# Patient Record
Sex: MALE | Race: WHITE | NOT HISPANIC OR LATINO | Employment: OTHER | URBAN - METROPOLITAN AREA
[De-identification: names, ages, dates, MRNs, and addresses within clinical notes are randomized per-mention and may not be internally consistent; named-entity substitution may affect disease eponyms.]

---

## 2017-01-10 ENCOUNTER — INFUSION (OUTPATIENT)
Dept: RHEUMATOLOGY | Facility: HOSPITAL | Age: 72
End: 2017-01-10
Attending: PHYSICIAN ASSISTANT
Payer: MEDICARE

## 2017-01-10 ENCOUNTER — LAB VISIT (OUTPATIENT)
Dept: LAB | Facility: HOSPITAL | Age: 72
End: 2017-01-10
Attending: PHYSICIAN ASSISTANT
Payer: MEDICARE

## 2017-01-10 ENCOUNTER — OFFICE VISIT (OUTPATIENT)
Dept: RHEUMATOLOGY | Facility: CLINIC | Age: 72
End: 2017-01-10
Payer: MEDICARE

## 2017-01-10 VITALS
BODY MASS INDEX: 32.21 KG/M2 | WEIGHT: 205.25 LBS | SYSTOLIC BLOOD PRESSURE: 117 MMHG | DIASTOLIC BLOOD PRESSURE: 64 MMHG | HEART RATE: 60 BPM | HEIGHT: 67 IN

## 2017-01-10 VITALS
DIASTOLIC BLOOD PRESSURE: 64 MMHG | SYSTOLIC BLOOD PRESSURE: 119 MMHG | HEART RATE: 49 BPM | WEIGHT: 205.25 LBS | BODY MASS INDEX: 32.15 KG/M2

## 2017-01-10 DIAGNOSIS — M06.9 RA (RHEUMATOID ARTHRITIS): Primary | ICD-10-CM

## 2017-01-10 DIAGNOSIS — M05.79 RHEUMATOID ARTHRITIS INVOLVING MULTIPLE SITES WITH POSITIVE RHEUMATOID FACTOR: Chronic | ICD-10-CM

## 2017-01-10 DIAGNOSIS — M05.79 RHEUMATOID ARTHRITIS INVOLVING MULTIPLE SITES WITH POSITIVE RHEUMATOID FACTOR: Primary | Chronic | ICD-10-CM

## 2017-01-10 DIAGNOSIS — M06.9 RA (RHEUMATOID ARTHRITIS): ICD-10-CM

## 2017-01-10 DIAGNOSIS — D84.9 IMMUNOCOMPROMISED PATIENT: Chronic | ICD-10-CM

## 2017-01-10 DIAGNOSIS — I73.9 PERIPHERAL VASCULAR DISEASE WITH CLAUDICATION: ICD-10-CM

## 2017-01-10 DIAGNOSIS — I25.118 CORONARY ARTERY DISEASE OF NATIVE ARTERY OF NATIVE HEART WITH STABLE ANGINA PECTORIS: ICD-10-CM

## 2017-01-10 DIAGNOSIS — Z51.81 MEDICATION MONITORING ENCOUNTER: Chronic | ICD-10-CM

## 2017-01-10 DIAGNOSIS — M05.79 RHEUMATOID ARTHRITIS INVOLVING MULTIPLE SITES WITH POSITIVE RHEUMATOID FACTOR: Primary | ICD-10-CM

## 2017-01-10 LAB
ALBUMIN SERPL BCP-MCNC: 3.3 G/DL
ALP SERPL-CCNC: 60 U/L
ALT SERPL W/O P-5'-P-CCNC: 16 U/L
ANION GAP SERPL CALC-SCNC: 9 MMOL/L
AST SERPL-CCNC: 28 U/L
BASOPHILS # BLD AUTO: 0.01 K/UL
BASOPHILS NFR BLD: 0.2 %
BILIRUB SERPL-MCNC: 0.6 MG/DL
BUN SERPL-MCNC: 11 MG/DL
CALCIUM SERPL-MCNC: 9.6 MG/DL
CHLORIDE SERPL-SCNC: 103 MMOL/L
CO2 SERPL-SCNC: 28 MMOL/L
CREAT SERPL-MCNC: 1 MG/DL
DIFFERENTIAL METHOD: ABNORMAL
EOSINOPHIL # BLD AUTO: 0.3 K/UL
EOSINOPHIL NFR BLD: 4.2 %
ERYTHROCYTE [DISTWIDTH] IN BLOOD BY AUTOMATED COUNT: 14.3 %
ERYTHROCYTE [SEDIMENTATION RATE] IN BLOOD BY WESTERGREN METHOD: 46 MM/HR
EST. GFR  (AFRICAN AMERICAN): >60 ML/MIN/1.73 M^2
EST. GFR  (NON AFRICAN AMERICAN): >60 ML/MIN/1.73 M^2
GLUCOSE SERPL-MCNC: 115 MG/DL
HCT VFR BLD AUTO: 42.8 %
HGB BLD-MCNC: 14.9 G/DL
LYMPHOCYTES # BLD AUTO: 2.3 K/UL
LYMPHOCYTES NFR BLD: 37.5 %
MCH RBC QN AUTO: 34.7 PG
MCHC RBC AUTO-ENTMCNC: 34.8 %
MCV RBC AUTO: 100 FL
MONOCYTES # BLD AUTO: 0.3 K/UL
MONOCYTES NFR BLD: 4.2 %
NEUTROPHILS # BLD AUTO: 3.4 K/UL
NEUTROPHILS NFR BLD: 53.9 %
PLATELET # BLD AUTO: 237 K/UL
PMV BLD AUTO: 8.7 FL
POTASSIUM SERPL-SCNC: 4.2 MMOL/L
PROT SERPL-MCNC: 7.1 G/DL
RBC # BLD AUTO: 4.3 M/UL
SODIUM SERPL-SCNC: 140 MMOL/L
WBC # BLD AUTO: 6.21 K/UL

## 2017-01-10 PROCEDURE — 25000003 PHARM REV CODE 250: Mod: PO | Performed by: PHYSICIAN ASSISTANT

## 2017-01-10 PROCEDURE — 96413 CHEMO IV INFUSION 1 HR: CPT | Mod: PO

## 2017-01-10 PROCEDURE — 99999 PR PBB SHADOW E&M-EST. PATIENT-LVL III: CPT | Mod: PBBFAC,,, | Performed by: INTERNAL MEDICINE

## 2017-01-10 PROCEDURE — 80053 COMPREHEN METABOLIC PANEL: CPT | Mod: PO

## 2017-01-10 PROCEDURE — 80299 QUANTITATIVE ASSAY DRUG: CPT

## 2017-01-10 PROCEDURE — 85025 COMPLETE CBC W/AUTO DIFF WBC: CPT | Mod: PO

## 2017-01-10 PROCEDURE — 85651 RBC SED RATE NONAUTOMATED: CPT | Mod: PO

## 2017-01-10 PROCEDURE — 36415 COLL VENOUS BLD VENIPUNCTURE: CPT | Mod: PO

## 2017-01-10 PROCEDURE — 86140 C-REACTIVE PROTEIN: CPT

## 2017-01-10 PROCEDURE — 99214 OFFICE O/P EST MOD 30 MIN: CPT | Mod: S$PBB,,, | Performed by: INTERNAL MEDICINE

## 2017-01-10 PROCEDURE — 96375 TX/PRO/DX INJ NEW DRUG ADDON: CPT | Mod: PO

## 2017-01-10 PROCEDURE — 63600175 PHARM REV CODE 636 W HCPCS: Mod: PO | Performed by: PHYSICIAN ASSISTANT

## 2017-01-10 RX ORDER — OMEGA-3-ACID ETHYL ESTERS 1 G/1
CAPSULE, LIQUID FILLED ORAL
Refills: 5 | COMMUNITY
Start: 2017-01-03 | End: 2019-07-03 | Stop reason: SDUPTHER

## 2017-01-10 RX ORDER — SODIUM CHLORIDE 0.9 % (FLUSH) 0.9 %
10 SYRINGE (ML) INJECTION
Status: CANCELLED | OUTPATIENT
Start: 2017-03-06

## 2017-01-10 RX ORDER — DIPHENHYDRAMINE HYDROCHLORIDE 50 MG/ML
12.5 INJECTION INTRAMUSCULAR; INTRAVENOUS ONCE
Status: COMPLETED | OUTPATIENT
Start: 2017-01-10 | End: 2017-01-10

## 2017-01-10 RX ORDER — EZETIMIBE 10 MG
TABLET ORAL
Refills: 5 | COMMUNITY
Start: 2016-12-27 | End: 2018-05-30

## 2017-01-10 RX ORDER — METHOTREXATE 2.5 MG/1
TABLET ORAL
Qty: 40 TABLET | Refills: 5 | Status: SHIPPED | OUTPATIENT
Start: 2017-01-10 | End: 2017-08-28 | Stop reason: SDUPTHER

## 2017-01-10 RX ORDER — DIPHENHYDRAMINE HYDROCHLORIDE 50 MG/ML
12.5 INJECTION INTRAMUSCULAR; INTRAVENOUS ONCE
Status: CANCELLED
Start: 2017-03-06 | End: 2017-03-06

## 2017-01-10 RX ORDER — METHYLPREDNISOLONE SOD SUCC 125 MG
50 VIAL (EA) INJECTION ONCE
Status: CANCELLED
Start: 2017-03-06 | End: 2017-03-06

## 2017-01-10 RX ORDER — SODIUM CHLORIDE 0.9 % (FLUSH) 0.9 %
10 SYRINGE (ML) INJECTION
Status: DISCONTINUED | OUTPATIENT
Start: 2017-01-10 | End: 2017-01-10 | Stop reason: HOSPADM

## 2017-01-10 RX ORDER — METHYLPREDNISOLONE SOD SUCC 125 MG
50 VIAL (EA) INJECTION ONCE
Status: COMPLETED | OUTPATIENT
Start: 2017-01-10 | End: 2017-01-10

## 2017-01-10 RX ADMIN — SODIUM CHLORIDE, PRESERVATIVE FREE 10 ML: 5 INJECTION INTRAVENOUS at 11:01

## 2017-01-10 RX ADMIN — DIPHENHYDRAMINE HYDROCHLORIDE 12.5 MG: 50 INJECTION INTRAMUSCULAR; INTRAVENOUS at 09:01

## 2017-01-10 RX ADMIN — INFLIXIMAB 800 MG: 100 INJECTION, POWDER, LYOPHILIZED, FOR SOLUTION INTRAVENOUS at 09:01

## 2017-01-10 RX ADMIN — METHYLPREDNISOLONE SODIUM SUCCINATE 50 MG: 125 INJECTION, POWDER, FOR SOLUTION INTRAMUSCULAR; INTRAVENOUS at 09:01

## 2017-01-10 ASSESSMENT — ROUTINE ASSESSMENT OF PATIENT INDEX DATA (RAPID3): MDHAQ FUNCTION SCORE: .6

## 2017-01-10 NOTE — PATIENT INSTRUCTIONS
Raise Mtx to 6 week for 2 weeks, the 8 week in split dose    No change in vits    Will check circulation in legs

## 2017-01-10 NOTE — PROGRESS NOTES
Infusion# >10  S/S infection noted or voiced? no  Recent labs? Today; infliximab antibodies drawn     Premeds? Benadryl 12.5 mg IVP over 2 min, and Solumedrol 50 mg IVP over 2 min    Remicade 800 mg administered IV at a 90 minute rate per orders; see MAR and vitals for more  details. Tolerated well without adverse events, discharged and ambulatory out of clinic.

## 2017-01-10 NOTE — MR AVS SNAPSHOT
[facility]  [address] [place] [address]  Phone: [phone]  Fax: [phone]                  Jack Back   1/10/2017 9:00 AM   Infusion    Description:  Male : 1945   Provider:  RHEUMATOLOGY, INFUSION   Department:  [facility]           Diagnoses this Visit        Comments    Rheumatoid arthritis involving multiple sites with positive rheumatoid factor    -  Primary     RA (rheumatoid arthritis)                To Do List           Future Appointments        Provider Department Dept Phone    3/7/2017 7:55 AM LABORATORY, [place] [facility] [phone]    3/7/2017 8:30 AM Cleo Espinoza PA-C [facility] Rheumatology [phone]    3/7/2017 9:30 AM RHEUMATOLOGY, INFUSION [facility] [phone]      Goals (5 Years of Data)     None      [facility]     [facility] -  Assistance  Registered nurses in the [facility] provide clinical advisement, health education, appointment booking, and other advisory services.  Call for this free service at [phone].             Medications           Message regarding Medications     Verify the changes and/or additions to your medication regime listed below are the same as discussed with your clinician today.  If any of these changes or additions are incorrect, please notify your healthcare provider.        These medications were administered today        Dose Freq    infliximab (REMICADE) 800 mg in sodium chloride 0.9% 250 mL IVPB 800 mg Once    Sig: Inject 800 mg into the vein once.    Class: Normal    Route: Intravenous    sodium chloride 0.9% flush 10 mL 10 mL As needed (PRN)    Sig: Inject 10 mLs into the vein as needed for Line Care.    Class: Normal    Route: Intravenous    diphenhydrAMINE injection 12.5 mg 12.5 mg Once    Sig: Inject 0.25 mLs (12.5 mg total) into the vein once.    Class: Normal    Route: Intravenous     methylPREDNISolone sodium succinate injection 50 mg 50 mg Once    Sig: Inject 50 mg into the vein once.    Class: Normal    Route: Intravenous           Verify that the below list of medications is an accurate representation of the medications you are currently taking.  If none reported, the list may be blank. If incorrect, please contact your healthcare provider. Carry this list with you in case of emergency.           Current Medications     arginine 500 mg tablet Take 500 mg by mouth once daily.    aspirin (ECOTRIN) 81 MG EC tablet Take 81 mg by mouth once daily.    atorvastatin (LIPITOR) 20 MG tablet     B-complex with vitamin C (Z-BEC OR EQUIV) tablet Take 1 tablet by mouth once daily.    co-enzyme Q-10 30 mg capsule Take 30 mg by mouth once daily.    EFFIENT 10 mg Tab     folic acid (FOLVITE) 800 MCG Tab Take 800 mcg by mouth once daily.    INFLIXIMAB (REMICADE IV) Inject into the vein.    isosorbide mononitrate (IMDUR) 30 MG 24 hr tablet 90 mg 2 (two) times daily.    lecithin 1,200 mg Cap Take by mouth.    methotrexate 2.5 MG Tab 4 tabs am, 4 tabs pm once a week    metoprolol tartrate (LOPRESSOR) 25 MG tablet 25 mg once daily.    milk thistle 175 mg tablet Take 175 mg by mouth once daily.    multivitamin with minerals tablet Take 1 tablet by mouth once daily.    NITROSTAT 0.4 mg SL tablet 0.4 mg.    omega-3 acid ethyl esters (LOVAZA) 1 gram capsule     POTASSIUM/MAGNESIUM (MAGNESIUM-POTASSIUM ORAL) Take by mouth.    prasterone, dhea, (DHEA) 50 mg Cap Take by mouth.    saw palmetto fruit 450 mg Cap Take by mouth.    SYNTHROID 75 mcg tablet     testosterone cypionate (DEPOTESTOTERONE CYPIONATE) 200 mg/mL injection     vitamin D 1000 units Tab Take 185 mg by mouth 2 (two) times daily.    ZETIA 10 mg tablet     zinc 50 mg Tab Take by mouth.           Clinical Reference Information           Vital Signs - Last Recorded  Most recent update: 1/10/2017 12:50 PM by Janina Tom RN    BP Pulse Wt BMI        119/64 (!) 49 93.1 kg (205 lb 4 oz) 32.15 kg/m2       Allergies as of 1/10/2017     Penicillins    Tetanus Vaccines And Toxoid    Vancomycin Analogues      Immunizations Administered on Date of Encounter - 1/10/2017     None      Orders Placed During Today's Visit      Normal Orders This Visit    Infliximab and Anti-Infliximab Ab       Administrations This Visit     diphenhydrAMINE injection 12.5 mg     Admin Date Action Dose Route Administered By             01/10/2017 Given 12.5 mg Intravenous Janina Tom RN                    infliximab (REMICADE) 800 mg in sodium chloride 0.9% 250 mL IVPB     Admin Date Action Dose Rate Route Administered By          01/10/2017 New Bag 800 mg 166.7 mL/hr Intravenous Janina Tom RN                   methylPREDNISolone sodium succinate injection 50 mg     Admin Date Action Dose Route Administered By             01/10/2017 Given 50 mg Intravenous Janina Tom RN                    sodium chloride 0.9% flush 10 mL     Admin Date Action Dose Route Administered By             01/10/2017 Given 10 mL Intravenous Janina Tom RN

## 2017-01-10 NOTE — PLAN OF CARE
Problem: Patient Care Overview  Goal: Plan of Care Review  Outcome: Ongoing (interventions implemented as appropriate)  I'm feeling a little worse than I have been before.

## 2017-01-11 LAB — CRP SERPL-MCNC: 5.9 MG/L

## 2017-01-13 LAB
INFLIXIMAB DRUG LEVEL: 6 MCG/ML
INFLIXIMAB INTERPRETATION: NORMAL

## 2017-03-07 ENCOUNTER — INFUSION (OUTPATIENT)
Dept: RHEUMATOLOGY | Facility: HOSPITAL | Age: 72
End: 2017-03-07
Attending: INTERNAL MEDICINE
Payer: MEDICARE

## 2017-03-07 ENCOUNTER — OFFICE VISIT (OUTPATIENT)
Dept: RHEUMATOLOGY | Facility: CLINIC | Age: 72
End: 2017-03-07
Payer: MEDICARE

## 2017-03-07 VITALS
DIASTOLIC BLOOD PRESSURE: 72 MMHG | BODY MASS INDEX: 32.21 KG/M2 | HEIGHT: 67 IN | WEIGHT: 205.25 LBS | SYSTOLIC BLOOD PRESSURE: 137 MMHG | HEART RATE: 54 BPM

## 2017-03-07 VITALS
BODY MASS INDEX: 32.15 KG/M2 | DIASTOLIC BLOOD PRESSURE: 60 MMHG | WEIGHT: 205.25 LBS | SYSTOLIC BLOOD PRESSURE: 107 MMHG | HEART RATE: 43 BPM

## 2017-03-07 DIAGNOSIS — R70.0 ELEVATED SED RATE: ICD-10-CM

## 2017-03-07 DIAGNOSIS — M05.79 RHEUMATOID ARTHRITIS INVOLVING MULTIPLE SITES WITH POSITIVE RHEUMATOID FACTOR: Primary | ICD-10-CM

## 2017-03-07 DIAGNOSIS — Z51.81 MEDICATION MONITORING ENCOUNTER: Chronic | ICD-10-CM

## 2017-03-07 DIAGNOSIS — M05.79 RHEUMATOID ARTHRITIS INVOLVING MULTIPLE SITES WITH POSITIVE RHEUMATOID FACTOR: Primary | Chronic | ICD-10-CM

## 2017-03-07 DIAGNOSIS — D84.9 IMMUNOCOMPROMISED PATIENT: Chronic | ICD-10-CM

## 2017-03-07 PROCEDURE — 25000003 PHARM REV CODE 250: Mod: PO | Performed by: PHYSICIAN ASSISTANT

## 2017-03-07 PROCEDURE — 63600175 PHARM REV CODE 636 W HCPCS: Mod: PO | Performed by: PHYSICIAN ASSISTANT

## 2017-03-07 PROCEDURE — 99214 OFFICE O/P EST MOD 30 MIN: CPT | Mod: S$PBB,,, | Performed by: PHYSICIAN ASSISTANT

## 2017-03-07 PROCEDURE — 99999 PR PBB SHADOW E&M-EST. PATIENT-LVL IV: CPT | Mod: PBBFAC,,, | Performed by: PHYSICIAN ASSISTANT

## 2017-03-07 PROCEDURE — 96413 CHEMO IV INFUSION 1 HR: CPT | Mod: PO

## 2017-03-07 RX ORDER — METHYLPREDNISOLONE SOD SUCC 125 MG
50 VIAL (EA) INJECTION ONCE
Status: CANCELLED
Start: 2017-05-01 | End: 2017-05-01

## 2017-03-07 RX ORDER — SODIUM CHLORIDE 0.9 % (FLUSH) 0.9 %
10 SYRINGE (ML) INJECTION
Status: CANCELLED | OUTPATIENT
Start: 2017-05-01

## 2017-03-07 RX ORDER — DIPHENHYDRAMINE HYDROCHLORIDE 50 MG/ML
12.5 INJECTION INTRAMUSCULAR; INTRAVENOUS ONCE
Status: CANCELLED
Start: 2017-05-01 | End: 2017-05-01

## 2017-03-07 RX ORDER — SODIUM CHLORIDE 0.9 % (FLUSH) 0.9 %
10 SYRINGE (ML) INJECTION
Status: DISCONTINUED | OUTPATIENT
Start: 2017-03-07 | End: 2017-03-07 | Stop reason: HOSPADM

## 2017-03-07 RX ORDER — LEVOTHYROXINE SODIUM 88 UG/1
88 TABLET ORAL DAILY
Refills: 5 | COMMUNITY
Start: 2017-02-21

## 2017-03-07 RX ADMIN — Medication 10 ML: at 09:03

## 2017-03-07 RX ADMIN — INFLIXIMAB 800 MG: 100 INJECTION, POWDER, LYOPHILIZED, FOR SOLUTION INTRAVENOUS at 09:03

## 2017-03-07 ASSESSMENT — CLINICAL DISEASE ACTIVITY INDEX (CDAI)
SWOLLEN_JOINTS_COUNT: 0
TOTAL_SCORE: 2
TENDER_JOINTS_COUNT: 0
PHYSICIAN_ASSESSMENT: 1
PATIENT_ASSESSMENT: 1

## 2017-03-07 ASSESSMENT — ROUTINE ASSESSMENT OF PATIENT INDEX DATA (RAPID3): MDHAQ FUNCTION SCORE: .5

## 2017-03-07 NOTE — PROGRESS NOTES
Infusion# >10  S/S infection noted or voiced? None noted/denied  Recent labs? yes    Premeds? None    Remicade 800 mg administered IV at a 90 minute rate per orders; see MAR and vitals for more  details. Tolerated well without adverse events, discharged and ambulatory out of clinic.

## 2017-03-07 NOTE — PROGRESS NOTES
Subjective:       Patient ID: Jack Back is a 72 y.o. male.    Chief Complaint: Rheumatoid Arthritis      HPI Comments: Jack is back today for rheumatology follow-up.  He has seropositive erosive rheumatoid arthritis. Last year was off schedule due to MI.  Had stent place. 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 He wanted to stay off his remicade and see how he did. After missing 2 months. Remicade resumed. initally not back to remission so we opted to increase his mtx to 20 mg once weekly. His joints are doing better. Still some aching and stiffness. No swelling or acute exacerbations. Pain 0/10 today. His esr has remained elevated. He is concerned from a cardiac standpoint with the chronic inflammation. He is on fish oil, liptor 20 mg, zetia, asa 81 mg and effient. Also on metoprolol. His bp is good. managed by cardiology. Previous MI with CABG X 3 in 2012. He is on lipitor, metoprolol and brilinta. They does have some mild heart damage. He Will be watched closely by cards. echo done a few weeks ago showed prior damage noted was resolved. His heart function was back to normal.  He has not developed heart failure. No lower ext edema, mild sob, no chest pain. No pnd    Dose of Remicade 800 mg (7.9 mg/kg) IV over 90 min.  On folic acid otc.       He denies any fevers, no cough,  congestion.  He did have a bone density done in June 2014 which was normal. Vaccines all up to date except TdaP but pt allergic to Tetanus.           Review of Systems   Constitutional: Negative.  Negative for chills, fatigue and fever.   HENT: Negative.  Negative for congestion, mouth sores and trouble swallowing.    Eyes: Negative.  Negative for photophobia, redness and visual disturbance.   Respiratory: Negative.  Negative for cough, shortness of breath and wheezing.    Cardiovascular: Negative.  Negative for chest pain and leg swelling.   Gastrointestinal: Negative.  Negative for abdominal distention, abdominal pain, diarrhea, nausea and vomiting.  "  Genitourinary: Negative.  Negative for dysuria, flank pain, frequency and urgency.   Musculoskeletal: Negative for arthralgias, back pain, gait problem, joint swelling and myalgias.        Mild aching and stiffness   Skin: Negative.  Negative for rash.   Neurological: Negative.  Negative for dizziness, weakness and numbness.           Objective:     /72  Pulse (!) 54  Ht 5' 7" (1.702 m)  Wt 93.1 kg (205 lb 4 oz)  BMI 32.15 kg/m2        Physical Exam   Constitutional: He is oriented to person, place, and time and well-developed, well-nourished, and in no distress. No distress.   HENT:   Head: Normocephalic and atraumatic.   Right Ear: External ear normal.   Left Ear: External ear normal.   Mouth/Throat: No oropharyngeal exudate.   Eyes: Conjunctivae and EOM are normal. Pupils are equal, round, and reactive to light. No scleral icterus.   Neck: Neck supple. No thyromegaly present.   Cardiovascular: Normal rate, regular rhythm and normal heart sounds.    No murmur heard.  Pulmonary/Chest: Effort normal and breath sounds normal. No respiratory distress.   Abdominal: Soft. Bowel sounds are normal.       Right Side Rheumatological Exam     Examination finds the shoulder, 1st PIP, 1st MCP, 2nd PIP, 2nd MCP, 3rd PIP, 3rd MCP, 4th PIP, 4th MCP, 5th PIP and 5th MCP normal.    Joint Exam Comments   Elbow: limited extension   Wrist: Mild reduced flexion of wrist, no synovitis    Left Side Rheumatological Exam     Examination finds the shoulder, elbow, knee, 1st PIP, 1st MCP, 2nd PIP, 2nd MCP, 3rd PIP, 3rd MCP, 4th PIP, 4th MCP, 5th PIP and 5th MCP normal.    Joint Exam Comments   Wrist: Limited motion left wrists, no synovitis      Lymphadenopathy:     He has no cervical adenopathy.   Neurological: He is alert and oriented to person, place, and time. No cranial nerve deficit. He exhibits normal muscle tone. Gait normal. Coordination normal.   Skin: Skin is warm and dry.     Musculoskeletal: Normal range of motion. " He exhibits deformity. He exhibits no edema or tenderness.   Ulnar drift 25 degrees right hand and 15 degrees left hand, right elbow fixed flexion stops about  5 degrees from baseline,  Limited motion both wrist extension  No synovitis on exam today               Results for orders placed or performed in visit on 03/07/17   CBC auto differential   Result Value Ref Range    WBC 6.15 3.90 - 12.70 K/uL    RBC 4.09 (L) 4.60 - 6.20 M/uL    Hemoglobin 14.1 14.0 - 18.0 g/dL    Hematocrit 41.0 40.0 - 54.0 %     (H) 82 - 98 fL    MCH 34.5 (H) 27.0 - 31.0 pg    MCHC 34.4 32.0 - 36.0 %    RDW 15.3 (H) 11.5 - 14.5 %    Platelets 251 150 - 350 K/uL    MPV 8.7 (L) 9.2 - 12.9 fL    Gran # 3.4 1.8 - 7.7 K/uL    Lymph # 2.4 1.0 - 4.8 K/uL    Mono # 0.2 (L) 0.3 - 1.0 K/uL    Eos # 0.2 0.0 - 0.5 K/uL    Baso # 0.01 0.00 - 0.20 K/uL    Gran% 55.7 38.0 - 73.0 %    Lymph% 38.2 18.0 - 48.0 %    Mono% 3.1 (L) 4.0 - 15.0 %    Eosinophil% 2.8 0.0 - 8.0 %    Basophil% 0.2 0.0 - 1.9 %    Differential Method Automated          DEXA 6/17/14 NORMAL        6/2016 [facility] hand and foot X-rays stable   2/25/15 [facility] hand [facility] foot films       Assessment:       1. Rheumatoid arthritis involving multiple sites with positive rheumatoid factor    2. Medication monitoring encounter    3. Immunocompromised patient    4. Elevated sed rate          1. Seropositive Erosive RA  Was flaring off  IV Remicade 800 mg Q 9 weeks now back to baseline with resuming remicade  - chronic damage but no activity on exam today 0 swollen/0 tender- CDAI 0- LI 0.5--      2. Vaccines need TdaP but allergic to tetnus    3. Med monitoring and immunocompromised     4. Immunocompromised no issues with recurrent infections    5. CAD post CABG X 3 2012 and post PTCA stent 2016 both related to MI with normal heart function on recent  ECHO    6. Chronic elevated esr- last visit crp normal but prior was elevated also     Plan:       Ok for IV Remicade 800 mg  infusion over 90 min.  Will  Stop premeds today   Keep  frequency every 8 weeks     Keep mtx at 20 mg weekly splitting dose 4 tabs am and pm, Always remember to Hold mtx for signs of infection or for surgery     Remain off prednisone     Watch closely if any heart failure develops then we would consider using [place] or actemra (high esr) or anther biologic rather than going back to TNF agents    Keep his folic acid daily     bone density up-to-date repeat in 3-5 years    defer TdaP due to pt allergic to tetanus.      Continue all meds per cards and follow up closely    rtc 8 weeks infusion only and 16 weeks with remicade infusion, labs and office visit    Repeat hand and foot X-rays 1 year    Next visit check spep and immunofixation     call with any questions, changes, or concerns          CC:   Dr Sanjay Ansari-CARDS  Dr Shahram Benavides-PCP

## 2017-04-21 ENCOUNTER — TELEPHONE (OUTPATIENT)
Dept: RHEUMATOLOGY | Facility: HOSPITAL | Age: 72
End: 2017-04-21

## 2017-05-02 ENCOUNTER — INFUSION (OUTPATIENT)
Dept: RHEUMATOLOGY | Facility: HOSPITAL | Age: 72
End: 2017-05-02
Attending: PHYSICIAN ASSISTANT
Payer: MEDICARE

## 2017-05-02 ENCOUNTER — LAB VISIT (OUTPATIENT)
Dept: LAB | Facility: HOSPITAL | Age: 72
End: 2017-05-02
Attending: INTERNAL MEDICINE
Payer: MEDICARE

## 2017-05-02 VITALS
BODY MASS INDEX: 31.21 KG/M2 | WEIGHT: 199.31 LBS | TEMPERATURE: 99 F | SYSTOLIC BLOOD PRESSURE: 124 MMHG | DIASTOLIC BLOOD PRESSURE: 69 MMHG | RESPIRATION RATE: 18 BRPM | HEART RATE: 48 BPM

## 2017-05-02 DIAGNOSIS — M05.79 RHEUMATOID ARTHRITIS INVOLVING MULTIPLE SITES WITH POSITIVE RHEUMATOID FACTOR: Chronic | ICD-10-CM

## 2017-05-02 DIAGNOSIS — M05.79 RHEUMATOID ARTHRITIS INVOLVING MULTIPLE SITES WITH POSITIVE RHEUMATOID FACTOR: Primary | ICD-10-CM

## 2017-05-02 DIAGNOSIS — Z51.81 MEDICATION MONITORING ENCOUNTER: Chronic | ICD-10-CM

## 2017-05-02 DIAGNOSIS — R70.0 ELEVATED SED RATE: ICD-10-CM

## 2017-05-02 LAB
ALBUMIN SERPL BCP-MCNC: 3.5 G/DL
ALP SERPL-CCNC: 63 U/L
ALT SERPL W/O P-5'-P-CCNC: 24 U/L
ANION GAP SERPL CALC-SCNC: 8 MMOL/L
AST SERPL-CCNC: 33 U/L
BASOPHILS # BLD AUTO: 0 K/UL
BASOPHILS NFR BLD: 0 %
BILIRUB SERPL-MCNC: 0.5 MG/DL
BUN SERPL-MCNC: 15 MG/DL
CALCIUM SERPL-MCNC: 9.4 MG/DL
CHLORIDE SERPL-SCNC: 104 MMOL/L
CO2 SERPL-SCNC: 29 MMOL/L
CREAT SERPL-MCNC: 1 MG/DL
DIFFERENTIAL METHOD: ABNORMAL
EOSINOPHIL # BLD AUTO: 0.3 K/UL
EOSINOPHIL NFR BLD: 6 %
ERYTHROCYTE [DISTWIDTH] IN BLOOD BY AUTOMATED COUNT: 14.4 %
EST. GFR  (AFRICAN AMERICAN): >60 ML/MIN/1.73 M^2
EST. GFR  (NON AFRICAN AMERICAN): >60 ML/MIN/1.73 M^2
GLUCOSE SERPL-MCNC: 93 MG/DL
HCT VFR BLD AUTO: 42.6 %
HGB BLD-MCNC: 14.8 G/DL
LYMPHOCYTES # BLD AUTO: 2.3 K/UL
LYMPHOCYTES NFR BLD: 43.9 %
MCH RBC QN AUTO: 34.8 PG
MCHC RBC AUTO-ENTMCNC: 34.7 %
MCV RBC AUTO: 100 FL
MONOCYTES # BLD AUTO: 0.1 K/UL
MONOCYTES NFR BLD: 2.3 %
NEUTROPHILS # BLD AUTO: 2.6 K/UL
NEUTROPHILS NFR BLD: 47.8 %
PLATELET # BLD AUTO: 189 K/UL
PMV BLD AUTO: 8.7 FL
POTASSIUM SERPL-SCNC: 4 MMOL/L
PROT SERPL-MCNC: 7.5 G/DL
RBC # BLD AUTO: 4.25 M/UL
SODIUM SERPL-SCNC: 141 MMOL/L
WBC # BLD AUTO: 5.33 K/UL

## 2017-05-02 PROCEDURE — 36415 COLL VENOUS BLD VENIPUNCTURE: CPT | Mod: PO

## 2017-05-02 PROCEDURE — 86334 IMMUNOFIX E-PHORESIS SERUM: CPT

## 2017-05-02 PROCEDURE — 80053 COMPREHEN METABOLIC PANEL: CPT | Mod: PO

## 2017-05-02 PROCEDURE — 84165 PROTEIN E-PHORESIS SERUM: CPT | Mod: 26,,, | Performed by: PATHOLOGY

## 2017-05-02 PROCEDURE — 85025 COMPLETE CBC W/AUTO DIFF WBC: CPT | Mod: PO

## 2017-05-02 PROCEDURE — 84165 PROTEIN E-PHORESIS SERUM: CPT

## 2017-05-02 PROCEDURE — 86334 IMMUNOFIX E-PHORESIS SERUM: CPT | Mod: 26,,, | Performed by: PATHOLOGY

## 2017-05-02 RX ORDER — METHYLPREDNISOLONE SOD SUCC 125 MG
50 VIAL (EA) INJECTION ONCE
Status: CANCELLED
Start: 2017-05-02 | End: 2017-05-02

## 2017-05-02 RX ORDER — DIPHENHYDRAMINE HYDROCHLORIDE 50 MG/ML
12.5 INJECTION INTRAMUSCULAR; INTRAVENOUS ONCE
Status: CANCELLED
Start: 2017-05-02 | End: 2017-05-02

## 2017-05-02 RX ADMIN — INFLIXIMAB 800 MG: 100 INJECTION, POWDER, LYOPHILIZED, FOR SOLUTION INTRAVENOUS at 08:05

## 2017-05-02 NOTE — MR AVS SNAPSHOT
[facility]  [address] [place] [address]  Phone: [phone]  Fax: [phone]                  Jack Back   2017 8:30 AM   Infusion    Description:  Male : 1945   Provider:  RHEUMATOLOGY, INFUSION   Department:  [facility]           Diagnoses this Visit        Comments    Rheumatoid arthritis involving multiple sites with positive rheumatoid factor    -  Primary            To Do List           Future Appointments        Provider Department Dept Phone    2017 8:00 AM LABORATORY, [place] [facility] [phone]    2017 8:30 AM Cleo Espinoza PA-C [facility] Rheumatology [phone]    2017 9:00 AM RHEUMATOLOGY, INFUSION [facility] [phone]    2017 8:00 AM LABORATORY, [facility] [facility] [phone]    2017 8:30 AM RHEUMATOLOGY, INFUSION [facility] [phone]      Goals (5 Years of Data)     None      [facility]     [facility] Care [facility] -  Assistance  Unless otherwise directed by your provider, please contact [facility], our nurse care line that is available for  assistance.     Registered nurses in the [facility] provide: appointment scheduling, clinical advisement, health education, and other advisory services.  Call: [phone] (toll free)               Medications           Message regarding Medications     Verify the changes and/or additions to your medication regime listed below are the same as discussed with your clinician today.  If any of these changes or additions are incorrect, please notify your healthcare provider.        These medications were administered today        Dose Freq    infliximab (REMICADE) 800 mg in sodium chloride 0.9% 250 mL IVPB 800 mg Once    Sig: Inject 800 mg into the vein once.    Class: Normal    Route: Intravenous      STOP taking these medications      atorvastatin (LIPITOR) 20 MG tablet     EFFIENT 10 mg Tab     isosorbide mononitrate (IMDUR) 30 MG 24 hr tablet 90 mg 2 (two) times daily.    metoprolol tartrate (LOPRESSOR) 25 MG tablet 25 mg once daily.    testosterone cypionate (DEPOTESTOTERONE CYPIONATE) 200 mg/mL injection            Verify that the below list of medications is an accurate representation of the medications you are currently taking.  If none reported, the list may be blank. If incorrect, please contact your healthcare provider. Carry this list with you in case of emergency.           Current Medications     arginine 500 mg tablet Take 500 mg by mouth once daily.    aspirin (ECOTRIN) 81 MG EC tablet Take 81 mg by mouth once daily.    B-complex with vitamin C (Z-BEC OR EQUIV) tablet Take 1 tablet by mouth once daily.    co-enzyme Q-10 30 mg capsule Take 30 mg by mouth once daily.    folic acid (FOLVITE) 800 MCG Tab Take 800 mcg by mouth once daily.    INFLIXIMAB (REMICADE IV) Inject into the vein.    lecithin 1,200 mg Cap Take by mouth.    methotrexate 2.5 MG Tab 4 tabs am, 4 tabs pm once a week    milk thistle 175 mg tablet Take 175 mg by mouth once daily.    multivitamin with minerals tablet Take 1 tablet by mouth once daily.    NITROSTAT 0.4 mg SL tablet 0.4 mg.    omega-3 acid ethyl esters (LOVAZA) 1 gram capsule     POTASSIUM/MAGNESIUM (MAGNESIUM-POTASSIUM ORAL) Take by mouth.    prasterone, dhea, (DHEA) 50 mg Cap Take by mouth.    saw palmetto fruit 450 mg Cap Take by mouth.    SYNTHROID 88 mcg tablet 88 mcg once daily.    vitamin D 1000 units Tab Take 185 mg by mouth 2 (two) times daily.    ZETIA 10 mg tablet     zinc 50 mg Tab Take by mouth.           Clinical Reference Information           Your Vitals Were     BP Pulse Temp Resp Weight BMI    112/68 48 98.6 °F (37 °C) 18 90.4 kg (199 lb 4.7 oz) 31.21 kg/m2      Allergies as of 5/2/2017     Penicillins    Tetanus Vaccines And Toxoid    Vancomycin Analogues      Immunizations Administered  on Date of Encounter - 5/2/2017     None      Orders Placed During Today's Visit      Normal Orders This Visit    Medication Pre-Authorization       Administrations This Visit     infliximab (REMICADE) 800 mg in sodium chloride 0.9% 250 mL IVPB     Admin Date Action Dose Rate Route Administered By          05/02/2017 New Bag 800 mg 125 mL/hr Intravenous Tamika Monroy RN                     Instructions      Infliximab injection  What is this medicine?  INFLIXIMAB (in FLIX i mab) is used to treat Crohn's disease and ulcerative colitis. It is also used to treat ankylosing spondylitis, psoriasis, and some forms of arthritis.  How should I use this medicine?  This medicine is for injection into a vein. It is usually given by a health care professional in a hospital or clinic setting.  A special MedGuide will be given to you by the pharmacist with each prescription and refill. Be sure to read this information carefully each time.  Talk to your pediatrician regarding the use of this medicine in children. Special care may be needed.  What side effects may I notice from receiving this medicine?  Side effects that you should report to your doctor or health care professional as soon as possible:  · allergic reactions like skin rash, itching or hives, swelling of the face, lips, or tongue  · chest pain  · fever or chills, usually related to the infusion  · muscle or joint pain  · red, scaly patches or raised bumps on the skin  · signs of infection - fever or chills, cough, sore throat, pain or difficulty passing urine  · swollen lymph nodes in the neck, underarm, or groin areas  · unexplained weight loss  · unusual bleeding or bruising  · unusually weak or tired  · yellowing of the eyes or skin  Side effects that usually do not require medical attention (report to your doctor or health care professional if they continue or are bothersome):  · headache  · heartburn or stomach pain  · nausea, vomiting  What may interact  with this medicine?  Do not take this medicine with any of the following medications:  · anakinra  · rilonacept  This medicine may also interact with the following medications:  · vaccines  What if I miss a dose?  It is important not to miss your dose. Call your doctor or health care professional if you are unable to keep an appointment.  Where should I keep my medicine?  This drug is given in a hospital or clinic and will not be stored at home.  What should I tell my health care provider before I take this medicine?  They need to know if you have any of these conditions:  · diabetes  · exposure to tuberculosis  · heart failure  · hepatitis or liver disease  · immune system problems  · infection  · lung or breathing disease, like COPD  · multiple sclerosis  · current or past resident of [place]  · seizure disorder  · an unusual or allergic reaction to infliximab, mouse proteins, other medicines, foods, dyes, or preservatives  · pregnant or trying to get pregnant  · breast-feeding  What should I watch for while using this medicine?  Visit your doctor or health care professional for regular checks on your progress.  If you get a cold or other infection while receiving this medicine, call your doctor or health care professional. Do not treat yourself. This medicine may decrease your body's ability to fight infections. Before beginning therapy, your doctor may do a test to see if you have been exposed to tuberculosis.  This medicine may make the symptoms of heart failure worse in some patients. If you notice symptoms such as increased shortness of breath or swelling of the ankles or legs, contact your health care provider right away.  If you are going to have surgery or dental work, tell your health care professional or dentist that you have received this medicine.  If you take this medicine for plaque psoriasis, stay out of the sun. If you cannot avoid being in the sun, wear protective  clothing and use sunscreen. Do not use sun lamps or tanning beds/booths.  Date Last Reviewed:   NOTE:This sheet is a summary. It may not cover all possible information. If you have questions about this medicine, talk to your doctor, pharmacist, or health care provider. Copyright© 2016 [employer]    WAYS TO HELP PREVENT INFECTION         WASH YOUR HANDS OFTEN DURING THE DAY, ESPECIALLY BEFORE YOU EAT, AFTER USING THE BATHROOM, AND AFTER TOUCHING ANIMALS     STAY AWAY FROM PEOPLE WHO HAVE ILLNESSES YOU CAN CATCH; SUCH AS COLDS, FLU, CHICKEN POX     TRY TO AVOID CROWDS     STAY AWAY FROM CHILDREN WHO RECENTLY HAVE RECEIVED LIVE VIRUS VACCINES     MAINTAIN GOOD MOUTH CARE     DO NOT SQUEEZE OR SCRATCH PIMPLES     CLEAN CUTS & SCRAPES RIGHT AWAY AND DAILY UNTIL HEALED WITH WARM WATER, SOAP & AN ANTISEPTIC     AVOID CONTACT WITH LITTER BOXES, BIRD CAGES, & FISH TANKS     AVOID STANDING WATER, IE., BIRD BATHS, FLOWER POTS/VASES, OR HUMIDIFIERS     WEAR GLOVES WHEN GARDENING OR CLEANING UP AFTER OTHERS, ESPECIALLY BABIES & SMALL CHILDREN     DO NOT EAT RAW FISH, SEAFOOD, MEAT, OR EGGS             Language Assistance Services     ATTENTION: Language assistance services are available, free of charge. Please call [phone].      ATENCIÓN: Si habla español, tiene a singer disposición servicios gratuitos de asistencia lingüística. Llame al [phone].     CHÚ Ý: N?u b?n nói Ti?ng Vi?t, có các d?ch v? h? tr? ngôn ng? mi?n phí dành cho b?n. G?i s? [phone].         [facility] complies with applicable Federal civil rights laws and does not discriminate on the basis of race, color, national origin, age, disability, or sex.

## 2017-05-02 NOTE — PATIENT INSTRUCTIONS
Infliximab injection  What is this medicine?  INFLIXIMAB (in FLIX i mab) is used to treat Crohn's disease and ulcerative colitis. It is also used to treat ankylosing spondylitis, psoriasis, and some forms of arthritis.  How should I use this medicine?  This medicine is for injection into a vein. It is usually given by a health care professional in a hospital or clinic setting.  A special MedGuide will be given to you by the pharmacist with each prescription and refill. Be sure to read this information carefully each time.  Talk to your pediatrician regarding the use of this medicine in children. Special care may be needed.  What side effects may I notice from receiving this medicine?  Side effects that you should report to your doctor or health care professional as soon as possible:  · allergic reactions like skin rash, itching or hives, swelling of the face, lips, or tongue  · chest pain  · fever or chills, usually related to the infusion  · muscle or joint pain  · red, scaly patches or raised bumps on the skin  · signs of infection - fever or chills, cough, sore throat, pain or difficulty passing urine  · swollen lymph nodes in the neck, underarm, or groin areas  · unexplained weight loss  · unusual bleeding or bruising  · unusually weak or tired  · yellowing of the eyes or skin  Side effects that usually do not require medical attention (report to your doctor or health care professional if they continue or are bothersome):  · headache  · heartburn or stomach pain  · nausea, vomiting  What may interact with this medicine?  Do not take this medicine with any of the following medications:  · anakinra  · rilonacept  This medicine may also interact with the following medications:  · vaccines  What if I miss a dose?  It is important not to miss your dose. Call your doctor or health care professional if you are unable to keep an appointment.  Where should I keep my medicine?  This drug is given in a hospital or clinic  and will not be stored at home.  What should I tell my health care provider before I take this medicine?  They need to know if you have any of these conditions:  · diabetes  · exposure to tuberculosis  · heart failure  · hepatitis or liver disease  · immune system problems  · infection  · lung or breathing disease, like COPD  · multiple sclerosis  · current or past resident of [place]  · seizure disorder  · an unusual or allergic reaction to infliximab, mouse proteins, other medicines, foods, dyes, or preservatives  · pregnant or trying to get pregnant  · breast-feeding  What should I watch for while using this medicine?  Visit your doctor or health care professional for regular checks on your progress.  If you get a cold or other infection while receiving this medicine, call your doctor or health care professional. Do not treat yourself. This medicine may decrease your body's ability to fight infections. Before beginning therapy, your doctor may do a test to see if you have been exposed to tuberculosis.  This medicine may make the symptoms of heart failure worse in some patients. If you notice symptoms such as increased shortness of breath or swelling of the ankles or legs, contact your health care provider right away.  If you are going to have surgery or dental work, tell your health care professional or dentist that you have received this medicine.  If you take this medicine for plaque psoriasis, stay out of the sun. If you cannot avoid being in the sun, wear protective clothing and use sunscreen. Do not use sun lamps or tanning beds/booths.  Date Last Reviewed:   NOTE:This sheet is a summary. It may not cover all possible information. If you have questions about this medicine, talk to your doctor, pharmacist, or health care provider. Copyright© 2016 [employer]    WAYS TO HELP PREVENT INFECTION         WASH YOUR HANDS OFTEN DURING THE DAY, ESPECIALLY BEFORE YOU EAT, AFTER USING THE  BATHROOM, AND AFTER TOUCHING ANIMALS     STAY AWAY FROM PEOPLE WHO HAVE ILLNESSES YOU CAN CATCH; SUCH AS COLDS, FLU, CHICKEN POX     TRY TO AVOID CROWDS     STAY AWAY FROM CHILDREN WHO RECENTLY HAVE RECEIVED LIVE VIRUS VACCINES     MAINTAIN GOOD MOUTH CARE     DO NOT SQUEEZE OR SCRATCH PIMPLES     CLEAN CUTS & SCRAPES RIGHT AWAY AND DAILY UNTIL HEALED WITH WARM WATER, SOAP & AN ANTISEPTIC     AVOID CONTACT WITH LITTER BOXES, BIRD CAGES, & FISH TANKS     AVOID STANDING WATER, IE., BIRD BATHS, FLOWER POTS/VASES, OR HUMIDIFIERS     WEAR GLOVES WHEN GARDENING OR CLEANING UP AFTER OTHERS, ESPECIALLY BABIES & SMALL CHILDREN     DO NOT EAT RAW FISH, SEAFOOD, MEAT, OR EGGS

## 2017-05-02 NOTE — PROGRESS NOTES
Infusion# >10  S/S infection noted or voiced? denies  Recent labs? yes    Premeds? no    Remicade 800 mg administered IV at a 90 minute rate per orders; see MAR and vitals for more  details. Tolerated well without adverse events, discharged and ambulatory out of clinic.

## 2017-05-03 LAB
ALBUMIN SERPL ELPH-MCNC: 3.76 G/DL
ALPHA1 GLOB SERPL ELPH-MCNC: 0.27 G/DL
ALPHA2 GLOB SERPL ELPH-MCNC: 0.56 G/DL
B-GLOBULIN SERPL ELPH-MCNC: 1.18 G/DL
GAMMA GLOB SERPL ELPH-MCNC: 1.63 G/DL
INTERPRETATION SERPL IFE-IMP: NORMAL
PATHOLOGIST INTERPRETATION IFE: NORMAL
PATHOLOGIST INTERPRETATION SPE: NORMAL
PROT SERPL-MCNC: 7.4 G/DL

## 2017-06-14 ENCOUNTER — TELEPHONE (OUTPATIENT)
Dept: RHEUMATOLOGY | Facility: HOSPITAL | Age: 72
End: 2017-06-14

## 2017-06-14 NOTE — TELEPHONE ENCOUNTER
Dr. Thurston,  Infusion time defaulted to 120 minutes on therapy plan when new plan was done, can you change infusion time to 90 minutes on therapy plan? Thank you so much!

## 2017-06-22 ENCOUNTER — PATIENT MESSAGE (OUTPATIENT)
Dept: RHEUMATOLOGY | Facility: CLINIC | Age: 72
End: 2017-06-22

## 2017-06-27 ENCOUNTER — INFUSION (OUTPATIENT)
Dept: RHEUMATOLOGY | Facility: HOSPITAL | Age: 72
End: 2017-06-27
Attending: PHYSICIAN ASSISTANT
Payer: MEDICARE

## 2017-06-27 ENCOUNTER — OFFICE VISIT (OUTPATIENT)
Dept: RHEUMATOLOGY | Facility: CLINIC | Age: 72
End: 2017-06-27
Payer: MEDICARE

## 2017-06-27 ENCOUNTER — LAB VISIT (OUTPATIENT)
Dept: LAB | Facility: HOSPITAL | Age: 72
End: 2017-06-27
Attending: PHYSICIAN ASSISTANT
Payer: MEDICARE

## 2017-06-27 VITALS
BODY MASS INDEX: 29.21 KG/M2 | DIASTOLIC BLOOD PRESSURE: 64 MMHG | HEART RATE: 44 BPM | SYSTOLIC BLOOD PRESSURE: 128 MMHG | WEIGHT: 186.5 LBS

## 2017-06-27 VITALS
DIASTOLIC BLOOD PRESSURE: 64 MMHG | SYSTOLIC BLOOD PRESSURE: 129 MMHG | BODY MASS INDEX: 29.83 KG/M2 | HEART RATE: 56 BPM | WEIGHT: 190.5 LBS

## 2017-06-27 DIAGNOSIS — Z51.81 MEDICATION MONITORING ENCOUNTER: Chronic | ICD-10-CM

## 2017-06-27 DIAGNOSIS — M05.79 RHEUMATOID ARTHRITIS INVOLVING MULTIPLE SITES WITH POSITIVE RHEUMATOID FACTOR: Primary | Chronic | ICD-10-CM

## 2017-06-27 DIAGNOSIS — D84.9 IMMUNOCOMPROMISED PATIENT: Chronic | ICD-10-CM

## 2017-06-27 DIAGNOSIS — M05.79 RHEUMATOID ARTHRITIS INVOLVING MULTIPLE SITES WITH POSITIVE RHEUMATOID FACTOR: Primary | ICD-10-CM

## 2017-06-27 DIAGNOSIS — M05.79 RHEUMATOID ARTHRITIS INVOLVING MULTIPLE SITES WITH POSITIVE RHEUMATOID FACTOR: Chronic | ICD-10-CM

## 2017-06-27 LAB
ALBUMIN SERPL BCP-MCNC: 3.5 G/DL
ALP SERPL-CCNC: 65 U/L
ALT SERPL W/O P-5'-P-CCNC: 39 U/L
ANION GAP SERPL CALC-SCNC: 11 MMOL/L
AST SERPL-CCNC: 37 U/L
BASOPHILS # BLD AUTO: 0.01 K/UL
BASOPHILS NFR BLD: 0.2 %
BILIRUB SERPL-MCNC: 0.5 MG/DL
BUN SERPL-MCNC: 15 MG/DL
CALCIUM SERPL-MCNC: 9.6 MG/DL
CHLORIDE SERPL-SCNC: 101 MMOL/L
CO2 SERPL-SCNC: 28 MMOL/L
CREAT SERPL-MCNC: 1 MG/DL
CRP SERPL-MCNC: 3.5 MG/L
DIFFERENTIAL METHOD: ABNORMAL
EOSINOPHIL # BLD AUTO: 0.4 K/UL
EOSINOPHIL NFR BLD: 6.6 %
ERYTHROCYTE [DISTWIDTH] IN BLOOD BY AUTOMATED COUNT: 14.7 %
ERYTHROCYTE [SEDIMENTATION RATE] IN BLOOD BY WESTERGREN METHOD: 56 MM/HR
EST. GFR  (AFRICAN AMERICAN): >60 ML/MIN/1.73 M^2
EST. GFR  (NON AFRICAN AMERICAN): >60 ML/MIN/1.73 M^2
GLUCOSE SERPL-MCNC: 97 MG/DL
HCT VFR BLD AUTO: 41.9 %
HGB BLD-MCNC: 14.4 G/DL
LYMPHOCYTES # BLD AUTO: 3.3 K/UL
LYMPHOCYTES NFR BLD: 50.5 %
MCH RBC QN AUTO: 34.1 PG
MCHC RBC AUTO-ENTMCNC: 34.4 %
MCV RBC AUTO: 99 FL
MONOCYTES # BLD AUTO: 0.7 K/UL
MONOCYTES NFR BLD: 10.5 %
NEUTROPHILS # BLD AUTO: 2.1 K/UL
NEUTROPHILS NFR BLD: 32.2 %
PLATELET # BLD AUTO: 204 K/UL
PMV BLD AUTO: 8.4 FL
POTASSIUM SERPL-SCNC: 4.1 MMOL/L
PROT SERPL-MCNC: 7.7 G/DL
RBC # BLD AUTO: 4.22 M/UL
SODIUM SERPL-SCNC: 140 MMOL/L
WBC # BLD AUTO: 6.48 K/UL

## 2017-06-27 PROCEDURE — 99999 PR PBB SHADOW E&M-EST. PATIENT-LVL III: CPT | Mod: PBBFAC,,, | Performed by: PHYSICIAN ASSISTANT

## 2017-06-27 PROCEDURE — 85651 RBC SED RATE NONAUTOMATED: CPT | Mod: PO

## 2017-06-27 PROCEDURE — 99215 OFFICE O/P EST HI 40 MIN: CPT | Mod: S$PBB,,, | Performed by: PHYSICIAN ASSISTANT

## 2017-06-27 PROCEDURE — 86140 C-REACTIVE PROTEIN: CPT

## 2017-06-27 PROCEDURE — 1126F AMNT PAIN NOTED NONE PRSNT: CPT | Mod: ,,, | Performed by: PHYSICIAN ASSISTANT

## 2017-06-27 PROCEDURE — 85025 COMPLETE CBC W/AUTO DIFF WBC: CPT | Mod: PO

## 2017-06-27 PROCEDURE — 1159F MED LIST DOCD IN RCRD: CPT | Mod: ,,, | Performed by: PHYSICIAN ASSISTANT

## 2017-06-27 PROCEDURE — 80053 COMPREHEN METABOLIC PANEL: CPT | Mod: PO

## 2017-06-27 PROCEDURE — 36415 COLL VENOUS BLD VENIPUNCTURE: CPT | Mod: PO

## 2017-06-27 RX ORDER — DIPHENHYDRAMINE HYDROCHLORIDE 50 MG/ML
12.5 INJECTION INTRAMUSCULAR; INTRAVENOUS ONCE
Status: CANCELLED
Start: 2017-06-27 | End: 2017-06-27

## 2017-06-27 RX ORDER — PREDNISONE 5 MG/1
5 TABLET ORAL DAILY
COMMUNITY
End: 2017-08-22

## 2017-06-27 RX ORDER — METHYLPREDNISOLONE SOD SUCC 125 MG
50 VIAL (EA) INJECTION ONCE
Status: CANCELLED
Start: 2017-06-27 | End: 2017-06-27

## 2017-06-27 RX ADMIN — INFLIXIMAB 800 MG: 100 INJECTION, POWDER, LYOPHILIZED, FOR SOLUTION INTRAVENOUS at 10:06

## 2017-06-27 ASSESSMENT — ROUTINE ASSESSMENT OF PATIENT INDEX DATA (RAPID3): MDHAQ FUNCTION SCORE: .4

## 2017-06-27 NOTE — PROGRESS NOTES
Subjective:       Patient ID: Jack Back is a 72 y.o. male.    Chief Complaint: Rheumatoid Arthritis    HPI  Review of Systems      Objective:   /64   Pulse (!) 56   Wt 86.4 kg (190 lb 7.6 oz)   BMI 29.83 kg/m²      Physical Exam      Assessment:       1. Rheumatoid arthritis involving multiple sites with positive rheumatoid factor    2. Medication monitoring encounter    3. Immunocompromised patient            Plan:       ***    Subjective:       Patient ID: Jack Back is a 72 y.o. male.    Chief Complaint: Rheumatoid Arthritis      HPI Comments: Jack is back today for rheumatology follow-up.  He has seropositive erosive rheumatoid arthritis. Last year was off schedule due to MI.  Had stent place. 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 He wanted to stay off his remicade and see how he did. After missing 2 months. Remicade resumed. initally not back to remission so we opted to increase his mtx to 20 mg once weekly. His joints are doing better. Still some aching and stiffness. No swelling or acute exacerbations. Pain 0/10 today. His esr has remained elevated. He is concerned from a cardiac standpoint with the chronic inflammation. He is on fish oil, liptor 20 mg, zetia, asa 81 mg and effient. Also on metoprolol. His bp is good. managed by cardiology. Previous MI with CABG X 3 in 2012. He is on lipitor, metoprolol and brilinta. They does have some mild heart damage. He Will be watched closely by cards. echo done a few weeks ago showed prior damage noted was resolved. His heart function was back to normal.  He has not developed heart failure. No lower ext edema, mild sob, no chest pain. No pnd    Dose of Remicade 800 mg (7.9 mg/kg) IV over 90 min.  On folic acid otc.       He denies any fevers, no cough,  congestion.  He did have a bone density done in June 2014 which was normal. Vaccines all up to date except TdaP but pt allergic to Tetanus.           Review of Systems   Constitutional: Negative.  Negative for  chills, fatigue and fever.   HENT: Negative.  Negative for congestion, mouth sores and trouble swallowing.    Eyes: Negative.  Negative for photophobia, redness and visual disturbance.   Respiratory: Negative.  Negative for cough, shortness of breath and wheezing.    Cardiovascular: Negative.  Negative for chest pain and leg swelling.   Gastrointestinal: Negative.  Negative for abdominal distention, abdominal pain, diarrhea, nausea and vomiting.   Genitourinary: Negative.  Negative for dysuria, flank pain, frequency and urgency.   Musculoskeletal: Negative for arthralgias, back pain, gait problem, joint swelling and myalgias.        Mild aching and stiffness   Skin: Negative.  Negative for rash.   Neurological: Negative.  Negative for dizziness, weakness and numbness.           Objective:     /64   Pulse (!) 56   Wt 86.4 kg (190 lb 7.6 oz)   BMI 29.83 kg/m²         Physical Exam   Constitutional: He is oriented to person, place, and time and well-developed, well-nourished, and in no distress. No distress.   HENT:   Head: Normocephalic and atraumatic.   Right Ear: External ear normal.   Left Ear: External ear normal.   Mouth/Throat: No oropharyngeal exudate.   Eyes: Conjunctivae and EOM are normal. Pupils are equal, round, and reactive to light. No scleral icterus.   Neck: Neck supple. No thyromegaly present.   Cardiovascular: Normal rate, regular rhythm and normal heart sounds.    No murmur heard.  Pulmonary/Chest: Effort normal and breath sounds normal. No respiratory distress.   Abdominal: Soft. Bowel sounds are normal.       Right Side Rheumatological Exam     Examination finds the shoulder, 1st PIP, 1st MCP, 2nd PIP, 2nd MCP, 3rd PIP, 3rd MCP, 4th PIP, 4th MCP, 5th PIP and 5th MCP normal.    Joint Exam Comments   Elbow: limited extension   Wrist: Mild reduced flexion of wrist, no synovitis    Left Side Rheumatological Exam     Examination finds the shoulder, elbow, knee, 1st PIP, 1st MCP, 2nd PIP, 2nd  MCP, 3rd PIP, 3rd MCP, 4th PIP, 4th MCP, 5th PIP and 5th MCP normal.    Joint Exam Comments   Wrist: Limited motion left wrists, no synovitis      Lymphadenopathy:     He has no cervical adenopathy.   Neurological: He is alert and oriented to person, place, and time. No cranial nerve deficit. He exhibits normal muscle tone. Gait normal. Coordination normal.   Skin: Skin is warm and dry.     Musculoskeletal: Normal range of motion. He exhibits deformity. He exhibits no edema or tenderness.   Ulnar drift 25 degrees right hand and 15 degrees left hand, right elbow fixed flexion stops about  5 degrees from baseline,  Limited motion both wrist extension  No synovitis on exam today               Results for orders placed or performed in visit on 06/27/17   CBC auto differential   Result Value Ref Range    WBC 6.48 3.90 - 12.70 K/uL    RBC 4.22 (L) 4.60 - 6.20 M/uL    Hemoglobin 14.4 14.0 - 18.0 g/dL    Hematocrit 41.9 40.0 - 54.0 %    MCV 99 (H) 82 - 98 fL    MCH 34.1 (H) 27.0 - 31.0 pg    MCHC 34.4 32.0 - 36.0 %    RDW 14.7 (H) 11.5 - 14.5 %    Platelets 204 150 - 350 K/uL    MPV 8.4 (L) 9.2 - 12.9 fL    Gran # 2.1 1.8 - 7.7 K/uL    Lymph # 3.3 1.0 - 4.8 K/uL    Mono # 0.7 0.3 - 1.0 K/uL    Eos # 0.4 0.0 - 0.5 K/uL    Baso # 0.01 0.00 - 0.20 K/uL    Gran% 32.2 (L) 38.0 - 73.0 %    Lymph% 50.5 (H) 18.0 - 48.0 %    Mono% 10.5 4.0 - 15.0 %    Eosinophil% 6.6 0.0 - 8.0 %    Basophil% 0.2 0.0 - 1.9 %    Differential Method Automated    Comprehensive metabolic panel   Result Value Ref Range    Sodium 140 136 - 145 mmol/L    Potassium 4.1 3.5 - 5.1 mmol/L    Chloride 101 95 - 110 mmol/L    CO2 28 23 - 29 mmol/L    Glucose 97 70 - 110 mg/dL    BUN, Bld 15 8 - 23 mg/dL    Creatinine 1.0 0.5 - 1.4 mg/dL    Calcium 9.6 8.7 - 10.5 mg/dL    Total Protein 7.7 6.0 - 8.4 g/dL    Albumin 3.5 3.5 - 5.2 g/dL    Total Bilirubin 0.5 0.1 - 1.0 mg/dL    Alkaline Phosphatase 65 55 - 135 U/L    AST 37 10 - 40 U/L    ALT 39 10 - 44 U/L    Anion  Gap 11 8 - 16 mmol/L    eGFR if African American >60 >60 mL/min/1.73 m^2    eGFR if non African American >60 >60 mL/min/1.73 m^2         DEXA 6/17/14 NORMAL        6/2016 [facility] stable   2/25/15 [facility] films       Assessment:       1. Rheumatoid arthritis involving multiple sites with positive rheumatoid factor    2. Medication monitoring encounter    3. Immunocompromised patient          1. Seropositive Erosive RA  Was flaring off  IV Remicade 800 mg Q 9 weeks now back to baseline with resuming remicade  - chronic damage but no activity on exam today 0 swollen/0 tender- CDAI 0- LI 0.5--      2. Vaccines need TdaP but allergic to tetnus    3. Med monitoring and immunocompromised     4. Immunocompromised no issues with recurrent infections    5. CAD post CABG X 3 2012 and post PTCA stent 2016 both related to MI with normal heart function on recent  ECHO    6. Chronic elevated esr- last visit crp normal but prior was elevated also     Plan:       Ok for IV Remicade 800 mg  infusion over 90 min. No premeds  Keep  frequency every 8 weeks     Keep mtx at 20 mg weekly splitting dose 4 tabs am and pm, Always remember to Hold mtx for signs of infection or for surgery     Remain off prednisone     Watch closely if any heart failure develops then we would consider using orenica or actemra (high esr) or anther biologic rather than going back to TNF agents    Keep his folic acid daily     bone density up-to-date repeat in 3-5 years    defer TdaP due to pt allergic to tetanus.      Continue all meds per cards and follow up closely    rtc 8 weeks infusion only and 16 weeks with remicade infusion, labs and office visit    Repeat hand and foot X-rays 1 year    Next visit check spep and immunofixation     call with any questions, changes, or concerns          CC:   Dr Sanjay Ansari-CARDS  Dr Shahram Benavides-PCP

## 2017-06-30 NOTE — PROGRESS NOTES
Subjective:       Patient ID: Jack Back is a 72 y.o. male.    Chief Complaint: Rheumatoid Arthritis      Jack is back today for rheumatology follow-up.  He has seropositive erosive rheumatoid arthritis. Last year was off schedule due to MI.  Had stent place. 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 He wanted to stay off his remicade and see how he did. After missing 2 months RA activity increased. We opted to resume IV Remicade 800 mg every 8 weeks. . He is on baseline mtx dose of 20 mg once weekly (better to stay on mtx for RA pt with heart disease)  His joints are doing fine. He is concerned about his esr and crp numbers. . The last several visits his esr elevated but crp normal. Worried about cardiac impact from chronic inflammation. On his own He just added back prednisone 5 mg daily to help keep inflammation downHe is seeing lipidemiologist. On fish oil and zetia. Lost 20 lg. His cholesterol and lipid numbers have improved. Also on metoprolol. His bp is good. managed by cardiology. echo done earlier this year showed prior damage was resolved. His heart function was back to normal.  He has not developed heart failure. No lower ext edema, mild sob, no chest pain. No pnd  spep showed elevated gammaglobulins, no polyclonal or monoclonal peaks in IF. Hypergammaglobulinemia.   No swelling or acute exacerbations. Pain 0/10 today.  Dose of Remicade 800 mg (7.9 mg/kg) IV over 90 min.  On folic acid otc.     Had rash. Skin biopsy came back lupus, wendy checked + 1:80 neg JASMINA, neg ds-dna- he stopped his effient, lipitor and imdur and the rash resolved. He has not sob, no pleuritic chest pain. No malar rash. No mouth ulcers. No fevers. No signs of lupus.     He denies any fevers, no cough,  congestion.   bone density done in June 2014 which was normal. Vaccines all up to date except TdaP but pt allergic to Tetanus.             Review of Systems   Constitutional: Negative.  Negative for chills, fatigue and fever.   HENT: Negative.   Negative for congestion, mouth sores and trouble swallowing.    Eyes: Negative.  Negative for photophobia, redness and visual disturbance.   Respiratory: Negative.  Negative for cough, shortness of breath and wheezing.    Cardiovascular: Negative.  Negative for chest pain and leg swelling.   Gastrointestinal: Negative.  Negative for abdominal distention, abdominal pain, diarrhea, nausea and vomiting.   Genitourinary: Negative.  Negative for dysuria, flank pain, frequency and urgency.   Musculoskeletal: Negative for arthralgias, back pain, gait problem, joint swelling and myalgias.        Mild aching and stiffness   Skin: Negative.  Negative for rash.   Neurological: Negative.  Negative for dizziness, weakness and numbness.           Objective:     /64   Pulse (!) 56   Wt 86.4 kg (190 lb 7.6 oz)   BMI 29.83 kg/m²         Physical Exam   Constitutional: He is oriented to person, place, and time and well-developed, well-nourished, and in no distress. No distress.   HENT:   Head: Normocephalic and atraumatic.   Right Ear: External ear normal.   Left Ear: External ear normal.   Mouth/Throat: No oropharyngeal exudate.   Eyes: Conjunctivae and EOM are normal. Pupils are equal, round, and reactive to light. No scleral icterus.   Neck: Neck supple. No thyromegaly present.   Cardiovascular: Normal rate, regular rhythm and normal heart sounds.    No murmur heard.  Pulmonary/Chest: Effort normal and breath sounds normal. No respiratory distress.   Abdominal: Soft. Bowel sounds are normal.       Right Side Rheumatological Exam     Examination finds the shoulder, 1st PIP, 1st MCP, 2nd PIP, 2nd MCP, 3rd PIP, 3rd MCP, 4th PIP, 4th MCP, 5th PIP and 5th MCP normal.    Joint Exam Comments   Elbow: limited extension   Wrist: Mild reduced flexion of wrist, no synovitis    Left Side Rheumatological Exam     Examination finds the shoulder, elbow, knee, 1st PIP, 1st MCP, 2nd PIP, 2nd MCP, 3rd PIP, 3rd MCP, 4th PIP, 4th MCP, 5th  PIP and 5th MCP normal.    Joint Exam Comments   Wrist: Limited motion left wrists, no synovitis      Lymphadenopathy:     He has no cervical adenopathy.   Neurological: He is alert and oriented to person, place, and time. No cranial nerve deficit. He exhibits normal muscle tone. Gait normal. Coordination normal.   Skin: Skin is warm and dry.     Musculoskeletal: Normal range of motion. He exhibits deformity. He exhibits no edema or tenderness.   Ulnar drift 25 degrees right hand and 15 degrees left hand, right elbow fixed flexion stops about  5 degrees from baseline,  Limited motion both wrist extension  No synovitis on exam today               Results for orders placed or performed in visit on 06/27/17   CBC auto differential   Result Value Ref Range    WBC 6.48 3.90 - 12.70 K/uL    RBC 4.22 (L) 4.60 - 6.20 M/uL    Hemoglobin 14.4 14.0 - 18.0 g/dL    Hematocrit 41.9 40.0 - 54.0 %    MCV 99 (H) 82 - 98 fL    MCH 34.1 (H) 27.0 - 31.0 pg    MCHC 34.4 32.0 - 36.0 %    RDW 14.7 (H) 11.5 - 14.5 %    Platelets 204 150 - 350 K/uL    MPV 8.4 (L) 9.2 - 12.9 fL    Gran # 2.1 1.8 - 7.7 K/uL    Lymph # 3.3 1.0 - 4.8 K/uL    Mono # 0.7 0.3 - 1.0 K/uL    Eos # 0.4 0.0 - 0.5 K/uL    Baso # 0.01 0.00 - 0.20 K/uL    Gran% 32.2 (L) 38.0 - 73.0 %    Lymph% 50.5 (H) 18.0 - 48.0 %    Mono% 10.5 4.0 - 15.0 %    Eosinophil% 6.6 0.0 - 8.0 %    Basophil% 0.2 0.0 - 1.9 %    Differential Method Automated    Comprehensive metabolic panel   Result Value Ref Range    Sodium 140 136 - 145 mmol/L    Potassium 4.1 3.5 - 5.1 mmol/L    Chloride 101 95 - 110 mmol/L    CO2 28 23 - 29 mmol/L    Glucose 97 70 - 110 mg/dL    BUN, Bld 15 8 - 23 mg/dL    Creatinine 1.0 0.5 - 1.4 mg/dL    Calcium 9.6 8.7 - 10.5 mg/dL    Total Protein 7.7 6.0 - 8.4 g/dL    Albumin 3.5 3.5 - 5.2 g/dL    Total Bilirubin 0.5 0.1 - 1.0 mg/dL    Alkaline Phosphatase 65 55 - 135 U/L    AST 37 10 - 40 U/L    ALT 39 10 - 44 U/L    Anion Gap 11 8 - 16 mmol/L    eGFR if African  American >60 >60 mL/min/1.73 m^2    eGFR if non African American >60 >60 mL/min/1.73 m^2   C-reactive protein   Result Value Ref Range    CRP 3.5 0.0 - 8.2 mg/L   Sedimentation rate, manual   Result Value Ref Range    Sed Rate 56 (H) 0 - 10 mm/Hr         DEXA 6/17/14 NORMAL        6/2016 [facility] X-[facility] stable   2/25/15 [facility] films       Assessment:       1. Rheumatoid arthritis involving multiple sites with positive rheumatoid factor    2. Medication monitoring encounter    3. Immunocompromised patient          1. Seropositive Erosive RA  Was flaring off  IV Remicade 800 mg Q 8 weeks now back to baseline with resuming remicade  - chronic damage but no activity on exam today 0 swollen/0 tender- CDAI 0- LI 0.4--      2. Vaccines need TdaP but allergic to tetnus    3. Med monitoring and immunocompromised     4. Immunocompromised no issues with recurrent infections    5. CAD post CABG X 3 2012 and post PTCA stent 2016 both related to MI with normal heart function on recent  ECHO- on zetia, asa and omega fish oil  RA well controlled  CRP normal  On mtx and remicade- helps reduce cardiac risk     6. Chronic elevated esr- last visit crp - hypergammaglobulins on spep, no monoclonal or polyclonal peaks on IF       7. + wendy with rash + biopsy for lupus now resolved after stopping imdur, lipitor and effient  + wendy may be related to remicade  With rash resolving- no other treatment needed at this time   Plan:         Reassurance to pt he is being well managed from RA and cardiac standpoint, he is on approprioate treatment  And keeping RA well controlled helps reduce his risk of future cardiac events  More importantly watch cpr  Esr will remain elevated with hypergammaglobulinemia  Will recheck spep from time to time to make sure no changes    Ok for IV Remicade 800 mg  infusion over 90 min every 8 weeks     Watch for return of rash, watch for other signs of DI Lupus, reassurance    Keep mtx at 20 mg  weekly splitting dose 4 tabs am and pm, Always remember to Hold mtx for signs of infection or for surgery, Discussed risk versus benefits and potential side effects of mtx.    Wean off  Prednisone long term steroid use will increase cardiac risk so best to stay off, he dose not need it from RA standpoint      Watch closely if any heart failure develops then we would consider using [place] or actemra (high esr) or anther biologic rather than going back to TNF agents    Keep his folic acid daily     bone density up-to-date repeat in 3-5 years    defer TdaP due to pt allergic to tetanus.      Continue all meds per cards and follow up closely    rtc 8 weeks infusion only and 16 weeks with remicade infusion, labs and office visit    Repeat hand and foot X-rays 1 year    call with any questions, changes, or concerns    Complicated established pt- extended visit  Total time with pt 45-50 min at least 1/2 time in face to face counseling discussed possible diagnosis, treatement options, risk vs benefits of meds. Time also spent in coordination of care and reviewing prior health records               CC:   Dr Sanjay Ansari-CARDS  Dr Shahram Benavides-PCP

## 2017-08-22 ENCOUNTER — LAB VISIT (OUTPATIENT)
Dept: LAB | Facility: HOSPITAL | Age: 72
End: 2017-08-22
Attending: PHYSICIAN ASSISTANT
Payer: MEDICARE

## 2017-08-22 ENCOUNTER — INFUSION (OUTPATIENT)
Dept: RHEUMATOLOGY | Facility: HOSPITAL | Age: 72
End: 2017-08-22
Attending: INTERNAL MEDICINE
Payer: MEDICARE

## 2017-08-22 VITALS
RESPIRATION RATE: 20 BRPM | TEMPERATURE: 98 F | DIASTOLIC BLOOD PRESSURE: 51 MMHG | HEART RATE: 43 BPM | SYSTOLIC BLOOD PRESSURE: 116 MMHG | WEIGHT: 199.5 LBS | BODY MASS INDEX: 31.25 KG/M2

## 2017-08-22 DIAGNOSIS — M05.79 RHEUMATOID ARTHRITIS INVOLVING MULTIPLE SITES WITH POSITIVE RHEUMATOID FACTOR: Chronic | ICD-10-CM

## 2017-08-22 DIAGNOSIS — M05.79 RHEUMATOID ARTHRITIS INVOLVING MULTIPLE SITES WITH POSITIVE RHEUMATOID FACTOR: Primary | ICD-10-CM

## 2017-08-22 DIAGNOSIS — Z51.81 MEDICATION MONITORING ENCOUNTER: Chronic | ICD-10-CM

## 2017-08-22 LAB
ALBUMIN SERPL BCP-MCNC: 3.4 G/DL
ALP SERPL-CCNC: 69 U/L
ALT SERPL W/O P-5'-P-CCNC: 16 U/L
ANION GAP SERPL CALC-SCNC: 10 MMOL/L
AST SERPL-CCNC: 26 U/L
BASOPHILS # BLD AUTO: 0.01 K/UL
BASOPHILS NFR BLD: 0.1 %
BILIRUB SERPL-MCNC: 0.5 MG/DL
BUN SERPL-MCNC: 13 MG/DL
CALCIUM SERPL-MCNC: 9.6 MG/DL
CHLORIDE SERPL-SCNC: 105 MMOL/L
CO2 SERPL-SCNC: 25 MMOL/L
CREAT SERPL-MCNC: 0.9 MG/DL
CRP SERPL-MCNC: 5.5 MG/L
DIFFERENTIAL METHOD: ABNORMAL
EOSINOPHIL # BLD AUTO: 0.3 K/UL
EOSINOPHIL NFR BLD: 4.9 %
ERYTHROCYTE [DISTWIDTH] IN BLOOD BY AUTOMATED COUNT: 14.7 %
ERYTHROCYTE [SEDIMENTATION RATE] IN BLOOD BY WESTERGREN METHOD: 55 MM/HR
EST. GFR  (AFRICAN AMERICAN): >60 ML/MIN/1.73 M^2
EST. GFR  (NON AFRICAN AMERICAN): >60 ML/MIN/1.73 M^2
GLUCOSE SERPL-MCNC: 94 MG/DL
HCT VFR BLD AUTO: 39.5 %
HGB BLD-MCNC: 13.5 G/DL
LYMPHOCYTES # BLD AUTO: 2.3 K/UL
LYMPHOCYTES NFR BLD: 32.9 %
MCH RBC QN AUTO: 35.9 PG
MCHC RBC AUTO-ENTMCNC: 34.2 G/DL
MCV RBC AUTO: 105 FL
MONOCYTES # BLD AUTO: 0.5 K/UL
MONOCYTES NFR BLD: 6.4 %
NEUTROPHILS # BLD AUTO: 3.9 K/UL
NEUTROPHILS NFR BLD: 55.7 %
PLATELET # BLD AUTO: 248 K/UL
PMV BLD AUTO: 8.5 FL
POTASSIUM SERPL-SCNC: 4 MMOL/L
PROT SERPL-MCNC: 7.4 G/DL
RBC # BLD AUTO: 3.76 M/UL
SODIUM SERPL-SCNC: 140 MMOL/L
WBC # BLD AUTO: 6.99 K/UL

## 2017-08-22 PROCEDURE — 86140 C-REACTIVE PROTEIN: CPT

## 2017-08-22 PROCEDURE — 80053 COMPREHEN METABOLIC PANEL: CPT | Mod: PO

## 2017-08-22 PROCEDURE — 85025 COMPLETE CBC W/AUTO DIFF WBC: CPT | Mod: PO

## 2017-08-22 PROCEDURE — 36415 COLL VENOUS BLD VENIPUNCTURE: CPT | Mod: PO

## 2017-08-22 PROCEDURE — 85651 RBC SED RATE NONAUTOMATED: CPT | Mod: PO

## 2017-08-22 RX ORDER — DIPHENHYDRAMINE HYDROCHLORIDE 50 MG/ML
12.5 INJECTION INTRAMUSCULAR; INTRAVENOUS ONCE
Status: CANCELLED
Start: 2017-08-22 | End: 2017-08-22

## 2017-08-22 RX ORDER — METHYLPREDNISOLONE SOD SUCC 125 MG
50 VIAL (EA) INJECTION ONCE
Status: CANCELLED
Start: 2017-08-22 | End: 2017-08-22

## 2017-08-22 RX ORDER — TESTOSTERONE CYPIONATE 1000 MG/10ML
200 INJECTION, SOLUTION INTRAMUSCULAR
COMMUNITY
End: 2019-07-03

## 2017-08-22 RX ORDER — ROSUVASTATIN CALCIUM 20 MG/1
20 TABLET, COATED ORAL DAILY
COMMUNITY
End: 2017-10-17

## 2017-08-22 RX ORDER — RAMIPRIL 10 MG/1
10 CAPSULE ORAL DAILY
COMMUNITY

## 2017-08-22 RX ADMIN — INFLIXIMAB 800 MG: 100 INJECTION, POWDER, LYOPHILIZED, FOR SOLUTION INTRAVENOUS at 09:08

## 2017-08-22 NOTE — PROGRESS NOTES
"Infusion# >10    Recent labs? yes    Premeds? none    Remicade 800 mg administered IV at a 90 minute rate per orders; see MAR and vitals for more  Details.    -Is the Biologic RX (therapy plan) up to date within the past one year? yes    -Was a pt assessment done by the prescribing MD/FLAVIA within the last 3 - 6 months? yes    -New Medications or changes in meds documented? yes     Documentation of safety questions done prior to infusion.    RN TO NOTIFY MD IF Pt. answeres"YES" to any of the questions prior to infusion:     -S/S of current or recent infections in the past 14 days? denied    -Recent Surgery? denied  Post wound complications? na     If yes, has surgeon cleared patient prior to getting this infusion? na    -Pregnant? na If yes, Is provider aware of this pregnancy? na     Tolerated infusion well without adverse events, discharged and ambulatory out of clinic.      "

## 2017-08-28 DIAGNOSIS — Z51.81 MEDICATION MONITORING ENCOUNTER: Chronic | ICD-10-CM

## 2017-08-28 DIAGNOSIS — M05.79 RHEUMATOID ARTHRITIS INVOLVING MULTIPLE SITES WITH POSITIVE RHEUMATOID FACTOR: Chronic | ICD-10-CM

## 2017-08-28 RX ORDER — METHOTREXATE 2.5 MG/1
TABLET ORAL
Qty: 40 TABLET | Refills: 5 | Status: SHIPPED | OUTPATIENT
Start: 2017-08-28 | End: 2017-10-17 | Stop reason: SDUPTHER

## 2017-10-17 ENCOUNTER — INFUSION (OUTPATIENT)
Dept: RHEUMATOLOGY | Facility: HOSPITAL | Age: 72
End: 2017-10-17
Attending: PHYSICIAN ASSISTANT
Payer: MEDICARE

## 2017-10-17 ENCOUNTER — OFFICE VISIT (OUTPATIENT)
Dept: RHEUMATOLOGY | Facility: CLINIC | Age: 72
End: 2017-10-17
Payer: MEDICARE

## 2017-10-17 VITALS
HEART RATE: 42 BPM | BODY MASS INDEX: 31.32 KG/M2 | OXYGEN SATURATION: 98 % | TEMPERATURE: 97 F | RESPIRATION RATE: 18 BRPM | WEIGHT: 199.94 LBS | SYSTOLIC BLOOD PRESSURE: 136 MMHG | DIASTOLIC BLOOD PRESSURE: 73 MMHG

## 2017-10-17 VITALS
DIASTOLIC BLOOD PRESSURE: 67 MMHG | SYSTOLIC BLOOD PRESSURE: 121 MMHG | WEIGHT: 199.94 LBS | HEIGHT: 67 IN | HEART RATE: 46 BPM | BODY MASS INDEX: 31.38 KG/M2

## 2017-10-17 DIAGNOSIS — M05.79 RHEUMATOID ARTHRITIS INVOLVING MULTIPLE SITES WITH POSITIVE RHEUMATOID FACTOR: Primary | ICD-10-CM

## 2017-10-17 DIAGNOSIS — Z51.81 MEDICATION MONITORING ENCOUNTER: Chronic | ICD-10-CM

## 2017-10-17 DIAGNOSIS — M05.79 RHEUMATOID ARTHRITIS INVOLVING MULTIPLE SITES WITH POSITIVE RHEUMATOID FACTOR: Chronic | ICD-10-CM

## 2017-10-17 DIAGNOSIS — Z79.52 LONG TERM CURRENT USE OF SYSTEMIC STEROIDS: Primary | Chronic | ICD-10-CM

## 2017-10-17 DIAGNOSIS — D63.8 ANEMIA DUE TO CHRONIC ILLNESS: ICD-10-CM

## 2017-10-17 PROCEDURE — 63600175 PHARM REV CODE 636 W HCPCS: Mod: PO | Performed by: PHYSICIAN ASSISTANT

## 2017-10-17 PROCEDURE — 99999 PR PBB SHADOW E&M-EST. PATIENT-LVL III: CPT | Mod: PBBFAC,,, | Performed by: INTERNAL MEDICINE

## 2017-10-17 PROCEDURE — 90662 IIV NO PRSV INCREASED AG IM: CPT | Mod: PBBFAC

## 2017-10-17 PROCEDURE — 25000003 PHARM REV CODE 250: Mod: PO | Performed by: PHYSICIAN ASSISTANT

## 2017-10-17 PROCEDURE — 99214 OFFICE O/P EST MOD 30 MIN: CPT | Mod: S$PBB,,, | Performed by: INTERNAL MEDICINE

## 2017-10-17 PROCEDURE — 96413 CHEMO IV INFUSION 1 HR: CPT | Mod: PO

## 2017-10-17 PROCEDURE — 99213 OFFICE O/P EST LOW 20 MIN: CPT | Mod: PBBFAC,PO | Performed by: INTERNAL MEDICINE

## 2017-10-17 PROCEDURE — G0008 ADMIN INFLUENZA VIRUS VAC: HCPCS | Mod: PBBFAC

## 2017-10-17 RX ORDER — GLUCOSAMINE SULFATE 500 MG
TABLET ORAL DAILY
COMMUNITY
End: 2019-07-03

## 2017-10-17 RX ORDER — VITAMIN E 268 MG
400 CAPSULE ORAL DAILY
COMMUNITY
End: 2019-07-03

## 2017-10-17 RX ORDER — METHOTREXATE 2.5 MG/1
TABLET ORAL
Qty: 100 TABLET | Refills: 5 | Status: SHIPPED | OUTPATIENT
Start: 2017-10-17 | End: 2018-09-17 | Stop reason: SDUPTHER

## 2017-10-17 RX ORDER — MECLIZINE HCL 12.5 MG 12.5 MG/1
TABLET ORAL
COMMUNITY
Start: 2017-08-17 | End: 2017-10-17

## 2017-10-17 RX ORDER — IBUPROFEN 100 MG/5ML
1000 SUSPENSION, ORAL (FINAL DOSE FORM) ORAL 3 TIMES DAILY
COMMUNITY
End: 2019-07-03

## 2017-10-17 RX ORDER — UBIDECARENONE/VIT E ACET 100MG-5
CAPSULE ORAL DAILY
COMMUNITY
End: 2019-07-03

## 2017-10-17 RX ORDER — FOLIC ACID 0.8 MG
800 TABLET ORAL 2 TIMES DAILY
COMMUNITY
Start: 2017-10-17

## 2017-10-17 RX ADMIN — INFLIXIMAB 800 MG: 100 INJECTION, POWDER, LYOPHILIZED, FOR SOLUTION INTRAVENOUS at 10:10

## 2017-10-17 ASSESSMENT — ROUTINE ASSESSMENT OF PATIENT INDEX DATA (RAPID3): MDHAQ FUNCTION SCORE: .3

## 2017-10-17 NOTE — PROGRESS NOTES
CHIEF COMPLAINT:  Rheumatoid arthritis.    PERTINENT HISTORY:  Jack says overall he is doing relatively well.  He rates   his pain at around 3/10.  He has not had any major pain or swelling or redness   involving his hand joints.  He has had a recent flare involving the left knee.    It seemed to hurt for about a day, particularly in the evening, and then went   away.  It may have been referred pain, so he did not see swelling or redness.    He is seeing   with regard to his heart issues.  Note, he has had multiple   bypass and stents in the past.  He is trying a lot of supplements to try to   help his inflammation and lipid profile.  He cannot tolerate statins because of   marked muscle and joint aches when he takes them.  In general, he is unable to   do most of what he wants.  He had to give up his horseback riding, but other   than that, he is still staying very active.    REVIEW OF SYSTEMS:  GENERAL:  His weight is stable.  He has not had a lot of fever, chills, or   sweats.  IMMUNIZATIONS:  He is due for a flu shot now.  HEAD:  No headache or recent head trauma.  EYES:  Visual acuity fine.  No ocular pain or redness.  EARS:  Denies ear pain, discharge or vertigo.  NOSE:  No loss of smell, no epistaxis or post nasal drip.  MOUTH & THROAT:  No hoarseness or change in voice or oral ulcers. No difficulty   swallowing or dryness.& THROAT:  No hoarseness or change in voice or oral   ulcers. No difficulty swallowing or dryness.  CARDIOVASCULAR:  Positive for coronary artery disease with multiple surgeries   and stents in the past.  LUNGS ISSUES:  Negative.  GASTROINTESTINAL ISSUES:  Negative.  HEMATOLOGIC:  He has had intermittent anemia.  Note, looking at his studies, it   is macrocytic.  ENDOCRINE:  With thyroid issues in the past.  He is on Synthroid presently.    PHYSICAL EXAMINATION:  VITAL SIGNS:  Height 5 feet 7 inches with blood pressure 121/67 with his heart   rate in the 40s, pain score  3.  MUSCULOSKELETAL:  Joints of his hands shows stable ulnar drift at 30 degrees on   the right hand and maybe 20 degrees on the left hand.  Though there is a lot of   MPJ damage and bony enlargement, there is no tenderness there.   strength is   very good.  His wrist has good motion.  The right elbow lacks full extension at   about 15 degrees, and left elbow moves normally.  Shoulders have good mobility.    He has good neck rotation.  His hips have been replaced.  Note mobility is   still maintained.  The knees actually have good motion with mild crepitus.  No   fluid, no heat, no redness, no synovitis.  Left knee which was flared recently   did not have any signs of tenderness today.  Ankles still move well.  No TP   tenderness.  He has got no new rashes.  Vascular exam is intact.    LABORATORY DATA:  From August shows his white count is 6900 with hematocrit 39%,   , platelets normal.  Sed rate is chronically elevated in the 50s.  His   CRP was normal at 5.  LFTs were normal.  Glucose was normal.  Calcium normal.    Electrolytes were normal.  Creatinine was 0.9.  His  rheumatoid factor last   checked was in the 40s.  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  Urinalysis in the past has been clear.  He   does have prostate issues.    IMPRESSION:  1.  Rheumatoid arthritis - CDAI at 4.  No signs of major activity.  2.  Medication monitoring on Remicade.  No signs of side effects.  Methotrexate   monitoring.  Note macrocytic anemia.  3.  Coronary artery disease - seeing   who is trying to maintain his   metabolic profile as good as possible.  4.  Immunization status, needs a high-strength flu shot.    PLAN:  1.  Okay for Remicade 800 a day and repeat in 8 to 16 weeks.  2.  Give him high-strength flu shot today - give methotrexate this week.  3.  We will lower methotrexate from 8 to 6 a week in view of his elevated MCV   and mild anemia is persistent and  no signs of rheumatoid activity.  4.  Notice off prednisone and maintain off of that.  5.  Continue all he can do to decrease his risk of heart attacks.  Certainly good   exercise program would be helpful.  See back in 8 and 16 weeks.  6.Note-  Hx skin cancers on face- follow up with Derm    SL/HN  dd: 10/17/2017 09:48:03 (CDT)  td: 10/18/2017 01:15:10 (CDT)  Doc ID   #[id number]  Job ID #[id number]    CC:

## 2017-10-17 NOTE — PATIENT INSTRUCTIONS
Infliximab injection  What is this medicine?  INFLIXIMAB (in FLIX i mab) is used to treat Crohn's disease and ulcerative colitis. It is also used to treat ankylosing spondylitis, psoriasis, and some forms of arthritis.  How should I use this medicine?  This medicine is for injection into a vein. It is usually given by a health care professional in a hospital or clinic setting.  A special MedGuide will be given to you by the pharmacist with each prescription and refill. Be sure to read this information carefully each time.  Talk to your pediatrician regarding the use of this medicine in children. Special care may be needed.  What side effects may I notice from receiving this medicine?  Side effects that you should report to your doctor or health care professional as soon as possible:  · allergic reactions like skin rash, itching or hives, swelling of the face, lips, or tongue  · chest pain  · fever or chills, usually related to the infusion  · muscle or joint pain  · red, scaly patches or raised bumps on the skin  · signs of infection - fever or chills, cough, sore throat, pain or difficulty passing urine  · swollen lymph nodes in the neck, underarm, or groin areas  · unexplained weight loss  · unusual bleeding or bruising  · unusually weak or tired  · yellowing of the eyes or skin  Side effects that usually do not require medical attention (report to your doctor or health care professional if they continue or are bothersome):  · headache  · heartburn or stomach pain  · nausea, vomiting  What may interact with this medicine?  Do not take this medicine with any of the following medications:  · anakinra  · rilonacept  This medicine may also interact with the following medications:  · vaccines  What if I miss a dose?  It is important not to miss your dose. Call your doctor or health care professional if you are unable to keep an appointment.  Where should I keep my medicine?  This drug is given in a hospital or clinic  and will not be stored at home.  What should I tell my health care provider before I take this medicine?  They need to know if you have any of these conditions:  · diabetes  · exposure to tuberculosis  · heart failure  · hepatitis or liver disease  · immune system problems  · infection  · lung or breathing disease, like COPD  · multiple sclerosis  · current or past resident of [place]  · seizure disorder  · an unusual or allergic reaction to infliximab, mouse proteins, other medicines, foods, dyes, or preservatives  · pregnant or trying to get pregnant  · breast-feeding  What should I watch for while using this medicine?  Visit your doctor or health care professional for regular checks on your progress.  If you get a cold or other infection while receiving this medicine, call your doctor or health care professional. Do not treat yourself. This medicine may decrease your body's ability to fight infections. Before beginning therapy, your doctor may do a test to see if you have been exposed to tuberculosis.  This medicine may make the symptoms of heart failure worse in some patients. If you notice symptoms such as increased shortness of breath or swelling of the ankles or legs, contact your health care provider right away.  If you are going to have surgery or dental work, tell your health care professional or dentist that you have received this medicine.  If you take this medicine for plaque psoriasis, stay out of the sun. If you cannot avoid being in the sun, wear protective clothing and use sunscreen. Do not use sun lamps or tanning beds/booths.  NOTE:This sheet is a summary. It may not cover all possible information. If you have questions about this medicine, talk to your doctor, pharmacist, or health care provider. Copyright© 2017 [employer]            Fall Prevention  Falls often occur due to slipping, tripping or losing your balance. Millions of people fall every year and injure  themselves. Here are ways to reduce your risk of falling again.  · Think about your fall, was there anything that caused your fall that can be fixed, removed, or replaced?  · Make your home safe by keeping walkways clear of objects you may trip over.  · Use non-slip pads under rugs. Do not use area rugs or small throw rugs.  · Use non-slip mats in bathtubs and showers.  · Install handrails and lights on staircases.  · Do not walk in poorly lit areas.  · Do not stand on chairs or wobbly ladders.  · Use caution when reaching overhead or looking upward. This position can cause a loss of balance.  · Be sure your shoes fit properly, have non-slip bottoms and are in good condition.   · Wear shoes both inside and out. Avoid going barefoot or wearing slippers.  · Be cautious when going up and down stairs, curbs, and when walking on uneven sidewalks.  · If your balance is poor, consider using a cane or walker.  · If your fall was related to alcohol use, stop or limit alcohol intake.   · If your fall was related to use of sleeping medicines, talk to your doctor about this. You may need to reduce your dosage at bedtime if you awaken during the night to go to the bathroom.    · To reduce the need for nighttime bathroom trips:  ¨ Avoid drinking fluids for several hours before going to bed  ¨ Empty your bladder before going to bed  ¨ Men can keep a urinal at the bedside  · Stay as active as you can. Balance, flexibility, strength, and endurance all come from exercise. They all play a role in preventing falls. Ask your healthcare provider which types of activity are right for you.  · Get your vision checked on a regular basis.  · If you have pets, know where they are before you stand up or walk so you don't trip over them.  · Use night lights.  Date Last Reviewed: 11/5/2015  © 6322-7618 [employer]. [address] [place] [address], [place] [address]. All rights reserved. This information is not intended as a substitute for  professional medical care. Always follow your healthcare professional's instructions.      WAYS TO HELP PREVENT INFECTION         WASH YOUR HANDS OFTEN DURING THE DAY, ESPECIALLY BEFORE YOU EAT, AFTER USING THE BATHROOM, AND AFTER TOUCHING ANIMALS     STAY AWAY FROM PEOPLE WHO HAVE ILLNESSES YOU CAN CATCH; SUCH AS COLDS, FLU, CHICKEN POX     TRY TO AVOID CROWDS     STAY AWAY FROM CHILDREN WHO RECENTLY HAVE RECEIVED LIVE VIRUS VACCINES     MAINTAIN GOOD MOUTH CARE     DO NOT SQUEEZE OR SCRATCH PIMPLES     CLEAN CUTS & SCRAPES RIGHT AWAY AND DAILY UNTIL HEALED WITH WARM WATER, SOAP & AN ANTISEPTIC     AVOID CONTACT WITH LITTER BOXES, BIRD CAGES, & FISH TANKS     AVOID STANDING WATER, IE., BIRD BATHS, FLOWER POTS/VASES, OR HUMIDIFIERS     WEAR GLOVES WHEN GARDENING OR CLEANING UP AFTER OTHERS, ESPECIALLY BABIES & SMALL CHILDREN    DO NOT EAT RAW FISH, SEAFOOD, MEAT, OR EGGS

## 2017-12-01 RX ORDER — EPINEPHRINE 1 MG/ML
0.3 INJECTION, SOLUTION, CONCENTRATE INTRAVENOUS
Status: CANCELLED | OUTPATIENT
Start: 2017-12-01

## 2017-12-01 RX ORDER — METHYLPREDNISOLONE SOD SUCC 125 MG
40 VIAL (EA) INJECTION
Status: CANCELLED | OUTPATIENT
Start: 2017-12-01

## 2017-12-01 RX ORDER — DIPHENHYDRAMINE HYDROCHLORIDE 50 MG/ML
25 INJECTION INTRAMUSCULAR; INTRAVENOUS
Status: CANCELLED | OUTPATIENT
Start: 2017-12-01

## 2017-12-01 RX ORDER — ACETAMINOPHEN 325 MG/1
650 TABLET ORAL
Status: CANCELLED | OUTPATIENT
Start: 2017-12-01

## 2017-12-01 RX ORDER — IPRATROPIUM BROMIDE AND ALBUTEROL SULFATE 2.5; .5 MG/3ML; MG/3ML
3 SOLUTION RESPIRATORY (INHALATION)
Status: CANCELLED | OUTPATIENT
Start: 2017-12-01

## 2017-12-01 RX ORDER — SODIUM CHLORIDE 0.9 % (FLUSH) 0.9 %
10 SYRINGE (ML) INJECTION
Status: CANCELLED | OUTPATIENT
Start: 2017-12-01

## 2017-12-12 ENCOUNTER — INFUSION (OUTPATIENT)
Dept: RHEUMATOLOGY | Facility: HOSPITAL | Age: 72
End: 2017-12-12
Attending: INTERNAL MEDICINE
Payer: MEDICARE

## 2017-12-12 ENCOUNTER — LAB VISIT (OUTPATIENT)
Dept: LAB | Facility: HOSPITAL | Age: 72
End: 2017-12-12
Attending: INTERNAL MEDICINE
Payer: MEDICARE

## 2017-12-12 VITALS
OXYGEN SATURATION: 96 % | TEMPERATURE: 97 F | SYSTOLIC BLOOD PRESSURE: 125 MMHG | WEIGHT: 202.81 LBS | DIASTOLIC BLOOD PRESSURE: 69 MMHG | RESPIRATION RATE: 18 BRPM | HEART RATE: 48 BPM | BODY MASS INDEX: 31.77 KG/M2

## 2017-12-12 DIAGNOSIS — M05.79 RHEUMATOID ARTHRITIS INVOLVING MULTIPLE SITES WITH POSITIVE RHEUMATOID FACTOR: Chronic | ICD-10-CM

## 2017-12-12 DIAGNOSIS — Z51.81 MEDICATION MONITORING ENCOUNTER: Chronic | ICD-10-CM

## 2017-12-12 DIAGNOSIS — M05.79 RHEUMATOID ARTHRITIS INVOLVING MULTIPLE SITES WITH POSITIVE RHEUMATOID FACTOR: Primary | ICD-10-CM

## 2017-12-12 LAB
ALBUMIN SERPL BCP-MCNC: 3.4 G/DL
ALP SERPL-CCNC: 55 U/L
ALT SERPL W/O P-5'-P-CCNC: 17 U/L
ANION GAP SERPL CALC-SCNC: 10 MMOL/L
AST SERPL-CCNC: 25 U/L
BASOPHILS # BLD AUTO: 0.01 K/UL
BASOPHILS NFR BLD: 0.2 %
BILIRUB SERPL-MCNC: 0.4 MG/DL
BUN SERPL-MCNC: 13 MG/DL
CALCIUM SERPL-MCNC: 9.4 MG/DL
CHLORIDE SERPL-SCNC: 103 MMOL/L
CO2 SERPL-SCNC: 27 MMOL/L
CREAT SERPL-MCNC: 1 MG/DL
CRP SERPL-MCNC: 5.2 MG/L
DIFFERENTIAL METHOD: ABNORMAL
EOSINOPHIL # BLD AUTO: 0.3 K/UL
EOSINOPHIL NFR BLD: 5.1 %
ERYTHROCYTE [DISTWIDTH] IN BLOOD BY AUTOMATED COUNT: 14.7 %
ERYTHROCYTE [SEDIMENTATION RATE] IN BLOOD BY WESTERGREN METHOD: 45 MM/HR
EST. GFR  (AFRICAN AMERICAN): >60 ML/MIN/1.73 M^2
EST. GFR  (NON AFRICAN AMERICAN): >60 ML/MIN/1.73 M^2
GLUCOSE SERPL-MCNC: 119 MG/DL
HCT VFR BLD AUTO: 41.7 %
HGB BLD-MCNC: 14.4 G/DL
LYMPHOCYTES # BLD AUTO: 2 K/UL
LYMPHOCYTES NFR BLD: 32.5 %
MCH RBC QN AUTO: 35.1 PG
MCHC RBC AUTO-ENTMCNC: 34.5 G/DL
MCV RBC AUTO: 102 FL
MONOCYTES # BLD AUTO: 0.3 K/UL
MONOCYTES NFR BLD: 4.5 %
NEUTROPHILS # BLD AUTO: 3.6 K/UL
NEUTROPHILS NFR BLD: 57.7 %
PLATELET # BLD AUTO: 241 K/UL
PMV BLD AUTO: 8.7 FL
POTASSIUM SERPL-SCNC: 4.1 MMOL/L
PROT SERPL-MCNC: 7.4 G/DL
RBC # BLD AUTO: 4.1 M/UL
SODIUM SERPL-SCNC: 140 MMOL/L
WBC # BLD AUTO: 6.28 K/UL

## 2017-12-12 PROCEDURE — 25000003 PHARM REV CODE 250: Mod: PO | Performed by: PHYSICIAN ASSISTANT

## 2017-12-12 PROCEDURE — A4216 STERILE WATER/SALINE, 10 ML: HCPCS | Mod: PO | Performed by: PHYSICIAN ASSISTANT

## 2017-12-12 PROCEDURE — 63600175 PHARM REV CODE 636 W HCPCS: Mod: PO | Performed by: PHYSICIAN ASSISTANT

## 2017-12-12 PROCEDURE — 86140 C-REACTIVE PROTEIN: CPT

## 2017-12-12 PROCEDURE — 80053 COMPREHEN METABOLIC PANEL: CPT | Mod: PO

## 2017-12-12 PROCEDURE — 36415 COLL VENOUS BLD VENIPUNCTURE: CPT | Mod: PO

## 2017-12-12 PROCEDURE — 96365 THER/PROPH/DIAG IV INF INIT: CPT | Mod: PO

## 2017-12-12 PROCEDURE — 85651 RBC SED RATE NONAUTOMATED: CPT | Mod: PO

## 2017-12-12 PROCEDURE — 85025 COMPLETE CBC W/AUTO DIFF WBC: CPT | Mod: PO

## 2017-12-12 RX ORDER — IPRATROPIUM BROMIDE AND ALBUTEROL SULFATE 2.5; .5 MG/3ML; MG/3ML
3 SOLUTION RESPIRATORY (INHALATION)
Status: CANCELLED | OUTPATIENT
Start: 2017-12-12

## 2017-12-12 RX ORDER — SODIUM CHLORIDE 0.9 % (FLUSH) 0.9 %
10 SYRINGE (ML) INJECTION
Status: DISCONTINUED | OUTPATIENT
Start: 2017-12-12 | End: 2017-12-12 | Stop reason: HOSPADM

## 2017-12-12 RX ORDER — ACETAMINOPHEN 325 MG/1
650 TABLET ORAL
Status: CANCELLED | OUTPATIENT
Start: 2017-12-12

## 2017-12-12 RX ORDER — EPINEPHRINE 1 MG/ML
0.3 INJECTION, SOLUTION, CONCENTRATE INTRAVENOUS
Status: CANCELLED | OUTPATIENT
Start: 2017-12-12

## 2017-12-12 RX ORDER — SODIUM CHLORIDE 0.9 % (FLUSH) 0.9 %
10 SYRINGE (ML) INJECTION
Status: CANCELLED | OUTPATIENT
Start: 2017-12-12

## 2017-12-12 RX ORDER — DIPHENHYDRAMINE HYDROCHLORIDE 50 MG/ML
25 INJECTION INTRAMUSCULAR; INTRAVENOUS
Status: CANCELLED | OUTPATIENT
Start: 2017-12-12

## 2017-12-12 RX ORDER — METHYLPREDNISOLONE SOD SUCC 125 MG
40 VIAL (EA) INJECTION
Status: CANCELLED | OUTPATIENT
Start: 2017-12-12

## 2017-12-12 RX ADMIN — INFLIXIMAB 800 MG: 100 INJECTION, POWDER, LYOPHILIZED, FOR SOLUTION INTRAVENOUS at 08:12

## 2017-12-12 RX ADMIN — Medication 10 ML: at 08:12

## 2017-12-12 NOTE — PATIENT INSTRUCTIONS
Remicade (infliximab) injection  What is this medicine?  INFLIXIMAB (in FLIX i mab) is used to treat Crohn's disease and ulcerative colitis. It is also used to treat ankylosing spondylitis, psoriasis, and some forms of arthritis.  How should I use this medicine?  This medicine is for injection into a vein. It is usually given by a health care professional in a hospital or clinic setting.  A special MedGuide will be given to you by the pharmacist with each prescription and refill. Be sure to read this information carefully each time.  Talk to your pediatrician regarding the use of this medicine in children. Special care may be needed.  What side effects may I notice from receiving this medicine?  Side effects that you should report to your doctor or health care professional as soon as possible:  · allergic reactions like skin rash, itching or hives, swelling of the face, lips, or tongue  · chest pain  · fever or chills, usually related to the infusion  · muscle or joint pain  · red, scaly patches or raised bumps on the skin  · signs of infection - fever or chills, cough, sore throat, pain or difficulty passing urine  · swollen lymph nodes in the neck, underarm, or groin areas  · unexplained weight loss  · unusual bleeding or bruising  · unusually weak or tired  · yellowing of the eyes or skin  Side effects that usually do not require medical attention (report to your doctor or health care professional if they continue or are bothersome):  · headache  · heartburn or stomach pain  · nausea, vomiting  What may interact with this medicine?  Do not take this medicine with any of the following medications:  · anakinra  · rilonacept  This medicine may also interact with the following medications:  · vaccines  What if I miss a dose?  It is important not to miss your dose. Call your doctor or health care professional if you are unable to keep an appointment.  Where should I keep my medicine?  This drug is given in a  hospital or clinic and will not be stored at home.  What should I tell my health care provider before I take this medicine?  They need to know if you have any of these conditions:  · diabetes  · exposure to tuberculosis  · heart failure  · hepatitis or liver disease  · immune system problems  · infection  · lung or breathing disease, like COPD  · multiple sclerosis  · current or past resident of [place]  · seizure disorder  · an unusual or allergic reaction to infliximab, mouse proteins, other medicines, foods, dyes, or preservatives  · pregnant or trying to get pregnant  · breast-feeding  What should I watch for while using this medicine?  Visit your doctor or health care professional for regular checks on your progress.  If you get a cold or other infection while receiving this medicine, call your doctor or health care professional. Do not treat yourself. This medicine may decrease your body's ability to fight infections. Before beginning therapy, your doctor may do a test to see if you have been exposed to tuberculosis.  This medicine may make the symptoms of heart failure worse in some patients. If you notice symptoms such as increased shortness of breath or swelling of the ankles or legs, contact your health care provider right away.  If you are going to have surgery or dental work, tell your health care professional or dentist that you have received this medicine.  If you take this medicine for plaque psoriasis, stay out of the sun. If you cannot avoid being in the sun, wear protective clothing and use sunscreen. Do not use sun lamps or tanning beds/booths.  NOTE:This sheet is a summary. It may not cover all possible information. If you have questions about this medicine, talk to your doctor, pharmacist, or health care provider. Copyright© 2017 [employer]

## 2017-12-12 NOTE — PROGRESS NOTES
Infusion # > 10 - Remicade 800 mg q 8 weeks    Any recent illness or antibiotic use in past week, open wounds or mouth sores, invasive procedures in past 4 weeks or in upcoming 4 weeks? denies    Recent labs? 12/12/71  Last provider visit- Seen by Dr. Thurston on 10/17/17     Premeds? none     Remicade 800 mg administered IV at a 90 minute rate per orders; see MAR and vitals for more  details. Tolerated well without adverse events, discharged and ambulatory out of clinic.

## 2017-12-12 NOTE — PLAN OF CARE
Problem: Infection, Risk/Actual (Adult, Obstetrics)  Goal: Infection Prevention/Resolution/Control (Infection, Risk/Actual)  Patient will demonstrate the desired outcomes.   1. RA  2. Patient sleeps during infusion   Outcome: Ongoing (interventions implemented as appropriate)  Encouraged frequent handwashing and use of antibacterial hand sanitizers. Also discussed notifying Rheumatology Infusion Dept for any antibiotic use, illness, or invasive procedures before, on, or after infusion date. Verbalizes understanding.

## 2018-02-06 ENCOUNTER — OFFICE VISIT (OUTPATIENT)
Dept: RHEUMATOLOGY | Facility: CLINIC | Age: 73
End: 2018-02-06
Payer: MEDICARE

## 2018-02-06 ENCOUNTER — INFUSION (OUTPATIENT)
Dept: RHEUMATOLOGY | Facility: HOSPITAL | Age: 73
End: 2018-02-06
Attending: PHYSICIAN ASSISTANT
Payer: MEDICARE

## 2018-02-06 VITALS
TEMPERATURE: 98 F | HEART RATE: 48 BPM | BODY MASS INDEX: 31.94 KG/M2 | WEIGHT: 203.94 LBS | DIASTOLIC BLOOD PRESSURE: 63 MMHG | OXYGEN SATURATION: 95 % | RESPIRATION RATE: 18 BRPM | SYSTOLIC BLOOD PRESSURE: 114 MMHG

## 2018-02-06 VITALS
HEART RATE: 63 BPM | DIASTOLIC BLOOD PRESSURE: 71 MMHG | HEIGHT: 67 IN | WEIGHT: 203.94 LBS | BODY MASS INDEX: 32.01 KG/M2 | SYSTOLIC BLOOD PRESSURE: 130 MMHG

## 2018-02-06 DIAGNOSIS — I25.118 CORONARY ARTERY DISEASE OF NATIVE ARTERY OF NATIVE HEART WITH STABLE ANGINA PECTORIS: ICD-10-CM

## 2018-02-06 DIAGNOSIS — M05.79 RHEUMATOID ARTHRITIS INVOLVING MULTIPLE SITES WITH POSITIVE RHEUMATOID FACTOR: Primary | Chronic | ICD-10-CM

## 2018-02-06 DIAGNOSIS — M05.79 RHEUMATOID ARTHRITIS INVOLVING MULTIPLE SITES WITH POSITIVE RHEUMATOID FACTOR: Primary | ICD-10-CM

## 2018-02-06 DIAGNOSIS — E29.1 HYPOGONADISM MALE: Chronic | ICD-10-CM

## 2018-02-06 DIAGNOSIS — Z51.81 MEDICATION MONITORING ENCOUNTER: Chronic | ICD-10-CM

## 2018-02-06 DIAGNOSIS — D84.9 IMMUNOCOMPROMISED PATIENT: Chronic | ICD-10-CM

## 2018-02-06 PROCEDURE — 63600175 PHARM REV CODE 636 W HCPCS: Mod: JG,PO | Performed by: PHYSICIAN ASSISTANT

## 2018-02-06 PROCEDURE — 99999 PR PBB SHADOW E&M-EST. PATIENT-LVL IV: CPT | Mod: PBBFAC,,, | Performed by: PHYSICIAN ASSISTANT

## 2018-02-06 PROCEDURE — 25000003 PHARM REV CODE 250: Mod: PO | Performed by: PHYSICIAN ASSISTANT

## 2018-02-06 PROCEDURE — 1159F MED LIST DOCD IN RCRD: CPT | Mod: ,,, | Performed by: PHYSICIAN ASSISTANT

## 2018-02-06 PROCEDURE — 99214 OFFICE O/P EST MOD 30 MIN: CPT | Mod: PBBFAC,PO | Performed by: PHYSICIAN ASSISTANT

## 2018-02-06 PROCEDURE — 99214 OFFICE O/P EST MOD 30 MIN: CPT | Mod: S$PBB,,, | Performed by: PHYSICIAN ASSISTANT

## 2018-02-06 PROCEDURE — 1125F AMNT PAIN NOTED PAIN PRSNT: CPT | Mod: ,,, | Performed by: PHYSICIAN ASSISTANT

## 2018-02-06 PROCEDURE — 96413 CHEMO IV INFUSION 1 HR: CPT | Mod: PO

## 2018-02-06 PROCEDURE — A4216 STERILE WATER/SALINE, 10 ML: HCPCS | Mod: PO | Performed by: PHYSICIAN ASSISTANT

## 2018-02-06 RX ORDER — DIPHENHYDRAMINE HYDROCHLORIDE 50 MG/ML
25 INJECTION INTRAMUSCULAR; INTRAVENOUS
Status: CANCELLED | OUTPATIENT
Start: 2018-02-06

## 2018-02-06 RX ORDER — ACETAMINOPHEN 325 MG/1
650 TABLET ORAL
Status: CANCELLED | OUTPATIENT
Start: 2018-02-06

## 2018-02-06 RX ORDER — LANOLIN ALCOHOL/MO/W.PET/CERES
300 CREAM (GRAM) TOPICAL DAILY
COMMUNITY

## 2018-02-06 RX ORDER — EPINEPHRINE 1 MG/ML
0.3 INJECTION, SOLUTION, CONCENTRATE INTRAVENOUS
Status: CANCELLED | OUTPATIENT
Start: 2018-02-06

## 2018-02-06 RX ORDER — SODIUM CHLORIDE 0.9 % (FLUSH) 0.9 %
10 SYRINGE (ML) INJECTION
Status: CANCELLED | OUTPATIENT
Start: 2018-02-06

## 2018-02-06 RX ORDER — SODIUM CHLORIDE 0.9 % (FLUSH) 0.9 %
10 SYRINGE (ML) INJECTION
Status: DISCONTINUED | OUTPATIENT
Start: 2018-02-06 | End: 2018-02-06 | Stop reason: HOSPADM

## 2018-02-06 RX ORDER — METHYLPREDNISOLONE SOD SUCC 125 MG
40 VIAL (EA) INJECTION
Status: CANCELLED | OUTPATIENT
Start: 2018-02-06

## 2018-02-06 RX ORDER — IPRATROPIUM BROMIDE AND ALBUTEROL SULFATE 2.5; .5 MG/3ML; MG/3ML
3 SOLUTION RESPIRATORY (INHALATION)
Status: CANCELLED | OUTPATIENT
Start: 2018-02-06

## 2018-02-06 RX ADMIN — INFLIXIMAB 800 MG: 100 INJECTION, POWDER, LYOPHILIZED, FOR SOLUTION INTRAVENOUS at 09:02

## 2018-02-06 RX ADMIN — SODIUM CHLORIDE, PRESERVATIVE FREE 10 ML: 5 INJECTION INTRAVENOUS at 09:02

## 2018-02-06 ASSESSMENT — CLINICAL DISEASE ACTIVITY INDEX (CDAI)
PHYSICIAN_ASSESSMENT: 0
SWOLLEN_JOINTS_COUNT: 0
TENDER_JOINTS_COUNT: 1
TOTAL_SCORE: 1
PATIENT_ASSESSMENT: 0

## 2018-02-06 ASSESSMENT — ROUTINE ASSESSMENT OF PATIENT INDEX DATA (RAPID3): MDHAQ FUNCTION SCORE: .5

## 2018-02-06 NOTE — PATIENT INSTRUCTIONS
Infliximab injection  What is this medicine?  INFLIXIMAB (in FLIX i mab) is used to treat Crohn's disease and ulcerative colitis. It is also used to treat ankylosing spondylitis, psoriasis, and some forms of arthritis.  How should I use this medicine?  This medicine is for injection into a vein. It is usually given by a health care professional in a hospital or clinic setting.  A special MedGuide will be given to you by the pharmacist with each prescription and refill. Be sure to read this information carefully each time.  Talk to your pediatrician regarding the use of this medicine in children. Special care may be needed.  What side effects may I notice from receiving this medicine?  Side effects that you should report to your doctor or health care professional as soon as possible:  · allergic reactions like skin rash, itching or hives, swelling of the face, lips, or tongue  · chest pain  · fever or chills, usually related to the infusion  · muscle or joint pain  · red, scaly patches or raised bumps on the skin  · signs of infection - fever or chills, cough, sore throat, pain or difficulty passing urine  · swollen lymph nodes in the neck, underarm, or groin areas  · unexplained weight loss  · unusual bleeding or bruising  · unusually weak or tired  · yellowing of the eyes or skin  Side effects that usually do not require medical attention (report to your doctor or health care professional if they continue or are bothersome):  · headache  · heartburn or stomach pain  · nausea, vomiting  What may interact with this medicine?  Do not take this medicine with any of the following medications:  · anakinra  · rilonacept  This medicine may also interact with the following medications:  · vaccines  What if I miss a dose?  It is important not to miss your dose. Call your doctor or health care professional if you are unable to keep an appointment.  Where should I keep my medicine?  This drug is given in a hospital or clinic  and will not be stored at home.  What should I tell my health care provider before I take this medicine?  They need to know if you have any of these conditions:  · diabetes  · exposure to tuberculosis  · heart failure  · hepatitis or liver disease  · immune system problems  · infection  · lung or breathing disease, like COPD  · multiple sclerosis  · current or past resident of [place]  · seizure disorder  · an unusual or allergic reaction to infliximab, mouse proteins, other medicines, foods, dyes, or preservatives  · pregnant or trying to get pregnant  · breast-feeding  What should I watch for while using this medicine?  Visit your doctor or health care professional for regular checks on your progress.  If you get a cold or other infection while receiving this medicine, call your doctor or health care professional. Do not treat yourself. This medicine may decrease your body's ability to fight infections. Before beginning therapy, your doctor may do a test to see if you have been exposed to tuberculosis.  This medicine may make the symptoms of heart failure worse in some patients. If you notice symptoms such as increased shortness of breath or swelling of the ankles or legs, contact your health care provider right away.  If you are going to have surgery or dental work, tell your health care professional or dentist that you have received this medicine.  If you take this medicine for plaque psoriasis, stay out of the sun. If you cannot avoid being in the sun, wear protective clothing and use sunscreen. Do not use sun lamps or tanning beds/booths.  NOTE:This sheet is a summary. It may not cover all possible information. If you have questions about this medicine, talk to your doctor, pharmacist, or health care provider. Copyright© 2017 [employer]      WAYS TO HELP PREVENT INFECTION         WASH YOUR HANDS OFTEN DURING THE DAY, ESPECIALLY BEFORE YOU EAT, AFTER USING THE BATHROOM, AND AFTER  TOUCHING ANIMALS     STAY AWAY FROM PEOPLE WHO HAVE ILLNESSES YOU CAN CATCH; SUCH AS COLDS, FLU, CHICKEN POX     TRY TO AVOID CROWDS     STAY AWAY FROM CHILDREN WHO RECENTLY HAVE RECEIVED LIVE VIRUS VACCINES     MAINTAIN GOOD MOUTH CARE     DO NOT SQUEEZE OR SCRATCH PIMPLES     CLEAN CUTS & SCRAPES RIGHT AWAY AND DAILY UNTIL HEALED WITH WARM WATER, SOAP & AN ANTISEPTIC     AVOID CONTACT WITH LITTER BOXES, BIRD CAGES, & FISH TANKS     AVOID STANDING WATER, IE., BIRD BATHS, FLOWER POTS/VASES, OR HUMIDIFIERS     WEAR GLOVES WHEN GARDENING OR CLEANING UP AFTER OTHERS, ESPECIALLY BABIES & SMALL CHILDREN    DO NOT EAT RAW FISH, SEAFOOD, MEAT, OR EGGS            Preventing Falls in the Home  An adult or child can fall for many reasons. If you are an older adult, you may fall because your reaction time slows down. Your muscles and joints may get stiff, weak, or less flexible because of illness, medicines, or a physical condition. These things can also make a child more likely to fall or be injured in a fall.  Other health problems that make falls more likely include:  · Arthritis  · Dizziness or lightheadedness when you get out of bed (orthostatic hypotension)  · History of a stroke  · Dizziness  · Anemia  · Certain medicines taken for mental illness  · Problems with balance or gait  · History of falls with or without an injury  · Changes in vision (vision impairment)  · Changes in thinking skills and memory (cognitive impairment)  Injuries from a fall can include broken bones, dislocated joints, and cuts. When these injuries are serious enough, they can make it impossible for you or a child who is injured in a fall to live on his or her own.  Prevention tips  To help prevent falls and fall-related injuries, follow the tips below.   Floors  Make floors safer by doing the following:   · Put nonskid pads under area rugs.  · Remove throw rugs.  · Replace worn floor coverings.  · Tack carpets firmly to each step on  carpeted stairs. Put nonskid strips on the edges of uncarpeted stairs.  · Keep floors and stairs free of clutter and cords.  · Arrange furniture so there are clear pathways.  · Clean up any spills right away.  · Wear shoes that fit.  Bathrooms    Make bathrooms safer by doing the following:   · Install grab bars in the tub or shower.  · Apply nonskid strips or put a nonskid rubber mat in the tub or shower.  · Sit on a bath chair to bathe.  · Use bathmats with nonskid backing.  Lighting and the environment  Improve lighting in your home by doing the following:   · Keep a flashlight in each room. Or put a lamp next to the bed within easy reach.  · Put nightlights in the bedrooms, hallways, kitchen, and bathrooms.  · Make sure all stairways have good lighting.  · Take your time when going up and down stairs.  · Put handrails on both sides of stairs and in walkways for more support. To prevent injury to your wrist or arm, dont use handrails to pull yourself up.  · Install grab bars to pull yourself up.  · Move or rearrange items that you use often. This will make them easier to find or reach.  · Look at your home to find any safety hazards. Especially look at doorways, walkways, and the driveway. Remove or repair any safety problems that you find.  Date Last Reviewed: 8/1/2016  © 8419-1070 [employer]. [address] [place] [address], [place] [address]. All rights reserved. This information is not intended as a substitute for professional medical care. Always follow your healthcare professional's instructions.

## 2018-02-06 NOTE — PROGRESS NOTES
Subjective:       Patient ID: Jack Back is a 73 y.o. male.    Chief Complaint: No chief complaint on file.      Jack is back today for rheumatology follow-up.  He has seropositive erosive rheumatoid arthritis. Last year was off schedule due to MI.  Had stent place. 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 He wanted to stay off his remicade and see how he did. After missing 2 months RA activity increased. We opted to resume IV Remicade 800 mg every 8 weeks (7.9 mg/kg) over 90 min. He is on baseline mtx dose of 15 mg once weekly (better to stay on mtx for RA pt with heart disease)  His joints are doing fine. He is concerned about his esr and crp numbers. . The last several visits his esr elevated but crp normal. Worried about cardiac impact from chronic inflammation. He is seeing lipidemiologist. On fish oil and zetia. Lost 20 lg. His cholesterol and lipid numbers have improved. Also on metoprolol. His bp is good. managed by cardiology. Echo last year showed prior damage was resolved. His heart function was back to normal.  He has not developed heart failure. No lower ext edema, mild sob, no chest pain. No pnd    5/2017 spep showed elevated gammaglobulins, no polyclonal or monoclonal peaks in IF. Hypergammaglobulinemia.   No swelling or acute exacerbations. Pain 4/10 today affecting left knee he twisted it 2 weeks ago. No swelling. The pain is getting better.     He has not sob, no pleuritic chest pain. No LED, no PND or orthopnea. No rash. No mouth ulcers. No fevers. No infections. Vaccines all up to date except TdaP but pt allergic to Tetanus.     bone density done in June 2014 which was normal.           Review of Systems   Constitutional: Negative.  Negative for chills, fatigue and fever.   HENT: Negative.  Negative for congestion, mouth sores and trouble swallowing.    Eyes: Negative.  Negative for photophobia, redness and visual disturbance.   Respiratory: Negative.  Negative for cough, shortness of breath and wheezing.     Cardiovascular: Negative.  Negative for chest pain and leg swelling.   Gastrointestinal: Negative.  Negative for abdominal distention, abdominal pain, diarrhea, nausea and vomiting.   Genitourinary: Negative.  Negative for dysuria, flank pain, frequency and urgency.   Musculoskeletal: Positive for arthralgias. Negative for back pain, gait problem, joint swelling and myalgias.        Mild aching and stiffness   Skin: Negative.  Negative for rash.   Neurological: Negative.  Negative for dizziness, weakness and numbness.           Objective:     There were no vitals taken for this visit.        Physical Exam   Constitutional: He is oriented to person, place, and time and well-developed, well-nourished, and in no distress. No distress.   HENT:   Head: Normocephalic and atraumatic.   Right Ear: External ear normal.   Left Ear: External ear normal.   Mouth/Throat: No oropharyngeal exudate.   Eyes: Conjunctivae and EOM are normal. Pupils are equal, round, and reactive to light. No scleral icterus.   Neck: Neck supple. No thyromegaly present.   Cardiovascular: Normal rate, regular rhythm and normal heart sounds.    No murmur heard.  Pulmonary/Chest: Effort normal and breath sounds normal. No respiratory distress.   Abdominal: Soft. Bowel sounds are normal.       Right Side Rheumatological Exam     Examination finds the shoulder, 1st PIP, 1st MCP, 2nd PIP, 2nd MCP, 3rd PIP, 3rd MCP, 4th PIP, 4th MCP, 5th PIP and 5th MCP normal.    Joint Exam Comments   Elbow: limited extension   Wrist: Mild reduced flexion of wrist, no synovitis    Left Side Rheumatological Exam     Examination finds the shoulder, elbow, 1st PIP, 1st MCP, 2nd PIP, 2nd MCP, 3rd PIP, 3rd MCP, 4th PIP, 4th MCP, 5th PIP and 5th MCP normal.    The patient is tender to palpation of the knee.    Joint Exam Comments   Wrist: Limited motion left wrists, no synovitis      Lymphadenopathy:     He has no cervical adenopathy.   Neurological: He is alert and oriented to  person, place, and time. No cranial nerve deficit. He exhibits normal muscle tone. Gait normal. Coordination normal.   Skin: Skin is warm and dry.     Musculoskeletal: Normal range of motion. He exhibits deformity. He exhibits no edema.   Ulnar drift 25 degrees right hand and 15 degrees left hand, right elbow fixed flexion stops about  5 degrees from baseline,  Limited motion both wrist extension  No synovitis on exam today                 Recent Results (from the past 168 hour(s))   CBC auto differential    Collection Time: 02/06/18  8:00 AM   Result Value Ref Range    WBC 6.63 3.90 - 12.70 K/uL    RBC 4.11 (L) 4.60 - 6.20 M/uL    Hemoglobin 14.4 14.0 - 18.0 g/dL    Hematocrit 42.3 40.0 - 54.0 %     (H) 82 - 98 fL    MCH 35.0 (H) 27.0 - 31.0 pg    MCHC 34.0 32.0 - 36.0 g/dL    RDW 14.0 11.5 - 14.5 %    Platelets 255 150 - 350 K/uL    MPV 8.6 (L) 9.2 - 12.9 fL    Gran # (ANC) 3.7 1.8 - 7.7 K/uL    Lymph # 2.4 1.0 - 4.8 K/uL    Mono # 0.2 (L) 0.3 - 1.0 K/uL    Eos # 0.3 0.0 - 0.5 K/uL    Baso # 0.01 0.00 - 0.20 K/uL    Gran% 55.9 38.0 - 73.0 %    Lymph% 36.2 18.0 - 48.0 %    Mono% 3.5 (L) 4.0 - 15.0 %    Eosinophil% 4.2 0.0 - 8.0 %    Basophil% 0.2 0.0 - 1.9 %    Differential Method Automated          DEXA 6/17/14 NORMAL        6/2016 [facility] X-rays stable   2/25/15 [facility] films       Assessment:       1. Rheumatoid arthritis involving multiple sites with positive rheumatoid factor    2. Immunocompromised patient    3. Medication monitoring encounter    4. Coronary artery disease of native artery of native heart with stable angina pectoris    5. Hypogonadism male          1. Seropositive Erosive RA stable on IV Remicade 800 mg Q 8 weeks and mtx 15 mg/wk - chronic damage but no activity on exam today 0 swollen/1 tender- CDAI 1- LI 0.5--      2. Vaccines need TdaP but allergic to tetnus    3. Med monitoring and immunocompromised     4. Immunocompromised no issues with recurrent  infections    5. CAD post CABG X 3 2012 and post PTCA stent 2016 both related to MI with normal heart function on recent  ECHO- on zetia, asa and omega fish oil  RA well controlled  CRP normal  On mtx and remicade- helps reduce cardiac risk     6. Chronic elevated esr- last visit crp - hypergammaglobulins on spep, no monoclona peaks on IF     Plan:         Reassurance to pt he is being well managed from RA and cardiac standpoint, he is on approprioate treatment  And keeping RA well controlled helps reduce his risk of future cardiac events  More importantly watch cpr  Esr will remain elevated with hypergammaglobulinemia  Will recheck spep from time to time to make sure no changes    Ok for IV Remicade 800 mg  infusion over 90 min every 8 weeks     Keep mtx at 15 mg weekly splitting dose 3 tabs am and pm, Always remember to Hold mtx for signs of infection or for surgery, Discussed risk versus benefits and potential side effects of mtx.    Remain off prednisone      Watch closely if any heart failure develops then we would consider using orenica or actemra (high esr) or anther biologic rather than going back to TNF agents    Keep his folic acid daily     bone density up-to-date repeat in 3-5 years    defer TdaP due to pt allergic to tetanus.      Continue all meds per cards and follow up closely    rtc 8 weeks infusion only and 16 weeks with remicade infusion, labs and office visit    Repeat hand and foot X-rays 1 year    call with any questions, changes, or concerns                CC:   Dr Sanajy Ansari-CARDS  Dr Shahram Benavides-PCP

## 2018-04-03 ENCOUNTER — LAB VISIT (OUTPATIENT)
Dept: LAB | Facility: HOSPITAL | Age: 73
End: 2018-04-03
Attending: INTERNAL MEDICINE
Payer: MEDICARE

## 2018-04-03 ENCOUNTER — INFUSION (OUTPATIENT)
Dept: RHEUMATOLOGY | Facility: HOSPITAL | Age: 73
End: 2018-04-03
Attending: INTERNAL MEDICINE
Payer: MEDICARE

## 2018-04-03 VITALS
WEIGHT: 199.94 LBS | DIASTOLIC BLOOD PRESSURE: 60 MMHG | HEART RATE: 50 BPM | RESPIRATION RATE: 18 BRPM | TEMPERATURE: 97 F | SYSTOLIC BLOOD PRESSURE: 108 MMHG | BODY MASS INDEX: 31.32 KG/M2

## 2018-04-03 DIAGNOSIS — M05.79 RHEUMATOID ARTHRITIS INVOLVING MULTIPLE SITES WITH POSITIVE RHEUMATOID FACTOR: Chronic | ICD-10-CM

## 2018-04-03 DIAGNOSIS — M05.79 RHEUMATOID ARTHRITIS INVOLVING MULTIPLE SITES WITH POSITIVE RHEUMATOID FACTOR: Primary | ICD-10-CM

## 2018-04-03 DIAGNOSIS — Z51.81 MEDICATION MONITORING ENCOUNTER: Chronic | ICD-10-CM

## 2018-04-03 LAB
ALBUMIN SERPL BCP-MCNC: 3.2 G/DL
ALP SERPL-CCNC: 53 U/L
ALT SERPL W/O P-5'-P-CCNC: 17 U/L
ANION GAP SERPL CALC-SCNC: 8 MMOL/L
AST SERPL-CCNC: 27 U/L
BASOPHILS # BLD AUTO: 0.01 K/UL
BASOPHILS NFR BLD: 0.1 %
BILIRUB SERPL-MCNC: 0.4 MG/DL
BUN SERPL-MCNC: 16 MG/DL
CALCIUM SERPL-MCNC: 9.5 MG/DL
CHLORIDE SERPL-SCNC: 103 MMOL/L
CO2 SERPL-SCNC: 27 MMOL/L
CREAT SERPL-MCNC: 1 MG/DL
CRP SERPL-MCNC: 4.8 MG/L
DIFFERENTIAL METHOD: ABNORMAL
EOSINOPHIL # BLD AUTO: 0.4 K/UL
EOSINOPHIL NFR BLD: 5.1 %
ERYTHROCYTE [DISTWIDTH] IN BLOOD BY AUTOMATED COUNT: 14.6 %
ERYTHROCYTE [SEDIMENTATION RATE] IN BLOOD BY WESTERGREN METHOD: 30 MM/HR
EST. GFR  (AFRICAN AMERICAN): >60 ML/MIN/1.73 M^2
EST. GFR  (NON AFRICAN AMERICAN): >60 ML/MIN/1.73 M^2
GLUCOSE SERPL-MCNC: 119 MG/DL
HCT VFR BLD AUTO: 41.5 %
HGB BLD-MCNC: 14.1 G/DL
LYMPHOCYTES # BLD AUTO: 2.5 K/UL
LYMPHOCYTES NFR BLD: 35.3 %
MCH RBC QN AUTO: 34.3 PG
MCHC RBC AUTO-ENTMCNC: 34 G/DL
MCV RBC AUTO: 101 FL
MONOCYTES # BLD AUTO: 0.4 K/UL
MONOCYTES NFR BLD: 5.3 %
NEUTROPHILS # BLD AUTO: 3.8 K/UL
NEUTROPHILS NFR BLD: 54.2 %
PLATELET # BLD AUTO: 238 K/UL
PMV BLD AUTO: 8.7 FL
POTASSIUM SERPL-SCNC: 4.3 MMOL/L
PROT SERPL-MCNC: 7.3 G/DL
RBC # BLD AUTO: 4.11 M/UL
SODIUM SERPL-SCNC: 138 MMOL/L
WBC # BLD AUTO: 7.03 K/UL

## 2018-04-03 PROCEDURE — A4216 STERILE WATER/SALINE, 10 ML: HCPCS | Mod: PO | Performed by: PHYSICIAN ASSISTANT

## 2018-04-03 PROCEDURE — 96413 CHEMO IV INFUSION 1 HR: CPT | Mod: PO

## 2018-04-03 PROCEDURE — 25000003 PHARM REV CODE 250: Mod: PO | Performed by: PHYSICIAN ASSISTANT

## 2018-04-03 PROCEDURE — 63600175 PHARM REV CODE 636 W HCPCS: Mod: JG,PO | Performed by: PHYSICIAN ASSISTANT

## 2018-04-03 PROCEDURE — 80053 COMPREHEN METABOLIC PANEL: CPT | Mod: PO

## 2018-04-03 PROCEDURE — 36415 COLL VENOUS BLD VENIPUNCTURE: CPT | Mod: PO

## 2018-04-03 PROCEDURE — 85025 COMPLETE CBC W/AUTO DIFF WBC: CPT | Mod: PO

## 2018-04-03 PROCEDURE — 85651 RBC SED RATE NONAUTOMATED: CPT | Mod: PO

## 2018-04-03 PROCEDURE — 86140 C-REACTIVE PROTEIN: CPT

## 2018-04-03 RX ORDER — SODIUM CHLORIDE 0.9 % (FLUSH) 0.9 %
10 SYRINGE (ML) INJECTION
Status: DISCONTINUED | OUTPATIENT
Start: 2018-04-03 | End: 2018-04-03 | Stop reason: HOSPADM

## 2018-04-03 RX ORDER — DIPHENHYDRAMINE HYDROCHLORIDE 50 MG/ML
25 INJECTION INTRAMUSCULAR; INTRAVENOUS
Status: CANCELLED | OUTPATIENT
Start: 2018-04-03

## 2018-04-03 RX ORDER — SODIUM CHLORIDE 0.9 % (FLUSH) 0.9 %
10 SYRINGE (ML) INJECTION
Status: CANCELLED | OUTPATIENT
Start: 2018-04-03

## 2018-04-03 RX ORDER — METHYLPREDNISOLONE SOD SUCC 125 MG
40 VIAL (EA) INJECTION
Status: CANCELLED | OUTPATIENT
Start: 2018-04-03

## 2018-04-03 RX ORDER — ACETAMINOPHEN 325 MG/1
650 TABLET ORAL
Status: CANCELLED | OUTPATIENT
Start: 2018-04-03

## 2018-04-03 RX ORDER — EPINEPHRINE 1 MG/ML
0.3 INJECTION, SOLUTION, CONCENTRATE INTRAVENOUS
Status: CANCELLED | OUTPATIENT
Start: 2018-04-03

## 2018-04-03 RX ORDER — IPRATROPIUM BROMIDE AND ALBUTEROL SULFATE 2.5; .5 MG/3ML; MG/3ML
3 SOLUTION RESPIRATORY (INHALATION)
Status: CANCELLED | OUTPATIENT
Start: 2018-04-03

## 2018-04-03 RX ADMIN — SODIUM CHLORIDE, PRESERVATIVE FREE 10 ML: 5 INJECTION INTRAVENOUS at 08:04

## 2018-04-03 RX ADMIN — INFLIXIMAB 800 MG: 100 INJECTION, POWDER, LYOPHILIZED, FOR SOLUTION INTRAVENOUS at 09:04

## 2018-04-03 NOTE — PATIENT INSTRUCTIONS
Please try to schedule Cataract Surgery the week of May 1st. Also hold methotrexate the week of your cataract surgery.     Remicade (infliximab) injection  What is this medicine?  INFLIXIMAB (in FLIX i mab) is used to treat Crohn's disease and ulcerative colitis. It is also used to treat ankylosing spondylitis, psoriasis, and some forms of arthritis.  How should I use this medicine?  This medicine is for injection into a vein. It is usually given by a health care professional in a hospital or clinic setting.  A special MedGuide will be given to you by the pharmacist with each prescription and refill. Be sure to read this information carefully each time.  Talk to your pediatrician regarding the use of this medicine in children. Special care may be needed.  What side effects may I notice from receiving this medicine?  Side effects that you should report to your doctor or health care professional as soon as possible:  · allergic reactions like skin rash, itching or hives, swelling of the face, lips, or tongue  · chest pain  · fever or chills, usually related to the infusion  · muscle or joint pain  · red, scaly patches or raised bumps on the skin  · signs of infection - fever or chills, cough, sore throat, pain or difficulty passing urine  · swollen lymph nodes in the neck, underarm, or groin areas  · unexplained weight loss  · unusual bleeding or bruising  · unusually weak or tired  · yellowing of the eyes or skin  Side effects that usually do not require medical attention (report to your doctor or health care professional if they continue or are bothersome):  · headache  · heartburn or stomach pain  · nausea, vomiting  What may interact with this medicine?  Do not take this medicine with any of the following medications:  · anakinra  · rilonacept  This medicine may also interact with the following medications:  · vaccines  What if I miss a dose?  It is important not to miss your dose. Call your doctor or health  care professional if you are unable to keep an appointment.  Where should I keep my medicine?  This drug is given in a hospital or clinic and will not be stored at home.  What should I tell my health care provider before I take this medicine?  They need to know if you have any of these conditions:  · diabetes  · exposure to tuberculosis  · heart failure  · hepatitis or liver disease  · immune system problems  · infection  · lung or breathing disease, like COPD  · multiple sclerosis  · current or past resident of [place]  · seizure disorder  · an unusual or allergic reaction to infliximab, mouse proteins, other medicines, foods, dyes, or preservatives  · pregnant or trying to get pregnant  · breast-feeding  What should I watch for while using this medicine?  Visit your doctor or health care professional for regular checks on your progress.  If you get a cold or other infection while receiving this medicine, call your doctor or health care professional. Do not treat yourself. This medicine may decrease your body's ability to fight infections. Before beginning therapy, your doctor may do a test to see if you have been exposed to tuberculosis.  This medicine may make the symptoms of heart failure worse in some patients. If you notice symptoms such as increased shortness of breath or swelling of the ankles or legs, contact your health care provider right away.  If you are going to have surgery or dental work, tell your health care professional or dentist that you have received this medicine.  If you take this medicine for plaque psoriasis, stay out of the sun. If you cannot avoid being in the sun, wear protective clothing and use sunscreen. Do not use sun lamps or tanning beds/booths.  NOTE:This sheet is a summary. It may not cover all possible information. If you have questions about this medicine, talk to your doctor, pharmacist, or health care provider. Copyright© 2017 [employer]

## 2018-04-03 NOTE — PROGRESS NOTES
Infusion # >10 - Remicade 800 mg q 8 weeks    Any:  -recent illness, infection, or antibiotic use in past week- denies  -open wounds or mouth sores- denies  -invasive procedures or surgeries in past 4 weeks or in upcoming 4 weeks- denies  -vaccinations in past week- denies  -chance you may be pregnant- n/a      Recent labs? 4/3/18  Last Rheumatology provider visit- Seen by ANA Gallo on 2/6/18     Premeds? none     Remicade 800 mg administered IV at a 90 minute rate per orders; see MAR and vitals for more  details. Tolerated well without adverse events, discharged and ambulatory out of clinic.

## 2018-04-03 NOTE — PLAN OF CARE
Problem: Infection, Risk/Actual (Adult, Obstetrics)  Goal: Infection Prevention/Resolution/Control (Infection, Risk/Actual)  Patient will demonstrate the desired outcomes.   1. RA  2. Patient sleeps during infusion   Outcome: Ongoing (interventions implemented as appropriate)  Instructed pt on infection prevention measures. Encouraged frequent hand washing and use of antibacteria hand sanitizers as needed. Instructed to notify Rheumatology Provider/ Infusion Dept for any antibiotic use, illness, vaccinations or surgeries/invasive procedures before, on, or after infusion date. Verbalizes understanding.

## 2018-05-30 ENCOUNTER — OFFICE VISIT (OUTPATIENT)
Dept: RHEUMATOLOGY | Facility: CLINIC | Age: 73
End: 2018-05-30
Payer: MEDICARE

## 2018-05-30 ENCOUNTER — INFUSION (OUTPATIENT)
Dept: RHEUMATOLOGY | Facility: HOSPITAL | Age: 73
End: 2018-05-30
Attending: PHYSICIAN ASSISTANT
Payer: MEDICARE

## 2018-05-30 VITALS
HEART RATE: 57 BPM | HEIGHT: 67 IN | WEIGHT: 197.06 LBS | BODY MASS INDEX: 30.93 KG/M2 | DIASTOLIC BLOOD PRESSURE: 65 MMHG | SYSTOLIC BLOOD PRESSURE: 121 MMHG

## 2018-05-30 VITALS
DIASTOLIC BLOOD PRESSURE: 59 MMHG | WEIGHT: 197.06 LBS | HEART RATE: 49 BPM | BODY MASS INDEX: 30.87 KG/M2 | SYSTOLIC BLOOD PRESSURE: 101 MMHG

## 2018-05-30 DIAGNOSIS — Z51.81 MEDICATION MONITORING ENCOUNTER: Chronic | ICD-10-CM

## 2018-05-30 DIAGNOSIS — M05.79 RHEUMATOID ARTHRITIS INVOLVING MULTIPLE SITES WITH POSITIVE RHEUMATOID FACTOR: Primary | ICD-10-CM

## 2018-05-30 DIAGNOSIS — D84.9 IMMUNOCOMPROMISED PATIENT: Chronic | ICD-10-CM

## 2018-05-30 DIAGNOSIS — F32.A DEPRESSION, UNSPECIFIED DEPRESSION TYPE: ICD-10-CM

## 2018-05-30 DIAGNOSIS — R70.0 ELEVATED SED RATE: ICD-10-CM

## 2018-05-30 DIAGNOSIS — M05.79 RHEUMATOID ARTHRITIS INVOLVING MULTIPLE SITES WITH POSITIVE RHEUMATOID FACTOR: Primary | Chronic | ICD-10-CM

## 2018-05-30 PROCEDURE — 99214 OFFICE O/P EST MOD 30 MIN: CPT | Mod: PBBFAC,PO | Performed by: PHYSICIAN ASSISTANT

## 2018-05-30 PROCEDURE — A4216 STERILE WATER/SALINE, 10 ML: HCPCS | Mod: PO | Performed by: PHYSICIAN ASSISTANT

## 2018-05-30 PROCEDURE — 25000003 PHARM REV CODE 250: Mod: PO | Performed by: PHYSICIAN ASSISTANT

## 2018-05-30 PROCEDURE — 63600175 PHARM REV CODE 636 W HCPCS: Mod: JG,PO | Performed by: PHYSICIAN ASSISTANT

## 2018-05-30 PROCEDURE — 99999 PR PBB SHADOW E&M-EST. PATIENT-LVL IV: CPT | Mod: PBBFAC,,, | Performed by: PHYSICIAN ASSISTANT

## 2018-05-30 PROCEDURE — 96413 CHEMO IV INFUSION 1 HR: CPT | Mod: PO

## 2018-05-30 PROCEDURE — 99214 OFFICE O/P EST MOD 30 MIN: CPT | Mod: S$PBB,,, | Performed by: PHYSICIAN ASSISTANT

## 2018-05-30 RX ORDER — IPRATROPIUM BROMIDE AND ALBUTEROL SULFATE 2.5; .5 MG/3ML; MG/3ML
3 SOLUTION RESPIRATORY (INHALATION)
Status: CANCELLED | OUTPATIENT
Start: 2018-05-30

## 2018-05-30 RX ORDER — SODIUM CHLORIDE 0.9 % (FLUSH) 0.9 %
10 SYRINGE (ML) INJECTION
Status: CANCELLED | OUTPATIENT
Start: 2018-05-30

## 2018-05-30 RX ORDER — DIPHENHYDRAMINE HYDROCHLORIDE 50 MG/ML
25 INJECTION INTRAMUSCULAR; INTRAVENOUS
Status: CANCELLED | OUTPATIENT
Start: 2018-05-30

## 2018-05-30 RX ORDER — EPINEPHRINE 1 MG/ML
0.3 INJECTION, SOLUTION, CONCENTRATE INTRAVENOUS
Status: CANCELLED | OUTPATIENT
Start: 2018-05-30

## 2018-05-30 RX ORDER — METHYLPREDNISOLONE SOD SUCC 125 MG
40 VIAL (EA) INJECTION
Status: CANCELLED | OUTPATIENT
Start: 2018-05-30

## 2018-05-30 RX ORDER — SODIUM CHLORIDE 0.9 % (FLUSH) 0.9 %
10 SYRINGE (ML) INJECTION
Status: DISCONTINUED | OUTPATIENT
Start: 2018-05-30 | End: 2018-05-30 | Stop reason: HOSPADM

## 2018-05-30 RX ORDER — ACETAMINOPHEN 325 MG/1
650 TABLET ORAL
Status: CANCELLED | OUTPATIENT
Start: 2018-05-30

## 2018-05-30 RX ADMIN — SODIUM CHLORIDE, PRESERVATIVE FREE 10 ML: 5 INJECTION INTRAVENOUS at 09:05

## 2018-05-30 RX ADMIN — INFLIXIMAB 800 MG: 100 INJECTION, POWDER, LYOPHILIZED, FOR SOLUTION INTRAVENOUS at 09:05

## 2018-05-30 ASSESSMENT — CLINICAL DISEASE ACTIVITY INDEX (CDAI)
PATIENT_ASSESSMENT: 0
TENDER_JOINTS_COUNT: 0
SWOLLEN_JOINTS_COUNT: 0
PHYSICIAN_ASSESSMENT: 0
TOTAL_SCORE: 0

## 2018-05-30 ASSESSMENT — ROUTINE ASSESSMENT OF PATIENT INDEX DATA (RAPID3): MDHAQ FUNCTION SCORE: .6

## 2018-05-30 NOTE — PROGRESS NOTES
Infusion# >10  S/S infection noted or voiced? None noted/denied  Recent labs? yes  Recent vaccines? denied    Premeds? none    Remicade 800 mg administered IV at a 90 minute rate per orders; see MAR & Flow Sheet for more  details. Tolerated well without adverse events

## 2018-05-30 NOTE — PROGRESS NOTES
Subjective:       Patient ID: Jack Back is a 73 y.o. male.    Chief Complaint: Rheumatoid Arthritis and immunocompromised      Jack is back today for rheumatology follow-up.      He has seropositive erosive rheumatoid arthritis. Last year was off schedule due to MI.  Had stent place. 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 He wanted to stay off his remicade and see how he did. After missing 2 months RA activity increased. We opted to resume IV Remicade 800 mg every 8 weeks (7.9 mg/kg) over 90 min. He is on baseline mtx dose of 15 mg once weekly (better to stay on mtx for RA pt with heart disease)  His joints are doing fine.  He is seeing lipidemiologist. On fish oil and rapatha, had to stop the  zetia due to myalgias and dizziness lost some weight. Trying to loose more. also on metoprolol. His bp is good. managed by cardiology. Echo last year showed prior damage was resolved. His heart function was back to normal.  He has not developed heart failure. No lower ext edema, mild sob, no chest pain. No pnd    Chronic elevated esr, normal crp. 5/2017 spep showed elevated gammaglobulins, no polyclonal or monoclonal peaks in IF. Hypergammaglobulinemia.   No swelling or acute exacerbations. Pain level rated today as 0/10 No swelling. No RA exacerbations.     He has not sob, no pleuritic chest pain. No LED, no PND or orthopnea. No rash. No mouth ulcers. No fevers. No infections. Vaccines all up to date except TdaP but pt allergic to Tetanus.     bone density done in June 2014 which was normal.           Review of Systems   Constitutional: Negative.  Negative for chills, fatigue and fever.   HENT: Negative.  Negative for congestion, mouth sores and trouble swallowing.    Eyes: Negative.  Negative for photophobia, redness and visual disturbance.   Respiratory: Negative.  Negative for cough, shortness of breath and wheezing.    Cardiovascular: Negative.  Negative for chest pain and leg swelling.   Gastrointestinal: Negative.  Negative for  "abdominal distention, abdominal pain, diarrhea, nausea and vomiting.   Genitourinary: Negative.  Negative for dysuria, flank pain, frequency and urgency.   Musculoskeletal: Negative for arthralgias, back pain, gait problem, joint swelling and myalgias.        Mild aching and stiffness   Skin: Negative.  Negative for rash.   Neurological: Negative.  Negative for dizziness, weakness and numbness.           Objective:     /65   Pulse (!) 57   Ht 5' 7" (1.702 m)   Wt 89.4 kg (197 lb 1.5 oz)   BMI 30.87 kg/m²         Physical Exam   Constitutional: He is oriented to person, place, and time and well-developed, well-nourished, and in no distress. No distress.   HENT:   Head: Normocephalic and atraumatic.   Right Ear: External ear normal.   Left Ear: External ear normal.   Mouth/Throat: No oropharyngeal exudate.   Eyes: Conjunctivae and EOM are normal. Pupils are equal, round, and reactive to light. No scleral icterus.   Neck: Neck supple. No thyromegaly present.   Cardiovascular: Normal rate, regular rhythm and normal heart sounds.    No murmur heard.  Pulmonary/Chest: Effort normal and breath sounds normal. No respiratory distress.   Abdominal: Soft. Bowel sounds are normal.       Right Side Rheumatological Exam     Examination finds the shoulder, 1st PIP, 1st MCP, 2nd PIP, 2nd MCP, 3rd PIP, 3rd MCP, 4th PIP, 4th MCP, 5th PIP and 5th MCP normal.    Joint Exam Comments   Elbow: limited extension   Wrist: Mild reduced flexion of wrist, no synovitis    Left Side Rheumatological Exam     Examination finds the shoulder, elbow, 1st PIP, 1st MCP, 2nd PIP, 2nd MCP, 3rd PIP, 3rd MCP, 4th PIP, 4th MCP, 5th PIP and 5th MCP normal.    The patient is tender to palpation of the knee.    Joint Exam Comments   Wrist: Limited motion left wrists, no synovitis      Lymphadenopathy:     He has no cervical adenopathy.   Neurological: He is alert and oriented to person, place, and time. No cranial nerve deficit. He exhibits normal " muscle tone. Gait normal. Coordination normal.   Skin: Skin is warm and dry.     Musculoskeletal: He exhibits deformity. He exhibits no edema.   Ulnar drift 25 degrees right hand and 15 degrees left hand, right elbow fixed flexion stops about  5 degrees from baseline,  Limited motion both wrist extension  No synovitis on exam today                 Recent Results (from the past 168 hour(s))   CBC auto differential    Collection Time: 05/30/18  8:17 AM   Result Value Ref Range    WBC 5.47 3.90 - 12.70 K/uL    RBC 3.92 (L) 4.60 - 6.20 M/uL    Hemoglobin 13.5 (L) 14.0 - 18.0 g/dL    Hematocrit 39.6 (L) 40.0 - 54.0 %     (H) 82 - 98 fL    MCH 34.4 (H) 27.0 - 31.0 pg    MCHC 34.1 32.0 - 36.0 g/dL    RDW 14.6 (H) 11.5 - 14.5 %    Platelets 228 150 - 350 K/uL    MPV 8.3 (L) 9.2 - 12.9 fL    Gran # (ANC) 3.1 1.8 - 7.7 K/uL    Lymph # 1.9 1.0 - 4.8 K/uL    Mono # 0.2 (L) 0.3 - 1.0 K/uL    Eos # 0.2 0.0 - 0.5 K/uL    Baso # 0.01 0.00 - 0.20 K/uL    Gran% 56.5 38.0 - 73.0 %    Lymph% 35.3 18.0 - 48.0 %    Mono% 3.8 (L) 4.0 - 15.0 %    Eosinophil% 4.2 0.0 - 8.0 %    Basophil% 0.2 0.0 - 1.9 %    Differential Method Automated    Comprehensive metabolic panel    Collection Time: 05/30/18  8:17 AM   Result Value Ref Range    Sodium 138 136 - 145 mmol/L    Potassium 4.1 3.5 - 5.1 mmol/L    Chloride 103 95 - 110 mmol/L    CO2 30 (H) 23 - 29 mmol/L    Glucose 107 70 - 110 mg/dL    BUN, Bld 17 8 - 23 mg/dL    Creatinine 1.0 0.5 - 1.4 mg/dL    Calcium 9.4 8.7 - 10.5 mg/dL    Total Protein 7.1 6.0 - 8.4 g/dL    Albumin 3.1 (L) 3.5 - 5.2 g/dL    Total Bilirubin 0.6 0.1 - 1.0 mg/dL    Alkaline Phosphatase 55 55 - 135 U/L    AST 25 10 - 40 U/L    ALT 17 10 - 44 U/L    Anion Gap 5 (L) 8 - 16 mmol/L    eGFR if African American >60 >60 mL/min/1.73 m^2    eGFR if non African American >60 >60 mL/min/1.73 m^2         DEXA 6/17/14 NORMAL        6/2016 [facility] X-rays stable   2/25/15 [facility] films       Assessment:        1. Rheumatoid arthritis involving multiple sites with positive rheumatoid factor    2. Medication monitoring encounter    3. Immunocompromised patient    4. Depression, unspecified depression type    5. Elevated sed rate            1. Seropositive Erosive RA stable on IV Remicade 800 mg Q 8 weeks and mtx 15 mg/wk - chronic damage but no activity on exam today 0 swollen/1 tender- CDAI 1- LI 0.5--      2. Vaccines need TdaP but allergic to tetnus    3. Med monitoring and immunocompromised     4. Immunocompromised no issues with recurrent infections    5. CAD post CABG X 3 2012 and post PTCA stent 2016 both related to MI with normal heart function on recent  ECHO- on zetia, asa and omega fish oil  RA well controlled  CRP normal  On mtx and remicade- helps reduce cardiac risk     6. Chronic elevated esr- last visit crp - hypergammaglobulins on spep, no monoclona peaks on IF     Plan:         Reassurance to pt he is being well managed from RA and cardiac standpoint, he is on approprioate treatment  And keeping RA well controlled helps reduce his risk of future cardiac events  More importantly watch crp  Esr will remain elevated with hypergammaglobulinemia  Will recheck spep from time to time to make sure no changes    Ok for IV Remicade 800 mg  infusion over 90 min every 8 weeks     Keep mtx at 15 mg weekly splitting dose 3 tabs am and pm, Always remember to Hold mtx for signs of infection or for surgery, Discussed risk versus benefits and potential side effects of mtx.    Remain off prednisone      Watch closely if any heart failure develops then we would consider using orenica or actemra (high esr) or anther biologic rather than going back to TNF agents    Keep his folic acid daily     bone density up-to-date repeat in 3-5 years    defer TdaP due to pt allergic to tetanus.      Continue all meds per cards and follow up closely    rtc 8 weeks infusion only and 16 weeks with remicade infusion, labs and office  visit    Repeat hand and foot X-rays  Next visit     call with any questions, changes, or concerns                CC:   Dr Sanjay Ansari-CARDS  Dr Shahram Benavides-PCP

## 2018-07-24 ENCOUNTER — HOSPITAL ENCOUNTER (OUTPATIENT)
Dept: RADIOLOGY | Facility: HOSPITAL | Age: 73
Discharge: HOME | End: 2018-07-24
Attending: PHYSICIAN ASSISTANT
Payer: MEDICARE

## 2018-07-24 ENCOUNTER — INFUSION (OUTPATIENT)
Dept: RHEUMATOLOGY | Facility: HOSPITAL | Age: 73
End: 2018-07-24
Attending: PHYSICIAN ASSISTANT
Payer: MEDICARE

## 2018-07-24 VITALS
SYSTOLIC BLOOD PRESSURE: 111 MMHG | HEART RATE: 46 BPM | BODY MASS INDEX: 30.9 KG/M2 | RESPIRATION RATE: 18 BRPM | WEIGHT: 197.31 LBS | DIASTOLIC BLOOD PRESSURE: 59 MMHG | OXYGEN SATURATION: 96 % | TEMPERATURE: 97 F

## 2018-07-24 DIAGNOSIS — M05.79 RHEUMATOID ARTHRITIS INVOLVING MULTIPLE SITES WITH POSITIVE RHEUMATOID FACTOR: Chronic | ICD-10-CM

## 2018-07-24 DIAGNOSIS — M05.79 RHEUMATOID ARTHRITIS INVOLVING MULTIPLE SITES WITH POSITIVE RHEUMATOID FACTOR: Primary | ICD-10-CM

## 2018-07-24 PROCEDURE — 73630 X-RAY EXAM OF FOOT: CPT | Mod: 50,TC,FY,PO

## 2018-07-24 PROCEDURE — 73130 X-RAY EXAM OF HAND: CPT | Mod: 50,TC,FY,PO

## 2018-07-24 PROCEDURE — 63600175 PHARM REV CODE 636 W HCPCS: Mod: JG,PO | Performed by: PHYSICIAN ASSISTANT

## 2018-07-24 PROCEDURE — 96413 CHEMO IV INFUSION 1 HR: CPT | Mod: PO

## 2018-07-24 PROCEDURE — 25000003 PHARM REV CODE 250: Mod: PO | Performed by: PHYSICIAN ASSISTANT

## 2018-07-24 PROCEDURE — 73130 X-RAY EXAM OF HAND: CPT | Mod: 26,50,, | Performed by: RADIOLOGY

## 2018-07-24 PROCEDURE — A4216 STERILE WATER/SALINE, 10 ML: HCPCS | Mod: PO | Performed by: PHYSICIAN ASSISTANT

## 2018-07-24 PROCEDURE — 73630 X-RAY EXAM OF FOOT: CPT | Mod: 26,50,, | Performed by: RADIOLOGY

## 2018-07-24 RX ORDER — DIPHENHYDRAMINE HYDROCHLORIDE 50 MG/ML
25 INJECTION INTRAMUSCULAR; INTRAVENOUS
Status: CANCELLED | OUTPATIENT
Start: 2018-07-24

## 2018-07-24 RX ORDER — IPRATROPIUM BROMIDE AND ALBUTEROL SULFATE 2.5; .5 MG/3ML; MG/3ML
3 SOLUTION RESPIRATORY (INHALATION)
Status: CANCELLED | OUTPATIENT
Start: 2018-07-24

## 2018-07-24 RX ORDER — SODIUM CHLORIDE 0.9 % (FLUSH) 0.9 %
10 SYRINGE (ML) INJECTION
Status: DISCONTINUED | OUTPATIENT
Start: 2018-07-24 | End: 2018-07-24 | Stop reason: HOSPADM

## 2018-07-24 RX ORDER — SODIUM CHLORIDE 0.9 % (FLUSH) 0.9 %
10 SYRINGE (ML) INJECTION
Status: CANCELLED | OUTPATIENT
Start: 2018-07-24

## 2018-07-24 RX ORDER — EPINEPHRINE 1 MG/ML
0.3 INJECTION, SOLUTION, CONCENTRATE INTRAVENOUS
Status: CANCELLED | OUTPATIENT
Start: 2018-07-24

## 2018-07-24 RX ORDER — METHYLPREDNISOLONE SOD SUCC 125 MG
40 VIAL (EA) INJECTION
Status: CANCELLED | OUTPATIENT
Start: 2018-07-24

## 2018-07-24 RX ORDER — ACETAMINOPHEN 325 MG/1
650 TABLET ORAL
Status: CANCELLED | OUTPATIENT
Start: 2018-07-24

## 2018-07-24 RX ADMIN — INFLIXIMAB 800 MG: 100 INJECTION, POWDER, LYOPHILIZED, FOR SOLUTION INTRAVENOUS at 08:07

## 2018-07-24 RX ADMIN — SODIUM CHLORIDE, PRESERVATIVE FREE 10 ML: 5 INJECTION INTRAVENOUS at 08:07

## 2018-07-24 NOTE — PROGRESS NOTES
Infusion # > 10 - Remicade 800 mg q 8 weeks    Any:  -recent illness, infection, or antibiotic use in past week- denies  -open wounds or mouth sores- denies  -invasive procedures or surgeries in past 4 weeks or in upcoming 4 weeks- denies  -vaccinations in past week- denies      Recent labs? 7/24/18  Last Rheumatology provider visit- Seen by ANA Gallo on 5/30/18     Premeds? none     Remicade 800 mg administered IV at a 90 minute rate per orders; see MAR and vitals for more  details. Tolerated well without adverse events, discharged and ambulatory out of clinic.

## 2018-09-17 ENCOUNTER — OFFICE VISIT (OUTPATIENT)
Dept: RHEUMATOLOGY | Facility: CLINIC | Age: 73
End: 2018-09-17
Payer: MEDICARE

## 2018-09-17 ENCOUNTER — INFUSION (OUTPATIENT)
Dept: RHEUMATOLOGY | Facility: HOSPITAL | Age: 73
End: 2018-09-17
Attending: INTERNAL MEDICINE
Payer: MEDICARE

## 2018-09-17 ENCOUNTER — LAB VISIT (OUTPATIENT)
Dept: LAB | Facility: HOSPITAL | Age: 73
End: 2018-09-17
Attending: INTERNAL MEDICINE
Payer: MEDICARE

## 2018-09-17 VITALS
SYSTOLIC BLOOD PRESSURE: 108 MMHG | HEART RATE: 61 BPM | DIASTOLIC BLOOD PRESSURE: 65 MMHG | WEIGHT: 194.25 LBS | BODY MASS INDEX: 30.49 KG/M2 | HEIGHT: 67 IN

## 2018-09-17 VITALS
HEART RATE: 64 BPM | DIASTOLIC BLOOD PRESSURE: 64 MMHG | BODY MASS INDEX: 30.42 KG/M2 | SYSTOLIC BLOOD PRESSURE: 110 MMHG | WEIGHT: 194.25 LBS

## 2018-09-17 DIAGNOSIS — Z51.81 MEDICATION MONITORING ENCOUNTER: Chronic | ICD-10-CM

## 2018-09-17 DIAGNOSIS — D89.2 HYPERGAMMAGLOBULINEMIA: ICD-10-CM

## 2018-09-17 DIAGNOSIS — M05.79 RHEUMATOID ARTHRITIS INVOLVING MULTIPLE SITES WITH POSITIVE RHEUMATOID FACTOR: Primary | ICD-10-CM

## 2018-09-17 DIAGNOSIS — M05.79 RHEUMATOID ARTHRITIS INVOLVING MULTIPLE SITES WITH POSITIVE RHEUMATOID FACTOR: Primary | Chronic | ICD-10-CM

## 2018-09-17 DIAGNOSIS — I25.118 CORONARY ARTERY DISEASE OF NATIVE ARTERY OF NATIVE HEART WITH STABLE ANGINA PECTORIS: ICD-10-CM

## 2018-09-17 DIAGNOSIS — D84.9 IMMUNOCOMPROMISED PATIENT: Chronic | ICD-10-CM

## 2018-09-17 DIAGNOSIS — R70.0 ELEVATED SED RATE: ICD-10-CM

## 2018-09-17 LAB
ALBUMIN SERPL BCP-MCNC: 3.2 G/DL
ALP SERPL-CCNC: 66 U/L
ALT SERPL W/O P-5'-P-CCNC: 12 U/L
ANION GAP SERPL CALC-SCNC: 6 MMOL/L
AST SERPL-CCNC: 21 U/L
BASOPHILS # BLD AUTO: 0.01 K/UL
BASOPHILS NFR BLD: 0.1 %
BILIRUB SERPL-MCNC: 0.4 MG/DL
BUN SERPL-MCNC: 15 MG/DL
CALCIUM SERPL-MCNC: 9.6 MG/DL
CHLORIDE SERPL-SCNC: 99 MMOL/L
CO2 SERPL-SCNC: 30 MMOL/L
CREAT SERPL-MCNC: 1 MG/DL
CRP SERPL-MCNC: 40.7 MG/L
DIFFERENTIAL METHOD: ABNORMAL
EOSINOPHIL # BLD AUTO: 0.3 K/UL
EOSINOPHIL NFR BLD: 3.9 %
ERYTHROCYTE [DISTWIDTH] IN BLOOD BY AUTOMATED COUNT: 13.1 %
ERYTHROCYTE [SEDIMENTATION RATE] IN BLOOD BY WESTERGREN METHOD: 87 MM/HR
EST. GFR  (AFRICAN AMERICAN): >60 ML/MIN/1.73 M^2
EST. GFR  (NON AFRICAN AMERICAN): >60 ML/MIN/1.73 M^2
GLUCOSE SERPL-MCNC: 76 MG/DL
HCT VFR BLD AUTO: 37.9 %
HGB BLD-MCNC: 13.1 G/DL
LYMPHOCYTES # BLD AUTO: 2.1 K/UL
LYMPHOCYTES NFR BLD: 29.5 %
MCH RBC QN AUTO: 34.7 PG
MCHC RBC AUTO-ENTMCNC: 34.6 G/DL
MCV RBC AUTO: 100 FL
MONOCYTES # BLD AUTO: 0.5 K/UL
MONOCYTES NFR BLD: 6.7 %
NEUTROPHILS # BLD AUTO: 4.3 K/UL
NEUTROPHILS NFR BLD: 59.8 %
PLATELET # BLD AUTO: 286 K/UL
PMV BLD AUTO: 8.1 FL
POTASSIUM SERPL-SCNC: 4.4 MMOL/L
PROT SERPL-MCNC: 7.8 G/DL
RBC # BLD AUTO: 3.78 M/UL
SODIUM SERPL-SCNC: 135 MMOL/L
WBC # BLD AUTO: 7.26 K/UL

## 2018-09-17 PROCEDURE — 99213 OFFICE O/P EST LOW 20 MIN: CPT | Mod: PBBFAC,25,PO | Performed by: INTERNAL MEDICINE

## 2018-09-17 PROCEDURE — 80053 COMPREHEN METABOLIC PANEL: CPT | Mod: PO

## 2018-09-17 PROCEDURE — 99214 OFFICE O/P EST MOD 30 MIN: CPT | Mod: S$PBB,,, | Performed by: INTERNAL MEDICINE

## 2018-09-17 PROCEDURE — 25000003 PHARM REV CODE 250: Mod: PO | Performed by: PHYSICIAN ASSISTANT

## 2018-09-17 PROCEDURE — 85025 COMPLETE CBC W/AUTO DIFF WBC: CPT | Mod: PO

## 2018-09-17 PROCEDURE — 99999 PR PBB SHADOW E&M-EST. PATIENT-LVL III: CPT | Mod: PBBFAC,,, | Performed by: INTERNAL MEDICINE

## 2018-09-17 PROCEDURE — 36415 COLL VENOUS BLD VENIPUNCTURE: CPT | Mod: PO

## 2018-09-17 PROCEDURE — 63600175 PHARM REV CODE 636 W HCPCS: Mod: JG,PO | Performed by: PHYSICIAN ASSISTANT

## 2018-09-17 PROCEDURE — 86140 C-REACTIVE PROTEIN: CPT

## 2018-09-17 PROCEDURE — 85651 RBC SED RATE NONAUTOMATED: CPT | Mod: PO

## 2018-09-17 PROCEDURE — A4216 STERILE WATER/SALINE, 10 ML: HCPCS | Mod: PO | Performed by: PHYSICIAN ASSISTANT

## 2018-09-17 PROCEDURE — 96413 CHEMO IV INFUSION 1 HR: CPT | Mod: PO

## 2018-09-17 RX ORDER — ACETAMINOPHEN 325 MG/1
650 TABLET ORAL
Status: CANCELLED | OUTPATIENT
Start: 2018-09-17

## 2018-09-17 RX ORDER — IPRATROPIUM BROMIDE AND ALBUTEROL SULFATE 2.5; .5 MG/3ML; MG/3ML
3 SOLUTION RESPIRATORY (INHALATION)
Status: CANCELLED | OUTPATIENT
Start: 2018-09-17

## 2018-09-17 RX ORDER — METHOTREXATE 2.5 MG/1
TABLET ORAL
Qty: 100 TABLET | Refills: 5 | Status: SHIPPED | OUTPATIENT
Start: 2018-09-17 | End: 2019-10-03 | Stop reason: SDUPTHER

## 2018-09-17 RX ORDER — SODIUM CHLORIDE 0.9 % (FLUSH) 0.9 %
10 SYRINGE (ML) INJECTION
Status: CANCELLED | OUTPATIENT
Start: 2018-09-17

## 2018-09-17 RX ORDER — DIPHENHYDRAMINE HYDROCHLORIDE 50 MG/ML
25 INJECTION INTRAMUSCULAR; INTRAVENOUS
Status: CANCELLED | OUTPATIENT
Start: 2018-09-17

## 2018-09-17 RX ORDER — SODIUM CHLORIDE 0.9 % (FLUSH) 0.9 %
10 SYRINGE (ML) INJECTION
Status: DISCONTINUED | OUTPATIENT
Start: 2018-09-17 | End: 2018-09-17 | Stop reason: HOSPADM

## 2018-09-17 RX ORDER — EPINEPHRINE 1 MG/ML
0.3 INJECTION, SOLUTION, CONCENTRATE INTRAVENOUS
Status: CANCELLED | OUTPATIENT
Start: 2018-09-17

## 2018-09-17 RX ORDER — MAG HYDROX/ALUMINUM HYD/SIMETH 400-400-40
450 SUSPENSION, ORAL (FINAL DOSE FORM) ORAL DAILY
COMMUNITY
End: 2019-07-03

## 2018-09-17 RX ORDER — METHYLPREDNISOLONE SOD SUCC 125 MG
40 VIAL (EA) INJECTION
Status: CANCELLED | OUTPATIENT
Start: 2018-09-17

## 2018-09-17 RX ADMIN — INFLIXIMAB 800 MG: 100 INJECTION, POWDER, LYOPHILIZED, FOR SOLUTION INTRAVENOUS at 10:09

## 2018-09-17 RX ADMIN — SODIUM CHLORIDE, PRESERVATIVE FREE 10 ML: 5 INJECTION INTRAVENOUS at 10:09

## 2018-09-17 ASSESSMENT — ROUTINE ASSESSMENT OF PATIENT INDEX DATA (RAPID3): MDHAQ FUNCTION SCORE: .6

## 2018-09-17 NOTE — PATIENT INSTRUCTIONS
No change inRemi-     OK for 6 Mtx  Week- skip it week of Flu shot and nxt week      Do lab 2-3 week early before [place]- maybe do when come in for flu shot    Continue supplements

## 2018-09-17 NOTE — PROGRESS NOTES
CHIEF COMPLAINT:  Erosive rheumatoid arthritis.    PERTINENT HISTORY:  Jack was last seen here in May.  He was on Remicade 800 mg   every 8 weeks, methotrexate 6 a week.  This regimen was maintained.  Folic acid   was also refilled at that time and he was staying off steroids.    He has a long history of heart disease with at least 3 prior documented heart   attacks and heart surgery.  He is stable from that standpoint presently.  He has   had high lipids and was on Repatha.  Unfortunately, just like other   cholesterol-lowering medicines, he had significant muscle and joint aches, and   he had to off of it and feels much better off of it presently.  He is not having   any recent angina, nausea, SOB, PND,  or peripheral edema and rates his pain at 0.  He   is going to have cataract surgery next month.  He has had increased sed rates in   the past and now hypergammaglobulinemia with ongoing changes established, last   checked about a year ago.  His most recent sed rates went up to 30s.    REVIEW OF SYSTEMS:  GENERAL:  No fever, chills, sweats.  No weight loss.  SKIN:  No rashes, itching or changes in color or texture of skin, no Raynaud's,   no malar rash.  HEAD:  No headache or recent head trauma.  EYES:  Visual acuity fine.  No ocular pain or redness.  EARS:  Denies ear pain, discharge or vertigo.  NOSE:  No loss of smell, no epistaxis or post nasal drip.  MOUTH AND THROAT:  No hoarseness or change in voice or oral ulcers. No   difficulty swallowing or dryness.  NODES:  Denies swollen glands.  CHEST:  Denies shortness of breath or cough.  CARDIOVASCULAR:  See HPI.  ABDOMEN:  No weight loss.  Denies nausea, vomiting, diarrhea, abdominal pain,   hematemesis or blood in stool.  MUSCULOSKELETAL:  Muscle aches much better since he came off of cholesterol   meds.  ENDOCRINE:  Thyroid disease , on testosterone for hypogonadism.  BONES:  Blat THR in the past.  PSYCHIATRIC:  He had depression in the past, stable  presently.  URINARY:  No flank pain, dysuria or hematuria.  PERIPHERAL VASCULAR:  No claudication or cyanosis.  NEURO:  No numbness, weakness or tingling.    PHYSICAL EXAMINATION:  VITAL SIGNS:  Blood pressure 108/65, pulse in the 60s, weight 88 kg, height 67   inches.  HEENT:  Normocephalic, atraumatic, alert and oriented x3.  PERRLA.  Conjunctiva   clear, sclera non icteric.  No tonsillar enlargement.  No pharyngeal erythema or   exudate.  No ulcers or lesions noted.  Mucous membranes moist and pink.  Oral   pharynx clear.  Neck supple, no JVD.  Normal ROM.  Thyroid normal.  No masses or   tracheal deviation.  No cervical, axillary or inguinal lymph node enlargement.   CARDIAC:  No murmur.  Strength is good today.  JOINT EXAM:  Shows marked ulnar drift right greater than left at MP joint 45   degrees on the right, probably 35 degrees on the left, old damage across the MP   heads with bony enlargement, not tender.   PPIs with minimal enlargement, not   tender.  DIPs minimal swelling mild deformity.  EXTREMITIES:  Right elbow has flexion contracte around 15 degrees, left elbow   is normal.  Shoulder still moves good.  Knee still moves good.  No effusion.    Peripheral  Pulses are good.    Skin with no rashes or nodules.    His x-rays were reviewed with him and I reviewed them personally.  He does have lots  of chronic damage - right greater than left MP and wrist.  This is stable   since 2016 erosion on the left with fifth MTP.  Midfoot looks good.  He does have   plantar spurs bilaterally.    His labs showed his white count remains stable at 7000 with hemoglobin slight   low at 13.1, normal platelets.  Sed rate jumped to 87 from the 30s, not sure why   .  His chemistries are good including glucose, electrolytes, renal   function, LFTs, albumin 3.2.  His CRP was 3.8,today is pending ; May   2017 with hypergammaglobulinemia, no monoclonal spikes.  Total protein 8.6.    IMPRESSION:  1.  Rheumatoid arthritis-inactive  with CDAI at 0, LI score 0.6.  2.  Chronic coronary artery disease-multiple surgeries in the past -stable   symptomatically.  3.  Hyperlipidemia - off medications presently because of recurring muscle aches and pains.  4.  Chronically elevated sed rate - probably related to hypergammaglobulinemia or something   else changing.  5.  Immunocompromise status - due for flu shot.  6.  Cataract surgery, pending.    PLAN:  1.  Okay to remain on Remicade 800 mg every 8 weeks.  2.  Okay to remain on methotrexate 6 a week with folic acid.  3.  Hold methotrexate the week with the flu shot and following week.  4.  Okay to cataract surgery.  5.  We will obtain IgG, A and M levels as well as immunoelectrophoresis, just   before his next Remicade.   6. Call if there is any issues.      DOE  dd: 09/17/2018 10:19:29 (CDT)  td: 09/17/2018 11:28:04 (CDT)  Doc ID   #[id number]  Job ID #[id number]    CC:   Prolonged visit: Over35 min spent in patient care and evaluation. Over 20 min time used in reviewing recent labs and symptoms, discussing diagnostic possibilities, counseling on medication options, reviewing side effects of therapies, and coordination of care with  infusion center  Patient's questions were all addressed and he understands our plans and risks.

## 2018-09-19 ENCOUNTER — TELEPHONE (OUTPATIENT)
Dept: RHEUMATOLOGY | Facility: CLINIC | Age: 73
End: 2018-09-19

## 2018-09-19 DIAGNOSIS — R70.0 ELEVATED SED RATE: Primary | ICD-10-CM

## 2018-09-19 NOTE — TELEPHONE ENCOUNTER
Called pt     Esr 87, crp 40  Wbc 7 wnl  No trauma, no infections, arthritis is controlled    Stopped his cholesterol med ripatha about 6 weeks ago  Last time his esr and crp went up this high about 4 weeks later had MI  Will see his cardiologist dr merchant tomorrow  Will repeat esr and crp next Tuesday, check spep/immunofixation and Immunoglobulin levels   Labs scheduled     Will call results to pt

## 2018-09-19 NOTE — TELEPHONE ENCOUNTER
----- Message from Nora Turner sent at 9/19/2018 11:09 AM CDT -----  Contact: self/[phone]  Would like to consult with nurse regarding questions about flu shot, please call back at [phone]. Thanks/ar

## 2018-09-19 NOTE — TELEPHONE ENCOUNTER
Spoke with Mr. McdonaldNazanin he states that he reviewed his lab results that was done on Monday and is very concern about the extremely elevated results of CRP and Sed Rate. Would like you to call him to discuss.

## 2018-09-25 ENCOUNTER — LAB VISIT (OUTPATIENT)
Dept: LAB | Facility: HOSPITAL | Age: 73
End: 2018-09-25
Attending: PHYSICIAN ASSISTANT
Payer: MEDICARE

## 2018-09-25 DIAGNOSIS — M05.79 RHEUMATOID ARTHRITIS INVOLVING MULTIPLE SITES WITH POSITIVE RHEUMATOID FACTOR: Chronic | ICD-10-CM

## 2018-09-25 DIAGNOSIS — R70.0 ELEVATED SED RATE: ICD-10-CM

## 2018-09-25 DIAGNOSIS — Z51.81 MEDICATION MONITORING ENCOUNTER: Chronic | ICD-10-CM

## 2018-09-25 DIAGNOSIS — D89.2 HYPERGAMMAGLOBULINEMIA: ICD-10-CM

## 2018-09-25 LAB
CRP SERPL-MCNC: 36.9 MG/L
ERYTHROCYTE [SEDIMENTATION RATE] IN BLOOD BY WESTERGREN METHOD: 85 MM/HR
IGA SERPL-MCNC: 786 MG/DL
IGG SERPL-MCNC: 1632 MG/DL
IGM SERPL-MCNC: 109 MG/DL

## 2018-09-25 PROCEDURE — 86334 IMMUNOFIX E-PHORESIS SERUM: CPT | Mod: 26,,, | Performed by: PATHOLOGY

## 2018-09-25 PROCEDURE — 84165 PROTEIN E-PHORESIS SERUM: CPT

## 2018-09-25 PROCEDURE — 84165 PROTEIN E-PHORESIS SERUM: CPT | Mod: 26,,, | Performed by: PATHOLOGY

## 2018-09-25 PROCEDURE — 86140 C-REACTIVE PROTEIN: CPT | Mod: PO

## 2018-09-25 PROCEDURE — 82784 ASSAY IGA/IGD/IGG/IGM EACH: CPT | Mod: 59

## 2018-09-25 PROCEDURE — 36415 COLL VENOUS BLD VENIPUNCTURE: CPT | Mod: PO

## 2018-09-25 PROCEDURE — 86334 IMMUNOFIX E-PHORESIS SERUM: CPT

## 2018-09-25 PROCEDURE — 85652 RBC SED RATE AUTOMATED: CPT

## 2018-09-26 LAB
ALBUMIN SERPL ELPH-MCNC: 3.42 G/DL
ALPHA1 GLOB SERPL ELPH-MCNC: 0.34 G/DL
ALPHA2 GLOB SERPL ELPH-MCNC: 0.77 G/DL
B-GLOBULIN SERPL ELPH-MCNC: 1.23 G/DL
GAMMA GLOB SERPL ELPH-MCNC: 1.53 G/DL
INTERPRETATION SERPL IFE-IMP: NORMAL
PATHOLOGIST INTERPRETATION IFE: NORMAL
PATHOLOGIST INTERPRETATION SPE: NORMAL
PROT SERPL-MCNC: 7.3 G/DL

## 2018-10-24 ENCOUNTER — IMMUNIZATION (OUTPATIENT)
Dept: RHEUMATOLOGY | Facility: CLINIC | Age: 73
End: 2018-10-24
Payer: MEDICARE

## 2018-10-24 PROCEDURE — 90662 IIV NO PRSV INCREASED AG IM: CPT | Mod: PBBFAC,PO

## 2018-10-24 PROCEDURE — 99999 PR PBB SHADOW E&M-EST. PATIENT-LVL III: CPT | Mod: PBBFAC,,,

## 2018-10-24 PROCEDURE — 99213 OFFICE O/P EST LOW 20 MIN: CPT | Mod: PBBFAC,PO

## 2018-11-06 ENCOUNTER — LAB VISIT (OUTPATIENT)
Dept: LAB | Facility: HOSPITAL | Age: 73
End: 2018-11-06
Attending: PHYSICIAN ASSISTANT
Payer: MEDICARE

## 2018-11-06 DIAGNOSIS — Z51.81 MEDICATION MONITORING ENCOUNTER: Chronic | ICD-10-CM

## 2018-11-06 LAB
ALBUMIN SERPL BCP-MCNC: 3.5 G/DL
ALP SERPL-CCNC: 55 U/L
ALT SERPL W/O P-5'-P-CCNC: 13 U/L
ANION GAP SERPL CALC-SCNC: 6 MMOL/L
AST SERPL-CCNC: 24 U/L
BASOPHILS # BLD AUTO: 0.01 K/UL
BASOPHILS NFR BLD: 0.1 %
BILIRUB SERPL-MCNC: 0.5 MG/DL
BUN SERPL-MCNC: 17 MG/DL
CALCIUM SERPL-MCNC: 9.7 MG/DL
CHLORIDE SERPL-SCNC: 104 MMOL/L
CO2 SERPL-SCNC: 30 MMOL/L
CREAT SERPL-MCNC: 1 MG/DL
CRP SERPL-MCNC: 6.2 MG/L
DIFFERENTIAL METHOD: ABNORMAL
EOSINOPHIL # BLD AUTO: 0.3 K/UL
EOSINOPHIL NFR BLD: 3.4 %
ERYTHROCYTE [DISTWIDTH] IN BLOOD BY AUTOMATED COUNT: 13.9 %
ERYTHROCYTE [SEDIMENTATION RATE] IN BLOOD BY WESTERGREN METHOD: 76 MM/HR
EST. GFR  (AFRICAN AMERICAN): >60 ML/MIN/1.73 M^2
EST. GFR  (NON AFRICAN AMERICAN): >60 ML/MIN/1.73 M^2
GLUCOSE SERPL-MCNC: 96 MG/DL
HCT VFR BLD AUTO: 41.3 %
HGB BLD-MCNC: 14 G/DL
LYMPHOCYTES # BLD AUTO: 2.2 K/UL
LYMPHOCYTES NFR BLD: 29.5 %
MCH RBC QN AUTO: 33.5 PG
MCHC RBC AUTO-ENTMCNC: 33.9 G/DL
MCV RBC AUTO: 99 FL
MONOCYTES # BLD AUTO: 0.5 K/UL
MONOCYTES NFR BLD: 7.4 %
NEUTROPHILS # BLD AUTO: 4.3 K/UL
NEUTROPHILS NFR BLD: 59.5 %
PLATELET # BLD AUTO: 315 K/UL
PMV BLD AUTO: 8.3 FL
POTASSIUM SERPL-SCNC: 4.9 MMOL/L
PROT SERPL-MCNC: 8 G/DL
RBC # BLD AUTO: 4.18 M/UL
SODIUM SERPL-SCNC: 140 MMOL/L
WBC # BLD AUTO: 7.3 K/UL

## 2018-11-06 PROCEDURE — 86140 C-REACTIVE PROTEIN: CPT | Mod: PO

## 2018-11-06 PROCEDURE — 80053 COMPREHEN METABOLIC PANEL: CPT | Mod: PO

## 2018-11-06 PROCEDURE — 36415 COLL VENOUS BLD VENIPUNCTURE: CPT | Mod: PO

## 2018-11-06 PROCEDURE — 85652 RBC SED RATE AUTOMATED: CPT

## 2018-11-06 PROCEDURE — 85025 COMPLETE CBC W/AUTO DIFF WBC: CPT | Mod: PO

## 2018-11-14 ENCOUNTER — INFUSION (OUTPATIENT)
Dept: RHEUMATOLOGY | Facility: HOSPITAL | Age: 73
End: 2018-11-14
Attending: PHYSICIAN ASSISTANT
Payer: MEDICARE

## 2018-11-14 VITALS
OXYGEN SATURATION: 98 % | HEART RATE: 55 BPM | DIASTOLIC BLOOD PRESSURE: 58 MMHG | SYSTOLIC BLOOD PRESSURE: 128 MMHG | BODY MASS INDEX: 31.01 KG/M2 | RESPIRATION RATE: 18 BRPM | TEMPERATURE: 99 F | WEIGHT: 198 LBS

## 2018-11-14 DIAGNOSIS — M05.79 RHEUMATOID ARTHRITIS INVOLVING MULTIPLE SITES WITH POSITIVE RHEUMATOID FACTOR: Primary | ICD-10-CM

## 2018-11-14 PROCEDURE — 96413 CHEMO IV INFUSION 1 HR: CPT | Mod: PO

## 2018-11-14 PROCEDURE — 63600175 PHARM REV CODE 636 W HCPCS: Mod: JG,PO | Performed by: PHYSICIAN ASSISTANT

## 2018-11-14 PROCEDURE — A4216 STERILE WATER/SALINE, 10 ML: HCPCS | Mod: PO | Performed by: PHYSICIAN ASSISTANT

## 2018-11-14 PROCEDURE — 25000003 PHARM REV CODE 250: Mod: PO | Performed by: PHYSICIAN ASSISTANT

## 2018-11-14 RX ORDER — SODIUM CHLORIDE 0.9 % (FLUSH) 0.9 %
10 SYRINGE (ML) INJECTION
Status: DISCONTINUED | OUTPATIENT
Start: 2018-11-14 | End: 2018-11-14 | Stop reason: HOSPADM

## 2018-11-14 RX ORDER — METHYLPREDNISOLONE SOD SUCC 125 MG
40 VIAL (EA) INJECTION
Status: CANCELLED | OUTPATIENT
Start: 2018-11-14

## 2018-11-14 RX ORDER — ACETAMINOPHEN 325 MG/1
650 TABLET ORAL
Status: CANCELLED | OUTPATIENT
Start: 2018-11-14

## 2018-11-14 RX ORDER — DIPHENHYDRAMINE HYDROCHLORIDE 50 MG/ML
25 INJECTION INTRAMUSCULAR; INTRAVENOUS
Status: CANCELLED | OUTPATIENT
Start: 2018-11-14

## 2018-11-14 RX ORDER — SODIUM CHLORIDE 0.9 % (FLUSH) 0.9 %
10 SYRINGE (ML) INJECTION
Status: CANCELLED | OUTPATIENT
Start: 2018-11-14

## 2018-11-14 RX ORDER — EPINEPHRINE 1 MG/ML
0.3 INJECTION, SOLUTION, CONCENTRATE INTRAVENOUS
Status: CANCELLED | OUTPATIENT
Start: 2018-11-14

## 2018-11-14 RX ORDER — IPRATROPIUM BROMIDE AND ALBUTEROL SULFATE 2.5; .5 MG/3ML; MG/3ML
3 SOLUTION RESPIRATORY (INHALATION)
Status: CANCELLED | OUTPATIENT
Start: 2018-11-14

## 2018-11-14 RX ADMIN — SODIUM CHLORIDE, PRESERVATIVE FREE 10 ML: 5 INJECTION INTRAVENOUS at 08:11

## 2018-11-14 RX ADMIN — INFLIXIMAB 800 MG: 100 INJECTION, POWDER, LYOPHILIZED, FOR SOLUTION INTRAVENOUS at 09:11

## 2018-11-14 NOTE — PROGRESS NOTES
Infusion # > 10 - Remicade 800 mg q 8 weeks    Any:  -recent illness, infection, or antibiotic use in past week- denies  -open wounds or mouth sores- denies  -invasive procedures or surgeries in past 4 weeks or in upcoming 4 weeks- denies  -vaccinations in past week- denies      Recent labs? 11/6/18   Last Rheumatology provider visit- Seen by Dr. Thurston on 9/17/18     Premeds? none     Remicade 800 mg administered IV at a 90 minute rate per orders; see MAR and vitals for more  details. Tolerated well without adverse events, discharged and ambulatory out of clinic.

## 2019-01-02 ENCOUNTER — TELEPHONE (OUTPATIENT)
Dept: RHEUMATOLOGY | Facility: CLINIC | Age: 74
End: 2019-01-02

## 2019-01-02 DIAGNOSIS — M05.79 RHEUMATOID ARTHRITIS INVOLVING MULTIPLE SITES WITH POSITIVE RHEUMATOID FACTOR: Primary | Chronic | ICD-10-CM

## 2019-01-02 RX ORDER — EPINEPHRINE 1 MG/ML
0.3 INJECTION, SOLUTION, CONCENTRATE INTRAVENOUS
Status: CANCELLED | OUTPATIENT
Start: 2019-01-02

## 2019-01-02 RX ORDER — ACETAMINOPHEN 325 MG/1
650 TABLET ORAL
Status: CANCELLED | OUTPATIENT
Start: 2019-01-02

## 2019-01-02 RX ORDER — SODIUM CHLORIDE 0.9 % (FLUSH) 0.9 %
10 SYRINGE (ML) INJECTION
Status: CANCELLED | OUTPATIENT
Start: 2019-01-02

## 2019-01-02 RX ORDER — METHYLPREDNISOLONE SOD SUCC 125 MG
40 VIAL (EA) INJECTION
Status: CANCELLED | OUTPATIENT
Start: 2019-01-02

## 2019-01-02 RX ORDER — IPRATROPIUM BROMIDE AND ALBUTEROL SULFATE 2.5; .5 MG/3ML; MG/3ML
3 SOLUTION RESPIRATORY (INHALATION)
Status: CANCELLED | OUTPATIENT
Start: 2019-01-02

## 2019-01-02 RX ORDER — DIPHENHYDRAMINE HYDROCHLORIDE 50 MG/ML
25 INJECTION INTRAMUSCULAR; INTRAVENOUS
Status: CANCELLED | OUTPATIENT
Start: 2019-01-02

## 2019-01-02 NOTE — TELEPHONE ENCOUNTER
----- Message from Herman Ansari RN sent at 12/26/2018 11:15 AM CST -----  Regarding: ARMANDO Gallo!  Can you please sign TP?! Thank you so much!

## 2019-01-03 ENCOUNTER — LAB VISIT (OUTPATIENT)
Dept: LAB | Facility: HOSPITAL | Age: 74
End: 2019-01-03
Attending: INTERNAL MEDICINE
Payer: MEDICARE

## 2019-01-03 DIAGNOSIS — M05.79 RHEUMATOID ARTHRITIS INVOLVING MULTIPLE SITES WITH POSITIVE RHEUMATOID FACTOR: Chronic | ICD-10-CM

## 2019-01-03 DIAGNOSIS — Z51.81 MEDICATION MONITORING ENCOUNTER: Chronic | ICD-10-CM

## 2019-01-03 LAB
ALBUMIN SERPL BCP-MCNC: 3.7 G/DL
ALP SERPL-CCNC: 59 U/L
ALT SERPL W/O P-5'-P-CCNC: 13 U/L
ANION GAP SERPL CALC-SCNC: 10 MMOL/L
AST SERPL-CCNC: 22 U/L
BASOPHILS # BLD AUTO: 0.01 K/UL
BASOPHILS NFR BLD: 0.1 %
BILIRUB SERPL-MCNC: 0.5 MG/DL
BUN SERPL-MCNC: 22 MG/DL
CALCIUM SERPL-MCNC: 9.4 MG/DL
CHLORIDE SERPL-SCNC: 101 MMOL/L
CO2 SERPL-SCNC: 27 MMOL/L
CREAT SERPL-MCNC: 1.1 MG/DL
CRP SERPL-MCNC: 4.4 MG/L
DIFFERENTIAL METHOD: ABNORMAL
EOSINOPHIL # BLD AUTO: 0.3 K/UL
EOSINOPHIL NFR BLD: 3.9 %
ERYTHROCYTE [DISTWIDTH] IN BLOOD BY AUTOMATED COUNT: 14.9 %
ERYTHROCYTE [SEDIMENTATION RATE] IN BLOOD BY WESTERGREN METHOD: 65 MM/HR
EST. GFR  (AFRICAN AMERICAN): >60 ML/MIN/1.73 M^2
EST. GFR  (NON AFRICAN AMERICAN): >60 ML/MIN/1.73 M^2
GLUCOSE SERPL-MCNC: 93 MG/DL
HCT VFR BLD AUTO: 43.4 %
HGB BLD-MCNC: 14.7 G/DL
LYMPHOCYTES # BLD AUTO: 2 K/UL
LYMPHOCYTES NFR BLD: 28.8 %
MCH RBC QN AUTO: 33.3 PG
MCHC RBC AUTO-ENTMCNC: 33.9 G/DL
MCV RBC AUTO: 98 FL
MONOCYTES # BLD AUTO: 0.5 K/UL
MONOCYTES NFR BLD: 7.9 %
NEUTROPHILS # BLD AUTO: 4 K/UL
NEUTROPHILS NFR BLD: 59.2 %
PLATELET # BLD AUTO: 307 K/UL
PMV BLD AUTO: 8.6 FL
POTASSIUM SERPL-SCNC: 4.7 MMOL/L
PROT SERPL-MCNC: 8.1 G/DL
RBC # BLD AUTO: 4.42 M/UL
SODIUM SERPL-SCNC: 138 MMOL/L
WBC # BLD AUTO: 6.84 K/UL

## 2019-01-03 PROCEDURE — 36415 COLL VENOUS BLD VENIPUNCTURE: CPT | Mod: PO,ER

## 2019-01-03 PROCEDURE — 85652 RBC SED RATE AUTOMATED: CPT

## 2019-01-03 PROCEDURE — 85025 COMPLETE CBC W/AUTO DIFF WBC: CPT | Mod: PO,ER

## 2019-01-03 PROCEDURE — 86140 C-REACTIVE PROTEIN: CPT | Mod: PO,ER

## 2019-01-03 PROCEDURE — 80053 COMPREHEN METABOLIC PANEL: CPT | Mod: PO,ER

## 2019-01-08 ENCOUNTER — OFFICE VISIT (OUTPATIENT)
Dept: HEMATOLOGY/ONCOLOGY | Facility: CLINIC | Age: 74
End: 2019-01-08
Payer: MEDICARE

## 2019-01-08 ENCOUNTER — OFFICE VISIT (OUTPATIENT)
Dept: RHEUMATOLOGY | Facility: CLINIC | Age: 74
End: 2019-01-08
Payer: MEDICARE

## 2019-01-08 ENCOUNTER — LAB VISIT (OUTPATIENT)
Dept: LAB | Facility: HOSPITAL | Age: 74
End: 2019-01-08
Attending: NURSE PRACTITIONER
Payer: MEDICARE

## 2019-01-08 VITALS
OXYGEN SATURATION: 98 % | TEMPERATURE: 98 F | WEIGHT: 199.75 LBS | BODY MASS INDEX: 31.35 KG/M2 | HEART RATE: 53 BPM | DIASTOLIC BLOOD PRESSURE: 65 MMHG | HEIGHT: 67 IN | RESPIRATION RATE: 16 BRPM | SYSTOLIC BLOOD PRESSURE: 113 MMHG

## 2019-01-08 VITALS
WEIGHT: 200.19 LBS | BODY MASS INDEX: 31.42 KG/M2 | SYSTOLIC BLOOD PRESSURE: 113 MMHG | HEIGHT: 67 IN | HEART RATE: 53 BPM | DIASTOLIC BLOOD PRESSURE: 65 MMHG

## 2019-01-08 DIAGNOSIS — D51.9 ANEMIA DUE TO VITAMIN B12 DEFICIENCY, UNSPECIFIED B12 DEFICIENCY TYPE: ICD-10-CM

## 2019-01-08 DIAGNOSIS — D84.9 IMMUNOCOMPROMISED PATIENT: Chronic | ICD-10-CM

## 2019-01-08 DIAGNOSIS — I25.118 CORONARY ARTERY DISEASE OF NATIVE ARTERY OF NATIVE HEART WITH STABLE ANGINA PECTORIS: ICD-10-CM

## 2019-01-08 DIAGNOSIS — R70.0 ESR RAISED: Primary | ICD-10-CM

## 2019-01-08 DIAGNOSIS — R70.0 ELEVATED SED RATE: ICD-10-CM

## 2019-01-08 DIAGNOSIS — R70.0 ESR RAISED: ICD-10-CM

## 2019-01-08 DIAGNOSIS — D89.2 HYPERGAMMAGLOBULINEMIA: ICD-10-CM

## 2019-01-08 DIAGNOSIS — D75.89 MACROCYTOSIS WITHOUT ANEMIA: ICD-10-CM

## 2019-01-08 DIAGNOSIS — M05.79 RHEUMATOID ARTHRITIS INVOLVING MULTIPLE SITES WITH POSITIVE RHEUMATOID FACTOR: Chronic | ICD-10-CM

## 2019-01-08 DIAGNOSIS — R77.8 ABNORMAL SERUM PROTEIN TEST: ICD-10-CM

## 2019-01-08 DIAGNOSIS — Z11.1 SCREENING-PULMONARY TB: ICD-10-CM

## 2019-01-08 DIAGNOSIS — M05.79 RHEUMATOID ARTHRITIS INVOLVING MULTIPLE SITES WITH POSITIVE RHEUMATOID FACTOR: Primary | Chronic | ICD-10-CM

## 2019-01-08 LAB — VIT B12 SERPL-MCNC: 797 PG/ML

## 2019-01-08 PROCEDURE — 99215 OFFICE O/P EST HI 40 MIN: CPT | Mod: PBBFAC,PO | Performed by: PHYSICIAN ASSISTANT

## 2019-01-08 PROCEDURE — 99999 PR PBB SHADOW E&M-EST. PATIENT-LVL V: ICD-10-PCS | Mod: PBBFAC,,, | Performed by: NURSE PRACTITIONER

## 2019-01-08 PROCEDURE — 84165 PROTEIN E-PHORESIS SERUM: CPT

## 2019-01-08 PROCEDURE — 99204 OFFICE O/P NEW MOD 45 MIN: CPT | Mod: S$PBB,,, | Performed by: NURSE PRACTITIONER

## 2019-01-08 PROCEDURE — 86334 IMMUNOFIX E-PHORESIS SERUM: CPT | Mod: 26,,, | Performed by: PATHOLOGY

## 2019-01-08 PROCEDURE — 99204 PR OFFICE/OUTPT VISIT, NEW, LEVL IV, 45-59 MIN: ICD-10-PCS | Mod: S$PBB,,, | Performed by: NURSE PRACTITIONER

## 2019-01-08 PROCEDURE — 99999 PR PBB SHADOW E&M-EST. PATIENT-LVL V: CPT | Mod: PBBFAC,,, | Performed by: NURSE PRACTITIONER

## 2019-01-08 PROCEDURE — 99215 OFFICE O/P EST HI 40 MIN: CPT | Mod: PBBFAC,27,PO | Performed by: NURSE PRACTITIONER

## 2019-01-08 PROCEDURE — 99999 PR PBB SHADOW E&M-EST. PATIENT-LVL V: CPT | Mod: PBBFAC,,, | Performed by: PHYSICIAN ASSISTANT

## 2019-01-08 PROCEDURE — 99999 PR PBB SHADOW E&M-EST. PATIENT-LVL V: ICD-10-PCS | Mod: PBBFAC,,, | Performed by: PHYSICIAN ASSISTANT

## 2019-01-08 PROCEDURE — 82607 VITAMIN B-12: CPT

## 2019-01-08 PROCEDURE — 86334 IMMUNOFIX E-PHORESIS SERUM: CPT

## 2019-01-08 PROCEDURE — 84165 PROTEIN E-PHORESIS SERUM: CPT | Mod: 26,,, | Performed by: PATHOLOGY

## 2019-01-08 PROCEDURE — 83520 IMMUNOASSAY QUANT NOS NONAB: CPT | Mod: 59

## 2019-01-08 PROCEDURE — 36415 COLL VENOUS BLD VENIPUNCTURE: CPT | Mod: PO

## 2019-01-08 PROCEDURE — 86334 PATHOLOGIST INTERPRETATION IFE: ICD-10-PCS | Mod: 26,,, | Performed by: PATHOLOGY

## 2019-01-08 PROCEDURE — 99214 PR OFFICE/OUTPT VISIT, EST, LEVL IV, 30-39 MIN: ICD-10-PCS | Mod: S$PBB,,, | Performed by: PHYSICIAN ASSISTANT

## 2019-01-08 PROCEDURE — 84165 PATHOLOGIST INTERPRETATION SPE: ICD-10-PCS | Mod: 26,,, | Performed by: PATHOLOGY

## 2019-01-08 PROCEDURE — 99214 OFFICE O/P EST MOD 30 MIN: CPT | Mod: S$PBB,,, | Performed by: PHYSICIAN ASSISTANT

## 2019-01-08 ASSESSMENT — ROUTINE ASSESSMENT OF PATIENT INDEX DATA (RAPID3): MDHAQ FUNCTION SCORE: .2

## 2019-01-08 NOTE — PROGRESS NOTES
Subjective:       Patient ID: Jack Back is a 74 y.o. male.    Chief Complaint: Rheumatoid Arthritis      Jack is back today for rheumatology follow-up.      He has seropositive erosive rheumatoid arthritis. In the past failed mtx monotherapy. Moved to IV remicade; RA has done very well on remicade.     2017 Had MI required PTCA stent placement. 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 He wanted to stay off his remicade and see how he did. After missing 2 months RA activity increased. We opted to resume IV Remicade 800 mg every 8 weeks (7.9 mg/kg) over 90 min. He is on baseline mtx dose of 15 mg once weekly (better to stay on mtx for RA pt with heart disease)  His joints are doing fine.  He is seeing lipidemiologist. On fish oil and rapatha, had to stop the  zetia due to myalgias and dizziness lost some weight. Trying to loose  Weight and exercise. On metoprolol. His bp is good. managed by cardiology. Echo [id number] showed prior damage was resolved. His heart function was back to normal.  He has not developed heart failure. No lower ext edema, mild sob, no chest pain. No pnd  Getting his remicade consistently No joint swelling, joint stiffness  or acute exacerbations. Pain level rated today as 0/10     Chronic elevated esr, normal crp. 5/2017 spep showed elevated gammaglobulins, no polyclonal or monoclonal peaks in IF. Hypergammaglobulinemia. Repeat done 9/2018 IF   There is a faint linear irregularity in gamma involving IgG and lambda, non-specific at this time.  Recommend repeat studies in 3-6 months or as clinically indicated. Esr in the 50-60's consistently, crp normal  No weight loss, fevers or night sweats. We have not sent him to heme/onc for further work up.       Vaccines all up to date except TdaP but pt allergic to Tetanus.     bone density done in June 2014 which was normal.           Review of Systems   Constitutional: Negative.  Negative for chills, fatigue and fever.   HENT: Negative.  Negative for congestion,  "mouth sores and trouble swallowing.    Eyes: Negative.  Negative for photophobia, redness and visual disturbance.   Respiratory: Negative.  Negative for cough, shortness of breath and wheezing.    Cardiovascular: Negative.  Negative for chest pain and leg swelling.   Gastrointestinal: Negative.  Negative for abdominal distention, abdominal pain, diarrhea, nausea and vomiting.   Genitourinary: Negative.  Negative for dysuria, flank pain, frequency and urgency.   Musculoskeletal: Negative for arthralgias, back pain, gait problem, joint swelling and myalgias.        Mild aching and stiffness   Skin: Negative.  Negative for rash.   Neurological: Negative.  Negative for dizziness, weakness and numbness.           Objective:     /65   Pulse (!) 53   Ht 5' 7" (1.702 m)   Wt 90.8 kg (200 lb 2.8 oz)   BMI 31.35 kg/m²         Physical Exam   Constitutional: He is oriented to person, place, and time and well-developed, well-nourished, and in no distress. No distress.   HENT:   Head: Normocephalic and atraumatic.   Right Ear: External ear normal.   Left Ear: External ear normal.   Mouth/Throat: No oropharyngeal exudate.   Eyes: Conjunctivae and EOM are normal. Pupils are equal, round, and reactive to light. No scleral icterus.   Neck: Neck supple. No thyromegaly present.   Cardiovascular: Normal rate, regular rhythm and normal heart sounds.    No murmur heard.  Pulmonary/Chest: Effort normal and breath sounds normal. No respiratory distress.   Abdominal: Soft. Bowel sounds are normal.       Right Side Rheumatological Exam     Examination finds the shoulder, 1st PIP, 1st MCP, 2nd PIP, 2nd MCP, 3rd PIP, 3rd MCP, 4th PIP, 4th MCP, 5th PIP and 5th MCP normal.    Joint Exam Comments   Elbow: limited extension   Wrist: Mild reduced flexion of wrist, no synovitis    Left Side Rheumatological Exam     Examination finds the shoulder, elbow, 1st PIP, 1st MCP, 2nd PIP, 2nd MCP, 3rd PIP, 3rd MCP, 4th PIP, 4th MCP, 5th PIP and 5th " MCP normal.    The patient is tender to palpation of the knee.    Joint Exam Comments   Wrist: Limited motion left wrists, no synovitis      Lymphadenopathy:     He has no cervical adenopathy.   Neurological: He is alert and oriented to person, place, and time. No cranial nerve deficit. He exhibits normal muscle tone. Gait normal. Coordination normal.   Skin: Skin is warm and dry.     Musculoskeletal: He exhibits deformity. He exhibits no edema.   Ulnar drift 25 degrees right hand and 15 degrees left hand, right elbow fixed flexion stops about  5 degrees from baseline,  Limited motion both wrist extension  No synovitis on exam today                 Recent Results (from the past 168 hour(s))   Sedimentation rate, manual    Collection Time: 01/03/19 10:34 AM   Result Value Ref Range    Sed Rate 65 (H) 0 - 23 mm/Hr   C-reactive protein    Collection Time: 01/03/19 10:34 AM   Result Value Ref Range    CRP 4.4 0.0 - 8.2 mg/L   Comprehensive metabolic panel    Collection Time: 01/03/19 10:34 AM   Result Value Ref Range    Sodium 138 136 - 145 mmol/L    Potassium 4.7 3.5 - 5.1 mmol/L    Chloride 101 95 - 110 mmol/L    CO2 27 23 - 29 mmol/L    Glucose 93 70 - 110 mg/dL    BUN, Bld 22 8 - 23 mg/dL    Creatinine 1.1 0.5 - 1.4 mg/dL    Calcium 9.4 8.7 - 10.5 mg/dL    Total Protein 8.1 6.0 - 8.4 g/dL    Albumin 3.7 3.5 - 5.2 g/dL    Total Bilirubin 0.5 0.1 - 1.0 mg/dL    Alkaline Phosphatase 59 55 - 135 U/L    AST 22 10 - 40 U/L    ALT 13 10 - 44 U/L    Anion Gap 10 8 - 16 mmol/L    eGFR if African American >60.0 >60 mL/min/1.73 m^2    eGFR if non African American >60.0 >60 mL/min/1.73 m^2   CBC auto differential    Collection Time: 01/03/19 10:34 AM   Result Value Ref Range    WBC 6.84 3.90 - 12.70 K/uL    RBC 4.42 (L) 4.60 - 6.20 M/uL    Hemoglobin 14.7 14.0 - 18.0 g/dL    Hematocrit 43.4 40.0 - 54.0 %    MCV 98 82 - 98 fL    MCH 33.3 (H) 27.0 - 31.0 pg    MCHC 33.9 32.0 - 36.0 g/dL    RDW 14.9 (H) 11.5 - 14.5 %    Platelets  307 150 - 350 K/uL    MPV 8.6 (L) 9.2 - 12.9 fL    Gran # (ANC) 4.0 1.8 - 7.7 K/uL    Lymph # 2.0 1.0 - 4.8 K/uL    Mono # 0.5 0.3 - 1.0 K/uL    Eos # 0.3 0.0 - 0.5 K/uL    Baso # 0.01 0.00 - 0.20 K/uL    Gran% 59.2 38.0 - 73.0 %    Lymph% 28.8 18.0 - 48.0 %    Mono% 7.9 4.0 - 15.0 %    Eosinophil% 3.9 0.0 - 8.0 %    Basophil% 0.1 0.0 - 1.9 %    Differential Method Automated        spep and IF   Component      Latest Ref Rng & Units 9/25/2018   Protein, Serum      6.0 - 8.4 g/dL 7.3   Albumin grams/dl      3.35 - 5.55 g/dL 3.42   Alpha-1 grams/dl      0.17 - 0.41 g/dL 0.34   Alpha-2 grams/dl      0.43 - 0.99 g/dL 0.77   Beta grams/dl      0.50 - 1.10 g/dL 1.23 (H)   Gamma grams/dl      0.67 - 1.58 g/dL 1.53       There is a faint linear irregularity in gamma involving IgG and   lambda, non-specific at this time.  Recommend repeat studies in 3-6   months or as clinically indicated.     DEXA 6/17/14 NORMAL        6/2016 [facility] hand and foot X-rays stable   2/25/15 [facility] hand and foot films       Assessment:       1. Rheumatoid arthritis involving multiple sites with positive rheumatoid factor    2. Coronary artery disease of native artery of native heart with stable angina pectoris    3. Immunocompromised patient    4. Elevated sed rate            1. Seropositive Erosive RA stable on IV Remicade 800 mg Q 8 weeks and mtx 15 mg/wk - chronic damage but no activity on exam today 0 swollen/0 tender- CDAI 0- LI 0.2--     In the past he has failed mtx monotherapy  Has done well on combination Rermicade and mtx      2. Vaccines need TdaP but allergic to tetnus- HD flu vaccine given     3. Med monitoring and immunocompromised     4. Immunocompromised no issues with recurrent infections    5. CAD post CABG X 3 2012 and post PTCA stent 2016 both related to MI with normal heart function on recent  ECHO- on zetia, asa and omega fish oil  RA well controlled  CRP normal  On mtx and remicade- helps reduce cardiac risk     6. Chronic  elevated esr- last visit crp - hypergammaglobulins on spep, no monoclonal peaks on IF but faint irregularity noted- will send to heme/onc for work up     .    Plan:         Reassurance to pt he is being well managed from RA and cardiac standpoint, he is on approprioate treatment  And keeping RA well controlled helps reduce his risk of future cardiac events  More importantly watch crp  Esr will remain elevated with hypergammaglobulinemia- will send to heme/onc for further work up with the faint irregularity- have them evaluate and monitor   Will recheck spep from time to time to make sure no changes    Ok for IV Remicade 800 mg  infusion over 90 min every 8 weeks     Keep mtx at 15 mg weekly splitting dose 3 tabs am and pm, Always remember to Hold mtx for signs of infection or for surgery, Discussed risk versus benefits and potential side effects of mtx.    Remain off prednisone      Watch closely if any heart failure develops then we would consider using orenica or actemra (high esr) or anther biologic rather than going back to TNF agents    Keep his folic acid daily     bone density up-to-date repeat in 3-5 years- later this year     defer TdaP due to pt allergic to tetanus; pneumovax booster this year.      Continue all meds per cards and follow up closely    rtc 8 weeks infusion only and 16 weeks with remicade infusion, labs and office visit    Repeat hand and foot X-rays  Yearly     call with any questions, changes, or concerns                CC:   Dr Sanjay Ansari-CARDS  Dr Shahram Benavides-PCP

## 2019-01-08 NOTE — PROGRESS NOTES
Subjective:       Patient ID: Jack Back is a 74 y.o. male.    Chief Complaint: elevated esr    HPI Pt referred by Rheumatology for an elevated esr.    Pt has personal history of RA and treatment with remicade and mtx for 19 years.     Pt has had 3 Myocardial infarctions    Recently had cataract extraction - about 3 weeks ago.     Colonoscopy- did fitkit a few years ago and normal- and has done colegard recently that was normal    FH: Paternal GM, father and brother all have arthritis. No cancers.    Pt states he takes precautions by wearing sunscreen, sleeves and hats when outside.     Review of Systems   Constitutional: Negative.  Negative for appetite change, chills, fatigue, fever and unexpected weight change.   HENT: Negative.  Negative for mouth sores.    Eyes: Negative.    Respiratory: Negative.    Cardiovascular: Negative.    Gastrointestinal: Negative.  Negative for abdominal pain and blood in stool.   Endocrine: Negative.    Genitourinary: Negative.  Negative for dysuria, flank pain and hematuria.   Musculoskeletal: Positive for arthralgias (intermittently- well controlled).   Skin: Negative.    Allergic/Immunologic: Negative.    Neurological: Negative.    Hematological: Negative.    Psychiatric/Behavioral: Negative.        Objective:      Physical Exam   Constitutional: He is oriented to person, place, and time. He appears well-developed and well-nourished. No distress.   HENT:   Head: Normocephalic and atraumatic.   Right Ear: External ear normal.   Left Ear: External ear normal.   Nose: Nose normal.   Mouth/Throat: Oropharynx is clear and moist. No oropharyngeal exudate.   Eyes: Conjunctivae and EOM are normal. Pupils are equal, round, and reactive to light. Right eye exhibits no discharge. Left eye exhibits no discharge. No scleral icterus.   Neck: Normal range of motion. Neck supple. No thyromegaly present.   Cardiovascular: Normal rate, regular rhythm and normal heart sounds. Exam reveals no  gallop and no friction rub.   No murmur heard.  Pulmonary/Chest: Effort normal and breath sounds normal. No respiratory distress. He has no wheezes. He has no rales.   Abdominal: Soft. Bowel sounds are normal. He exhibits no distension. There is no tenderness.   Musculoskeletal: Normal range of motion. He exhibits no edema.   Lymphadenopathy:        Head (right side): No submental, no submandibular, no tonsillar, no preauricular, no posterior auricular and no occipital adenopathy present.        Head (left side): No submental, no submandibular, no tonsillar, no preauricular, no posterior auricular and no occipital adenopathy present.     He has no cervical adenopathy.     He has no axillary adenopathy.        Right: No supraclavicular adenopathy present.        Left: No supraclavicular adenopathy present.   Neurological: He is alert and oriented to person, place, and time. He has normal strength. He exhibits normal muscle tone. Gait normal.   Skin: Skin is warm and dry. No lesion and no rash noted. He is not diaphoretic.   Psychiatric: He has a normal mood and affect. His behavior is normal. Judgment and thought content normal.       Assessment:       1. ESR raised    2. Macrocytosis without anemia    3. Abnormal serum protein test    4. Anemia due to vitamin B12 deficiency, unspecified B12 deficiency type     5. Immunocompromised patient    6. Hypergammaglobulinemia    7. Elevated sed rate    8. Rheumatoid arthritis involving multiple sites with positive rheumatoid factor        Plan:     1.     RA with remicade and mtx treatments for 19 years.   2.     Reviewed labs from 2004 to present and pt has had an elevated esr that has been as high as 90's and low as 5. No anemia noted. Macrocytosis intermittently- taking b12 tabs - denies alcohol ingestion.   3.     History of hypergammaglobulenemia.No monoclonoal peaks. Recommend repeat spe, Light chains since has been 3 months since last levels.   4.     Reviewed labs  with Dr. Marquez- recommends Lt chains and spe today- if stable follow every 3-4 months.   5.     Explained to pt that the RA can cause elevation in his ESR- no real change in number over the years. No anemia noted. Will notify pt of lab results through pt portal. If abnormal will schedule follow-up.

## 2019-01-08 NOTE — LETTER
January 8, 2019      Cleo Espinoza PA-C  [address] [id number]           [facility]  [address] [place] [address]  Phone: [phone]  Fax: [phone]          Patient: Jack Back   MR Number: [id number]   YOB: 1945   Date of Visit: 1/8/2019       Dear Cleo Espinoza:    Thank you for referring Jack Back to me for evaluation. Attached you will find relevant portions of my assessment and plan of care.    If you have questions, please do not hesitate to call me. I look forward to following Jack Back along with you.    Sincerely,    HARLAN Healy  CC:  No Recipients    If you would like to receive this communication electronically, please contact [email or web address] or [phone] to request more information on [employer] access.    For providers and/or their staff who would like to refer a patient to [facility], please contact us through our one-stop-shop provider referral line, [facility], at [phone].    If you feel you have received this communication in error or would no longer like to receive these types of communications, please e-mail [email or web address]

## 2019-01-09 DIAGNOSIS — R77.8 ABNORMAL SPEP: ICD-10-CM

## 2019-01-09 DIAGNOSIS — R70.0 ELEVATED SEDIMENTATION RATE: Primary | ICD-10-CM

## 2019-01-09 LAB
ALBUMIN SERPL ELPH-MCNC: 4.16 G/DL
ALPHA1 GLOB SERPL ELPH-MCNC: 0.24 G/DL
ALPHA2 GLOB SERPL ELPH-MCNC: 0.57 G/DL
B-GLOBULIN SERPL ELPH-MCNC: 1.22 G/DL
GAMMA GLOB SERPL ELPH-MCNC: 1.82 G/DL
INTERPRETATION SERPL IFE-IMP: NORMAL
KAPPA LC SER QL IA: 5.58 MG/DL
KAPPA LC/LAMBDA SER IA: 2.13
LAMBDA LC SER QL IA: 2.62 MG/DL
PATHOLOGIST INTERPRETATION IFE: NORMAL
PATHOLOGIST INTERPRETATION SPE: NORMAL
PROT SERPL-MCNC: 8 G/DL

## 2019-01-10 ENCOUNTER — LAB VISIT (OUTPATIENT)
Dept: LAB | Facility: HOSPITAL | Age: 74
End: 2019-01-10
Attending: PHYSICIAN ASSISTANT
Payer: MEDICARE

## 2019-01-10 DIAGNOSIS — Z11.1 SCREENING-PULMONARY TB: ICD-10-CM

## 2019-01-10 DIAGNOSIS — M05.79 RHEUMATOID ARTHRITIS INVOLVING MULTIPLE SITES WITH POSITIVE RHEUMATOID FACTOR: Chronic | ICD-10-CM

## 2019-01-10 PROCEDURE — 36415 COLL VENOUS BLD VENIPUNCTURE: CPT | Mod: PO

## 2019-01-10 PROCEDURE — 86480 TB TEST CELL IMMUN MEASURE: CPT

## 2019-01-14 LAB
M TB IFN-G CD4+ BCKGRND COR BLD-ACNC: 0 IU/ML
MITOGEN IGNF BCKGRD COR BLD-ACNC: 8.73 IU/ML
MITOGEN IGNF BCKGRD COR BLD-ACNC: NEGATIVE [IU]/ML
NIL: 0.02 IU/ML
TB2 - NIL: 0 IU/ML

## 2019-01-15 ENCOUNTER — TELEPHONE (OUTPATIENT)
Dept: RHEUMATOLOGY | Facility: HOSPITAL | Age: 74
End: 2019-01-15

## 2019-01-15 ENCOUNTER — TELEPHONE (OUTPATIENT)
Dept: RHEUMATOLOGY | Facility: CLINIC | Age: 74
End: 2019-01-15

## 2019-01-15 NOTE — TELEPHONE ENCOUNTER
Call placed to [facility] [employer] (secondary insurance for patient) in regards to letter received needing more information. Spoke with Jackie. Verbal peer to peer set up for someone to call ANA Gallo. Explained to Jackie that this verbal peer to peer needs to be done ASAP and be of high priority since Mr. Back has been on this medication for 18 years and he is scheduled for 1/17/19; and waiting on his secondary insurance to approve this will potentially cause a delay if peer to peer doesn't occur before Thursday. Jackie states that she marked the request as urgent. Gave ANA Bello's contact number and days that Cleo can be contacted are today, tomorrow, and Thursday. Also verified with Jackie that nothing has to be faxed or sent to her or [employer] at this time since peer to peer will be verbal. Jackie verified that nothing has to be sent at this time.

## 2019-01-15 NOTE — TELEPHONE ENCOUNTER
----- Message from Neida Smith sent at 1/15/2019  8:55 AM CST -----  Contact: Patient   Patient would like someone to call him back    Callback: [phone]    Thank you

## 2019-01-16 ENCOUNTER — TELEPHONE (OUTPATIENT)
Dept: RHEUMATOLOGY | Facility: HOSPITAL | Age: 74
End: 2019-01-16

## 2019-01-17 ENCOUNTER — INFUSION (OUTPATIENT)
Dept: RHEUMATOLOGY | Facility: HOSPITAL | Age: 74
End: 2019-01-17
Attending: INTERNAL MEDICINE
Payer: MEDICARE

## 2019-01-17 VITALS
RESPIRATION RATE: 20 BRPM | BODY MASS INDEX: 30.45 KG/M2 | DIASTOLIC BLOOD PRESSURE: 63 MMHG | HEART RATE: 56 BPM | SYSTOLIC BLOOD PRESSURE: 113 MMHG | TEMPERATURE: 98 F | WEIGHT: 194.44 LBS

## 2019-01-17 DIAGNOSIS — M05.79 RHEUMATOID ARTHRITIS INVOLVING MULTIPLE SITES WITH POSITIVE RHEUMATOID FACTOR: Primary | ICD-10-CM

## 2019-01-17 PROCEDURE — 96413 CHEMO IV INFUSION 1 HR: CPT

## 2019-01-17 PROCEDURE — A4216 STERILE WATER/SALINE, 10 ML: HCPCS | Performed by: PHYSICIAN ASSISTANT

## 2019-01-17 PROCEDURE — 63600175 PHARM REV CODE 636 W HCPCS: Mod: JG | Performed by: PHYSICIAN ASSISTANT

## 2019-01-17 PROCEDURE — 25000003 PHARM REV CODE 250: Performed by: PHYSICIAN ASSISTANT

## 2019-01-17 RX ORDER — SODIUM CHLORIDE 0.9 % (FLUSH) 0.9 %
10 SYRINGE (ML) INJECTION
Status: CANCELLED | OUTPATIENT
Start: 2019-01-17

## 2019-01-17 RX ORDER — IPRATROPIUM BROMIDE AND ALBUTEROL SULFATE 2.5; .5 MG/3ML; MG/3ML
3 SOLUTION RESPIRATORY (INHALATION)
Status: CANCELLED | OUTPATIENT
Start: 2019-01-17

## 2019-01-17 RX ORDER — SODIUM CHLORIDE 0.9 % (FLUSH) 0.9 %
10 SYRINGE (ML) INJECTION
Status: DISCONTINUED | OUTPATIENT
Start: 2019-01-17 | End: 2019-01-17 | Stop reason: HOSPADM

## 2019-01-17 RX ORDER — EPINEPHRINE 1 MG/ML
0.3 INJECTION, SOLUTION, CONCENTRATE INTRAVENOUS
Status: CANCELLED | OUTPATIENT
Start: 2019-01-17

## 2019-01-17 RX ORDER — DIPHENHYDRAMINE HYDROCHLORIDE 50 MG/ML
25 INJECTION INTRAMUSCULAR; INTRAVENOUS
Status: CANCELLED | OUTPATIENT
Start: 2019-01-17

## 2019-01-17 RX ORDER — METHYLPREDNISOLONE SOD SUCC 125 MG
40 VIAL (EA) INJECTION
Status: CANCELLED | OUTPATIENT
Start: 2019-01-17

## 2019-01-17 RX ORDER — ACETAMINOPHEN 325 MG/1
650 TABLET ORAL
Status: CANCELLED | OUTPATIENT
Start: 2019-01-17

## 2019-01-17 RX ADMIN — INFLIXIMAB 800 MG: 100 INJECTION, POWDER, LYOPHILIZED, FOR SOLUTION INTRAVENOUS at 01:01

## 2019-01-17 RX ADMIN — Medication 10 ML: at 01:01

## 2019-01-17 NOTE — NURSING
Infusion# >10  S/S infection noted or voiced? Noted/denied  Recent labs? yes  Recent vaccines? denied    Premeds? none    Remicade 800 mg administered IV at a 90 minute rate per orders; see MAR & Flow Sheet for more  details. Tolerated well without adverse events

## 2019-03-06 ENCOUNTER — PATIENT MESSAGE (OUTPATIENT)
Dept: RHEUMATOLOGY | Facility: CLINIC | Age: 74
End: 2019-03-06

## 2019-03-06 DIAGNOSIS — R70.0 ELEVATED SED RATE: ICD-10-CM

## 2019-03-06 DIAGNOSIS — I25.118 CORONARY ARTERY DISEASE OF NATIVE ARTERY OF NATIVE HEART WITH STABLE ANGINA PECTORIS: Primary | ICD-10-CM

## 2019-03-06 DIAGNOSIS — M05.79 RHEUMATOID ARTHRITIS INVOLVING MULTIPLE SITES WITH POSITIVE RHEUMATOID FACTOR: Chronic | ICD-10-CM

## 2019-03-12 ENCOUNTER — INFUSION (OUTPATIENT)
Dept: RHEUMATOLOGY | Facility: HOSPITAL | Age: 74
End: 2019-03-12
Attending: PHYSICIAN ASSISTANT
Payer: MEDICARE

## 2019-03-12 ENCOUNTER — LAB VISIT (OUTPATIENT)
Dept: LAB | Facility: HOSPITAL | Age: 74
End: 2019-03-12
Payer: MEDICARE

## 2019-03-12 VITALS
RESPIRATION RATE: 18 BRPM | HEART RATE: 55 BPM | BODY MASS INDEX: 30.66 KG/M2 | TEMPERATURE: 97 F | DIASTOLIC BLOOD PRESSURE: 62 MMHG | OXYGEN SATURATION: 98 % | WEIGHT: 195.75 LBS | SYSTOLIC BLOOD PRESSURE: 118 MMHG

## 2019-03-12 DIAGNOSIS — R70.0 ELEVATED SED RATE: ICD-10-CM

## 2019-03-12 DIAGNOSIS — I25.118 CORONARY ARTERY DISEASE OF NATIVE ARTERY OF NATIVE HEART WITH STABLE ANGINA PECTORIS: ICD-10-CM

## 2019-03-12 DIAGNOSIS — M05.79 RHEUMATOID ARTHRITIS INVOLVING MULTIPLE SITES WITH POSITIVE RHEUMATOID FACTOR: Primary | ICD-10-CM

## 2019-03-12 DIAGNOSIS — M05.79 RHEUMATOID ARTHRITIS INVOLVING MULTIPLE SITES WITH POSITIVE RHEUMATOID FACTOR: Chronic | ICD-10-CM

## 2019-03-12 DIAGNOSIS — Z51.81 MEDICATION MONITORING ENCOUNTER: Chronic | ICD-10-CM

## 2019-03-12 LAB
ALBUMIN SERPL BCP-MCNC: 3.3 G/DL
ALP SERPL-CCNC: 56 U/L
ALT SERPL W/O P-5'-P-CCNC: 16 U/L
ANION GAP SERPL CALC-SCNC: 9 MMOL/L
AST SERPL-CCNC: 25 U/L
BASOPHILS # BLD AUTO: 0.01 K/UL
BASOPHILS NFR BLD: 0.1 %
BILIRUB SERPL-MCNC: 0.5 MG/DL
BUN SERPL-MCNC: 16 MG/DL
CALCIUM SERPL-MCNC: 10 MG/DL
CHLORIDE SERPL-SCNC: 102 MMOL/L
CHOLEST SERPL-MCNC: 210 MG/DL
CHOLEST/HDLC SERPL: 4.3 {RATIO}
CO2 SERPL-SCNC: 29 MMOL/L
CREAT SERPL-MCNC: 1 MG/DL
DIFFERENTIAL METHOD: ABNORMAL
EOSINOPHIL # BLD AUTO: 0.2 K/UL
EOSINOPHIL NFR BLD: 3.6 %
ERYTHROCYTE [DISTWIDTH] IN BLOOD BY AUTOMATED COUNT: 15 %
EST. GFR  (AFRICAN AMERICAN): >60 ML/MIN/1.73 M^2
EST. GFR  (NON AFRICAN AMERICAN): >60 ML/MIN/1.73 M^2
GLUCOSE SERPL-MCNC: 102 MG/DL
HCT VFR BLD AUTO: 39.2 %
HDLC SERPL-MCNC: 49 MG/DL
HDLC SERPL: 23.3 %
HGB BLD-MCNC: 12.9 G/DL
IMM GRANULOCYTES # BLD AUTO: 0.01 K/UL
IMM GRANULOCYTES NFR BLD AUTO: 0.1 %
LDLC SERPL CALC-MCNC: 141.2 MG/DL
LYMPHOCYTES # BLD AUTO: 2.4 K/UL
LYMPHOCYTES NFR BLD: 35.2 %
MCH RBC QN AUTO: 33.8 PG
MCHC RBC AUTO-ENTMCNC: 32.9 G/DL
MCV RBC AUTO: 103 FL
MONOCYTES # BLD AUTO: 0.4 K/UL
MONOCYTES NFR BLD: 6.5 %
NEUTROPHILS # BLD AUTO: 3.7 K/UL
NEUTROPHILS NFR BLD: 54.6 %
NONHDLC SERPL-MCNC: 161 MG/DL
NRBC BLD-RTO: 0 /100 WBC
PLATELET # BLD AUTO: 265 K/UL
PMV BLD AUTO: 8.3 FL
POTASSIUM SERPL-SCNC: 4.5 MMOL/L
PROT SERPL-MCNC: 7.7 G/DL
RBC # BLD AUTO: 3.82 M/UL
SODIUM SERPL-SCNC: 140 MMOL/L
TRIGL SERPL-MCNC: 99 MG/DL
WBC # BLD AUTO: 6.73 K/UL

## 2019-03-12 PROCEDURE — 25000003 PHARM REV CODE 250: Performed by: INTERNAL MEDICINE

## 2019-03-12 PROCEDURE — 85025 COMPLETE CBC W/AUTO DIFF WBC: CPT

## 2019-03-12 PROCEDURE — 36415 COLL VENOUS BLD VENIPUNCTURE: CPT

## 2019-03-12 PROCEDURE — A4216 STERILE WATER/SALINE, 10 ML: HCPCS | Performed by: INTERNAL MEDICINE

## 2019-03-12 PROCEDURE — 63600175 PHARM REV CODE 636 W HCPCS: Mod: JG | Performed by: INTERNAL MEDICINE

## 2019-03-12 PROCEDURE — 80061 LIPID PANEL: CPT

## 2019-03-12 PROCEDURE — 80053 COMPREHEN METABOLIC PANEL: CPT

## 2019-03-12 PROCEDURE — 96413 CHEMO IV INFUSION 1 HR: CPT

## 2019-03-12 RX ORDER — ACETAMINOPHEN 325 MG/1
650 TABLET ORAL
Status: CANCELLED | OUTPATIENT
Start: 2019-03-12

## 2019-03-12 RX ORDER — SODIUM CHLORIDE 0.9 % (FLUSH) 0.9 %
10 SYRINGE (ML) INJECTION
Status: DISCONTINUED | OUTPATIENT
Start: 2019-03-12 | End: 2019-03-12 | Stop reason: HOSPADM

## 2019-03-12 RX ORDER — METHYLPREDNISOLONE SOD SUCC 125 MG
40 VIAL (EA) INJECTION
Status: CANCELLED | OUTPATIENT
Start: 2019-03-12

## 2019-03-12 RX ORDER — DIPHENHYDRAMINE HYDROCHLORIDE 50 MG/ML
25 INJECTION INTRAMUSCULAR; INTRAVENOUS
Status: CANCELLED | OUTPATIENT
Start: 2019-03-12

## 2019-03-12 RX ORDER — SODIUM CHLORIDE 0.9 % (FLUSH) 0.9 %
10 SYRINGE (ML) INJECTION
Status: CANCELLED | OUTPATIENT
Start: 2019-03-12

## 2019-03-12 RX ORDER — EPINEPHRINE 1 MG/ML
0.3 INJECTION, SOLUTION, CONCENTRATE INTRAVENOUS
Status: CANCELLED | OUTPATIENT
Start: 2019-03-12

## 2019-03-12 RX ORDER — IPRATROPIUM BROMIDE AND ALBUTEROL SULFATE 2.5; .5 MG/3ML; MG/3ML
3 SOLUTION RESPIRATORY (INHALATION)
Status: CANCELLED | OUTPATIENT
Start: 2019-03-12

## 2019-03-12 RX ADMIN — INFLIXIMAB 800 MG: 100 INJECTION, POWDER, LYOPHILIZED, FOR SOLUTION INTRAVENOUS at 10:03

## 2019-03-12 RX ADMIN — Medication 10 ML: at 10:03

## 2019-03-12 NOTE — PATIENT INSTRUCTIONS
Infliximab injection  What is this medicine?  INFLIXIMAB (in FLIX i mab) is used to treat Crohn's disease and ulcerative colitis. It is also used to treat ankylosing spondylitis, psoriasis, and some forms of arthritis.  How should I use this medicine?  This medicine is for injection into a vein. It is usually given by a health care professional in a hospital or clinic setting.  A special MedGuide will be given to you by the pharmacist with each prescription and refill. Be sure to read this information carefully each time.  Talk to your pediatrician regarding the use of this medicine in children. Special care may be needed.  What side effects may I notice from receiving this medicine?  Side effects that you should report to your doctor or health care professional as soon as possible:  · allergic reactions like skin rash, itching or hives, swelling of the face, lips, or tongue  · chest pain  · fever or chills, usually related to the infusion  · muscle or joint pain  · red, scaly patches or raised bumps on the skin  · signs of infection - fever or chills, cough, sore throat, pain or difficulty passing urine  · swollen lymph nodes in the neck, underarm, or groin areas  · unexplained weight loss  · unusual bleeding or bruising  · unusually weak or tired  · yellowing of the eyes or skin  Side effects that usually do not require medical attention (report to your doctor or health care professional if they continue or are bothersome):  · headache  · heartburn or stomach pain  · nausea, vomiting  What may interact with this medicine?  Do not take this medicine with any of the following medications:  · anakinra  · rilonacept  This medicine may also interact with the following medications:  · vaccines  What if I miss a dose?  It is important not to miss your dose. Call your doctor or health care professional if you are unable to keep an appointment.  Where should I keep my medicine?  This drug is given in a hospital or clinic  and will not be stored at home.  What should I tell my health care provider before I take this medicine?  They need to know if you have any of these conditions:  · diabetes  · exposure to tuberculosis  · heart failure  · hepatitis or liver disease  · immune system problems  · infection  · lung or breathing disease, like COPD  · multiple sclerosis  · current or past resident of [place]  · seizure disorder  · an unusual or allergic reaction to infliximab, mouse proteins, other medicines, foods, dyes, or preservatives  · pregnant or trying to get pregnant  · breast-feeding  What should I watch for while using this medicine?  Visit your doctor or health care professional for regular checks on your progress.  If you get a cold or other infection while receiving this medicine, call your doctor or health care professional. Do not treat yourself. This medicine may decrease your body's ability to fight infections. Before beginning therapy, your doctor may do a test to see if you have been exposed to tuberculosis.  This medicine may make the symptoms of heart failure worse in some patients. If you notice symptoms such as increased shortness of breath or swelling of the ankles or legs, contact your health care provider right away.  If you are going to have surgery or dental work, tell your health care professional or dentist that you have received this medicine.  If you take this medicine for plaque psoriasis, stay out of the sun. If you cannot avoid being in the sun, wear protective clothing and use sunscreen. Do not use sun lamps or tanning beds/booths.  NOTE:This sheet is a summary. It may not cover all possible information. If you have questions about this medicine, talk to your doctor, pharmacist, or health care provider. Copyright© 2017 [employer]        WAYS TO HELP PREVENT INFECTION         WASH YOUR HANDS OFTEN DURING THE DAY, ESPECIALLY BEFORE YOU EAT, AFTER USING THE BATHROOM, AND AFTER  TOUCHING ANIMALS     STAY AWAY FROM PEOPLE WHO HAVE ILLNESSES YOU CAN CATCH; SUCH AS COLDS, FLU, CHICKEN POX     TRY TO AVOID CROWDS     STAY AWAY FROM CHILDREN WHO RECENTLY HAVE RECEIVED LIVE VIRUS VACCINES     MAINTAIN GOOD MOUTH CARE     DO NOT SQUEEZE OR SCRATCH PIMPLES     CLEAN CUTS & SCRAPES RIGHT AWAY AND DAILY UNTIL HEALED WITH WARM WATER, SOAP & AN ANTISEPTIC     AVOID CONTACT WITH LITTER BOXES, BIRD CAGES, & FISH TANKS     AVOID STANDING WATER, IE., BIRD BATHS, FLOWER POTS/VASES, OR HUMIDIFIERS     WEAR GLOVES WHEN GARDENING OR CLEANING UP AFTER OTHERS, ESPECIALLY BABIES & SMALL CHILDREN     DO NOT EAT RAW FISH, SEAFOOD, MEAT, OR EGGS

## 2019-03-12 NOTE — NURSING
Infusion# >10  S/S infection noted or voiced? denies  Recent labs? yes  Recent vaccines? denies    Premeds? none    Remicade 800 mg administered IV at a 90 minute rate per orders; see MAR & Flow Sheet for more  details. Tolerated well without adverse events

## 2019-03-12 NOTE — PLAN OF CARE
Problem: Adult Inpatient Plan of Care  Goal: Absence of Hospital-Acquired Illness or Injury    Intervention: Prevent Infection  No s/s of infection

## 2019-04-01 ENCOUNTER — PATIENT MESSAGE (OUTPATIENT)
Dept: RHEUMATOLOGY | Facility: CLINIC | Age: 74
End: 2019-04-01

## 2019-04-30 ENCOUNTER — PATIENT MESSAGE (OUTPATIENT)
Dept: RHEUMATOLOGY | Facility: CLINIC | Age: 74
End: 2019-04-30

## 2019-05-08 ENCOUNTER — PATIENT MESSAGE (OUTPATIENT)
Dept: RHEUMATOLOGY | Facility: CLINIC | Age: 74
End: 2019-05-08

## 2019-05-09 ENCOUNTER — TELEPHONE (OUTPATIENT)
Dept: RHEUMATOLOGY | Facility: HOSPITAL | Age: 74
End: 2019-05-09

## 2019-05-09 NOTE — TELEPHONE ENCOUNTER
Spoke to curly Keating to 6/18  4 wk post heart surg and reminded him to stop MTX  1 wk before surg

## 2019-06-07 ENCOUNTER — PATIENT MESSAGE (OUTPATIENT)
Dept: RHEUMATOLOGY | Facility: CLINIC | Age: 74
End: 2019-06-07

## 2019-06-07 DIAGNOSIS — R05.9 COUGH: Primary | ICD-10-CM

## 2019-06-11 ENCOUNTER — PATIENT MESSAGE (OUTPATIENT)
Dept: RHEUMATOLOGY | Facility: CLINIC | Age: 74
End: 2019-06-11

## 2019-06-12 ENCOUNTER — OFFICE VISIT (OUTPATIENT)
Dept: PULMONOLOGY | Facility: CLINIC | Age: 74
End: 2019-06-12
Payer: MEDICARE

## 2019-06-12 ENCOUNTER — HOSPITAL ENCOUNTER (OUTPATIENT)
Dept: RADIOLOGY | Facility: HOSPITAL | Age: 74
Discharge: HOME | End: 2019-06-12
Attending: INTERNAL MEDICINE
Payer: MEDICARE

## 2019-06-12 ENCOUNTER — PATIENT MESSAGE (OUTPATIENT)
Dept: RHEUMATOLOGY | Facility: CLINIC | Age: 74
End: 2019-06-12

## 2019-06-12 VITALS
WEIGHT: 179.44 LBS | SYSTOLIC BLOOD PRESSURE: 110 MMHG | OXYGEN SATURATION: 99 % | DIASTOLIC BLOOD PRESSURE: 62 MMHG | HEIGHT: 67 IN | BODY MASS INDEX: 28.16 KG/M2 | HEART RATE: 76 BPM

## 2019-06-12 DIAGNOSIS — E86.0 DEHYDRATION, MILD: ICD-10-CM

## 2019-06-12 DIAGNOSIS — J90 LOCULATED PLEURAL EFFUSION: Primary | ICD-10-CM

## 2019-06-12 DIAGNOSIS — Z87.09: ICD-10-CM

## 2019-06-12 DIAGNOSIS — I49.8 ATRIAL ARRHYTHMIA: ICD-10-CM

## 2019-06-12 DIAGNOSIS — R05.9 COUGH: ICD-10-CM

## 2019-06-12 DIAGNOSIS — Z98.890 POST-OPERATIVE STATE: ICD-10-CM

## 2019-06-12 PROCEDURE — 99999 PR PBB SHADOW E&M-EST. PATIENT-LVL III: ICD-10-PCS | Mod: PBBFAC,,, | Performed by: INTERNAL MEDICINE

## 2019-06-12 PROCEDURE — 71046 X-RAY EXAM CHEST 2 VIEWS: CPT | Mod: TC

## 2019-06-12 PROCEDURE — 71046 X-RAY EXAM CHEST 2 VIEWS: CPT | Mod: 26,,, | Performed by: RADIOLOGY

## 2019-06-12 PROCEDURE — 99999 PR PBB SHADOW E&M-EST. PATIENT-LVL III: CPT | Mod: PBBFAC,,, | Performed by: INTERNAL MEDICINE

## 2019-06-12 PROCEDURE — 99213 OFFICE O/P EST LOW 20 MIN: CPT | Mod: PBBFAC,25 | Performed by: INTERNAL MEDICINE

## 2019-06-12 PROCEDURE — 99205 PR OFFICE/OUTPT VISIT, NEW, LEVL V, 60-74 MIN: ICD-10-PCS | Mod: S$PBB,,, | Performed by: INTERNAL MEDICINE

## 2019-06-12 PROCEDURE — 99205 OFFICE O/P NEW HI 60 MIN: CPT | Mod: S$PBB,,, | Performed by: INTERNAL MEDICINE

## 2019-06-12 PROCEDURE — 71046 XR CHEST PA AND LATERAL: ICD-10-PCS | Mod: 26,,, | Performed by: RADIOLOGY

## 2019-06-12 RX ORDER — FUROSEMIDE 80 MG/1
TABLET ORAL
Refills: 0 | COMMUNITY
Start: 2019-06-03 | End: 2019-06-13

## 2019-06-12 RX ORDER — FERROUS SULFATE 325(65) MG
325 TABLET ORAL
COMMUNITY
Start: 2019-05-25 | End: 2019-07-03

## 2019-06-12 RX ORDER — TRIAMCINOLONE ACETONIDE 1 MG/G
OINTMENT TOPICAL
COMMUNITY
Start: 2019-06-10 | End: 2019-07-03

## 2019-06-12 RX ORDER — OXYCODONE AND ACETAMINOPHEN 5; 325 MG/1; MG/1
1 TABLET ORAL EVERY 4 HOURS PRN
COMMUNITY
Start: 2019-05-25

## 2019-06-12 RX ORDER — METOPROLOL SUCCINATE 50 MG/1
50 TABLET, EXTENDED RELEASE ORAL
COMMUNITY
Start: 2019-05-26 | End: 2019-07-10 | Stop reason: ALTCHOICE

## 2019-06-12 NOTE — PROGRESS NOTES
Subjective:       Patient ID: Jack Back is a 74 y.o. male.    Chief Complaint: He       Cough    HPI   73 y/o s/p Coronary Artery Bypass Grafting 3 weeks ago. Redo by Dr. Shaw.  Seen at [facility] 6 days ago and diagnosed with pulmonary enema - was started on lasix 80 mg a day  Open heart surgery 3 weeks ago has complaints of shortness of breath . He was diagnosed with Chronic Obstructive Pulmonary Disease but has no prior history of smoking or asthma  Started with BREO - problems with mouth lesions - stopped this medication.  He was told he had fluid in lungs.  CT scan of the chest is consistent with typical post-op loculated pleural effusion that is common after redo Coronary Artery Bypass Grafting.  He reports symptoms of orthostatic hypotension - light headedness when arising. Worse after starting diuretic. No syncope.    Additionally is recovery has been slowed by his rheumatoid arthritis -Every two months- Remicade but has not been able to take it because of a rash that developed as a result of taking plavix.  Surgeon Dr. Shaw in [place]    Past Medical History:   Diagnosis Date    Arthritis     Congestive heart failure 06/07/2019    Depression     Lung disease     Rheumatoid arthritis involving multiple sites with positive rheumatoid factor 12/1/2015    Rheumatoid arthritis(714.0)     Thyroid disease      Past Surgical History:   Procedure Laterality Date    HERNIA REPAIR      JOINT REPLACEMENT      bi lateral hips    AL CABG, ARTERY-VEIN, FOUR  05/21/2019    Coronary Artery Bypass, 4    SPINE SURGERY       Social History     Socioeconomic History    Marital status:      Spouse name: Not on file    Number of children: Not on file    Years of education: Not on file    Highest education level: Not on file   Occupational History    Not on file   Social Needs    Financial resource strain: Not on file    Food insecurity:     Worry: Not on file     Inability: Not  "on file    Transportation needs:     Medical: Not on file     Non-medical: Not on file   Tobacco Use    Smoking status: Former Smoker    Smokeless tobacco: Never Used   Substance and Sexual Activity    Alcohol use: No    Drug use: No    Sexual activity: Not on file   Lifestyle    Physical activity:     Days per week: Not on file     Minutes per session: Not on file    Stress: Not on file   Relationships    Social connections:     Talks on phone: Not on file     Gets together: Not on file     Attends Mosque service: Not on file     Active member of club or organization: Not on file     Attends meetings of clubs or organizations: Not on file     Relationship status: Not on file   Other Topics Concern    Not on file   Social History Narrative    Not on file     Review of Systems   Constitutional: Positive for fatigue and weakness.   HENT: Negative.    Respiratory: Positive for chest tightness, shortness of breath and dyspnea on extertion.    Cardiovascular: Positive for palpitations and leg swelling.   Genitourinary: Negative.    Endocrine: endocrine negative   Musculoskeletal: Positive for arthralgias and joint swelling.   Skin: Positive for rash.   Gastrointestinal: Negative.    Neurological: Positive for weakness and light-headedness.       Objective:      /62   Pulse 76   Ht 5' 7" (1.702 m)   Wt 81.4 kg (179 lb 7.3 oz)   SpO2 99%   BMI 28.11 kg/m²   Physical Exam   Constitutional: He is oriented to person, place, and time. He appears well-developed and well-nourished.   HENT:   Head: Normocephalic and atraumatic.   Eyes: Pupils are equal, round, and reactive to light. Conjunctivae are normal.   Neck: Neck supple. No JVD present. No tracheal deviation present. No thyromegaly present.   Cardiovascular: Normal rate and normal heart sounds. An irregular rhythm present.   Pulmonary/Chest: Effort normal. He has decreased breath sounds. He has rales in the right lower field and the left lower " field.   Well healed midline sternal scar   Abdominal: Soft.   Musculoskeletal: Normal range of motion. He exhibits deformity.   Both hands with lateral deviation of PIP   Lymphadenopathy:     He has no cervical adenopathy.   Neurological: He is alert and oriented to person, place, and time.   Skin: Skin is warm and dry. Rash noted.   Primarily truncal erythematous rash   Nursing note and vitals reviewed.    Personal Diagnostic Review  CT of chest performed on 6/6/2019 without contrast from [facility]  revealed post op loculated pleural effusion  .  X-Ray Chest PA And Lateral  Narrative: EXAMINATION:  XR CHEST PA AND LATERAL    CLINICAL HISTORY:  Cough    TECHNIQUE:  PA and lateral views of the chest were performed.    COMPARISON:  12/16/2014    FINDINGS:  Cardiac silhouette and mediastinal contours are stable with postoperative findings.  Pleural base mass noted at the lateral left mid to upper lung.  Bibasilar scarring present.  Bilateral nipple shadows noted.  No large pleural effusion.  Osseous structures are intact.  Impression: Pleural base mass within the left chest.  CT chest with contrast recommended for definitive characterization.  This report was flagged in Epic as abnormal.    Electronically signed by: Bogdan Ruiz MD  Date:    06/12/2019  Time:    10:34      Office Spirometry Results:     No flowsheet data found.  Pulmonary Studies Review 6/12/2019   SpO2 99   Height 67.000   Weight 2871.27   BMI (Calculated) 28.2   Predicted Distance 308.3   Predicted Distance Meters (Calculated) 459.65       Results for SALVATORE MERIDA (MRN [id number]) as of 6/13/2019 09:29   Ref. Range 6/12/2019 12:28   Sodium Latest Ref Range: 136 - 145 mmol/L 138   Potassium Latest Ref Range: 3.5 - 5.1 mmol/L 4.4   Chloride Latest Ref Range: 95 - 110 mmol/L 98   CO2 Latest Ref Range: 23 - 29 mmol/L 28   Anion Gap Latest Ref Range: 8 - 16 mmol/L 12   BUN, Bld Latest Ref Range: 8 - 23 mg/dL 26 (H)   Creatinine  Latest Ref Range: 0.5 - 1.4 mg/dL 1.3   eGFR if non African American Latest Ref Range: >60 mL/min/1.73 m^2 53.4 (A)   eGFR if African American Latest Ref Range: >60 mL/min/1.73 m^2 >60.0   Glucose Latest Ref Range: 70 - 110 mg/dL 100   Calcium Latest Ref Range: 8.7 - 10.5 mg/dL 9.7     Assessment:       Loculated pleural effusion    Post-operative state- s/p CABG redo 2019    Hx of acute pulmonary edema  -     Basic metabolic panel; Future; Expected date: 06/12/2019  -     furosemide (LASIX) 40 MG tablet; Take 1 tablet (40 mg total) by mouth once daily.  Dispense: 30 tablet; Refill: 1    Atrial arrhythmia    Dehydration, mild - due to diuretic  Comments:  Reduce dose of lasix to 40 mg /day  Orders:  -     furosemide (LASIX) 40 MG tablet; Take 1 tablet (40 mg total) by mouth once daily.  Dispense: 30 tablet; Refill: 1          Outpatient Encounter Medications as of 6/12/2019   Medication Sig Dispense Refill    aspirin (ECOTRIN) 81 MG EC tablet Take 81 mg by mouth once daily.      co-enzyme Q-10 30 mg capsule Take 30 mg by mouth once daily.      cyanocobalamin (VITAMIN B-12) 1000 MCG tablet Take 100 mcg by mouth once daily.      ferrous sulfate (FEOSOL) 325 mg (65 mg iron) Tab tablet Take 325 mg by mouth.      folic acid (FOLVITE) 800 MCG Tab Take 1 tablet (800 mcg total) by mouth 2 (two) times daily.      furosemide (LASIX) 40 MG tablet Take 1 tablet (40 mg total) by mouth once daily. 30 tablet 1    INFLIXIMAB (REMICADE IV) Inject into the vein.       methotrexate 2.5 MG Tab TAKE 3 TABLETES BY MOUTH IN THE MORNING, THEN 3 TABLETS IN THE EVENING ONCE A WEEK= 15 mg week 100 tablet 5    metoprolol succinate (TOPROL-XL) 50 MG 24 hr tablet Take 50 mg by mouth.      milk thistle 175 mg tablet Take 175 mg by mouth once daily.      NITROSTAT 0.4 mg SL tablet 0.4 mg.      omega-3 acid ethyl esters (LOVAZA) 1 gram capsule   5    oxyCODONE-acetaminophen (PERCOCET) 5-325 mg per tablet Take 1 tablet by mouth every 4  (four) hours as needed.      ramipril (ALTACE) 10 MG capsule Take 10 mg by mouth once daily.      SYNTHROID 88 mcg tablet 88 mcg once daily.  5    triamcinolone acetonide 0.1% (KENALOG) 0.1 % ointment Apply to the affected area twice daily to rash on chest      vitamin D 1000 units Tab Take 185 mg by mouth 2 (two) times daily.      VITAMIN K2 ORAL Take 100 mcg by mouth.      [DISCONTINUED] furosemide (LASIX) 80 MG tablet   0    arginine 500 mg tablet Take 500 mg by mouth once daily.      ascorbic acid, vitamin C, (VITAMIN C) 1000 MG tablet Take 1,000 mg by mouth 3 (three) times daily.      HAWTHORN ORAL Take by mouth once daily.       LUTEIN EXTRACT/ZEAXANTHIN EXT (LUTEIN-ZEAXANTHIN ORAL) Take by mouth.      lysine 1,000 mg Tab Take by mouth once daily.       multivitamin with minerals tablet Take 1 tablet by mouth once daily.      resveratrol 100 mg Cap Take by mouth once daily.      saw palmetto fruit 450 mg Cap Take 450 mg by mouth once daily.      testosterone cypionate (DEPOTESTOTERONE CYPIONATE) 100 mg/mL injection Inject 200 mg into the muscle every 14 (fourteen) days.      TURMERIC, BULK, MISC 500 mg by Misc.(Non-Drug; Combo Route) route.      UNABLE TO FIND medication name: Anti- Inflammatory Support 2 ea. qd      vitamin E 400 UNIT capsule Take 400 Units by mouth once daily.       No facility-administered encounter medications on file as of 6/12/2019.      Plan:       Requested Prescriptions     Signed Prescriptions Disp Refills    furosemide (LASIX) 40 MG tablet 30 tablet 1     Sig: Take 1 tablet (40 mg total) by mouth once daily.     Problem List Items Addressed This Visit     Loculated pleural effusion - Primary    Post-operative state- s/p CABG redo 2019      Other Visit Diagnoses     Hx of acute pulmonary edema        Relevant Medications    furosemide (LASIX) 40 MG tablet    Other Relevant Orders    Basic metabolic panel (Completed)    Atrial arrhythmia        Dehydration, mild -  due to diuretic        Reduce dose of lasix to 40 mg /day    Relevant Medications    furosemide (LASIX) 40 MG tablet             Follow up if symptoms worsen or fail to improve.    MEDICAL DECISION MAKING: Moderate to high complexity.  Overall, the multiple problems listed are of moderate to high severity that may impact quality of life and activities of daily living. Side effects of medications, treatment plan as well as options and alternatives reviewed and discussed with patient. There was counseling of patient concerning these issues.    Total time spent in face to face counseling and coordination of care - 65  minutes over 50% of time was used in discussion of prognosis, risks, benefits of treatment, instructions and compliance with regimen . Discussion with other physicians or health care providers (DME, NP, pharmacy, respiratory therapy) occurred.

## 2019-06-12 NOTE — LETTER
June 12, 2019      Nabil Benavides MD  [address]  [facility] [address]           [facility]  [address] [facility] [address]  Phone: [phone]  Fax: [phone]          Patient: Jack Back   MR Number: [id number]   YOB: 1945   Date of Visit: 6/12/2019       Dear No ref. provider found:    Thank you for referring Jack Back to me for evaluation. Attached you will find relevant portions of my assessment and plan of care.    If you have questions, please do not hesitate to call me. I look forward to following Jack Back along with you.    Sincerely,    Manuel Dang MD    Enclosure  CC:  No Recipients    IIf you would like to receive this communication electronically, please contact [email or web address] or [phone] to request Cigital Link access.    Cigital Link is a tool which provides read-only access to select patient information with whom you have a relationship. Its easy to use and provides real time access to review your patients record including encounter summaries, notes, results, and demographic information.    If you feel you have received this communication in error or would no longer like to receive these types of communications, please e-mail [email or web address]

## 2019-06-13 ENCOUNTER — TELEPHONE (OUTPATIENT)
Dept: PULMONOLOGY | Facility: CLINIC | Age: 74
End: 2019-06-13

## 2019-06-13 PROBLEM — Z98.890 POST-OPERATIVE STATE: Status: ACTIVE | Noted: 2019-06-13

## 2019-06-13 RX ORDER — FUROSEMIDE 40 MG/1
40 TABLET ORAL DAILY
Qty: 30 TABLET | Refills: 1 | Status: SHIPPED | OUTPATIENT
Start: 2019-06-13 | End: 2019-07-10 | Stop reason: ALTCHOICE

## 2019-06-14 ENCOUNTER — TELEPHONE (OUTPATIENT)
Dept: RHEUMATOLOGY | Facility: CLINIC | Age: 74
End: 2019-06-14

## 2019-06-14 NOTE — TELEPHONE ENCOUNTER
----- Message from Brianda Saavedra sent at 6/14/2019 10:32 AM CDT -----  Contact: Pt  Pt is calling in regards to having problems getting infusion set up.      Pls call pt back at .[phone]

## 2019-06-14 NOTE — TELEPHONE ENCOUNTER
Patient states that he has an appointment for his infusion and Ms Espinoza on 6-27 but has a Cardiology appointment that same day.  He also has   infusion appointment on 7-3 with labs . Wants to know if he can switch his appointment with Ms Espinoza to 7-3. Appointment with Ms Espinoza rescheduled to 7-3.

## 2019-06-26 ENCOUNTER — PATIENT MESSAGE (OUTPATIENT)
Dept: RHEUMATOLOGY | Facility: CLINIC | Age: 74
End: 2019-06-26

## 2019-06-26 DIAGNOSIS — Z11.59 NEED FOR HEPATITIS B SCREENING TEST: Primary | ICD-10-CM

## 2019-06-26 DIAGNOSIS — D84.9 IMMUNOCOMPROMISED PATIENT: Chronic | ICD-10-CM

## 2019-06-26 DIAGNOSIS — R53.83 OTHER FATIGUE: ICD-10-CM

## 2019-06-26 NOTE — PROGRESS NOTES
Pt now cleared post cardiac surgery to resume IV remicade     pt scheduled for Infusion 7/3/19 and apt with me   Will need labs to be done - tb gold neg and utd but will need acute hep studies with reg 4 labs    Will call him and recommend he get with his pcp for hep a and hep b  Vaccination   He will call them tomorrow

## 2019-07-03 ENCOUNTER — INFUSION (OUTPATIENT)
Dept: RHEUMATOLOGY | Facility: HOSPITAL | Age: 74
End: 2019-07-03
Payer: MEDICARE

## 2019-07-03 ENCOUNTER — OFFICE VISIT (OUTPATIENT)
Dept: RHEUMATOLOGY | Facility: CLINIC | Age: 74
End: 2019-07-03
Payer: MEDICARE

## 2019-07-03 VITALS
WEIGHT: 181.44 LBS | SYSTOLIC BLOOD PRESSURE: 122 MMHG | HEIGHT: 67 IN | DIASTOLIC BLOOD PRESSURE: 81 MMHG | BODY MASS INDEX: 28.48 KG/M2 | HEART RATE: 55 BPM

## 2019-07-03 VITALS
WEIGHT: 181.44 LBS | SYSTOLIC BLOOD PRESSURE: 100 MMHG | HEART RATE: 66 BPM | BODY MASS INDEX: 28.42 KG/M2 | DIASTOLIC BLOOD PRESSURE: 65 MMHG

## 2019-07-03 DIAGNOSIS — D89.2 HYPERGAMMAGLOBULINEMIA: ICD-10-CM

## 2019-07-03 DIAGNOSIS — D84.9 IMMUNOCOMPROMISED PATIENT: Chronic | ICD-10-CM

## 2019-07-03 DIAGNOSIS — R70.0 ELEVATED SED RATE: ICD-10-CM

## 2019-07-03 DIAGNOSIS — Z98.890 POST-OPERATIVE STATE: ICD-10-CM

## 2019-07-03 DIAGNOSIS — M05.79 RHEUMATOID ARTHRITIS INVOLVING MULTIPLE SITES WITH POSITIVE RHEUMATOID FACTOR: Primary | ICD-10-CM

## 2019-07-03 DIAGNOSIS — L40.8 OTHER PSORIASIS: ICD-10-CM

## 2019-07-03 DIAGNOSIS — M05.79 RHEUMATOID ARTHRITIS INVOLVING MULTIPLE SITES WITH POSITIVE RHEUMATOID FACTOR: Primary | Chronic | ICD-10-CM

## 2019-07-03 PROCEDURE — 25000003 PHARM REV CODE 250: Performed by: INTERNAL MEDICINE

## 2019-07-03 PROCEDURE — 99214 OFFICE O/P EST MOD 30 MIN: CPT | Mod: S$PBB,,, | Performed by: PHYSICIAN ASSISTANT

## 2019-07-03 PROCEDURE — 96375 TX/PRO/DX INJ NEW DRUG ADDON: CPT

## 2019-07-03 PROCEDURE — 63600175 PHARM REV CODE 636 W HCPCS: Performed by: PHYSICIAN ASSISTANT

## 2019-07-03 PROCEDURE — 63600175 PHARM REV CODE 636 W HCPCS: Mod: JG | Performed by: INTERNAL MEDICINE

## 2019-07-03 PROCEDURE — 99999 PR PBB SHADOW E&M-EST. PATIENT-LVL IV: ICD-10-PCS | Mod: PBBFAC,,, | Performed by: PHYSICIAN ASSISTANT

## 2019-07-03 PROCEDURE — 99999 PR PBB SHADOW E&M-EST. PATIENT-LVL IV: CPT | Mod: PBBFAC,,, | Performed by: PHYSICIAN ASSISTANT

## 2019-07-03 PROCEDURE — 99214 OFFICE O/P EST MOD 30 MIN: CPT | Mod: PBBFAC,25 | Performed by: PHYSICIAN ASSISTANT

## 2019-07-03 PROCEDURE — 96413 CHEMO IV INFUSION 1 HR: CPT

## 2019-07-03 PROCEDURE — A4216 STERILE WATER/SALINE, 10 ML: HCPCS | Performed by: INTERNAL MEDICINE

## 2019-07-03 PROCEDURE — 99214 PR OFFICE/OUTPT VISIT, EST, LEVL IV, 30-39 MIN: ICD-10-PCS | Mod: S$PBB,,, | Performed by: PHYSICIAN ASSISTANT

## 2019-07-03 RX ORDER — DIPHENHYDRAMINE HYDROCHLORIDE 50 MG/ML
12.5 INJECTION INTRAMUSCULAR; INTRAVENOUS
Status: COMPLETED | OUTPATIENT
Start: 2019-07-03 | End: 2019-07-03

## 2019-07-03 RX ORDER — METHYLPREDNISOLONE SOD SUCC 125 MG
40 VIAL (EA) INJECTION
Status: CANCELLED | OUTPATIENT
Start: 2019-08-28

## 2019-07-03 RX ORDER — DIPHENHYDRAMINE HYDROCHLORIDE 50 MG/ML
25 INJECTION INTRAMUSCULAR; INTRAVENOUS
Status: CANCELLED | OUTPATIENT
Start: 2019-08-28

## 2019-07-03 RX ORDER — DIPHENHYDRAMINE HYDROCHLORIDE 50 MG/ML
12.5 INJECTION INTRAMUSCULAR; INTRAVENOUS
Status: CANCELLED
Start: 2019-08-28

## 2019-07-03 RX ORDER — METHYLPREDNISOLONE SOD SUCC 125 MG
50 VIAL (EA) INJECTION
Status: COMPLETED | OUTPATIENT
Start: 2019-07-03 | End: 2019-07-03

## 2019-07-03 RX ORDER — SODIUM CHLORIDE 0.9 % (FLUSH) 0.9 %
10 SYRINGE (ML) INJECTION
Status: DISCONTINUED | OUTPATIENT
Start: 2019-07-03 | End: 2019-07-03 | Stop reason: HOSPADM

## 2019-07-03 RX ORDER — IPRATROPIUM BROMIDE AND ALBUTEROL SULFATE 2.5; .5 MG/3ML; MG/3ML
3 SOLUTION RESPIRATORY (INHALATION)
Status: CANCELLED | OUTPATIENT
Start: 2019-08-28

## 2019-07-03 RX ORDER — DIPHENHYDRAMINE HYDROCHLORIDE 50 MG/ML
12.5 INJECTION INTRAMUSCULAR; INTRAVENOUS
Status: CANCELLED
Start: 2019-07-03

## 2019-07-03 RX ORDER — METHYLPREDNISOLONE SOD SUCC 125 MG
50 VIAL (EA) INJECTION
Status: CANCELLED
Start: 2019-08-28

## 2019-07-03 RX ORDER — METHYLPREDNISOLONE SOD SUCC 125 MG
50 VIAL (EA) INJECTION
Status: CANCELLED
Start: 2019-07-03

## 2019-07-03 RX ORDER — EPINEPHRINE 1 MG/ML
0.3 INJECTION, SOLUTION, CONCENTRATE INTRAVENOUS
Status: CANCELLED | OUTPATIENT
Start: 2019-08-28

## 2019-07-03 RX ORDER — SODIUM CHLORIDE 0.9 % (FLUSH) 0.9 %
10 SYRINGE (ML) INJECTION
Status: CANCELLED | OUTPATIENT
Start: 2019-08-28

## 2019-07-03 RX ORDER — TRIAMCINOLONE ACETONIDE 1 MG/G
CREAM TOPICAL 2 TIMES DAILY
Qty: 45 G | Refills: 2 | Status: SHIPPED | OUTPATIENT
Start: 2019-07-03 | End: 2021-01-14

## 2019-07-03 RX ORDER — POTASSIUM CHLORIDE 750 MG/1
10 CAPSULE, EXTENDED RELEASE ORAL ONCE
COMMUNITY

## 2019-07-03 RX ORDER — ACETAMINOPHEN 325 MG/1
650 TABLET ORAL
Status: CANCELLED | OUTPATIENT
Start: 2019-08-28

## 2019-07-03 RX ADMIN — Medication 10 ML: at 10:07

## 2019-07-03 RX ADMIN — METHYLPREDNISOLONE SODIUM SUCCINATE 50 MG: 125 INJECTION, POWDER, FOR SOLUTION INTRAMUSCULAR; INTRAVENOUS at 10:07

## 2019-07-03 RX ADMIN — INFLIXIMAB 800 MG: 100 INJECTION, POWDER, LYOPHILIZED, FOR SOLUTION INTRAVENOUS at 10:07

## 2019-07-03 RX ADMIN — DIPHENHYDRAMINE HYDROCHLORIDE 12.5 MG: 50 INJECTION INTRAMUSCULAR; INTRAVENOUS at 10:07

## 2019-07-03 ASSESSMENT — ROUTINE ASSESSMENT OF PATIENT INDEX DATA (RAPID3): MDHAQ FUNCTION SCORE: .6

## 2019-07-03 NOTE — PROGRESS NOTES
Subjective:       Patient ID: Jack Back is a 74 y.o. male.    Chief Complaint: Rheumatoid Arthritis      Jack is back today for rheumatology follow-up.      He has seropositive erosive rheumatoid arthritis. In the past failed mtx monotherapy. Moved to IV remicade; RA has done very well on remicade.     He is 7 wks s/w redo of cabg- 2017 Had MI required PTCA stent placement. 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 Prior cabc in 2012  Had some sob post d/c- cxr showed pleural effusion, admitted back to hospital, given lasix and started on daily dose  F/w visit with his CVT surgeon 6/25/19  he was cleared to resume his remicade , repeat cxr showed resolving effusion; sob is better  Still on his bp meds, this is well controlled. He did loose some weight prior to and since surgery.     Just resumed his mtx this week, total of 10 mg/wk oral, takes folic acid  Last Remicade was 3/12/19 (17 weeks ago)   He has experienced some increased stiffness in his joints nusrat saloni hands/wrist about 1.5 hrs then gets better  In the past we tried to stay off Remicade but RA activity increased and it was resumed.   Left elbow has rash he thinks may be psoriasis        He has not developed heart failure. No lower ext edema, mild sob, no chest pain. No pnd  Getting his remicade consistently No joint swelling, joint stiffness  or acute exacerbations. Pain level rated today as 0/10     Chronic elevated esr, normal crp. 5/2017 spep showed elevated gammaglobulins, no polyclonal or monoclonal peaks in IF. Hypergammaglobulinemia. Repeat done 9/2018 IF  There is a faint linear irregularity in gamma involving IgG and lambda, non-specific at this time.  Recommend repeat studies in 3-6 months or as clinically indicated. Esr in the 50-60's consistently, crp normal  No weight loss, fevers or night sweats.       Vaccines all up to date except TdaP but pt allergic to Tetanus.     bone density done in June 2014 which was normal.         Review of Systems  "  Constitutional: Negative.  Negative for chills, fatigue and fever.   HENT: Negative.  Negative for congestion, mouth sores and trouble swallowing.    Eyes: Negative.  Negative for photophobia, redness and visual disturbance.   Respiratory: Negative.  Negative for cough, shortness of breath and wheezing.    Cardiovascular: Negative.  Negative for chest pain and leg swelling.   Gastrointestinal: Negative.  Negative for abdominal distention, abdominal pain, diarrhea, nausea and vomiting.   Genitourinary: Negative.  Negative for dysuria, flank pain, frequency and urgency.   Musculoskeletal: Negative for arthralgias, back pain, gait problem, joint swelling and myalgias.        Mild aching and stiffness   Skin: Negative.  Negative for rash.   Neurological: Negative.  Negative for dizziness, weakness and numbness.           Objective:     /81   Pulse (!) 55   Ht 5' 7" (1.702 m)   Wt 82.3 kg (181 lb 7 oz)   BMI 28.42 kg/m²         Physical Exam   Constitutional: He is oriented to person, place, and time and well-developed, well-nourished, and in no distress. No distress.   HENT:   Head: Normocephalic and atraumatic.   Right Ear: External ear normal.   Left Ear: External ear normal.   Mouth/Throat: No oropharyngeal exudate.   Eyes: Conjunctivae and EOM are normal. Pupils are equal, round, and reactive to light. No scleral icterus.   Neck: Neck supple. No thyromegaly present.   Cardiovascular: Normal rate, regular rhythm and normal heart sounds.    No murmur heard.  Pulmonary/Chest: Effort normal and breath sounds normal. No respiratory distress.   Incision has healed nicely  No redness or drainage   Abdominal: Soft. Bowel sounds are normal.       Right Side Rheumatological Exam     Examination finds the shoulder, 1st PIP, 1st MCP, 2nd PIP, 2nd MCP, 3rd PIP, 3rd MCP, 4th PIP, 4th MCP, 5th PIP and 5th MCP normal.    Joint Exam Comments   Elbow: limited extension   Wrist: Mild reduced flexion of wrist, no " synovitis    Left Side Rheumatological Exam     Examination finds the shoulder, elbow, 1st PIP, 1st MCP, 2nd PIP, 2nd MCP, 3rd PIP, 3rd MCP, 4th PIP, 4th MCP, 5th PIP and 5th MCP normal.    The patient is tender to palpation of the knee.    Joint Exam Comments   Wrist: Limited motion left wrists, no synovitis      Lymphadenopathy:     He has no cervical adenopathy.   Neurological: He is alert and oriented to person, place, and time. No cranial nerve deficit. He exhibits normal muscle tone. Gait normal. Coordination normal.   Skin: Skin is warm and dry. Rash noted.     Left olecranon scaly dry rash   Musculoskeletal: He exhibits deformity. He exhibits no edema.   Ulnar drift 25 degrees right hand and 15 degrees left hand, right elbow fixed flexion stops about  5 degrees from baseline,  Limited motion both wrist extension  No synovitis on exam today  Some mild ttp wrists and mp joints                 Recent Results (from the past 168 hour(s))   C-reactive protein    Collection Time: 07/03/19  9:33 AM   Result Value Ref Range    CRP 17.7 (H) 0.0 - 8.2 mg/L   Comprehensive metabolic panel    Collection Time: 07/03/19  9:33 AM   Result Value Ref Range    Sodium 140 136 - 145 mmol/L    Potassium 4.1 3.5 - 5.1 mmol/L    Chloride 101 95 - 110 mmol/L    CO2 31 (H) 23 - 29 mmol/L    Glucose 93 70 - 110 mg/dL    BUN, Bld 16 8 - 23 mg/dL    Creatinine 1.1 0.5 - 1.4 mg/dL    Calcium 9.7 8.7 - 10.5 mg/dL    Total Protein 8.4 6.0 - 8.4 g/dL    Albumin 3.1 (L) 3.5 - 5.2 g/dL    Total Bilirubin 0.4 0.1 - 1.0 mg/dL    Alkaline Phosphatase 85 55 - 135 U/L    AST 20 10 - 40 U/L    ALT 18 10 - 44 U/L    Anion Gap 8 8 - 16 mmol/L    eGFR if African American >60 >60 mL/min/1.73 m^2    eGFR if non African American >60 >60 mL/min/1.73 m^2   CBC auto differential    Collection Time: 07/03/19  9:33 AM   Result Value Ref Range    WBC 6.72 3.90 - 12.70 K/uL    RBC 3.55 (L) 4.60 - 6.20 M/uL    Hemoglobin 11.9 (L) 14.0 - 18.0 g/dL    Hematocrit  36.1 (L) 40.0 - 54.0 %    Mean Corpuscular Volume 102 (H) 82 - 98 fL    Mean Corpuscular Hemoglobin 33.5 (H) 27.0 - 31.0 pg    Mean Corpuscular Hemoglobin Conc 33.0 32.0 - 36.0 g/dL    RDW 13.3 11.5 - 14.5 %    Platelets 322 150 - 350 K/uL    MPV 8.2 (L) 9.2 - 12.9 fL    Immature Granulocytes 0.1 0.0 - 0.5 %    Gran # (ANC) 3.9 1.8 - 7.7 K/uL    Immature Grans (Abs) 0.01 0.00 - 0.04 K/uL    Lymph # 1.9 1.0 - 4.8 K/uL    Mono # 0.5 0.3 - 1.0 K/uL    Eos # 0.3 0.0 - 0.5 K/uL    Baso # 0.02 0.00 - 0.20 K/uL    nRBC 0 0 /100 WBC    Gran% 58.4 38.0 - 73.0 %    Lymph% 28.9 18.0 - 48.0 %    Mono% 7.3 4.0 - 15.0 %    Eosinophil% 5.1 0.0 - 8.0 %    Basophil% 0.3 0.0 - 1.9 %    Differential Method Automated    Comprehensive metabolic panel    Collection Time: 07/03/19  9:33 AM   Result Value Ref Range    Sodium 140 136 - 145 mmol/L    Potassium 4.1 3.5 - 5.1 mmol/L    Chloride 101 95 - 110 mmol/L    CO2 31 (H) 23 - 29 mmol/L    Glucose 93 70 - 110 mg/dL    BUN, Bld 16 8 - 23 mg/dL    Creatinine 1.1 0.5 - 1.4 mg/dL    Calcium 9.7 8.7 - 10.5 mg/dL    Total Protein 8.4 6.0 - 8.4 g/dL    Albumin 3.1 (L) 3.5 - 5.2 g/dL    Total Bilirubin 0.4 0.1 - 1.0 mg/dL    Alkaline Phosphatase 85 55 - 135 U/L    AST 20 10 - 40 U/L    ALT 18 10 - 44 U/L    Anion Gap 8 8 - 16 mmol/L    eGFR if African American >60 >60 mL/min/1.73 m^2    eGFR if non African American >60 >60 mL/min/1.73 m^2       spep and IF   Component      Latest Ref Rng & Units 9/25/2018   Protein, Serum      6.0 - 8.4 g/dL 7.3   Albumin grams/dl      3.35 - 5.55 g/dL 3.42   Alpha-1 grams/dl      0.17 - 0.41 g/dL 0.34   Alpha-2 grams/dl      0.43 - 0.99 g/dL 0.77   Beta grams/dl      0.50 - 1.10 g/dL 1.23 (H)   Gamma grams/dl      0.67 - 1.58 g/dL 1.53       There is a faint linear irregularity in gamma involving IgG and   lambda, non-specific at this time.  Recommend repeat studies in 3-6   months or as clinically indicated.     DEXA 6/17/14 NORMAL        6/2016 saloni hand and  foot X-rays stable   2/25/15 saloni hand and foot films       Assessment:       1. Rheumatoid arthritis involving multiple sites with positive rheumatoid factor    2. Elevated sed rate    3. Hypergammaglobulinemia    4. Immunocompromised patient    5. Post-operative state- s/p CABG redo 2019            1. Seropositive Erosive RA stable on IV Remicade 800 mg Q 8 weeks and mtx 10 mg/wk - chronic damage but no activity on exam today 0 swollen/4 tender- CDAI 8- LI 0.6--     7 wks post redo cabc  17 wks since last remicade    In the past he has failed mtx monotherapy  Has done well on combination Rermicade and mtx      2. Vaccines need TdaP but allergic to tetnus- HD flu vaccine given     3. Med monitoring and immunocompromised     4. Immunocompromised no issues with recurrent infections    5. 7 wks s/p Post CABG- redo 5/21/19  CAD post CABG X 3 2012 and post PTCA stent 2016 both related to MI with normal heart function on recent  ECHO- on zetia, asa and omega fish oil  RA well controlled  CRP normal  On mtx and remicade- helps reduce cardiac risk     6. Chronic elevated esr- last visit crp - hypergammaglobulins on spep, no monoclonal peaks on IF but faint irregularity noted- will send to heme/onc for work up     7. Right elbow rash- ? psoriasis    Plan:           Ok for IV Remicade 800 mg  infusion over 90 min every 8 weeks   Add benadryl 12.5 mg and solumedrol 50 mg as premed since he is out so far since last remicade  If does well next visit stop benadryl and cut back on solumedrol and then wean off    Keep mtx at 10 mg weekly,  Always remember to Hold mtx for signs of infection or for surgery, Discussed risk versus benefits and potential side effects of mtx.    Remain off prednisone      Watch closely if any heart failure develops then we would consider using o[place]ica or actemra (high esr) or anther biologic rather than going back to TNF agents    Keep his folic acid daily     bone density up-to-date repeat in 3-5  years- later this year     defer TdaP due to pt allergic to tetanus; pneumovax booster this year.      Continue all meds per cards and follow up closely    Topical kenalog 2-3 X daily to left elbow    rtc 8 weeks lam with labs - reg 4   Hep screening and tb up to date     Repeat hand and foot X-rays  Yearly     call with any questions, changes, or concerns                CC:   Dr Sanjay Ansari-CARDS  Dr Shahram Benavides-PCP

## 2019-07-03 NOTE — NURSING
Infusion# >10 (restart since cardiac surgery)    Recent labs? reveiwed    S/S infection noted or voiced? None noted/denied  Recent or planned surgeries/invasive procedures? Cleo cleared pt for infusion  Recent vaccines? denied    Premeds? Benadryl 12.5 mg IVP exp 01/21  Solumedrol 50 mg IVP exp 07/21    remicade 800 mg administered IV. Titrated over 2 hrs; see MAR & Flow Sheet for more  details. Tolerated well without adverse events

## 2019-07-09 ENCOUNTER — PATIENT MESSAGE (OUTPATIENT)
Dept: RHEUMATOLOGY | Facility: CLINIC | Age: 74
End: 2019-07-09

## 2019-07-09 ENCOUNTER — TELEPHONE (OUTPATIENT)
Dept: RHEUMATOLOGY | Facility: CLINIC | Age: 74
End: 2019-07-09

## 2019-07-09 DIAGNOSIS — R76.8 HEPATITIS B SURFACE ANTIGEN POSITIVE: Primary | ICD-10-CM

## 2019-07-09 DIAGNOSIS — R76.8 HEPATITIS C ANTIBODY POSITIVE IN BLOOD: ICD-10-CM

## 2019-07-09 NOTE — TELEPHONE ENCOUNTER
Cleo looking at test results 7/3 hepatitis test.  Monday  7/3 I went to my primary   DrMaryam and got the hep A&B shot.  Would this be what is showing a positive result

## 2019-07-09 NOTE — PROGRESS NOTES
Subjective:       Patient ID: Jack Back is a 74 y.o. male.    Chief Complaint: Anemia (hypergammaglobulins)    HPI Pt referred by Rheumatology for an elevated esr.    Pt has personal history of RA and treatment with remicade and mtx for 19 years. Just restarted last week after heart surgery    Pt has had 3 Myocardial infarctions- had redo cabg 7 weeks ago - missed his 3 mon f/u with us.     Colonoscopy- did fitkit a few years ago and normal- and has done colegard recently that was normal    FH: Paternal GM, father and brother all have arthritis. No cancers.    Pt states he takes precautions by wearing sunscreen, sleeves and hats when outside.     Pt relates that he had hepatitis A and B vaccine on Monday of last week and then had hepatitis serology drawn on Wed for rheumatology.    Abnormal serologies noted- discussed with Dr. Zeng and Dr. Bone who recommend we do quantitative B DNA and C RNA today and if abnormal see gastro. If normal recheck hepatitis serologies 6 months to see if change.     Explained to the pt who verbalized understanding. Also verbalized frustration with having so many lab and doctor appts.    States he is feeling much better- no longer fatigued or SOB. Eating well and getting back into his regular activities.     Review of Systems   Constitutional: Negative.  Negative for appetite change, chills, fatigue, fever and unexpected weight change.   HENT: Negative.  Negative for mouth sores.    Eyes: Negative.    Respiratory: Negative.    Cardiovascular: Negative.    Gastrointestinal: Negative.  Negative for abdominal pain and blood in stool.   Endocrine: Negative.    Genitourinary: Negative.  Negative for dysuria, flank pain and hematuria.   Musculoskeletal: Positive for arthralgias (intermittently- well controlled).   Skin: Negative.    Allergic/Immunologic: Negative.    Neurological: Negative.    Hematological: Negative.    Psychiatric/Behavioral: Negative.        Objective:       Physical Exam   Constitutional: He is oriented to person, place, and time. He appears well-developed and well-nourished. No distress.   HENT:   Head: Normocephalic and atraumatic.   Right Ear: External ear normal.   Left Ear: External ear normal.   Nose: Nose normal.   Mouth/Throat: Oropharynx is clear and moist. No oropharyngeal exudate.   Eyes: Pupils are equal, round, and reactive to light. Conjunctivae and EOM are normal. Right eye exhibits no discharge. Left eye exhibits no discharge. No scleral icterus.   Neck: Normal range of motion. Neck supple. No thyromegaly present.   Cardiovascular: Normal rate, regular rhythm and normal heart sounds. Exam reveals no gallop and no friction rub.   No murmur heard.  Pulmonary/Chest: Effort normal and breath sounds normal. No respiratory distress. He has no wheezes. He has no rales.   Abdominal: Soft. Bowel sounds are normal. He exhibits no distension. There is no tenderness.   Musculoskeletal: Normal range of motion. He exhibits no edema.   Lymphadenopathy:        Head (right side): No submental, no submandibular, no tonsillar, no preauricular, no posterior auricular and no occipital adenopathy present.        Head (left side): No submental, no submandibular, no tonsillar, no preauricular, no posterior auricular and no occipital adenopathy present.     He has no cervical adenopathy.     He has no axillary adenopathy.        Right: No supraclavicular adenopathy present.        Left: No supraclavicular adenopathy present.   Neurological: He is alert and oriented to person, place, and time. He has normal strength. He exhibits normal muscle tone. Gait normal.   Skin: Skin is warm and dry. No lesion and no rash noted. He is not diaphoretic.   Psychiatric: He has a normal mood and affect. His behavior is normal. Judgment and thought content normal.       Assessment:       1. Hypergammaglobulinemia    2. Immunocompromised patient    3. Positive hepatitis serology    4. Abnormal  finding of blood chemistry     5. Iron deficiency anemia, unspecified iron deficiency anemia type         Plan:     1.     RA with remicade and mtx treatments for 19 years. Restarted treatment last week  2.     Rheumatology ordered hepatitis panel that was positive- will do quantitative serology today - if positive will send to gastro for evaluation- if negative needs 6 mon repeat of serologies and hep b surface antigen  3.     Mildly anemic which could be from recent heart surgery- will have pt f/u in 3 months- will order iron studies - if has liver disease -may be elevated due to liver disease- if wnl will have pt f/u with me in January 2020 with cbc, cmp, LT chains, spe and Holden one week prior to review  4.     Reviewed labs from 2004 to present and pt has had an elevated esr that has been as high as 90's and low as 5. Macrocytosis intermittently- taking b12 tabs - denies alcohol ingestion.   5.     History of hypergammaglobulenemia. No monoclonoal peaks. Recommend repeat spe, Light chains, HOLDEN and cbc, cmp 3 mons and 6 months   6.     Reviewed labs with Dr. London- recommends f/u 3 mons for mild anemia-   7.     Explained to pt that the RA can cause elevation in his ESR- no real change in number over the years. No anemia noted until now- could be related to recent CABG. Will notify pt of lab results through pt portal.

## 2019-07-09 NOTE — TELEPHONE ENCOUNTER
Called pt, left message    The acute hep panel shows he has had exposure in the past hep B and C    I need to make sure the viral titer is neg  So I Need to get more lab test done    Would prefer to get done now and not wait    Please call pt and schedule     Orders Placed This Encounter   Procedures    Hepatitis C RNA, quantitative, PCR    Hepatitis B surface antibody    HEPATITIS B VIRAL DNA, QUANTITATIVE

## 2019-07-10 ENCOUNTER — OFFICE VISIT (OUTPATIENT)
Dept: HEMATOLOGY/ONCOLOGY | Facility: CLINIC | Age: 74
End: 2019-07-10
Payer: MEDICARE

## 2019-07-10 ENCOUNTER — LAB VISIT (OUTPATIENT)
Dept: LAB | Facility: HOSPITAL | Age: 74
End: 2019-07-10
Attending: FAMILY MEDICINE
Payer: MEDICARE

## 2019-07-10 VITALS
DIASTOLIC BLOOD PRESSURE: 73 MMHG | WEIGHT: 181 LBS | SYSTOLIC BLOOD PRESSURE: 124 MMHG | BODY MASS INDEX: 28.35 KG/M2 | HEART RATE: 68 BPM | TEMPERATURE: 98 F

## 2019-07-10 DIAGNOSIS — R79.9 ABNORMAL FINDING OF BLOOD CHEMISTRY: ICD-10-CM

## 2019-07-10 DIAGNOSIS — M05.79 RHEUMATOID ARTHRITIS INVOLVING MULTIPLE SITES WITH POSITIVE RHEUMATOID FACTOR: Chronic | ICD-10-CM

## 2019-07-10 DIAGNOSIS — R76.8 POSITIVE HEPATITIS SEROLOGY: ICD-10-CM

## 2019-07-10 DIAGNOSIS — D84.9 IMMUNOCOMPROMISED PATIENT: Chronic | ICD-10-CM

## 2019-07-10 DIAGNOSIS — D50.9 IRON DEFICIENCY ANEMIA, UNSPECIFIED IRON DEFICIENCY ANEMIA TYPE: ICD-10-CM

## 2019-07-10 DIAGNOSIS — Z51.81 MEDICATION MONITORING ENCOUNTER: Chronic | ICD-10-CM

## 2019-07-10 DIAGNOSIS — D89.2 HYPERGAMMAGLOBULINEMIA: ICD-10-CM

## 2019-07-10 DIAGNOSIS — D89.2 HYPERGAMMAGLOBULINEMIA: Primary | ICD-10-CM

## 2019-07-10 LAB
BASOPHILS # BLD AUTO: 0.03 K/UL (ref 0–0.2)
BASOPHILS NFR BLD: 0.3 % (ref 0–1.9)
DIFFERENTIAL METHOD: ABNORMAL
EOSINOPHIL # BLD AUTO: 0.4 K/UL (ref 0–0.5)
EOSINOPHIL NFR BLD: 3.9 % (ref 0–8)
ERYTHROCYTE [DISTWIDTH] IN BLOOD BY AUTOMATED COUNT: 13.6 % (ref 11.5–14.5)
HCT VFR BLD AUTO: 37.6 % (ref 40–54)
HGB BLD-MCNC: 12.7 G/DL (ref 14–18)
IMM GRANULOCYTES # BLD AUTO: 0.02 K/UL (ref 0–0.04)
IMM GRANULOCYTES NFR BLD AUTO: 0.2 % (ref 0–0.5)
LYMPHOCYTES # BLD AUTO: 3 K/UL (ref 1–4.8)
LYMPHOCYTES NFR BLD: 33.7 % (ref 18–48)
MCH RBC QN AUTO: 34.9 PG (ref 27–31)
MCHC RBC AUTO-ENTMCNC: 33.8 G/DL (ref 32–36)
MCV RBC AUTO: 103 FL (ref 82–98)
MONOCYTES # BLD AUTO: 0.8 K/UL (ref 0.3–1)
MONOCYTES NFR BLD: 9.2 % (ref 4–15)
NEUTROPHILS # BLD AUTO: 4.7 K/UL (ref 1.8–7.7)
NEUTROPHILS NFR BLD: 52.9 % (ref 38–73)
NRBC BLD-RTO: 0 /100 WBC
PLATELET # BLD AUTO: 344 K/UL (ref 150–350)
PMV BLD AUTO: 8.3 FL (ref 9.2–12.9)
RBC # BLD AUTO: 3.64 M/UL (ref 4.6–6.2)
WBC # BLD AUTO: 8.92 K/UL (ref 3.9–12.7)

## 2019-07-10 PROCEDURE — 99999 PR PBB SHADOW E&M-EST. PATIENT-LVL IV: CPT | Mod: PBBFAC,,, | Performed by: NURSE PRACTITIONER

## 2019-07-10 PROCEDURE — 99214 OFFICE O/P EST MOD 30 MIN: CPT | Mod: S$PBB,,, | Performed by: NURSE PRACTITIONER

## 2019-07-10 PROCEDURE — 85025 COMPLETE CBC W/AUTO DIFF WBC: CPT

## 2019-07-10 PROCEDURE — 99999 PR PBB SHADOW E&M-EST. PATIENT-LVL IV: ICD-10-PCS | Mod: PBBFAC,,, | Performed by: NURSE PRACTITIONER

## 2019-07-10 PROCEDURE — 36415 COLL VENOUS BLD VENIPUNCTURE: CPT

## 2019-07-10 PROCEDURE — 99214 OFFICE O/P EST MOD 30 MIN: CPT | Mod: PBBFAC | Performed by: NURSE PRACTITIONER

## 2019-07-10 PROCEDURE — 99214 PR OFFICE/OUTPT VISIT, EST, LEVL IV, 30-39 MIN: ICD-10-PCS | Mod: S$PBB,,, | Performed by: NURSE PRACTITIONER

## 2019-07-10 PROCEDURE — 87517 HEPATITIS B DNA QUANT: CPT

## 2019-07-12 LAB
HBV DNA SERPL NAA+PROBE-ACNC: <10 IU/ML
HBV DNA SERPL NAA+PROBE-LOG IU: <1 LOG (10) IU/ML
HBV DNA SERPL QL NAA+PROBE: NOT DETECTED

## 2019-08-28 ENCOUNTER — LAB VISIT (OUTPATIENT)
Dept: LAB | Facility: HOSPITAL | Age: 74
End: 2019-08-28
Attending: PHYSICIAN ASSISTANT
Payer: MEDICARE

## 2019-08-28 ENCOUNTER — INFUSION (OUTPATIENT)
Dept: RHEUMATOLOGY | Facility: HOSPITAL | Age: 74
End: 2019-08-28
Attending: PHYSICIAN ASSISTANT
Payer: MEDICARE

## 2019-08-28 ENCOUNTER — OFFICE VISIT (OUTPATIENT)
Dept: RHEUMATOLOGY | Facility: CLINIC | Age: 74
End: 2019-08-28
Payer: MEDICARE

## 2019-08-28 VITALS
RESPIRATION RATE: 16 BRPM | DIASTOLIC BLOOD PRESSURE: 62 MMHG | WEIGHT: 183.19 LBS | HEART RATE: 60 BPM | SYSTOLIC BLOOD PRESSURE: 114 MMHG | BODY MASS INDEX: 28.75 KG/M2 | HEIGHT: 67 IN

## 2019-08-28 VITALS — HEART RATE: 54 BPM | SYSTOLIC BLOOD PRESSURE: 104 MMHG | DIASTOLIC BLOOD PRESSURE: 56 MMHG

## 2019-08-28 DIAGNOSIS — D89.2 HYPERGAMMAGLOBULINEMIA: ICD-10-CM

## 2019-08-28 DIAGNOSIS — D84.9 IMMUNOCOMPROMISED PATIENT: Chronic | ICD-10-CM

## 2019-08-28 DIAGNOSIS — M05.79 RHEUMATOID ARTHRITIS INVOLVING MULTIPLE SITES WITH POSITIVE RHEUMATOID FACTOR: Chronic | ICD-10-CM

## 2019-08-28 DIAGNOSIS — M05.79 RHEUMATOID ARTHRITIS INVOLVING MULTIPLE SITES WITH POSITIVE RHEUMATOID FACTOR: Primary | Chronic | ICD-10-CM

## 2019-08-28 DIAGNOSIS — R76.8 POSITIVE HEPATITIS SEROLOGY: ICD-10-CM

## 2019-08-28 DIAGNOSIS — R70.0 ELEVATED SED RATE: ICD-10-CM

## 2019-08-28 DIAGNOSIS — R79.9 ABNORMAL FINDING OF BLOOD CHEMISTRY: ICD-10-CM

## 2019-08-28 DIAGNOSIS — Z51.81 MEDICATION MONITORING ENCOUNTER: Chronic | ICD-10-CM

## 2019-08-28 DIAGNOSIS — D50.9 IRON DEFICIENCY ANEMIA, UNSPECIFIED IRON DEFICIENCY ANEMIA TYPE: ICD-10-CM

## 2019-08-28 DIAGNOSIS — M05.79 RHEUMATOID ARTHRITIS INVOLVING MULTIPLE SITES WITH POSITIVE RHEUMATOID FACTOR: Primary | ICD-10-CM

## 2019-08-28 LAB
ALBUMIN SERPL BCP-MCNC: 3.3 G/DL (ref 3.5–5.2)
ALP SERPL-CCNC: 88 U/L (ref 55–135)
ALT SERPL W/O P-5'-P-CCNC: 12 U/L (ref 10–44)
ANION GAP SERPL CALC-SCNC: 10 MMOL/L (ref 8–16)
AST SERPL-CCNC: 20 U/L (ref 10–40)
BASOPHILS # BLD AUTO: 0.01 K/UL (ref 0–0.2)
BASOPHILS # BLD AUTO: 0.01 K/UL (ref 0–0.2)
BASOPHILS NFR BLD: 0.1 % (ref 0–1.9)
BASOPHILS NFR BLD: 0.1 % (ref 0–1.9)
BILIRUB SERPL-MCNC: 0.3 MG/DL (ref 0.1–1)
BUN SERPL-MCNC: 22 MG/DL (ref 8–23)
CALCIUM SERPL-MCNC: 9.9 MG/DL (ref 8.7–10.5)
CHLORIDE SERPL-SCNC: 103 MMOL/L (ref 95–110)
CO2 SERPL-SCNC: 27 MMOL/L (ref 23–29)
CREAT SERPL-MCNC: 1 MG/DL (ref 0.5–1.4)
CRP SERPL-MCNC: 12.7 MG/L (ref 0–8.2)
DIFFERENTIAL METHOD: ABNORMAL
DIFFERENTIAL METHOD: ABNORMAL
EOSINOPHIL # BLD AUTO: 0.4 K/UL (ref 0–0.5)
EOSINOPHIL # BLD AUTO: 0.4 K/UL (ref 0–0.5)
EOSINOPHIL NFR BLD: 5.5 % (ref 0–8)
EOSINOPHIL NFR BLD: 5.5 % (ref 0–8)
ERYTHROCYTE [DISTWIDTH] IN BLOOD BY AUTOMATED COUNT: 14.7 % (ref 11.5–14.5)
ERYTHROCYTE [DISTWIDTH] IN BLOOD BY AUTOMATED COUNT: 14.7 % (ref 11.5–14.5)
ERYTHROCYTE [SEDIMENTATION RATE] IN BLOOD BY WESTERGREN METHOD: 63 MM/HR (ref 0–23)
EST. GFR  (AFRICAN AMERICAN): >60 ML/MIN/1.73 M^2
EST. GFR  (NON AFRICAN AMERICAN): >60 ML/MIN/1.73 M^2
FERRITIN SERPL-MCNC: 127 NG/ML (ref 20–300)
GLUCOSE SERPL-MCNC: 73 MG/DL (ref 70–110)
HCT VFR BLD AUTO: 35.9 % (ref 40–54)
HCT VFR BLD AUTO: 35.9 % (ref 40–54)
HGB BLD-MCNC: 11.8 G/DL (ref 14–18)
HGB BLD-MCNC: 11.8 G/DL (ref 14–18)
IMM GRANULOCYTES # BLD AUTO: 0.02 K/UL (ref 0–0.04)
IMM GRANULOCYTES # BLD AUTO: 0.02 K/UL (ref 0–0.04)
IMM GRANULOCYTES NFR BLD AUTO: 0.3 % (ref 0–0.5)
IMM GRANULOCYTES NFR BLD AUTO: 0.3 % (ref 0–0.5)
IRON SERPL-MCNC: 52 UG/DL (ref 45–160)
LYMPHOCYTES # BLD AUTO: 2.3 K/UL (ref 1–4.8)
LYMPHOCYTES # BLD AUTO: 2.3 K/UL (ref 1–4.8)
LYMPHOCYTES NFR BLD: 32.8 % (ref 18–48)
LYMPHOCYTES NFR BLD: 32.8 % (ref 18–48)
MCH RBC QN AUTO: 32 PG (ref 27–31)
MCH RBC QN AUTO: 32 PG (ref 27–31)
MCHC RBC AUTO-ENTMCNC: 32.9 G/DL (ref 32–36)
MCHC RBC AUTO-ENTMCNC: 32.9 G/DL (ref 32–36)
MCV RBC AUTO: 97 FL (ref 82–98)
MCV RBC AUTO: 97 FL (ref 82–98)
MONOCYTES # BLD AUTO: 0.7 K/UL (ref 0.3–1)
MONOCYTES # BLD AUTO: 0.7 K/UL (ref 0.3–1)
MONOCYTES NFR BLD: 10.6 % (ref 4–15)
MONOCYTES NFR BLD: 10.6 % (ref 4–15)
NEUTROPHILS # BLD AUTO: 3.5 K/UL (ref 1.8–7.7)
NEUTROPHILS # BLD AUTO: 3.5 K/UL (ref 1.8–7.7)
NEUTROPHILS NFR BLD: 51 % (ref 38–73)
NEUTROPHILS NFR BLD: 51 % (ref 38–73)
NRBC BLD-RTO: 0 /100 WBC
NRBC BLD-RTO: 0 /100 WBC
PLATELET # BLD AUTO: 316 K/UL (ref 150–350)
PLATELET # BLD AUTO: 316 K/UL (ref 150–350)
PMV BLD AUTO: 8 FL (ref 9.2–12.9)
PMV BLD AUTO: 8 FL (ref 9.2–12.9)
POTASSIUM SERPL-SCNC: 4.1 MMOL/L (ref 3.5–5.1)
PROT SERPL-MCNC: 8.2 G/DL (ref 6–8.4)
RBC # BLD AUTO: 3.69 M/UL (ref 4.6–6.2)
RBC # BLD AUTO: 3.69 M/UL (ref 4.6–6.2)
SATURATED IRON: 16 % (ref 20–50)
SODIUM SERPL-SCNC: 140 MMOL/L (ref 136–145)
TOTAL IRON BINDING CAPACITY: 327 UG/DL (ref 250–450)
TRANSFERRIN SERPL-MCNC: 221 MG/DL (ref 200–375)
WBC # BLD AUTO: 6.88 K/UL (ref 3.9–12.7)
WBC # BLD AUTO: 6.88 K/UL (ref 3.9–12.7)

## 2019-08-28 PROCEDURE — 99214 OFFICE O/P EST MOD 30 MIN: CPT | Mod: PBBFAC,25 | Performed by: PHYSICIAN ASSISTANT

## 2019-08-28 PROCEDURE — 84165 PROTEIN E-PHORESIS SERUM: CPT | Mod: 26,,, | Performed by: PATHOLOGY

## 2019-08-28 PROCEDURE — 63600175 PHARM REV CODE 636 W HCPCS: Mod: JG | Performed by: PHYSICIAN ASSISTANT

## 2019-08-28 PROCEDURE — 99214 OFFICE O/P EST MOD 30 MIN: CPT | Mod: S$PBB,,, | Performed by: PHYSICIAN ASSISTANT

## 2019-08-28 PROCEDURE — 85652 RBC SED RATE AUTOMATED: CPT

## 2019-08-28 PROCEDURE — 84165 PATHOLOGIST INTERPRETATION SPE: ICD-10-PCS | Mod: 26,,, | Performed by: PATHOLOGY

## 2019-08-28 PROCEDURE — 25000003 PHARM REV CODE 250: Performed by: PHYSICIAN ASSISTANT

## 2019-08-28 PROCEDURE — 86334 PATHOLOGIST INTERPRETATION IFE: ICD-10-PCS | Mod: 26,,, | Performed by: PATHOLOGY

## 2019-08-28 PROCEDURE — 83540 ASSAY OF IRON: CPT

## 2019-08-28 PROCEDURE — 36415 COLL VENOUS BLD VENIPUNCTURE: CPT

## 2019-08-28 PROCEDURE — 99999 PR PBB SHADOW E&M-EST. PATIENT-LVL IV: ICD-10-PCS | Mod: PBBFAC,,, | Performed by: PHYSICIAN ASSISTANT

## 2019-08-28 PROCEDURE — 86140 C-REACTIVE PROTEIN: CPT

## 2019-08-28 PROCEDURE — 86334 IMMUNOFIX E-PHORESIS SERUM: CPT | Mod: 26,,, | Performed by: PATHOLOGY

## 2019-08-28 PROCEDURE — 85025 COMPLETE CBC W/AUTO DIFF WBC: CPT

## 2019-08-28 PROCEDURE — 99999 PR PBB SHADOW E&M-EST. PATIENT-LVL IV: CPT | Mod: PBBFAC,,, | Performed by: PHYSICIAN ASSISTANT

## 2019-08-28 PROCEDURE — 80053 COMPREHEN METABOLIC PANEL: CPT

## 2019-08-28 PROCEDURE — 99214 PR OFFICE/OUTPT VISIT, EST, LEVL IV, 30-39 MIN: ICD-10-PCS | Mod: S$PBB,,, | Performed by: PHYSICIAN ASSISTANT

## 2019-08-28 PROCEDURE — 82728 ASSAY OF FERRITIN: CPT

## 2019-08-28 PROCEDURE — 86334 IMMUNOFIX E-PHORESIS SERUM: CPT

## 2019-08-28 PROCEDURE — A4216 STERILE WATER/SALINE, 10 ML: HCPCS | Performed by: PHYSICIAN ASSISTANT

## 2019-08-28 PROCEDURE — 83520 IMMUNOASSAY QUANT NOS NONAB: CPT | Mod: 59

## 2019-08-28 PROCEDURE — 84165 PROTEIN E-PHORESIS SERUM: CPT

## 2019-08-28 PROCEDURE — 96413 CHEMO IV INFUSION 1 HR: CPT

## 2019-08-28 RX ORDER — EPINEPHRINE 1 MG/ML
0.3 INJECTION, SOLUTION, CONCENTRATE INTRAVENOUS
Status: CANCELLED | OUTPATIENT
Start: 2019-10-23

## 2019-08-28 RX ORDER — SODIUM CHLORIDE 0.9 % (FLUSH) 0.9 %
10 SYRINGE (ML) INJECTION
Status: DISCONTINUED | OUTPATIENT
Start: 2019-08-28 | End: 2019-08-28 | Stop reason: HOSPADM

## 2019-08-28 RX ORDER — SODIUM CHLORIDE 0.9 % (FLUSH) 0.9 %
10 SYRINGE (ML) INJECTION
Status: CANCELLED | OUTPATIENT
Start: 2019-10-23

## 2019-08-28 RX ORDER — DIPHENHYDRAMINE HYDROCHLORIDE 50 MG/ML
12.5 INJECTION INTRAMUSCULAR; INTRAVENOUS
Status: CANCELLED
Start: 2019-10-23

## 2019-08-28 RX ORDER — ACETAMINOPHEN 325 MG/1
650 TABLET ORAL
Status: CANCELLED | OUTPATIENT
Start: 2019-10-23

## 2019-08-28 RX ORDER — METHYLPREDNISOLONE SOD SUCC 125 MG
25 VIAL (EA) INJECTION ONCE
Status: CANCELLED
Start: 2019-08-28

## 2019-08-28 RX ORDER — METHYLPREDNISOLONE SOD SUCC 125 MG
50 VIAL (EA) INJECTION
Status: DISCONTINUED | OUTPATIENT
Start: 2019-08-28 | End: 2019-08-28 | Stop reason: HOSPADM

## 2019-08-28 RX ORDER — IPRATROPIUM BROMIDE AND ALBUTEROL SULFATE 2.5; .5 MG/3ML; MG/3ML
3 SOLUTION RESPIRATORY (INHALATION)
Status: CANCELLED | OUTPATIENT
Start: 2019-10-23

## 2019-08-28 RX ORDER — DIPHENHYDRAMINE HYDROCHLORIDE 50 MG/ML
25 INJECTION INTRAMUSCULAR; INTRAVENOUS
Status: CANCELLED | OUTPATIENT
Start: 2019-10-23

## 2019-08-28 RX ORDER — METHYLPREDNISOLONE SOD SUCC 125 MG
50 VIAL (EA) INJECTION
Status: CANCELLED
Start: 2019-10-23

## 2019-08-28 RX ORDER — METHYLPREDNISOLONE SOD SUCC 125 MG
25 VIAL (EA) INJECTION ONCE
Status: CANCELLED
Start: 2019-10-23

## 2019-08-28 RX ORDER — DIPHENHYDRAMINE HYDROCHLORIDE 50 MG/ML
12.5 INJECTION INTRAMUSCULAR; INTRAVENOUS
Status: DISCONTINUED | OUTPATIENT
Start: 2019-08-28 | End: 2019-08-28 | Stop reason: HOSPADM

## 2019-08-28 RX ADMIN — Medication 10 ML: at 09:08

## 2019-08-28 RX ADMIN — INFLIXIMAB 800 MG: 100 INJECTION, POWDER, LYOPHILIZED, FOR SOLUTION INTRAVENOUS at 09:08

## 2019-08-28 ASSESSMENT — CLINICAL DISEASE ACTIVITY INDEX (CDAI)
PHYSICIAN_ASSESSMENT: 0
TOTAL_SCORE: 0
SWOLLEN_JOINTS_COUNT: 0
PATIENT_ASSESSMENT: 0
TENDER_JOINTS_COUNT: 0

## 2019-08-28 ASSESSMENT — ROUTINE ASSESSMENT OF PATIENT INDEX DATA (RAPID3): MDHAQ FUNCTION SCORE: .6

## 2019-08-28 NOTE — NURSING
Infusion# >10    Recent labs? reviewed    S/S infection noted or voiced? None noted/denied  Recent or planned surgeries/invasive procedures? denied  Recent vaccines? denied    Premeds? none    Remicade 800 mg administered IV at a 90 minute rate per orders; see MAR & Flow Sheet for more  details. Tolerated well without adverse events

## 2019-08-28 NOTE — PROGRESS NOTES
Subjective:       Patient ID: Jack Back is a 74 y.o. male.    Chief Complaint: remicade/labs and Rheumatoid Arthritis      Jack is back today for rheumatology follow-up.      He has seropositive erosive rheumatoid arthritis. In the past failed mtx monotherapy. Moved to IV remicade; RA has done very well on remicade.     He is 14 wks s/w redo of cabg-   In 2017 Had MI required PTCA stent placement. 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 Prior cabc in 2012  Had some sob post d/c- cxr showed pleural effusion, admitted back to hospital, given lasix and started on daily dose  F/w visit with his CVT surgeon 6/25/19  he was cleared to resume his remicade , repeat cxr showed resolving effusion; sob is better  Still on his bp meds, this is well controlled. He did loose some weight prior to and since surgery.     Just resumed his mtx this week, total of 5 mg/wk oral, takes folic acid  Last Remicade was 3/12/19 (17 weeks ago)   He has experienced some increased stiffness in his joints nusrat saloni hands/wrist about 1.5 hrs then gets better  In the past we tried to stay off Remicade but RA activity increased and it was resumed.   Left elbow has rash he thinks may be psoriasis        He has not developed heart failure. No lower ext edema, mild sob, no chest pain. No pnd  Getting his remicade consistently No joint swelling, joint stiffness  or acute exacerbations. Pain level rated today as 0/10     Chronic elevated esr, normal crp. 5/2017 spep showed elevated gammaglobulins, no polyclonal or monoclonal peaks in IF. Hypergammaglobulinemia. Repeat done 9/2018 IF  There is a faint linear irregularity in gamma involving IgG and lambda, non-specific at this time.  Recommend repeat studies in 3-6 months or as clinically indicated. Esr in the 50-60's consistently, crp normal  No weight loss, fevers or night sweats.       Vaccines all up to date except TdaP but pt allergic to Tetanus.     bone density done in June 2014 which was normal.         Review  "of Systems   Constitutional: Negative.  Negative for chills, fatigue and fever.   HENT: Negative.  Negative for congestion, mouth sores and trouble swallowing.    Eyes: Negative.  Negative for photophobia, redness and visual disturbance.   Respiratory: Negative.  Negative for cough, shortness of breath and wheezing.    Cardiovascular: Negative.  Negative for chest pain and leg swelling.   Gastrointestinal: Negative.  Negative for abdominal distention, abdominal pain, diarrhea, nausea and vomiting.   Genitourinary: Negative.  Negative for dysuria, flank pain, frequency and urgency.   Musculoskeletal: Negative for arthralgias, back pain, gait problem, joint swelling and myalgias.        Mild aching and stiffness   Skin: Negative.  Negative for rash.   Neurological: Negative.  Negative for dizziness, weakness and numbness.           Objective:     /62 (BP Location: Right arm, Patient Position: Sitting, BP Method: Small (Automatic))   Pulse 60   Resp 16   Ht 5' 7" (1.702 m)   Wt 83.1 kg (183 lb 3.2 oz)   BMI 28.69 kg/m²         Physical Exam   Constitutional: He is oriented to person, place, and time and well-developed, well-nourished, and in no distress. No distress.   HENT:   Head: Normocephalic and atraumatic.   Right Ear: External ear normal.   Left Ear: External ear normal.   Mouth/Throat: No oropharyngeal exudate.   Eyes: Conjunctivae and EOM are normal. Pupils are equal, round, and reactive to light. No scleral icterus.   Neck: Neck supple. No thyromegaly present.   Cardiovascular: Normal rate, regular rhythm and normal heart sounds.    No murmur heard.  Pulmonary/Chest: Effort normal and breath sounds normal. No respiratory distress.   Incision has healed nicely  No redness or drainage   Abdominal: Soft. Bowel sounds are normal.       Right Side Rheumatological Exam     Examination finds the shoulder, 1st PIP, 1st MCP, 2nd PIP, 2nd MCP, 3rd PIP, 3rd MCP, 4th PIP, 4th MCP, 5th PIP and 5th MCP " normal.    Joint Exam Comments   Elbow: limited extension   Wrist: Mild reduced flexion of wrist, no synovitis    Left Side Rheumatological Exam     Examination finds the shoulder, elbow, 1st PIP, 1st MCP, 2nd PIP, 2nd MCP, 3rd PIP, 3rd MCP, 4th PIP, 4th MCP, 5th PIP and 5th MCP normal.    The patient is tender to palpation of the knee.    Joint Exam Comments   Wrist: Limited motion left wrists, no synovitis      Lymphadenopathy:     He has no cervical adenopathy.   Neurological: He is alert and oriented to person, place, and time. No cranial nerve deficit. He exhibits normal muscle tone. Gait normal. Coordination normal.   Skin: Skin is warm and dry. Rash noted.     Left olecranon scaly dry rash   Musculoskeletal: He exhibits deformity. He exhibits no edema.   Ulnar drift 25 degrees right hand and 15 degrees left hand, right elbow fixed flexion stops about  5 degrees from baseline,  Limited motion both wrist extension  No synovitis on exam today  Some mild ttp wrists and mp joints                 Recent Results (from the past 168 hour(s))   C-reactive protein    Collection Time: 08/28/19  8:17 AM   Result Value Ref Range    CRP 12.7 (H) 0.0 - 8.2 mg/L   Comprehensive metabolic panel    Collection Time: 08/28/19  8:17 AM   Result Value Ref Range    Sodium 140 136 - 145 mmol/L    Potassium 4.1 3.5 - 5.1 mmol/L    Chloride 103 95 - 110 mmol/L    CO2 27 23 - 29 mmol/L    Glucose 73 70 - 110 mg/dL    BUN, Bld 22 8 - 23 mg/dL    Creatinine 1.0 0.5 - 1.4 mg/dL    Calcium 9.9 8.7 - 10.5 mg/dL    Total Protein 8.2 6.0 - 8.4 g/dL    Albumin 3.3 (L) 3.5 - 5.2 g/dL    Total Bilirubin 0.3 0.1 - 1.0 mg/dL    Alkaline Phosphatase 88 55 - 135 U/L    AST 20 10 - 40 U/L    ALT 12 10 - 44 U/L    Anion Gap 10 8 - 16 mmol/L    eGFR if African American >60 >60 mL/min/1.73 m^2    eGFR if non African American >60 >60 mL/min/1.73 m^2   CBC auto differential    Collection Time: 08/28/19  8:17 AM   Result Value Ref Range    WBC 6.88 3.90 -  12.70 K/uL    RBC 3.69 (L) 4.60 - 6.20 M/uL    Hemoglobin 11.8 (L) 14.0 - 18.0 g/dL    Hematocrit 35.9 (L) 40.0 - 54.0 %    Mean Corpuscular Volume 97 82 - 98 fL    Mean Corpuscular Hemoglobin 32.0 (H) 27.0 - 31.0 pg    Mean Corpuscular Hemoglobin Conc 32.9 32.0 - 36.0 g/dL    RDW 14.7 (H) 11.5 - 14.5 %    Platelets 316 150 - 350 K/uL    MPV 8.0 (L) 9.2 - 12.9 fL    Immature Granulocytes 0.3 0.0 - 0.5 %    Gran # (ANC) 3.5 1.8 - 7.7 K/uL    Immature Grans (Abs) 0.02 0.00 - 0.04 K/uL    Lymph # 2.3 1.0 - 4.8 K/uL    Mono # 0.7 0.3 - 1.0 K/uL    Eos # 0.4 0.0 - 0.5 K/uL    Baso # 0.01 0.00 - 0.20 K/uL    nRBC 0 0 /100 WBC    Gran% 51.0 38.0 - 73.0 %    Lymph% 32.8 18.0 - 48.0 %    Mono% 10.6 4.0 - 15.0 %    Eosinophil% 5.5 0.0 - 8.0 %    Basophil% 0.1 0.0 - 1.9 %    Differential Method Automated    CBC auto differential    Collection Time: 08/28/19  8:17 AM   Result Value Ref Range    WBC 6.88 3.90 - 12.70 K/uL    RBC 3.69 (L) 4.60 - 6.20 M/uL    Hemoglobin 11.8 (L) 14.0 - 18.0 g/dL    Hematocrit 35.9 (L) 40.0 - 54.0 %    Mean Corpuscular Volume 97 82 - 98 fL    Mean Corpuscular Hemoglobin 32.0 (H) 27.0 - 31.0 pg    Mean Corpuscular Hemoglobin Conc 32.9 32.0 - 36.0 g/dL    RDW 14.7 (H) 11.5 - 14.5 %    Platelets 316 150 - 350 K/uL    MPV 8.0 (L) 9.2 - 12.9 fL    Immature Granulocytes 0.3 0.0 - 0.5 %    Gran # (ANC) 3.5 1.8 - 7.7 K/uL    Immature Grans (Abs) 0.02 0.00 - 0.04 K/uL    Lymph # 2.3 1.0 - 4.8 K/uL    Mono # 0.7 0.3 - 1.0 K/uL    Eos # 0.4 0.0 - 0.5 K/uL    Baso # 0.01 0.00 - 0.20 K/uL    nRBC 0 0 /100 WBC    Gran% 51.0 38.0 - 73.0 %    Lymph% 32.8 18.0 - 48.0 %    Mono% 10.6 4.0 - 15.0 %    Eosinophil% 5.5 0.0 - 8.0 %    Basophil% 0.1 0.0 - 1.9 %    Differential Method Automated        spep and IF   Component      Latest Ref Rng & Units 9/25/2018   Protein, Serum      6.0 - 8.4 g/dL 7.3   Albumin grams/dl      3.35 - 5.55 g/dL 3.42   Alpha-1 grams/dl      0.17 - 0.41 g/dL 0.34   Alpha-2 grams/dl      0.43 -  0.99 g/dL 0.77   Beta grams/dl      0.50 - 1.10 g/dL 1.23 (H)   Gamma grams/dl      0.67 - 1.58 g/dL 1.53       There is a faint linear irregularity in gamma involving IgG and   lambda, non-specific at this time.  Recommend repeat studies in 3-6   months or as clinically indicated.     DEXA 6/17/14 NORMAL        6/2016 [facility] hand and foot X-rays stable   2/25/15 [facility] hand and foot films       Assessment:       1. Rheumatoid arthritis involving multiple sites with positive rheumatoid factor    2. Immunocompromised patient    3. Hypergammaglobulinemia    4. Elevated sed rate            1. Seropositive Erosive RA stable on IV Remicade 800 mg Q 8 weeks and mtx 5 mg/wk - chronic damage but no activity on exam today 0 swollen/0 tender- CDAI 8- LI 0.6--     In the past he has failed mtx monotherapy  Has done well on combination Rermicade and mtx      2. Vaccines need TdaP but allergic to tetnus- HD flu vaccine in the fall     3. Med monitoring and immunocompromised     4. Immunocompromised no issues with recurrent infections    5. 14 wks s/p Post CABG- redo 5/21/19  CAD post CABG X 3 2012 and post PTCA stent 2016 both related to MI with normal heart function on recent  ECHO- on zetia, asa and omega fish oil  RA well controlled  CRP normal  On mtx and remicade- helps reduce cardiac risk     6. Chronic elevated esr- last visit crp - hypergammaglobulins on spep, no monoclonal peaks on IF but faint irregularity noted- will send to heme/onc for work up     7. Right elbow rash- ? psoriasis    Plan:           Ok for IV Remicade 800 mg  infusion over 90 min every 8 weeks   Can stop  benadryl 12.5 mg and  Cut solumedrol back to 25 mg as premed and stop next visit;  then wean off    Keep mtx at 10 mg weekly,  Always remember to Hold mtx for signs of infection or for surgery, Discussed risk versus benefits and potential side effects of mtx.    Remain off prednisone      Watch closely if any heart failure develops then we would consider  using orenica or actemra (high esr) or anther biologic rather than going back to TNF agents    Keep his folic acid daily     bone density up-to-date repeat in 3-5 years- later this year     defer TdaP due to pt allergic to tetanus; pneumovax booster this year.      Continue all meds per cards and follow up closely    Topical kenalog 2-3 X daily to left elbow    rtc 8 weeks lam with labs - reg 4   Hep screening and tb up to date     Repeat hand and foot X-rays  Yearly     call with any questions, changes, or concerns                CC:   Dr Sanjay Ansari-CARDS  Dr Shahram Benavides-PCP

## 2019-08-29 LAB
ALBUMIN SERPL ELPH-MCNC: 3.78 G/DL (ref 3.35–5.55)
ALPHA1 GLOB SERPL ELPH-MCNC: 0.31 G/DL (ref 0.17–0.41)
ALPHA2 GLOB SERPL ELPH-MCNC: 0.71 G/DL (ref 0.43–0.99)
B-GLOBULIN SERPL ELPH-MCNC: 1.33 G/DL (ref 0.5–1.1)
GAMMA GLOB SERPL ELPH-MCNC: 1.78 G/DL (ref 0.67–1.58)
INTERPRETATION SERPL IFE-IMP: NORMAL
KAPPA LC SER QL IA: 5.91 MG/DL (ref 0.33–1.94)
KAPPA LC/LAMBDA SER IA: 1.86 (ref 0.26–1.65)
LAMBDA LC SER QL IA: 3.18 MG/DL (ref 0.57–2.63)
PATHOLOGIST INTERPRETATION IFE: NORMAL
PATHOLOGIST INTERPRETATION SPE: NORMAL
PROT SERPL-MCNC: 7.9 G/DL (ref 6–8.4)

## 2019-10-03 DIAGNOSIS — M05.79 RHEUMATOID ARTHRITIS INVOLVING MULTIPLE SITES WITH POSITIVE RHEUMATOID FACTOR: Chronic | ICD-10-CM

## 2019-10-03 DIAGNOSIS — Z51.81 MEDICATION MONITORING ENCOUNTER: Chronic | ICD-10-CM

## 2019-10-03 RX ORDER — METHOTREXATE 2.5 MG/1
TABLET ORAL
Qty: 72 TABLET | Refills: 4 | Status: SHIPPED | OUTPATIENT
Start: 2019-10-03 | End: 2020-11-02

## 2019-10-24 ENCOUNTER — INFUSION (OUTPATIENT)
Dept: RHEUMATOLOGY | Facility: HOSPITAL | Age: 74
End: 2019-10-24
Attending: INTERNAL MEDICINE
Payer: MEDICARE

## 2019-10-24 VITALS
SYSTOLIC BLOOD PRESSURE: 116 MMHG | TEMPERATURE: 98 F | BODY MASS INDEX: 29.56 KG/M2 | RESPIRATION RATE: 18 BRPM | WEIGHT: 188.69 LBS | HEART RATE: 50 BPM | OXYGEN SATURATION: 97 % | DIASTOLIC BLOOD PRESSURE: 69 MMHG

## 2019-10-24 DIAGNOSIS — M05.79 RHEUMATOID ARTHRITIS INVOLVING MULTIPLE SITES WITH POSITIVE RHEUMATOID FACTOR: Primary | ICD-10-CM

## 2019-10-24 PROCEDURE — 63600175 PHARM REV CODE 636 W HCPCS: Performed by: PHYSICIAN ASSISTANT

## 2019-10-24 PROCEDURE — A4216 STERILE WATER/SALINE, 10 ML: HCPCS | Performed by: PHYSICIAN ASSISTANT

## 2019-10-24 PROCEDURE — 25000003 PHARM REV CODE 250: Performed by: PHYSICIAN ASSISTANT

## 2019-10-24 PROCEDURE — 96413 CHEMO IV INFUSION 1 HR: CPT

## 2019-10-24 RX ORDER — ACETAMINOPHEN 325 MG/1
650 TABLET ORAL
Status: CANCELLED | OUTPATIENT
Start: 2019-12-19

## 2019-10-24 RX ORDER — DIPHENHYDRAMINE HYDROCHLORIDE 50 MG/ML
25 INJECTION INTRAMUSCULAR; INTRAVENOUS
Status: CANCELLED | OUTPATIENT
Start: 2019-12-19

## 2019-10-24 RX ORDER — SODIUM CHLORIDE 0.9 % (FLUSH) 0.9 %
10 SYRINGE (ML) INJECTION
Status: CANCELLED | OUTPATIENT
Start: 2019-12-19

## 2019-10-24 RX ORDER — SODIUM CHLORIDE 0.9 % (FLUSH) 0.9 %
10 SYRINGE (ML) INJECTION
Status: DISCONTINUED | OUTPATIENT
Start: 2019-10-24 | End: 2019-10-24 | Stop reason: HOSPADM

## 2019-10-24 RX ORDER — EPINEPHRINE 1 MG/ML
0.3 INJECTION, SOLUTION, CONCENTRATE INTRAVENOUS
Status: CANCELLED | OUTPATIENT
Start: 2019-12-19

## 2019-10-24 RX ORDER — DIPHENHYDRAMINE HYDROCHLORIDE 50 MG/ML
12.5 INJECTION INTRAMUSCULAR; INTRAVENOUS
Status: CANCELLED
Start: 2019-12-19

## 2019-10-24 RX ORDER — IPRATROPIUM BROMIDE AND ALBUTEROL SULFATE 2.5; .5 MG/3ML; MG/3ML
3 SOLUTION RESPIRATORY (INHALATION)
Status: CANCELLED | OUTPATIENT
Start: 2019-12-19

## 2019-10-24 RX ORDER — METHYLPREDNISOLONE SOD SUCC 125 MG
25 VIAL (EA) INJECTION ONCE
Status: CANCELLED
Start: 2019-12-19

## 2019-10-24 RX ORDER — METHYLPREDNISOLONE SOD SUCC 125 MG
50 VIAL (EA) INJECTION
Status: CANCELLED
Start: 2019-12-19

## 2019-10-24 RX ADMIN — Medication 10 ML: at 10:10

## 2019-10-24 RX ADMIN — INFLIXIMAB 800 MG: 100 INJECTION, POWDER, LYOPHILIZED, FOR SOLUTION INTRAVENOUS at 10:10

## 2019-10-24 NOTE — NURSING
Infusion # >10 - Remicade 800 mg q 8 weeks  Last dose- 8/28/19    Any:  -recent illness, infection, or antibiotic use in past week- denies  -open wounds or mouth sores- denies  -invasive procedures or surgeries in past 4 weeks or in upcoming 4 weeks- denies  -vaccinations in past week- denies  -any new symptoms/change in symptoms-denies  -chance you may be pregnant- n/a      Recent labs? 8/28/19  Last Rheumatology provider visit- Seen by ANA Gallo on 8/28/19     Premeds-none     Remicade 800 mg administered IV at a 90 minute rate per orders; see MAR and vitals for more  details. Tolerated well without adverse events, discharged and ambulatory out of clinic.

## 2019-10-30 ENCOUNTER — TELEPHONE (OUTPATIENT)
Dept: RHEUMATOLOGY | Facility: CLINIC | Age: 74
End: 2019-10-30

## 2019-10-30 ENCOUNTER — CLINICAL SUPPORT (OUTPATIENT)
Dept: RHEUMATOLOGY | Facility: CLINIC | Age: 74
End: 2019-10-30
Payer: MEDICARE

## 2019-10-30 PROCEDURE — 90662 IIV NO PRSV INCREASED AG IM: CPT | Mod: PBBFAC

## 2019-10-30 PROCEDURE — 99211 OFF/OP EST MAY X REQ PHY/QHP: CPT | Mod: PBBFAC,25

## 2019-10-30 PROCEDURE — 99999 PR PBB SHADOW E&M-EST. PATIENT-LVL I: ICD-10-PCS | Mod: PBBFAC,,,

## 2019-10-30 PROCEDURE — 99999 PR PBB SHADOW E&M-EST. PATIENT-LVL I: CPT | Mod: PBBFAC,,,

## 2019-10-30 NOTE — TELEPHONE ENCOUNTER
Called pt to advise high dose flu on back order, can go to [facility] location for nurse visit if he would like. Pt stated not an option for him, would rather not. Placed on hold & checked with infusion to see if we can borrow, which we can pt coming when finish appt at  [facility].

## 2019-10-30 NOTE — PROGRESS NOTES
Administered 0.5 cc High Dose Influenza vaccination to Left Deltoid. Pt tolerated well No acute reaction noted to site. Pt instructed on S/S to report. Advised patient to wait in lobby 15 minutes after receiving injection to monitor for any reactions.  Pt verbalized understanding.     Lot: [id number]  Exp: 05/29/20

## 2019-12-18 DIAGNOSIS — Z01.89 ENCOUNTER FOR OTHER SPECIFIED SPECIAL EXAMINATIONS: ICD-10-CM

## 2019-12-18 DIAGNOSIS — D89.2 HYPERGAMMAGLOBULINEMIA: Primary | ICD-10-CM

## 2019-12-19 ENCOUNTER — INFUSION (OUTPATIENT)
Dept: RHEUMATOLOGY | Facility: HOSPITAL | Age: 74
End: 2019-12-19
Attending: PHYSICIAN ASSISTANT
Payer: MEDICARE

## 2019-12-19 ENCOUNTER — LAB VISIT (OUTPATIENT)
Dept: LAB | Facility: HOSPITAL | Age: 74
End: 2019-12-19
Attending: PHYSICIAN ASSISTANT
Payer: MEDICARE

## 2019-12-19 ENCOUNTER — OFFICE VISIT (OUTPATIENT)
Dept: RHEUMATOLOGY | Facility: CLINIC | Age: 74
End: 2019-12-19
Payer: MEDICARE

## 2019-12-19 VITALS
SYSTOLIC BLOOD PRESSURE: 118 MMHG | HEIGHT: 67 IN | DIASTOLIC BLOOD PRESSURE: 67 MMHG | HEART RATE: 61 BPM | WEIGHT: 193.81 LBS | BODY MASS INDEX: 30.42 KG/M2

## 2019-12-19 VITALS — SYSTOLIC BLOOD PRESSURE: 113 MMHG | DIASTOLIC BLOOD PRESSURE: 65 MMHG | HEART RATE: 49 BPM

## 2019-12-19 DIAGNOSIS — R70.0 ELEVATED SED RATE: ICD-10-CM

## 2019-12-19 DIAGNOSIS — M05.79 RHEUMATOID ARTHRITIS INVOLVING MULTIPLE SITES WITH POSITIVE RHEUMATOID FACTOR: Chronic | ICD-10-CM

## 2019-12-19 DIAGNOSIS — Z51.81 MEDICATION MONITORING ENCOUNTER: Chronic | ICD-10-CM

## 2019-12-19 DIAGNOSIS — D84.9 IMMUNOCOMPROMISED PATIENT: Chronic | ICD-10-CM

## 2019-12-19 DIAGNOSIS — M05.79 RHEUMATOID ARTHRITIS INVOLVING MULTIPLE SITES WITH POSITIVE RHEUMATOID FACTOR: Primary | ICD-10-CM

## 2019-12-19 DIAGNOSIS — M05.79 RHEUMATOID ARTHRITIS INVOLVING MULTIPLE SITES WITH POSITIVE RHEUMATOID FACTOR: Primary | Chronic | ICD-10-CM

## 2019-12-19 DIAGNOSIS — Z23 NEED FOR ZOSTER VACCINATION: ICD-10-CM

## 2019-12-19 DIAGNOSIS — D89.2 HYPERGAMMAGLOBULINEMIA: ICD-10-CM

## 2019-12-19 LAB
ALBUMIN SERPL BCP-MCNC: 3.4 G/DL (ref 3.5–5.2)
ALP SERPL-CCNC: 86 U/L (ref 55–135)
ALT SERPL W/O P-5'-P-CCNC: 13 U/L (ref 10–44)
ANION GAP SERPL CALC-SCNC: 9 MMOL/L (ref 8–16)
AST SERPL-CCNC: 24 U/L (ref 10–40)
BASOPHILS # BLD AUTO: 0.02 K/UL (ref 0–0.2)
BASOPHILS NFR BLD: 0.3 % (ref 0–1.9)
BILIRUB SERPL-MCNC: 0.2 MG/DL (ref 0.1–1)
BUN SERPL-MCNC: 16 MG/DL (ref 8–23)
CALCIUM SERPL-MCNC: 9.5 MG/DL (ref 8.7–10.5)
CHLORIDE SERPL-SCNC: 101 MMOL/L (ref 95–110)
CO2 SERPL-SCNC: 28 MMOL/L (ref 23–29)
CREAT SERPL-MCNC: 1 MG/DL (ref 0.5–1.4)
CRP SERPL-MCNC: 5.8 MG/L (ref 0–8.2)
DIFFERENTIAL METHOD: ABNORMAL
EOSINOPHIL # BLD AUTO: 0.3 K/UL (ref 0–0.5)
EOSINOPHIL NFR BLD: 4.6 % (ref 0–8)
ERYTHROCYTE [DISTWIDTH] IN BLOOD BY AUTOMATED COUNT: 14.2 % (ref 11.5–14.5)
ERYTHROCYTE [SEDIMENTATION RATE] IN BLOOD BY WESTERGREN METHOD: 69 MM/HR (ref 0–23)
EST. GFR  (AFRICAN AMERICAN): >60 ML/MIN/1.73 M^2
EST. GFR  (NON AFRICAN AMERICAN): >60 ML/MIN/1.73 M^2
GLUCOSE SERPL-MCNC: 89 MG/DL (ref 70–110)
HCT VFR BLD AUTO: 35.8 % (ref 40–54)
HGB BLD-MCNC: 12.1 G/DL (ref 14–18)
IMM GRANULOCYTES # BLD AUTO: 0.02 K/UL (ref 0–0.04)
IMM GRANULOCYTES NFR BLD AUTO: 0.3 % (ref 0–0.5)
LYMPHOCYTES # BLD AUTO: 2.1 K/UL (ref 1–4.8)
LYMPHOCYTES NFR BLD: 30.1 % (ref 18–48)
MCH RBC QN AUTO: 34.4 PG (ref 27–31)
MCHC RBC AUTO-ENTMCNC: 33.8 G/DL (ref 32–36)
MCV RBC AUTO: 102 FL (ref 82–98)
MONOCYTES # BLD AUTO: 0.7 K/UL (ref 0.3–1)
MONOCYTES NFR BLD: 9.8 % (ref 4–15)
NEUTROPHILS # BLD AUTO: 3.9 K/UL (ref 1.8–7.7)
NEUTROPHILS NFR BLD: 55.2 % (ref 38–73)
NRBC BLD-RTO: 0 /100 WBC
PLATELET # BLD AUTO: 271 K/UL (ref 150–350)
PMV BLD AUTO: 8.3 FL (ref 9.2–12.9)
POTASSIUM SERPL-SCNC: 4.2 MMOL/L (ref 3.5–5.1)
PROT SERPL-MCNC: 7.7 G/DL (ref 6–8.4)
RBC # BLD AUTO: 3.52 M/UL (ref 4.6–6.2)
SODIUM SERPL-SCNC: 138 MMOL/L (ref 136–145)
WBC # BLD AUTO: 6.97 K/UL (ref 3.9–12.7)

## 2019-12-19 PROCEDURE — 99214 PR OFFICE/OUTPT VISIT, EST, LEVL IV, 30-39 MIN: ICD-10-PCS | Mod: S$PBB,,, | Performed by: PHYSICIAN ASSISTANT

## 2019-12-19 PROCEDURE — 36415 COLL VENOUS BLD VENIPUNCTURE: CPT

## 2019-12-19 PROCEDURE — 63600175 PHARM REV CODE 636 W HCPCS: Mod: JG | Performed by: PHYSICIAN ASSISTANT

## 2019-12-19 PROCEDURE — 86140 C-REACTIVE PROTEIN: CPT

## 2019-12-19 PROCEDURE — 99999 PR PBB SHADOW E&M-EST. PATIENT-LVL V: ICD-10-PCS | Mod: PBBFAC,,, | Performed by: PHYSICIAN ASSISTANT

## 2019-12-19 PROCEDURE — 1126F AMNT PAIN NOTED NONE PRSNT: CPT | Mod: ,,, | Performed by: PHYSICIAN ASSISTANT

## 2019-12-19 PROCEDURE — 85025 COMPLETE CBC W/AUTO DIFF WBC: CPT

## 2019-12-19 PROCEDURE — 85652 RBC SED RATE AUTOMATED: CPT

## 2019-12-19 PROCEDURE — 96413 CHEMO IV INFUSION 1 HR: CPT

## 2019-12-19 PROCEDURE — 1159F PR MEDICATION LIST DOCUMENTED IN MEDICAL RECORD: ICD-10-PCS | Mod: ,,, | Performed by: PHYSICIAN ASSISTANT

## 2019-12-19 PROCEDURE — 99215 OFFICE O/P EST HI 40 MIN: CPT | Mod: PBBFAC,25 | Performed by: PHYSICIAN ASSISTANT

## 2019-12-19 PROCEDURE — 1159F MED LIST DOCD IN RCRD: CPT | Mod: ,,, | Performed by: PHYSICIAN ASSISTANT

## 2019-12-19 PROCEDURE — 1126F PR PAIN SEVERITY QUANTIFIED, NO PAIN PRESENT: ICD-10-PCS | Mod: ,,, | Performed by: PHYSICIAN ASSISTANT

## 2019-12-19 PROCEDURE — 80053 COMPREHEN METABOLIC PANEL: CPT

## 2019-12-19 PROCEDURE — 99999 PR PBB SHADOW E&M-EST. PATIENT-LVL V: CPT | Mod: PBBFAC,,, | Performed by: PHYSICIAN ASSISTANT

## 2019-12-19 PROCEDURE — 99214 OFFICE O/P EST MOD 30 MIN: CPT | Mod: S$PBB,,, | Performed by: PHYSICIAN ASSISTANT

## 2019-12-19 RX ORDER — ZINC GLUCONATE 50 MG
50 TABLET ORAL DAILY
COMMUNITY

## 2019-12-19 RX ORDER — TESTOSTERONE CYPIONATE 200 MG/ML
INJECTION, SOLUTION INTRAMUSCULAR
Refills: 0 | COMMUNITY
Start: 2019-11-27

## 2019-12-19 RX ORDER — IPRATROPIUM BROMIDE AND ALBUTEROL SULFATE 2.5; .5 MG/3ML; MG/3ML
3 SOLUTION RESPIRATORY (INHALATION)
Status: CANCELLED | OUTPATIENT
Start: 2020-02-13

## 2019-12-19 RX ORDER — DIPHENHYDRAMINE HYDROCHLORIDE 50 MG/ML
12.5 INJECTION INTRAMUSCULAR; INTRAVENOUS
Status: CANCELLED
Start: 2020-02-13

## 2019-12-19 RX ORDER — SODIUM CHLORIDE 0.9 % (FLUSH) 0.9 %
10 SYRINGE (ML) INJECTION
Status: CANCELLED | OUTPATIENT
Start: 2020-02-13

## 2019-12-19 RX ORDER — SODIUM CHLORIDE 0.9 % (FLUSH) 0.9 %
10 SYRINGE (ML) INJECTION
Status: DISCONTINUED | OUTPATIENT
Start: 2019-12-19 | End: 2019-12-19 | Stop reason: HOSPADM

## 2019-12-19 RX ORDER — EPINEPHRINE 1 MG/ML
0.3 INJECTION, SOLUTION, CONCENTRATE INTRAVENOUS
Status: CANCELLED | OUTPATIENT
Start: 2020-02-13

## 2019-12-19 RX ORDER — METHYLPREDNISOLONE SOD SUCC 125 MG
50 VIAL (EA) INJECTION
Status: CANCELLED
Start: 2020-02-13

## 2019-12-19 RX ORDER — FUROSEMIDE 40 MG/1
TABLET ORAL
Refills: 1 | COMMUNITY
Start: 2019-11-04

## 2019-12-19 RX ORDER — METHYLPREDNISOLONE SOD SUCC 125 MG
25 VIAL (EA) INJECTION ONCE
Status: CANCELLED
Start: 2020-02-13

## 2019-12-19 RX ORDER — ACETAMINOPHEN 325 MG/1
650 TABLET ORAL
Status: CANCELLED | OUTPATIENT
Start: 2020-02-13

## 2019-12-19 RX ORDER — ZOSTER VACCINE RECOMBINANT, ADJUVANTED 50 MCG/0.5
0.5 KIT INTRAMUSCULAR ONCE
Qty: 0.5 ML | Refills: 1 | Status: SHIPPED | OUTPATIENT
Start: 2019-12-19 | End: 2019-12-19

## 2019-12-19 RX ORDER — DIPHENHYDRAMINE HYDROCHLORIDE 50 MG/ML
25 INJECTION INTRAMUSCULAR; INTRAVENOUS
Status: CANCELLED | OUTPATIENT
Start: 2020-02-13

## 2019-12-19 RX ADMIN — INFLIXIMAB 800 MG: 100 INJECTION, POWDER, LYOPHILIZED, FOR SOLUTION INTRAVENOUS at 10:12

## 2019-12-19 NOTE — NURSING
Infusion# >10    Recent labs? 12/19/2019    S/S infection noted or voiced? Denies  Recent or planned surgeries/invasive procedures? None  Recent vaccines? Denies    Premeds? None    Remicade 800 mg administered IV at a 90 minute rate per orders; see MAR & Flow Sheet for more  details. Tolerated well without adverse events

## 2019-12-19 NOTE — PROGRESS NOTES
Subjective:       Patient ID: Jack Back is a 74 y.o. male.    Chief Complaint: Rheumatoid Arthritis      Jack is back today for rheumatology follow-up.      He has seropositive erosive rheumatoid arthritis. In the past failed mtx monotherapy. Moved to IV remicade; RA has done very well on remicade.     About 6 months ago had  redo of cabg- In 2017 Had MI required PTCA stent placement. 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 Prior cabc in 2012  Had some sob post d/c- cxr showed pleural effusion, admitted back to hospital, given lasix and started on daily dose  F/w visit with his CVT surgeon 6/25/19  he was cleared to resume his remicade , repeat cxr showed resolving effusion; sob is better  Still on his bp meds, this is well controlled. He did loose some weight prior to and since surgery.  On repatha his cholesterol is much better    RA wise doing well; still on mtx at 1st 10 mg but he went back up to regular dose of 15 mg/wk  RA no issues; no exacerbations pain today 0/10    Still on IV remicade 800 mg Every 8 weeks      He has not developed heart failure. No lower ext edema, mild sob, no chest pain. No pnd    Chronic elevated esr, normal crp. 5/2017 spep showed elevated gammaglobulins, no polyclonal or monoclonal peaks in IF. Hypergammaglobulinemia. Repeat done 9/2018 IF  There is a faint linear irregularity in gamma involving IgG and lambda, non-specific at this time.  Recommend repeat studies in 3-6 months or as clinically indicated. Esr in the 50-60's consistently, crp normal  No weight loss, fevers or night sweats.       Vaccines all up to date except TdaP but pt allergic to Tetanus.     bone density done in June 2014 which was normal.         Review of Systems   Constitutional: Negative.  Negative for chills, fatigue and fever.   HENT: Negative.  Negative for congestion, mouth sores and trouble swallowing.    Eyes: Negative.  Negative for photophobia, redness and visual disturbance.   Respiratory: Negative.  Negative for  "cough, shortness of breath and wheezing.    Cardiovascular: Negative.  Negative for chest pain and leg swelling.   Gastrointestinal: Negative.  Negative for abdominal distention, abdominal pain, diarrhea, nausea and vomiting.   Genitourinary: Negative.  Negative for dysuria, flank pain, frequency and urgency.   Musculoskeletal: Negative for arthralgias, back pain, gait problem, joint swelling and myalgias.        Mild aching and stiffness   Skin: Negative.  Negative for rash.   Neurological: Negative.  Negative for dizziness, weakness and numbness.           Objective:     /67   Pulse 61   Ht 5' 7" (1.702 m)   Wt 87.9 kg (193 lb 12.6 oz)   BMI 30.35 kg/m²         Physical Exam   Constitutional: He is oriented to person, place, and time and well-developed, well-nourished, and in no distress. No distress.   HENT:   Head: Normocephalic and atraumatic.   Right Ear: External ear normal.   Left Ear: External ear normal.   Mouth/Throat: No oropharyngeal exudate.   Eyes: Conjunctivae and EOM are normal. Pupils are equal, round, and reactive to light. No scleral icterus.   Neck: Neck supple. No thyromegaly present.   Cardiovascular: Normal rate, regular rhythm and normal heart sounds.    No murmur heard.  Pulmonary/Chest: Effort normal and breath sounds normal. No respiratory distress.   Incision has healed nicely  No redness or drainage   Abdominal: Soft. Bowel sounds are normal.       Right Side Rheumatological Exam     Examination finds the shoulder, 1st PIP, 1st MCP, 2nd PIP, 2nd MCP, 3rd PIP, 3rd MCP, 4th PIP, 4th MCP, 5th PIP and 5th MCP normal.    Joint Exam Comments   Elbow: limited extension   Wrist: Mild reduced flexion of wrist, no synovitis    Left Side Rheumatological Exam     Examination finds the shoulder, elbow, 1st PIP, 1st MCP, 2nd PIP, 2nd MCP, 3rd PIP, 3rd MCP, 4th PIP, 4th MCP, 5th PIP and 5th MCP normal.    The patient is tender to palpation of the knee.    Joint Exam Comments   Wrist: Limited " motion left wrists, no synovitis      Lymphadenopathy:     He has no cervical adenopathy.   Neurological: He is alert and oriented to person, place, and time. No cranial nerve deficit. He exhibits normal muscle tone. Gait normal. Coordination normal.   Skin: Skin is warm and dry. Rash noted.     Left olecranon scaly dry rash   Musculoskeletal: He exhibits deformity. He exhibits no edema.   Ulnar drift 25 degrees right hand and 15 degrees left hand, right elbow fixed flexion stops about  5 degrees from baseline,  Limited motion both wrist extension  No synovitis on exam today  Some mild ttp wrists and mp joints                   Recent Results (from the past 168 hour(s))   C-reactive protein    Collection Time: 12/19/19  8:55 AM   Result Value Ref Range    CRP 5.8 0.0 - 8.2 mg/L   Comprehensive metabolic panel    Collection Time: 12/19/19  8:55 AM   Result Value Ref Range    Sodium 138 136 - 145 mmol/L    Potassium 4.2 3.5 - 5.1 mmol/L    Chloride 101 95 - 110 mmol/L    CO2 28 23 - 29 mmol/L    Glucose 89 70 - 110 mg/dL    BUN, Bld 16 8 - 23 mg/dL    Creatinine 1.0 0.5 - 1.4 mg/dL    Calcium 9.5 8.7 - 10.5 mg/dL    Total Protein 7.7 6.0 - 8.4 g/dL    Albumin 3.4 (L) 3.5 - 5.2 g/dL    Total Bilirubin 0.2 0.1 - 1.0 mg/dL    Alkaline Phosphatase 86 55 - 135 U/L    AST 24 10 - 40 U/L    ALT 13 10 - 44 U/L    Anion Gap 9 8 - 16 mmol/L    eGFR if African American >60 >60 mL/min/1.73 m^2    eGFR if non African American >60 >60 mL/min/1.73 m^2   CBC auto differential    Collection Time: 12/19/19  8:55 AM   Result Value Ref Range    WBC 6.97 3.90 - 12.70 K/uL    RBC 3.52 (L) 4.60 - 6.20 M/uL    Hemoglobin 12.1 (L) 14.0 - 18.0 g/dL    Hematocrit 35.8 (L) 40.0 - 54.0 %    Mean Corpuscular Volume 102 (H) 82 - 98 fL    Mean Corpuscular Hemoglobin 34.4 (H) 27.0 - 31.0 pg    Mean Corpuscular Hemoglobin Conc 33.8 32.0 - 36.0 g/dL    RDW 14.2 11.5 - 14.5 %    Platelets 271 150 - 350 K/uL    MPV 8.3 (L) 9.2 - 12.9 fL    Immature  Granulocytes 0.3 0.0 - 0.5 %    Gran # (ANC) 3.9 1.8 - 7.7 K/uL    Immature Grans (Abs) 0.02 0.00 - 0.04 K/uL    Lymph # 2.1 1.0 - 4.8 K/uL    Mono # 0.7 0.3 - 1.0 K/uL    Eos # 0.3 0.0 - 0.5 K/uL    Baso # 0.02 0.00 - 0.20 K/uL    nRBC 0 0 /100 WBC    Gran% 55.2 38.0 - 73.0 %    Lymph% 30.1 18.0 - 48.0 %    Mono% 9.8 4.0 - 15.0 %    Eosinophil% 4.6 0.0 - 8.0 %    Basophil% 0.3 0.0 - 1.9 %    Differential Method Automated        spep and IF   Component      Latest Ref Rng & Units 9/25/2018   Protein, Serum      6.0 - 8.4 g/dL 7.3   Albumin grams/dl      3.35 - 5.55 g/dL 3.42   Alpha-1 grams/dl      0.17 - 0.41 g/dL 0.34   Alpha-2 grams/dl      0.43 - 0.99 g/dL 0.77   Beta grams/dl      0.50 - 1.10 g/dL 1.23 (H)   Gamma grams/dl      0.67 - 1.58 g/dL 1.53       There is a faint linear irregularity in gamma involving IgG and   lambda, non-specific at this time.  Recommend repeat studies in 3-6   months or as clinically indicated.     DEXA 6/17/14 NORMAL        6/2016 [facility] hand and foot X-rays stable   2/25/15 [facility] hand and foot films       Assessment:       1. Rheumatoid arthritis involving multiple sites with positive rheumatoid factor    2. Elevated sed rate    3. Immunocompromised patient    4. Hypergammaglobulinemia            1. Seropositive Erosive RA stable on IV Remicade 800 mg Q 8 weeks and mtx 5 mg/wk - chronic damage; no RA activity       NICKERSON-28 (CRP): 1.65 (Remission)    CDAI 0- LI 0.6--     In the past he has failed mtx monotherapy  Has done well on combination Rermicade and mtx      2. Vaccines need TdaP but allergic to tetnus- HD flu vaccine in the fall     3. Med monitoring and immunocompromised     4. Immunocompromised no issues with recurrent infections    5. 6  Months s/p Post CABG- redo 5/21/19  CAD post CABG X 3 2012 and post PTCA stent 2016 both related to MI with normal heart function on recent  ECHO- on zetia, asa and omega fish oil  RA well controlled  CRP normal  On mtx and remicade- helps  reduce cardiac risk     6. Chronic elevated esr- last visit crp - hypergammaglobulins on spep, no monoclonal peaks on IF but faint irregularity noted- will send to heme/onc for work up     7. Right elbow rash- ? psoriasis    Plan:           Ok for IV Remicade 800 mg  infusion over 90 min every 8 weeks   Can stop  benadryl 12.5 mg and  Cut solumedrol back to 25 mg as premed and stop next visit;  then wean off    Can cut back on  mtx to 10mg weekly, dose still high enough to prevent antibody formation w/veronica   Always remember to Hold mtx for signs of infection or for surgery, Discussed risk versus benefits and potential side effects of mtx.    Remain off prednisone      Watch closely if any heart failure develops then we would consider using orenica or actemra (high esr) or anther biologic rather than going back to TNF agents    Keep his folic acid daily     bone density up-to-date repeat in 3-5 years- later this year     defer TdaP due to pt allergic to tetanus; pneumovax booster this year.      Continue all meds per cards and follow up closely    Topical kenalog 2-3 X daily to left elbow    rtc 8 weeks lam with labs - reg 4   Hep screening and tb up to date     Repeat hand and foot X-rays  Yearly     call with any questions, changes, or concerns                CC:   Dr Sanjay Ansari-CARDS  Dr Shahram Benavides-PCP

## 2020-01-16 ENCOUNTER — PATIENT MESSAGE (OUTPATIENT)
Dept: RHEUMATOLOGY | Facility: CLINIC | Age: 75
End: 2020-01-16

## 2020-01-28 ENCOUNTER — TELEPHONE (OUTPATIENT)
Dept: INFUSION THERAPY | Facility: HOSPITAL | Age: 75
End: 2020-01-28

## 2020-01-28 NOTE — TELEPHONE ENCOUNTER
----- Message from Theodora Ward LPN sent at 1/27/2020  3:56 PM CST -----  Contact: Pt       ----- Message -----  From: Lucas Estrada  Sent: 1/27/2020   3:30 PM CST  To: Alexis ROD Staff    Is calling rg misting some questions that he has for Rachel so that Rachel can relate the questions to Ms Alexis / pt is requesting to speak with Rachel and can be reached at [phone]/ pt would like a call back today

## 2020-01-28 NOTE — TELEPHONE ENCOUNTER
Spoke to pt  His wife needs to see Rheumatologist. Dr Thurston approved pat for 3/4 at 10 am  Pat to request records to be sent to Dr Thurston  For poss auto immune disease

## 2020-02-13 ENCOUNTER — INFUSION (OUTPATIENT)
Dept: INFUSION THERAPY | Facility: HOSPITAL | Age: 75
End: 2020-02-13
Payer: MEDICARE

## 2020-02-13 ENCOUNTER — OFFICE VISIT (OUTPATIENT)
Dept: RHEUMATOLOGY | Facility: CLINIC | Age: 75
End: 2020-02-13
Payer: MEDICARE

## 2020-02-13 VITALS
BODY MASS INDEX: 31.18 KG/M2 | SYSTOLIC BLOOD PRESSURE: 107 MMHG | WEIGHT: 199.06 LBS | DIASTOLIC BLOOD PRESSURE: 66 MMHG | SYSTOLIC BLOOD PRESSURE: 109 MMHG | TEMPERATURE: 98 F | BODY MASS INDEX: 31.18 KG/M2 | DIASTOLIC BLOOD PRESSURE: 68 MMHG | WEIGHT: 199.06 LBS | HEART RATE: 58 BPM | HEART RATE: 52 BPM

## 2020-02-13 DIAGNOSIS — D89.2 HYPERGAMMAGLOBULINEMIA: ICD-10-CM

## 2020-02-13 DIAGNOSIS — D84.9 IMMUNOCOMPROMISED PATIENT: Chronic | ICD-10-CM

## 2020-02-13 DIAGNOSIS — M05.79 RHEUMATOID ARTHRITIS INVOLVING MULTIPLE SITES WITH POSITIVE RHEUMATOID FACTOR: Primary | Chronic | ICD-10-CM

## 2020-02-13 DIAGNOSIS — Z79.899 ENCOUNTER FOR LONG-TERM (CURRENT) USE OF HIGH-RISK MEDICATION: Chronic | ICD-10-CM

## 2020-02-13 DIAGNOSIS — R70.0 ELEVATED SED RATE: ICD-10-CM

## 2020-02-13 DIAGNOSIS — M05.79 RHEUMATOID ARTHRITIS INVOLVING MULTIPLE SITES WITH POSITIVE RHEUMATOID FACTOR: Primary | ICD-10-CM

## 2020-02-13 PROCEDURE — 99999 PR PBB SHADOW E&M-EST. PATIENT-LVL V: CPT | Mod: PBBFAC,,, | Performed by: PHYSICIAN ASSISTANT

## 2020-02-13 PROCEDURE — 99999 PR PBB SHADOW E&M-EST. PATIENT-LVL V: ICD-10-PCS | Mod: PBBFAC,,, | Performed by: PHYSICIAN ASSISTANT

## 2020-02-13 PROCEDURE — 96413 CHEMO IV INFUSION 1 HR: CPT

## 2020-02-13 PROCEDURE — 63600175 PHARM REV CODE 636 W HCPCS: Performed by: PHYSICIAN ASSISTANT

## 2020-02-13 PROCEDURE — A4216 STERILE WATER/SALINE, 10 ML: HCPCS | Performed by: PHYSICIAN ASSISTANT

## 2020-02-13 PROCEDURE — 99214 PR OFFICE/OUTPT VISIT, EST, LEVL IV, 30-39 MIN: ICD-10-PCS | Mod: S$PBB,,, | Performed by: PHYSICIAN ASSISTANT

## 2020-02-13 PROCEDURE — 99214 OFFICE O/P EST MOD 30 MIN: CPT | Mod: S$PBB,,, | Performed by: PHYSICIAN ASSISTANT

## 2020-02-13 PROCEDURE — 99215 OFFICE O/P EST HI 40 MIN: CPT | Mod: PBBFAC | Performed by: PHYSICIAN ASSISTANT

## 2020-02-13 PROCEDURE — 25000003 PHARM REV CODE 250: Performed by: PHYSICIAN ASSISTANT

## 2020-02-13 RX ORDER — DIPHENHYDRAMINE HYDROCHLORIDE 50 MG/ML
25 INJECTION INTRAMUSCULAR; INTRAVENOUS
Status: CANCELLED | OUTPATIENT
Start: 2020-04-09

## 2020-02-13 RX ORDER — SODIUM CHLORIDE 0.9 % (FLUSH) 0.9 %
10 SYRINGE (ML) INJECTION
Status: CANCELLED | OUTPATIENT
Start: 2020-04-09

## 2020-02-13 RX ORDER — SODIUM CHLORIDE 0.9 % (FLUSH) 0.9 %
10 SYRINGE (ML) INJECTION
Status: DISCONTINUED | OUTPATIENT
Start: 2020-02-13 | End: 2020-02-13 | Stop reason: HOSPADM

## 2020-02-13 RX ORDER — ACETAMINOPHEN 325 MG/1
650 TABLET ORAL
Status: CANCELLED | OUTPATIENT
Start: 2020-04-09

## 2020-02-13 RX ORDER — DIPHENHYDRAMINE HYDROCHLORIDE 50 MG/ML
12.5 INJECTION INTRAMUSCULAR; INTRAVENOUS
Status: CANCELLED
Start: 2020-04-09

## 2020-02-13 RX ORDER — METHYLPREDNISOLONE SOD SUCC 125 MG
25 VIAL (EA) INJECTION ONCE
Status: CANCELLED
Start: 2020-04-09

## 2020-02-13 RX ORDER — METHYLPREDNISOLONE SOD SUCC 125 MG
50 VIAL (EA) INJECTION
Status: CANCELLED
Start: 2020-04-09

## 2020-02-13 RX ORDER — EPINEPHRINE 1 MG/ML
0.3 INJECTION, SOLUTION, CONCENTRATE INTRAVENOUS
Status: CANCELLED | OUTPATIENT
Start: 2020-04-09

## 2020-02-13 RX ORDER — IPRATROPIUM BROMIDE AND ALBUTEROL SULFATE 2.5; .5 MG/3ML; MG/3ML
3 SOLUTION RESPIRATORY (INHALATION)
Status: CANCELLED | OUTPATIENT
Start: 2020-04-09

## 2020-02-13 RX ADMIN — INFLIXIMAB 800 MG: 100 INJECTION, POWDER, LYOPHILIZED, FOR SOLUTION INTRAVENOUS at 09:02

## 2020-02-13 RX ADMIN — Medication 10 ML: at 09:02

## 2020-02-13 NOTE — PLAN OF CARE
"  Problem: Adult Inpatient Plan of Care  Goal: Plan of Care Review  Outcome: Ongoing, Progressing  Flowsheets (Taken 2/13/2020 1025)  Plan of Care Reviewed With: patient  Goal: Absence of Hospital-Acquired Illness or Injury  Outcome: Ongoing, Progressing  Goal: Optimal Comfort and Wellbeing  Outcome: Ongoing, Progressing  Intervention: Provide Person-Centered Care  Flowsheets (Taken 2/13/2020 1025)  Trust Relationship/Rapport: care explained; questions answered; thoughts/feelings acknowledged; choices provided; questions encouraged; emotional support provided; reassurance provided; empathic listening provided  Goal: Patient-Specific Goal (Individualization)  Outcome: Ongoing, Progressing  Pt verbalized understanding of infusion POT.   Comfort measures provided with legs elevated during infusion.  "please put PIV in my right hand"     "

## 2020-02-13 NOTE — NURSING
Infusion# >10    Recent labs? reviewed    S/S infection noted or voiced? None noted/denied  Recent or planned surgeries/invasive procedures? denied  Recent vaccines? denied    Premeds? none    Remicade 800 mg administered IV over 90 min. see MAR & Flow Sheet for more  details. Tolerated well without adverse events

## 2020-02-13 NOTE — PROGRESS NOTES
Subjective:       Patient ID: Jack Back is a 75 y.o. male.    Chief Complaint: Rheumatoid Arthritis      Jack is back today for rheumatology follow-up.      He has seropositive erosive rheumatoid arthritis. In the past failed mtx monotherapy. Moved to IV remicade; RA has done very well on remicade.     About 6 months ago had  redo of cabg- In 2017 Had MI required PTCA stent placement. 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 Prior cabc in 2012  Had some sob post d/c- cxr showed pleural effusion, admitted back to hospital, given lasix and started on daily dose  F/w visit with his CVT surgeon 6/25/19  he was cleared to resume his remicade , repeat cxr showed resolving effusion; sob is better  Still on his bp meds, this is well controlled. He did loose some weight prior to and since surgery.  On repatha his cholesterol is much better    RA wise doing well; still on mtx 15 mg/wk and folic acid supplement, no prednisone   RA no issues; no exacerbations pain today 0/10    Still on IV remicade 800 mg Every 8 weeks      He has not developed heart failure. No lower ext edema, mild sob, no chest pain. No pnd    Chronic elevated esr, normal crp. 5/2017 spep showed elevated gammaglobulins, no polyclonal or monoclonal peaks in IF. Hypergammaglobulinemia. Repeat done 9/2018 IF  There is a faint linear irregularity in gamma involving IgG and lambda, non-specific at this time.  Recommend repeat studies in 3-6 months or as clinically indicated. Esr in the 50-60's consistently, crp normal  No weight loss, fevers or night sweats.       Vaccines all up to date except TdaP but pt allergic to Tetanus.     bone density done in June 2014 which was normal.         Review of Systems   Constitutional: Negative.  Negative for chills, fatigue and fever.   HENT: Negative.  Negative for congestion, mouth sores and trouble swallowing.    Eyes: Negative.  Negative for photophobia, redness and visual disturbance.   Respiratory: Negative.  Negative for cough,  shortness of breath and wheezing.    Cardiovascular: Negative.  Negative for chest pain and leg swelling.   Gastrointestinal: Negative.  Negative for abdominal distention, abdominal pain, diarrhea, nausea and vomiting.   Genitourinary: Negative.  Negative for dysuria, flank pain, frequency and urgency.   Musculoskeletal: Negative for arthralgias, back pain, gait problem, joint swelling and myalgias.        Mild aching and stiffness   Skin: Negative.  Negative for rash.   Neurological: Negative.  Negative for dizziness, weakness and numbness.           Objective:     /68   Pulse (!) 58   Wt 90.3 kg (199 lb 1.2 oz)   BMI 31.18 kg/m²         Physical Exam   Constitutional: He is oriented to person, place, and time and well-developed, well-nourished, and in no distress. No distress.   HENT:   Head: Normocephalic and atraumatic.   Right Ear: External ear normal.   Left Ear: External ear normal.   Mouth/Throat: No oropharyngeal exudate.   Eyes: Conjunctivae and EOM are normal. Pupils are equal, round, and reactive to light. No scleral icterus.   Neck: Neck supple. No thyromegaly present.   Cardiovascular: Normal rate, regular rhythm and normal heart sounds.    No murmur heard.  Pulmonary/Chest: Effort normal and breath sounds normal. No respiratory distress.   Incision has healed nicely  No redness or drainage   Abdominal: Soft. Bowel sounds are normal.       Right Side Rheumatological Exam     Examination finds the shoulder, 1st PIP, 1st MCP, 2nd PIP, 2nd MCP, 3rd PIP, 3rd MCP, 4th PIP, 4th MCP, 5th PIP and 5th MCP normal.    Joint Exam Comments   Elbow: limited extension   Wrist: Mild reduced flexion of wrist, no synovitis    Left Side Rheumatological Exam     Examination finds the shoulder, elbow, 1st PIP, 1st MCP, 2nd PIP, 2nd MCP, 3rd PIP, 3rd MCP, 4th PIP, 4th MCP, 5th PIP and 5th MCP normal.    The patient is tender to palpation of the knee.    Joint Exam Comments   Wrist: Limited motion left wrists, no  synovitis      Lymphadenopathy:     He has no cervical adenopathy.   Neurological: He is alert and oriented to person, place, and time. No cranial nerve deficit. He exhibits normal muscle tone. Gait normal. Coordination normal.   Skin: Skin is warm and dry. Rash noted.     Left olecranon scaly dry rash   Musculoskeletal: He exhibits deformity. He exhibits no edema.   Ulnar drift 25 degrees right hand and 15 degrees left hand, right elbow fixed flexion stops about  5 degrees from baseline,  Limited motion both wrist extension  No synovitis on exam today                   Recent Results (from the past 168 hour(s))   C-reactive protein    Collection Time: 02/13/20  8:47 AM   Result Value Ref Range    CRP 4.9 0.0 - 8.2 mg/L   Comprehensive metabolic panel    Collection Time: 02/13/20  8:47 AM   Result Value Ref Range    Sodium 137 136 - 145 mmol/L    Potassium 4.2 3.5 - 5.1 mmol/L    Chloride 102 95 - 110 mmol/L    CO2 28 23 - 29 mmol/L    Glucose 104 70 - 110 mg/dL    BUN, Bld 13 8 - 23 mg/dL    Creatinine 1.0 0.5 - 1.4 mg/dL    Calcium 9.2 8.7 - 10.5 mg/dL    Total Protein 7.6 6.0 - 8.4 g/dL    Albumin 3.4 (L) 3.5 - 5.2 g/dL    Total Bilirubin 0.4 0.1 - 1.0 mg/dL    Alkaline Phosphatase 73 55 - 135 U/L    AST 28 10 - 40 U/L    ALT 18 10 - 44 U/L    Anion Gap 7 (L) 8 - 16 mmol/L    eGFR if African American >60 >60 mL/min/1.73 m^2    eGFR if non African American >60 >60 mL/min/1.73 m^2   CBC auto differential    Collection Time: 02/13/20  8:47 AM   Result Value Ref Range    WBC 7.99 3.90 - 12.70 K/uL    RBC 4.02 (L) 4.60 - 6.20 M/uL    Hemoglobin 13.6 (L) 14.0 - 18.0 g/dL    Hematocrit 40.4 40.0 - 54.0 %    Mean Corpuscular Volume 101 (H) 82 - 98 fL    Mean Corpuscular Hemoglobin 33.8 (H) 27.0 - 31.0 pg    Mean Corpuscular Hemoglobin Conc 33.7 32.0 - 36.0 g/dL    RDW 15.8 (H) 11.5 - 14.5 %    Platelets 293 150 - 350 K/uL    MPV 8.1 (L) 9.2 - 12.9 fL    Immature Granulocytes 0.3 0.0 - 0.5 %    Gran # (ANC) 4.4 1.8 - 7.7  K/uL    Immature Grans (Abs) 0.02 0.00 - 0.04 K/uL    Lymph # 2.5 1.0 - 4.8 K/uL    Mono # 0.8 0.3 - 1.0 K/uL    Eos # 0.4 0.0 - 0.5 K/uL    Baso # 0.02 0.00 - 0.20 K/uL    nRBC 0 0 /100 WBC    Gran% 54.6 38.0 - 73.0 %    Lymph% 31.2 18.0 - 48.0 %    Mono% 9.5 4.0 - 15.0 %    Eosinophil% 4.4 0.0 - 8.0 %    Basophil% 0.3 0.0 - 1.9 %    Differential Method Automated          Lab Results   Component Value Date    HEPAIGM Negative 07/03/2019    HEPBIGM Negative 07/03/2019    HEPCAB Grayzone (A) 07/03/2019        Ref. Range 7/3/2019 09:33 7/10/2019 11:18   Hep B Viral DNA IU/ML Latest Ref Range: <10 IU/mL  <10   Hepatitis B Surface Ag Unknown Positive (A)    Hepatitis B Virus DNA Latest Ref Range: Not detected   Not detected   Log HBV IU/mL Latest Ref Range: <1.00 Log (10) IU/mL  <1.00       Lab Results   Component Value Date    TBGOLDPLUS Negative 01/10/2019         spep and IF   Component      Latest Ref Rng & Units 9/25/2018   Protein, Serum      6.0 - 8.4 g/dL 7.3   Albumin grams/dl      3.35 - 5.55 g/dL 3.42   Alpha-1 grams/dl      0.17 - 0.41 g/dL 0.34   Alpha-2 grams/dl      0.43 - 0.99 g/dL 0.77   Beta grams/dl      0.50 - 1.10 g/dL 1.23 (H)   Gamma grams/dl      0.67 - 1.58 g/dL 1.53       There is a faint linear irregularity in gamma involving IgG and   lambda, non-specific at this time.  Recommend repeat studies in 3-6   months or as clinically indicated.     DEXA 6/17/14 NORMAL      7/2018, 6/2016, 2/2015  saloni hand and foot X-rays stable         Assessment:       1. Rheumatoid arthritis involving multiple sites with positive rheumatoid factor    2. Immunocompromised patient    3. Hypergammaglobulinemia    4. Elevated sed rate    5. Encounter for long-term (current) use of high-risk medication            1. Seropositive Erosive RA stable on IV Remicade 800 mg Q 8 weeks and mtx 10 mg/wk - chronic damage; no RA activity       NICKERSON-28 (CRP): 1.6 (Remission)    CDAI 0- LI 0.6    In the past he has failed mtx  monotherapy  Has done well on combination Rermicade and mtx      2. Vaccines need TdaP but allergic to tetnus- HD flu vaccine in the fall     3. Med monitoring and immunocompromised     4. Immunocompromised no issues with recurrent infections    5. s/p Post CABG- redo 5/21/19  CAD post CABG X 3 2012 and post PTCA stent 2016 both related to MI with normal heart function on recent  ECHO- on zetia, asa and omega fish oil, repatha  RA well controlled  CRP normal  On mtx and remicade- helps reduce cardiac risk     6. Chronic elevated esr- last visit crp - hypergammaglobulins on spep, no monoclonal peaks on IF but faint irregularity noted- will send to heme/onc for work up       Plan:           Ok for IV Remicade 800 mg  infusion over 90 min every 8 weeks   No premeds    C/w   mtx to 15mg weekly   Always remember to Hold mtx for signs of infection or for surgery, Discussed risk versus benefits and potential side effects of mtx.    Remain off prednisone      Watch closely if any heart failure develops then we would consider using orenica or actemra (high esr) or anther biologic rather than going back to TNF agents    Keep his folic acid daily     bone density up-to-date repeat in 3-5 years- later this year     defer TdaP due to pt allergic to tetanus; pneumovax booster this year.      Continue all meds per cards and follow up closely    rtc 8 weeks infusion only, rtc 16 wks visit w/lam, Infusion and labs - reg 4, x-rays saloni hand and feet   Hep screening and tb up to date     call with any questions, changes, or concerns                CC:   Dr Sanjay Ansari-CARDS  Dr Shahram Benavides-PCP

## 2020-04-09 ENCOUNTER — INFUSION (OUTPATIENT)
Dept: INFUSION THERAPY | Facility: HOSPITAL | Age: 75
End: 2020-04-09
Attending: INTERNAL MEDICINE
Payer: MEDICARE

## 2020-04-09 VITALS
WEIGHT: 197.75 LBS | TEMPERATURE: 98 F | HEART RATE: 51 BPM | HEIGHT: 66 IN | BODY MASS INDEX: 31.78 KG/M2 | SYSTOLIC BLOOD PRESSURE: 123 MMHG | DIASTOLIC BLOOD PRESSURE: 61 MMHG | RESPIRATION RATE: 18 BRPM | OXYGEN SATURATION: 96 %

## 2020-04-09 DIAGNOSIS — M05.79 RHEUMATOID ARTHRITIS INVOLVING MULTIPLE SITES WITH POSITIVE RHEUMATOID FACTOR: Primary | ICD-10-CM

## 2020-04-09 PROCEDURE — 96413 CHEMO IV INFUSION 1 HR: CPT

## 2020-04-09 PROCEDURE — 25000003 PHARM REV CODE 250: Performed by: PHYSICIAN ASSISTANT

## 2020-04-09 PROCEDURE — A4216 STERILE WATER/SALINE, 10 ML: HCPCS | Performed by: PHYSICIAN ASSISTANT

## 2020-04-09 PROCEDURE — 63600175 PHARM REV CODE 636 W HCPCS: Mod: JG | Performed by: PHYSICIAN ASSISTANT

## 2020-04-09 RX ORDER — DIPHENHYDRAMINE HYDROCHLORIDE 50 MG/ML
12.5 INJECTION INTRAMUSCULAR; INTRAVENOUS
Status: CANCELLED
Start: 2020-06-04

## 2020-04-09 RX ORDER — DIPHENHYDRAMINE HYDROCHLORIDE 50 MG/ML
25 INJECTION INTRAMUSCULAR; INTRAVENOUS
Status: CANCELLED | OUTPATIENT
Start: 2020-06-04

## 2020-04-09 RX ORDER — METHYLPREDNISOLONE SOD SUCC 125 MG
25 VIAL (EA) INJECTION ONCE
Status: DISCONTINUED | OUTPATIENT
Start: 2020-04-09 | End: 2020-04-09 | Stop reason: HOSPADM

## 2020-04-09 RX ORDER — SODIUM CHLORIDE 0.9 % (FLUSH) 0.9 %
10 SYRINGE (ML) INJECTION
Status: CANCELLED | OUTPATIENT
Start: 2020-06-04

## 2020-04-09 RX ORDER — SODIUM CHLORIDE 0.9 % (FLUSH) 0.9 %
10 SYRINGE (ML) INJECTION
Status: DISCONTINUED | OUTPATIENT
Start: 2020-04-09 | End: 2020-04-09 | Stop reason: HOSPADM

## 2020-04-09 RX ORDER — ACETAMINOPHEN 325 MG/1
650 TABLET ORAL
Status: CANCELLED | OUTPATIENT
Start: 2020-06-04

## 2020-04-09 RX ORDER — METHYLPREDNISOLONE SOD SUCC 125 MG
25 VIAL (EA) INJECTION ONCE
Status: CANCELLED
Start: 2020-06-04

## 2020-04-09 RX ORDER — METHYLPREDNISOLONE SOD SUCC 125 MG
50 VIAL (EA) INJECTION
Status: CANCELLED
Start: 2020-06-04

## 2020-04-09 RX ORDER — EPINEPHRINE 1 MG/ML
0.3 INJECTION, SOLUTION, CONCENTRATE INTRAVENOUS
Status: CANCELLED | OUTPATIENT
Start: 2020-06-04

## 2020-04-09 RX ORDER — IPRATROPIUM BROMIDE AND ALBUTEROL SULFATE 2.5; .5 MG/3ML; MG/3ML
3 SOLUTION RESPIRATORY (INHALATION)
Status: CANCELLED | OUTPATIENT
Start: 2020-06-04

## 2020-04-09 RX ADMIN — Medication 10 ML: at 11:04

## 2020-04-09 RX ADMIN — INFLIXIMAB 800 MG: 100 INJECTION, POWDER, LYOPHILIZED, FOR SOLUTION INTRAVENOUS at 11:04

## 2020-04-09 NOTE — DISCHARGE INSTRUCTIONS
Coronavirus Disease 2019 or COVID-19 is a new strand of coronavirus to infect humans. It was first detected in [place] and has now spread around the world, including the [place].       Currently outbreak levels are on the rise in the  [place], especially [place]. The information on COVID-19 is constantly changing. As the [facility] continues to update information, here are five basics facts you should know about COVID-19.      What are the symptoms of COVID-19??  COVID-19 is a respiratory disease. Some symptoms may include a runny nose, cough, sore throat, headaches and possibly fever. For people who have a weakened immune system, such as the elderly, the very young or immunocompromised patients, symptoms can become severe quickly and can cause serious respiratory tract illnesses, such as pneumonia or bronchitis.?    Should I buy a mask to protect myself from sasha COVID-19??   If you are healthy, you only need to wear a mask if you are taking care of a person with suspected 2019-nCoV infection.   Wear a mask if you are coughing or sneezing.   Masks are effective only when used in combination with frequent hand-cleaning with alcohol-based hand rub or soap and water.   If you wear a mask, then you must know how to use it and dispose of it properly.   Before putting on a mask, clean hands with alcohol-based hand rub or soap and water.   Cover mouth and nose with mask and make sure there are no gaps between your face and the mask.   Avoid touching the mask while using it; if you do, clean your hands with alcohol-based hand rub or soap and water.   Replace the mask with a new one as soon as it is damp and do not re-use single-use masks.   To remove the mask: remove it from behind (do not touch the front of mask); discard immediately in a closed bin; clean hands with alcohol-based hand rub or soap and water.   Please copy the link below; education  and instructional videos on proper use and disposal  of masks  o https://www.who.int/emergencies/diseases/novel-coronavirus-2019/advice-for-public/when-and-how-to-use-masks      Handwashing is Best    best thing to do is to wash your hands thoroughly. For best handwashing techniques, follow the?CDCs guide.   Below is video link to [facility] proper handwashing procedure  o https://www.cdc.gov/handwashing/videos.html    What preventions and treatments are available??  Currently, there is no vaccine to prevent COVID-19. The best way to prevent contraction is to avoid being exposed to the virus. There is no specific treatment for COVID-19. People who contract the virus should receive supportive care from their healthcare team to help alleviate symptoms. People with a severe case will need care that involves support for vital organs. Some everyday preventions the?CDC recommends?are:    -Stay home, you do not have to be confined to your home; its ok to go outside just make sure you remain 6 ft away from others if you walk down your street or talk to neighbors  -Absolutely Avoid close contact with people who are sick  -Avoid touching your eyes, nose and mouth  -Stay home when you are sick  -Cover your cough or sneezes with a tissue and immediately throw it away in the trash.  -Disinfect objects that you frequently use, such as your phone, computer, purse, remotes, chargers, kids toys, water bottles, etc.  -Wash your hands frequently with soap and water for at least 20 seconds, especially after going to the bathroom, before eating or after you cough or sneeze.    Can I still travel??  I would advises against travel currently both domestic and international. If travel is an an emergency take extreme caution and if trying to return to the [place], can expect restrictions on entry and quarantines upon return. The status of COVID-19 is changing every day, so continue to monitor the?CDCs list of travel notices before traveling.?    What should I do if I think I have  COVID-19??  If you are an [facility] patient and think you contracted COVID-19, reach out to [facility], our free 24/7 nurse care line. [facility], is always available when you have health concerns or need advice on care options. Our specially trained registered nurses are available to discuss your health care concerns, recommend self-care techniques and help you decide if your symptoms require a visit to urgent or emergency care.     The service is free and available by calling [phone]?or?[phone].   Or see a provider from home with an [facility] virtual visit.    Patients with questions can also call the [facility] Covid-19 Line at [phone].          Rheumatology Patients:   If you are on immunosuppressive medications   Do Not Stop your medications, continue to take as prescribed   Stopping medications may cause a flare up of your underlying conditions which would increase your risk of infection so the best advice is to continue your medications as prescribed    If you develop any signs or symptoms consistent w/Covid-19 (dry cough, shortness of breath, fever>100, chest congestion, headache, chills) at that time Please stop taking all immunosuppressive medications immediately   o Do Not stop taking prednisone as abrupt discontinuation can cause issues.   o Do Not stop taking hydroxychloroquine as it has some anti-viral properties and is being used as treatment for coronavirus immune response   Immunosuppressive meds are not limited to but include medications like:  o Humira, Enbrel, Cimzia, Remicade, Simponi  o Actemra, Kevzara  o Orencia  o Rituxan, Cytoxan  o Benlysta  o Xeljanz, Olumiant, Rinvoq  o Kineret, Ilaris  o Cosentyx, Taltz  o Stelara, Tremfya  o Methotrexate, mycophenolate (cellcept), azathioprine (imuran), leflunomide (arava), tacrolimus (prograf), cyclosporin, dapsone   If you have any specific rheumatology questions please reach out to your provider via phone  call [phone] or via patient portal message  o Also available resources is [facility] [phone]?or?[phone].   o [facility] virtual visit   o You can also be seen by your rheumatology provider from home with my chart video visit for face time visit         More info below:copy th link into your web browser    [email or web address]        [place] [facility] and [facility]  Preventing the Spread of Coronavirus Disease 2019 in Homes and Residential Communities    Prevention steps for people with confirmed or suspected COVID-19 (including persons under investigation) who do not need to be hospitalized and people with confirmed COVID-19 who were hospitalized and determined to be medically stable to go home    Your healthcare provider and public health staff will evaluate whether you can be cared for at home.   Stay home except to get medical care.   Separate yourself from other people and animals in your home   Call ahead before visiting your doctor.   Wear a facemask.   Cover your coughs and sneezes.   Clean your hands often.   Avoid sharing personal household items.   Clean all high-touch surfaces everyday.   Monitor your symptoms.     Seek prompt medical attention if your illness is worsening (e.g.,difficulty breathing). Before seeking care, call your healthcare provider. If you have a medical emergency and need to call 911, notify the dispatch personnel that you have, or are being evaluated for COVID-19. If possible, put on a facemask before emergency medical services arrive.      Discontinuing home isolation. Call your provider about guidance to discontinue home isolation.    Recommended precautions for household members, intimate partners, and caregivers in a non healthcare setting of a patient with symptomatic laboratory-confirmed COVID-19 or a patient under investigation    Household members,  intimate partners, and caregivers in a nonhealthcare setting may have close contact with a person with symptomatic, laboratory-confirmed COVID-19 or a person under investigation. Close contacts should monitor their health; they should call their healthcare provider right away if they develop symptoms suggestive of COVID-19 (e.g., fever, cough, shortness of breath).    Close contacts should also follow these recommendations:    Make sure that you understand and can help the patient follow their healthcare providers instructions for medication(s) and care. You should help the patient with basic needs in the home and provide support for getting groceries, prescriptions, and other personal needs.    Monitor the patients symptoms. If the patient is getting sicker, call his or her healthcare provider and tell them that the patient has laboratory-confirmed COVID-19. This will help the healthcare providers office take steps to keep other people in the office or waiting room from getting infected. Ask the healthcare provider to call the local or [facility] health department for additional guidance. If the patient has a medical emergency and you need to call 911, notify the dispatch personnel that the patient has, or is being evaluated for COVID-19.    Household members should stay in another room or be  from the patient as much as possible. Household members should use a separate bedroom and bathroom, if available.   Prohibit visitors who do not have an essential need to be in the home.    Household members should not care for any pets in the home. Do not handle pets or other animals while sick.   Make sure that shared spaces in the home have good air flow, such as by an air conditioner or an opened window, weather permitting.   Perform hand hygiene frequently. Wash your hands often with soap and water for at least 20 seconds or use an alcohol-based hand  that contains 60 to 95% alcohol, covering all  surfaces of your hands and rubbing them together until they feel dry. Soap and water should be used preferentially if hands are visibly dirty.   Avoid touching your eyes, nose, and mouth with unwashed hands.   The patient should wear a facemask when you are around other people. If the patient is not able to wear a facemask (for example, because it causes trouble breathing), you, as the caregiver, should wear a mask when you are in the same room as the patient.   Wear a disposable facemask and gloves when you touch or have contact with the patients blood, stool, or body fluids, such as saliva, sputum, nasal mucus, vomit, urine.   Throw out disposable facemasks and gloves after using them. Do not reuse.   When removing personal protective equipment, first remove and dispose of gloves. Then, immediately clean your hands with soap and water or alcohol-based hand . Next,remove and dispose of facemask, and immediately clean your hands again with soap and water or alcohol-based hand .    Avoid sharing household items with the patient. You should not share dishes, drinking glasses, cups, eating utensils, towels, bedding, or other items. After the patient uses these items, you should wash them thoroughly (see below Wash laundry thoroughly).   Clean all high-touch surfaces, such as counters, tabletops, doorknobs, bathroom fixtures, toilets, phones, keyboards, tablets, and bedside tables, every day. Also, clean any surfaces that may have blood, stool, or body fluids on them.   Use a household cleaning spray or wipe, according to the label instructions. Labels contain instructions for safe and effective use of the cleaning product including precautions you should take when applying the product, such as wearing gloves and making sure you have good ventilation during use of the product.   Wash laundry thoroughly.    Immediately remove and wash clothes or bedding that have blood, stool, or body  fluids on them.   Wear disposable gloves while handling soiled items and keep soiled items away from your body. Clean your hands (with soap and water or an alcohol-based hand ) immediately after removing your gloves.      Read and follow directions on labels of laundry or clothing items and detergent.   o In general, using a normal laundry detergent according to washing machine instructions and dry thoroughly using the warmest temperatures recommended on the clothing label.    Place all used disposable gloves, facemasks, and other contaminated items in a lined container before disposing of them with other household waste.    Clean your hands (with soap and water or an alcohol-based hand ) immediately after handling these items.   o Soap and water should be used preferentially if hands are visibly dirty.   Discuss any additional questions with your state or local health department or healthcare provider.   Check available hours when contacting your local health department. [facility] and hospitals

## 2020-04-09 NOTE — NURSING
Infusion# >10    Recent labs? Reviewed    S/S infection noted or voiced? None/Denies  Recent or planned surgeries/invasive procedures? None/Denies  Recent vaccines? None/Denies    Premeds? None    Remicade 800 mg administered IV. Administered per orders over 2 hrs. See MAR & Flow Sheet for more  details. Tolerated well without adverse events.    Given printed material on Covid 19.  Taught on frequent hand-washing and social distancing.

## 2020-04-09 NOTE — PLAN OF CARE
Problem: Adult Inpatient Plan of Care  Goal: Plan of Care Review  Outcome: Ongoing, Progressing  Goal: Patient-Specific Goal (Individualization)  Outcome: Ongoing, Progressing  Flowsheets (Taken 4/9/2020 1155)  Individualized Care Needs: allowed choices for starting IV  Anxieties, Fears or Concerns: fear of isolation for corona virus  Patient-Specific Goals (Include Timeframe): Verbalize feelings about Covud 19  Goal: Absence of Hospital-Acquired Illness or Injury  Outcome: Ongoing, Progressing  Goal: Optimal Comfort and Wellbeing  Outcome: Ongoing, Progressing  Intervention: Provide Person-Centered Care  Flowsheets (Taken 4/9/2020 1156)  Trust Relationship/Rapport: care explained; choices provided; emotional support provided; empathic listening provided; questions answered; questions encouraged; reassurance provided; thoughts/feelings acknowledged   Elevated feet . Provided refreshments.  Explained POT.  Given printed material on Covid 19.  Taught on frequent hand-washing and social distancing.

## 2020-04-09 NOTE — PATIENT INSTRUCTIONS
Infliximab injection  What is this medicine?  INFLIXIMAB (in FLIX i mab) is used to treat Crohn's disease and ulcerative colitis. It is also used to treat ankylosing spondylitis, psoriasis, and some forms of arthritis.  How should I use this medicine?  This medicine is for injection into a vein. It is usually given by a health care professional in a hospital or clinic setting.  A special MedGuide will be given to you by the pharmacist with each prescription and refill. Be sure to read this information carefully each time.  Talk to your pediatrician regarding the use of this medicine in children. Special care may be needed.  What side effects may I notice from receiving this medicine?  Side effects that you should report to your doctor or health care professional as soon as possible:  · allergic reactions like skin rash, itching or hives, swelling of the face, lips, or tongue  · chest pain  · fever or chills, usually related to the infusion  · muscle or joint pain  · red, scaly patches or raised bumps on the skin  · signs of infection - fever or chills, cough, sore throat, pain or difficulty passing urine  · swollen lymph nodes in the neck, underarm, or groin areas  · unexplained weight loss  · unusual bleeding or bruising  · unusually weak or tired  · yellowing of the eyes or skin  Side effects that usually do not require medical attention (report to your doctor or health care professional if they continue or are bothersome):  · headache  · heartburn or stomach pain  · nausea, vomiting  What may interact with this medicine?  Do not take this medicine with any of the following medications:  · anakinra  · rilonacept  This medicine may also interact with the following medications:  · vaccines  What if I miss a dose?  It is important not to miss your dose. Call your doctor or health care professional if you are unable to keep an appointment.  Where should I keep my medicine?  This drug is given in a hospital or clinic  and will not be stored at home.  What should I tell my health care provider before I take this medicine?  They need to know if you have any of these conditions:  · diabetes  · exposure to tuberculosis  · heart failure  · hepatitis or liver disease  · immune system problems  · infection  · lung or breathing disease, like COPD  · multiple sclerosis  · current or past resident of [place]  · seizure disorder  · an unusual or allergic reaction to infliximab, mouse proteins, other medicines, foods, dyes, or preservatives  · pregnant or trying to get pregnant  · breast-feeding  What should I watch for while using this medicine?  Visit your doctor or health care professional for regular checks on your progress.  If you get a cold or other infection while receiving this medicine, call your doctor or health care professional. Do not treat yourself. This medicine may decrease your body's ability to fight infections. Before beginning therapy, your doctor may do a test to see if you have been exposed to tuberculosis.  This medicine may make the symptoms of heart failure worse in some patients. If you notice symptoms such as increased shortness of breath or swelling of the ankles or legs, contact your health care provider right away.  If you are going to have surgery or dental work, tell your health care professional or dentist that you have received this medicine.  If you take this medicine for plaque psoriasis, stay out of the sun. If you cannot avoid being in the sun, wear protective clothing and use sunscreen. Do not use sun lamps or tanning beds/booths.  NOTE:This sheet is a summary. It may not cover all possible information. If you have questions about this medicine, talk to your doctor, pharmacist, or health care provider. Copyright© 2017 [employer]

## 2020-05-05 ENCOUNTER — TELEPHONE (OUTPATIENT)
Dept: RHEUMATOLOGY | Facility: CLINIC | Age: 75
End: 2020-05-05

## 2020-05-05 NOTE — TELEPHONE ENCOUNTER
----- Message from Paige Delgadillo sent at 5/5/2020  9:43 AM CDT -----  Contact: Karen MATHEW  Calling to get diagnosis for the Methotrexate 2mg. Please call Karen @ [phone]. Thanks

## 2020-05-29 DIAGNOSIS — Z03.818 ENCOUNTER FOR OBSERVATION FOR SUSPECTED EXPOSURE TO OTHER BIOLOGICAL AGENTS RULED OUT: Primary | ICD-10-CM

## 2020-06-02 ENCOUNTER — LAB VISIT (OUTPATIENT)
Dept: OTOLARYNGOLOGY | Facility: CLINIC | Age: 75
End: 2020-06-02
Payer: MEDICARE

## 2020-06-02 DIAGNOSIS — Z03.818 ENCOUNTER FOR OBSERVATION FOR SUSPECTED EXPOSURE TO OTHER BIOLOGICAL AGENTS RULED OUT: ICD-10-CM

## 2020-06-02 PROCEDURE — U0003 INFECTIOUS AGENT DETECTION BY NUCLEIC ACID (DNA OR RNA); SEVERE ACUTE RESPIRATORY SYNDROME CORONAVIRUS 2 (SARS-COV-2) (CORONAVIRUS DISEASE [COVID-19]), AMPLIFIED PROBE TECHNIQUE, MAKING USE OF HIGH THROUGHPUT TECHNOLOGIES AS DESCRIBED BY CMS-2020-01-R: HCPCS

## 2020-06-03 LAB — SARS-COV-2 RNA RESP QL NAA+PROBE: NOT DETECTED

## 2020-06-04 ENCOUNTER — HOSPITAL ENCOUNTER (OUTPATIENT)
Dept: RADIOLOGY | Facility: HOSPITAL | Age: 75
Discharge: HOME | End: 2020-06-04
Attending: PHYSICIAN ASSISTANT
Payer: MEDICARE

## 2020-06-04 ENCOUNTER — INFUSION (OUTPATIENT)
Dept: INFUSION THERAPY | Facility: HOSPITAL | Age: 75
End: 2020-06-04
Attending: INTERNAL MEDICINE
Payer: MEDICARE

## 2020-06-04 ENCOUNTER — OFFICE VISIT (OUTPATIENT)
Dept: RHEUMATOLOGY | Facility: CLINIC | Age: 75
End: 2020-06-04
Payer: MEDICARE

## 2020-06-04 VITALS
WEIGHT: 201.5 LBS | RESPIRATION RATE: 18 BRPM | SYSTOLIC BLOOD PRESSURE: 123 MMHG | DIASTOLIC BLOOD PRESSURE: 59 MMHG | HEART RATE: 48 BPM | OXYGEN SATURATION: 98 % | TEMPERATURE: 98 F | BODY MASS INDEX: 32.52 KG/M2

## 2020-06-04 VITALS
HEART RATE: 57 BPM | SYSTOLIC BLOOD PRESSURE: 120 MMHG | DIASTOLIC BLOOD PRESSURE: 69 MMHG | WEIGHT: 201.5 LBS | HEIGHT: 66 IN | BODY MASS INDEX: 32.38 KG/M2

## 2020-06-04 DIAGNOSIS — D84.9 IMMUNOCOMPROMISED PATIENT: Chronic | ICD-10-CM

## 2020-06-04 DIAGNOSIS — D89.2 HYPERGAMMAGLOBULINEMIA: ICD-10-CM

## 2020-06-04 DIAGNOSIS — Z03.818 ENCOUNTER FOR OBSERVATION FOR SUSPECTED EXPOSURE TO OTHER BIOLOGICAL AGENTS RULED OUT: ICD-10-CM

## 2020-06-04 DIAGNOSIS — M05.79 RHEUMATOID ARTHRITIS INVOLVING MULTIPLE SITES WITH POSITIVE RHEUMATOID FACTOR: Primary | ICD-10-CM

## 2020-06-04 DIAGNOSIS — R70.0 ELEVATED SED RATE: ICD-10-CM

## 2020-06-04 DIAGNOSIS — M05.79 RHEUMATOID ARTHRITIS INVOLVING MULTIPLE SITES WITH POSITIVE RHEUMATOID FACTOR: ICD-10-CM

## 2020-06-04 DIAGNOSIS — M05.79 RHEUMATOID ARTHRITIS INVOLVING MULTIPLE SITES WITH POSITIVE RHEUMATOID FACTOR: Primary | Chronic | ICD-10-CM

## 2020-06-04 DIAGNOSIS — Z79.899 ENCOUNTER FOR LONG-TERM (CURRENT) USE OF HIGH-RISK MEDICATION: Chronic | ICD-10-CM

## 2020-06-04 PROCEDURE — 99214 OFFICE O/P EST MOD 30 MIN: CPT | Mod: PBBFAC,25 | Performed by: PHYSICIAN ASSISTANT

## 2020-06-04 PROCEDURE — A4216 STERILE WATER/SALINE, 10 ML: HCPCS | Performed by: PHYSICIAN ASSISTANT

## 2020-06-04 PROCEDURE — 73130 X-RAY EXAM OF HAND: CPT | Mod: TC,50

## 2020-06-04 PROCEDURE — 25000003 PHARM REV CODE 250: Performed by: PHYSICIAN ASSISTANT

## 2020-06-04 PROCEDURE — 73130 XR HAND COMPLETE 3 VIEWS BILATERAL: ICD-10-PCS | Mod: 26,50,, | Performed by: RADIOLOGY

## 2020-06-04 PROCEDURE — 99214 PR OFFICE/OUTPT VISIT, EST, LEVL IV, 30-39 MIN: ICD-10-PCS | Mod: S$PBB,,, | Performed by: PHYSICIAN ASSISTANT

## 2020-06-04 PROCEDURE — 73130 X-RAY EXAM OF HAND: CPT | Mod: 26,50,, | Performed by: RADIOLOGY

## 2020-06-04 PROCEDURE — 99214 OFFICE O/P EST MOD 30 MIN: CPT | Mod: S$PBB,,, | Performed by: PHYSICIAN ASSISTANT

## 2020-06-04 PROCEDURE — 63600175 PHARM REV CODE 636 W HCPCS: Mod: JG | Performed by: PHYSICIAN ASSISTANT

## 2020-06-04 PROCEDURE — 96413 CHEMO IV INFUSION 1 HR: CPT

## 2020-06-04 PROCEDURE — 73630 XR FOOT COMPLETE 3 VIEW BILATERAL: ICD-10-PCS | Mod: 26,50,, | Performed by: RADIOLOGY

## 2020-06-04 PROCEDURE — 99999 PR PBB SHADOW E&M-EST. PATIENT-LVL IV: CPT | Mod: PBBFAC,,, | Performed by: PHYSICIAN ASSISTANT

## 2020-06-04 PROCEDURE — 99999 PR PBB SHADOW E&M-EST. PATIENT-LVL IV: ICD-10-PCS | Mod: PBBFAC,,, | Performed by: PHYSICIAN ASSISTANT

## 2020-06-04 PROCEDURE — 73630 X-RAY EXAM OF FOOT: CPT | Mod: TC,50

## 2020-06-04 PROCEDURE — 73630 X-RAY EXAM OF FOOT: CPT | Mod: 26,50,, | Performed by: RADIOLOGY

## 2020-06-04 RX ORDER — EPINEPHRINE 1 MG/ML
0.3 INJECTION, SOLUTION, CONCENTRATE INTRAVENOUS
Status: CANCELLED | OUTPATIENT
Start: 2020-07-30

## 2020-06-04 RX ORDER — DIPHENHYDRAMINE HYDROCHLORIDE 50 MG/ML
12.5 INJECTION INTRAMUSCULAR; INTRAVENOUS
Status: CANCELLED
Start: 2020-07-30

## 2020-06-04 RX ORDER — ACETAMINOPHEN 325 MG/1
650 TABLET ORAL
Status: CANCELLED | OUTPATIENT
Start: 2020-07-30

## 2020-06-04 RX ORDER — METHYLPREDNISOLONE SOD SUCC 125 MG
25 VIAL (EA) INJECTION ONCE
Status: CANCELLED
Start: 2020-07-30

## 2020-06-04 RX ORDER — METHYLPREDNISOLONE SOD SUCC 125 MG
50 VIAL (EA) INJECTION
Status: CANCELLED
Start: 2020-07-30

## 2020-06-04 RX ORDER — DIPHENHYDRAMINE HYDROCHLORIDE 50 MG/ML
25 INJECTION INTRAMUSCULAR; INTRAVENOUS
Status: CANCELLED | OUTPATIENT
Start: 2020-07-30

## 2020-06-04 RX ORDER — SODIUM CHLORIDE 0.9 % (FLUSH) 0.9 %
10 SYRINGE (ML) INJECTION
Status: DISCONTINUED | OUTPATIENT
Start: 2020-06-04 | End: 2020-06-04 | Stop reason: HOSPADM

## 2020-06-04 RX ORDER — SODIUM CHLORIDE 0.9 % (FLUSH) 0.9 %
10 SYRINGE (ML) INJECTION
Status: CANCELLED | OUTPATIENT
Start: 2020-07-30

## 2020-06-04 RX ORDER — IPRATROPIUM BROMIDE AND ALBUTEROL SULFATE 2.5; .5 MG/3ML; MG/3ML
3 SOLUTION RESPIRATORY (INHALATION)
Status: CANCELLED | OUTPATIENT
Start: 2020-07-30

## 2020-06-04 RX ADMIN — INFLIXIMAB 800 MG: 100 INJECTION, POWDER, LYOPHILIZED, FOR SOLUTION INTRAVENOUS at 09:06

## 2020-06-04 RX ADMIN — Medication 10 ML: at 09:06

## 2020-06-04 ASSESSMENT — CLINICAL DISEASE ACTIVITY INDEX (CDAI)
TOTAL_SCORE: 2
PHYSICIAN_ASSESSMENT: 1
PATIENT_ASSESSMENT: 1
TENDER_JOINTS_COUNT: 0
SWOLLEN_JOINTS_COUNT: 0

## 2020-06-04 ASSESSMENT — ROUTINE ASSESSMENT OF PATIENT INDEX DATA (RAPID3): MDHAQ FUNCTION SCORE: .4

## 2020-06-04 NOTE — PLAN OF CARE
Problem: Adult Inpatient Plan of Care  Goal: Plan of Care Review  Outcome: Ongoing, Progressing  Flowsheets (Taken 6/4/2020 0934)  Plan of Care Reviewed With: patient  Goal: Patient-Specific Goal (Individualization)  Outcome: Ongoing, Progressing  Flowsheets (Taken 6/4/2020 0934)  Individualized Care Needs: legs elevated during infusion  Anxieties, Fears or Concerns: covid  Patient-Specific Goals (Include Timeframe): Pt will verbalize understanding of today's infusion POT  Goal: Absence of Hospital-Acquired Illness or Injury  Outcome: Ongoing, Progressing  Goal: Optimal Comfort and Wellbeing  Outcome: Ongoing, Progressing  Intervention: Provide Person-Centered Care  Flowsheets (Taken 6/4/2020 0934)  Trust Relationship/Rapport: care explained; choices provided; emotional support provided; empathic listening provided; questions answered; questions encouraged; reassurance provided; thoughts/feelings acknowledged

## 2020-06-04 NOTE — PROGRESS NOTES
Subjective:       Patient ID: Jack Back is a 75 y.o. male.    Chief Complaint: Rheumatoid Arthritis      Jack is back today for rheumatology follow-up.      He has seropositive erosive rheumatoid arthritis. In the past failed mtx monotherapy. Moved to IV remicade; RA has done very well on remicade.     6/2019 had  redo of cabg- In 2017 Had MI required PTCA stent placement. 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 Prior cabc in 2012  Had some sob post d/c- cxr showed pleural effusion, admitted back to hospital, given lasix and started on daily dose  F/w visit with his CVT surgeon 6/25/19  he was cleared to resume his remicade , repeat cxr showed resolving effusion; no sob w/activity; some right sided chest wall pain but no cp w/exertion its more positional   Still on his bp meds, this is well controlled. He did loose some weight prior to and since surgery.  On repatha his cholesterol is much better    RA wise doing well; we did cut back on mtx to 10 mg/d but skin psoriasis recurred left elbow, right knee and buttocks, went back up to mtx 15 mg/wk and now rash resolving  Still taking folic acid supplement, no prednisone     Rates pain today 3/10    Still on IV remicade 800 mg Every 8 weeks      He has not developed heart failure. No lower ext edema, mild sob, no chest pain. No pnd    Chronic elevated esr, normal crp. 5/2017 spep showed elevated gammaglobulins, no polyclonal or monoclonal peaks in IF. Hypergammaglobulinemia. Repeat done 9/2018 IF  There is a faint linear irregularity in gamma involving IgG and lambda, non-specific at this time.  Recommend repeat studies in 3-6 months or as clinically indicated. Esr in the 50-60's consistently, crp normal  No weight loss, fevers or night sweats.       Vaccines all up to date except TdaP but pt allergic to Tetanus.     bone density done in June 2014 which was normal.         Review of Systems   Constitutional: Negative.  Negative for chills, fatigue and fever.   HENT: Negative.   "Negative for congestion, mouth sores and trouble swallowing.    Eyes: Negative.  Negative for photophobia, redness and visual disturbance.   Respiratory: Negative.  Negative for cough, shortness of breath and wheezing.    Cardiovascular: Negative.  Negative for chest pain and leg swelling.   Gastrointestinal: Negative.  Negative for abdominal distention, abdominal pain, diarrhea, nausea and vomiting.   Genitourinary: Negative.  Negative for dysuria, flank pain, frequency and urgency.   Musculoskeletal: Positive for arthralgias. Negative for back pain, gait problem, joint swelling and myalgias.        Mild aching and stiffness   Skin: Negative.  Negative for rash.   Neurological: Negative.  Negative for dizziness, weakness and numbness.           Objective:     /69   Pulse (!) 57   Ht 5' 6" (1.676 m)   Wt 91.4 kg (201 lb 8 oz)   BMI 32.52 kg/m²         Physical Exam   Constitutional: He is oriented to person, place, and time and well-developed, well-nourished, and in no distress. No distress.   HENT:   Head: Normocephalic and atraumatic.   Right Ear: External ear normal.   Left Ear: External ear normal.   Mouth/Throat: No oropharyngeal exudate.   Eyes: Conjunctivae and EOM are normal. Pupils are equal, round, and reactive to light. No scleral icterus.   Neck: Neck supple. No thyromegaly present.   Cardiovascular: Normal rate, regular rhythm and normal heart sounds.    No murmur heard.  Pulmonary/Chest: Effort normal and breath sounds normal. No respiratory distress.   Incision has healed nicely  No redness or drainage   Abdominal: Soft. Bowel sounds are normal.       Right Side Rheumatological Exam     Examination finds the shoulder, 1st PIP, 1st MCP, 2nd PIP, 2nd MCP, 3rd PIP, 3rd MCP, 4th PIP, 4th MCP, 5th PIP and 5th MCP normal.    Joint Exam Comments   Elbow: limited extension   Wrist: Mild reduced flexion of wrist, no synovitis    Left Side Rheumatological Exam     Examination finds the shoulder, " elbow, 1st PIP, 1st MCP, 2nd PIP, 2nd MCP, 3rd PIP, 3rd MCP, 4th PIP, 4th MCP, 5th PIP and 5th MCP normal.    The patient is tender to palpation of the knee.    Joint Exam Comments   Wrist: Limited motion left wrists, no synovitis      Lymphadenopathy:     He has no cervical adenopathy.   Neurological: He is alert and oriented to person, place, and time. No cranial nerve deficit. He exhibits normal muscle tone. Gait normal. Coordination normal.   Skin: Skin is warm and dry. Rash noted.     Left olecranon scaly dry rash   Musculoskeletal: He exhibits deformity. He exhibits no edema.   Ulnar drift 25 degrees right hand and 15 degrees left hand, right elbow fixed flexion stops about  5 degrees from baseline,  Limited motion both wrist extension  No synovitis on exam today                   Recent Results (from the past 168 hour(s))   COVID-19 Routine Screening    Collection Time: 06/02/20  9:44 AM   Result Value Ref Range    SARS-CoV2 (COVID-19) Qualitative PCR Not Detected Not Detected   C-reactive protein    Collection Time: 06/04/20  8:31 AM   Result Value Ref Range    CRP 6.4 0.0 - 8.2 mg/L   Comprehensive metabolic panel    Collection Time: 06/04/20  8:31 AM   Result Value Ref Range    Sodium 137 136 - 145 mmol/L    Potassium 4.3 3.5 - 5.1 mmol/L    Chloride 102 95 - 110 mmol/L    CO2 29 23 - 29 mmol/L    Glucose 73 70 - 110 mg/dL    BUN, Bld 18 8 - 23 mg/dL    Creatinine 1.2 0.5 - 1.4 mg/dL    Calcium 9.4 8.7 - 10.5 mg/dL    Total Protein 7.9 6.0 - 8.4 g/dL    Albumin 3.5 3.5 - 5.2 g/dL    Total Bilirubin 0.4 0.1 - 1.0 mg/dL    Alkaline Phosphatase 66 55 - 135 U/L    AST 27 10 - 40 U/L    ALT 16 10 - 44 U/L    Anion Gap 6 (L) 8 - 16 mmol/L    eGFR if African American >60 >60 mL/min/1.73 m^2    eGFR if non African American 58 (A) >60 mL/min/1.73 m^2   CBC auto differential    Collection Time: 06/04/20  8:31 AM   Result Value Ref Range    WBC 7.38 3.90 - 12.70 K/uL    RBC 4.20 (L) 4.60 - 6.20 M/uL    Hemoglobin  14.3 14.0 - 18.0 g/dL    Hematocrit 42.5 40.0 - 54.0 %    Mean Corpuscular Volume 101 (H) 82 - 98 fL    Mean Corpuscular Hemoglobin 34.0 (H) 27.0 - 31.0 pg    Mean Corpuscular Hemoglobin Conc 33.6 32.0 - 36.0 g/dL    RDW 15.8 (H) 11.5 - 14.5 %    Platelets 290 150 - 350 K/uL    MPV 7.9 (L) 9.2 - 12.9 fL    Immature Granulocytes 0.3 0.0 - 0.5 %    Gran # (ANC) 4.4 1.8 - 7.7 K/uL    Immature Grans (Abs) 0.02 0.00 - 0.04 K/uL    Lymph # 2.1 1.0 - 4.8 K/uL    Mono # 0.7 0.3 - 1.0 K/uL    Eos # 0.3 0.0 - 0.5 K/uL    Baso # 0.02 0.00 - 0.20 K/uL    nRBC 0 0 /100 WBC    Gran% 59.2 38.0 - 73.0 %    Lymph% 28.3 18.0 - 48.0 %    Mono% 8.8 4.0 - 15.0 %    Eosinophil% 3.4 0.0 - 8.0 %    Basophil% 0.3 0.0 - 1.9 %    Differential Method Automated          Lab Results   Component Value Date    HEPAIGM Negative 07/03/2019    HEPBIGM Negative 07/03/2019    HEPCAB Grayzone (A) 07/03/2019        Ref. Range 7/3/2019 09:33 7/10/2019 11:18   Hep B Viral DNA IU/ML Latest Ref Range: <10 IU/mL  <10   Hepatitis B Surface Ag Unknown Positive (A)    Hepatitis B Virus DNA Latest Ref Range: Not detected   Not detected   Log HBV IU/mL Latest Ref Range: <1.00 Log (10) IU/mL  <1.00       Lab Results   Component Value Date    TBGOLDPLUS Negative 01/10/2019         spep and IF   Component      Latest Ref Rng & Units 9/25/2018   Protein, Serum      6.0 - 8.4 g/dL 7.3   Albumin grams/dl      3.35 - 5.55 g/dL 3.42   Alpha-1 grams/dl      0.17 - 0.41 g/dL 0.34   Alpha-2 grams/dl      0.43 - 0.99 g/dL 0.77   Beta grams/dl      0.50 - 1.10 g/dL 1.23 (H)   Gamma grams/dl      0.67 - 1.58 g/dL 1.53   There is a faint linear irregularity in gamma involving IgG and   lambda, non-specific at this time.  Recommend repeat studies in 3-6   months or as clinically indicated.     DEXA 6/17/14 NORMAL      6/2020, 7/2018, 6/2016, 2/2015  saloni hand and foot X-rays stable       Assessment:       1. Rheumatoid arthritis involving multiple sites with positive rheumatoid  factor    2. Encounter for long-term (current) use of high-risk medication    3. Immunocompromised patient    4. Hypergammaglobulinemia    5. Elevated sed rate            1. Seropositive Erosive RA stable on IV Remicade 800 mg Q 8 weeks and mtx 15 mg/wk - chronic damage; no RA activity       NICKERSON-28 (CRP): 1.82 (Remission)    CDAI 2-   LI 0.6    In the past he has failed mtx monotherapy  Has done well on combination Rermicade and mtx      2. Vaccines need TdaP but allergic to tetnus- HD flu vaccine in the fall     3. Med monitoring and immunocompromised     4. Immunocompromised no issues with recurrent infections    5. s/p Post CABG- redo 5/21/19  CAD post CABG X 3 2012 and post PTCA stent 2016 both related to MI with normal heart function on recent  ECHO- on zetia, asa and omega fish oil, repatha  RA well controlled  CRP normal  On mtx and remicade- helps reduce cardiac risk     6. Chronic elevated esr- last visit crp - hypergammaglobulins on spep, no monoclonal peaks on IF but faint irregularity noted- will send to heme/onc for work up     7. Mild skin psoriais better on mtx 15 mg/wk, flared on lower dose   Plan:           Ok for IV Remicade 800 mg  infusion over 90 min every 8 weeks   No premeds    C/w   mtx to 15mg weekly- RA ok on lower dose but skin psoriasis exacerbation w/lower dose   Always remember to Hold mtx for signs of infection or for surgery, Discussed risk versus benefits and potential side effects of mtx.    Skin pso- c/w mtx and topicals    Remain off prednisone      Watch closely if any heart failure develops then we would consider using [place] or actemra (high esr) or anther biologic rather than going back to TNF agents    Keep his folic acid daily     bone density up-to-date repeat in 3-5 years- later this year or early next year     defer TdaP due to pt allergic to tetanus; pneumovax booster this year.      Continue all meds per cards and follow up closely    rtc 8 weeks infusion only, rtc 16  wks visit w/lam, Infusion and labs - reg 4, x-rays saloni hand and feet   Hep screening and tb up to date     call with any questions, changes, or concerns                CC:   Dr Sanjay Ansari-CARDS  Dr Shahram Benavides-PCP

## 2020-07-30 ENCOUNTER — INFUSION (OUTPATIENT)
Dept: INFUSION THERAPY | Facility: HOSPITAL | Age: 75
End: 2020-07-30
Attending: INTERNAL MEDICINE
Payer: MEDICARE

## 2020-07-30 VITALS
SYSTOLIC BLOOD PRESSURE: 116 MMHG | WEIGHT: 195.75 LBS | RESPIRATION RATE: 18 BRPM | BODY MASS INDEX: 31.6 KG/M2 | DIASTOLIC BLOOD PRESSURE: 65 MMHG | HEART RATE: 53 BPM | OXYGEN SATURATION: 98 % | TEMPERATURE: 99 F

## 2020-07-30 DIAGNOSIS — M05.79 RHEUMATOID ARTHRITIS INVOLVING MULTIPLE SITES WITH POSITIVE RHEUMATOID FACTOR: Primary | ICD-10-CM

## 2020-07-30 PROCEDURE — 96413 CHEMO IV INFUSION 1 HR: CPT

## 2020-07-30 PROCEDURE — 25000003 PHARM REV CODE 250: Performed by: PHYSICIAN ASSISTANT

## 2020-07-30 PROCEDURE — 63600175 PHARM REV CODE 636 W HCPCS: Mod: JG | Performed by: PHYSICIAN ASSISTANT

## 2020-07-30 PROCEDURE — A4216 STERILE WATER/SALINE, 10 ML: HCPCS | Performed by: PHYSICIAN ASSISTANT

## 2020-07-30 RX ORDER — METHYLPREDNISOLONE SOD SUCC 125 MG
50 VIAL (EA) INJECTION
Status: CANCELLED
Start: 2020-09-24

## 2020-07-30 RX ORDER — SODIUM CHLORIDE 0.9 % (FLUSH) 0.9 %
10 SYRINGE (ML) INJECTION
Status: CANCELLED | OUTPATIENT
Start: 2020-09-24

## 2020-07-30 RX ORDER — EPINEPHRINE 1 MG/ML
0.3 INJECTION, SOLUTION, CONCENTRATE INTRAVENOUS
Status: CANCELLED | OUTPATIENT
Start: 2020-09-24

## 2020-07-30 RX ORDER — DIPHENHYDRAMINE HYDROCHLORIDE 50 MG/ML
12.5 INJECTION INTRAMUSCULAR; INTRAVENOUS
Status: CANCELLED
Start: 2020-09-24

## 2020-07-30 RX ORDER — ACETAMINOPHEN 325 MG/1
650 TABLET ORAL
Status: CANCELLED | OUTPATIENT
Start: 2020-09-24

## 2020-07-30 RX ORDER — IPRATROPIUM BROMIDE AND ALBUTEROL SULFATE 2.5; .5 MG/3ML; MG/3ML
3 SOLUTION RESPIRATORY (INHALATION)
Status: CANCELLED | OUTPATIENT
Start: 2020-09-24

## 2020-07-30 RX ORDER — SODIUM CHLORIDE 0.9 % (FLUSH) 0.9 %
10 SYRINGE (ML) INJECTION
Status: DISCONTINUED | OUTPATIENT
Start: 2020-07-30 | End: 2020-07-30 | Stop reason: HOSPADM

## 2020-07-30 RX ORDER — METHYLPREDNISOLONE SOD SUCC 125 MG
25 VIAL (EA) INJECTION ONCE
Status: CANCELLED
Start: 2020-09-24

## 2020-07-30 RX ORDER — DIPHENHYDRAMINE HYDROCHLORIDE 50 MG/ML
25 INJECTION INTRAMUSCULAR; INTRAVENOUS
Status: CANCELLED | OUTPATIENT
Start: 2020-09-24

## 2020-07-30 RX ADMIN — INFLIXIMAB 800 MG: 100 INJECTION, POWDER, LYOPHILIZED, FOR SOLUTION INTRAVENOUS at 09:07

## 2020-07-30 RX ADMIN — Medication 10 ML: at 08:07

## 2020-07-30 NOTE — NURSING
Infusion # >10 - Remicade 800 mg q 8 weeks  Last dose- 6/4/20    Any:  -recent illness, infection, or antibiotic use in past week- denies  -open wounds or mouth sores- denies  -invasive procedures or surgeries in past 4 weeks or in upcoming 4 weeks- denies  -vaccinations in past week- denies  -any new symptoms/change in symptoms-denies  -chance you may be pregnant- n/a      Recent labs? 7/30/20  Last Rheumatology provider visit- Seen by ANA Gallo on 6/4/20     Premeds-none     Remicade 800 mg administered IV at a 90 minute rate per orders; see MAR and vitals for more details. Tolerated well without adverse events, discharged and ambulatory out of clinic.

## 2020-07-30 NOTE — PLAN OF CARE
Plan of care reviewed with patient. Discussed if there are any new or ongoing concerns. Denies.  Problem: Adult Inpatient Plan of Care  Goal: Plan of Care Review  Outcome: Ongoing, Progressing  Flowsheets (Taken 7/30/2020 1015)  Plan of Care Reviewed With: patient  Goal: Patient-Specific Goal (Individualization)  Outcome: Ongoing, Progressing  Goal: Absence of Hospital-Acquired Illness or Injury  Outcome: Ongoing, Progressing  Goal: Optimal Comfort and Wellbeing  Outcome: Ongoing, Progressing  Intervention: Provide Person-Centered Care  Flowsheets (Taken 7/30/2020 1015)  Trust Relationship/Rapport:   questions encouraged   care explained   choices provided   reassurance provided   empathic listening provided   thoughts/feelings acknowledged   emotional support provided   questions answered

## 2020-09-24 ENCOUNTER — INFUSION (OUTPATIENT)
Dept: INFUSION THERAPY | Facility: HOSPITAL | Age: 75
End: 2020-09-24
Attending: INTERNAL MEDICINE
Payer: MEDICARE

## 2020-09-24 ENCOUNTER — OFFICE VISIT (OUTPATIENT)
Dept: RHEUMATOLOGY | Facility: CLINIC | Age: 75
End: 2020-09-24
Payer: MEDICARE

## 2020-09-24 VITALS
RESPIRATION RATE: 16 BRPM | OXYGEN SATURATION: 97 % | HEART RATE: 52 BPM | SYSTOLIC BLOOD PRESSURE: 133 MMHG | DIASTOLIC BLOOD PRESSURE: 75 MMHG | TEMPERATURE: 99 F

## 2020-09-24 VITALS — BODY MASS INDEX: 32.06 KG/M2 | WEIGHT: 199.5 LBS | HEIGHT: 66 IN

## 2020-09-24 DIAGNOSIS — M05.79 RHEUMATOID ARTHRITIS INVOLVING MULTIPLE SITES WITH POSITIVE RHEUMATOID FACTOR: Primary | Chronic | ICD-10-CM

## 2020-09-24 DIAGNOSIS — R70.0 ELEVATED SED RATE: ICD-10-CM

## 2020-09-24 DIAGNOSIS — Z13.820 OSTEOPOROSIS SCREENING: ICD-10-CM

## 2020-09-24 DIAGNOSIS — Z79.899 ENCOUNTER FOR LONG-TERM (CURRENT) USE OF HIGH-RISK MEDICATION: Chronic | ICD-10-CM

## 2020-09-24 DIAGNOSIS — D89.2 HYPERGAMMAGLOBULINEMIA: ICD-10-CM

## 2020-09-24 DIAGNOSIS — M05.79 RHEUMATOID ARTHRITIS INVOLVING MULTIPLE SITES WITH POSITIVE RHEUMATOID FACTOR: Primary | ICD-10-CM

## 2020-09-24 DIAGNOSIS — M85.842 OTHER SPECIFIED DISORDERS OF BONE DENSITY AND STRUCTURE, LEFT HAND: ICD-10-CM

## 2020-09-24 DIAGNOSIS — D84.9 IMMUNOCOMPROMISED PATIENT: Chronic | ICD-10-CM

## 2020-09-24 DIAGNOSIS — E29.1 HYPOGONADISM MALE: Chronic | ICD-10-CM

## 2020-09-24 PROCEDURE — A4216 STERILE WATER/SALINE, 10 ML: HCPCS | Performed by: PHYSICIAN ASSISTANT

## 2020-09-24 PROCEDURE — 96413 CHEMO IV INFUSION 1 HR: CPT

## 2020-09-24 PROCEDURE — 99214 PR OFFICE/OUTPT VISIT, EST, LEVL IV, 30-39 MIN: ICD-10-PCS | Mod: S$PBB,,, | Performed by: PHYSICIAN ASSISTANT

## 2020-09-24 PROCEDURE — 25000003 PHARM REV CODE 250: Performed by: PHYSICIAN ASSISTANT

## 2020-09-24 PROCEDURE — 99214 OFFICE O/P EST MOD 30 MIN: CPT | Mod: S$PBB,,, | Performed by: PHYSICIAN ASSISTANT

## 2020-09-24 PROCEDURE — 99999 PR PBB SHADOW E&M-EST. PATIENT-LVL IV: ICD-10-PCS | Mod: PBBFAC,,, | Performed by: PHYSICIAN ASSISTANT

## 2020-09-24 PROCEDURE — 63600175 PHARM REV CODE 636 W HCPCS: Mod: JG | Performed by: PHYSICIAN ASSISTANT

## 2020-09-24 PROCEDURE — 99214 OFFICE O/P EST MOD 30 MIN: CPT | Mod: PBBFAC,25 | Performed by: PHYSICIAN ASSISTANT

## 2020-09-24 PROCEDURE — 99999 PR PBB SHADOW E&M-EST. PATIENT-LVL IV: CPT | Mod: PBBFAC,,, | Performed by: PHYSICIAN ASSISTANT

## 2020-09-24 RX ORDER — METHYLPREDNISOLONE SOD SUCC 125 MG
50 VIAL (EA) INJECTION
Status: CANCELLED
Start: 2020-11-19

## 2020-09-24 RX ORDER — DIPHENHYDRAMINE HYDROCHLORIDE 50 MG/ML
12.5 INJECTION INTRAMUSCULAR; INTRAVENOUS
Status: CANCELLED
Start: 2020-11-19

## 2020-09-24 RX ORDER — DIPHENHYDRAMINE HYDROCHLORIDE 50 MG/ML
25 INJECTION INTRAMUSCULAR; INTRAVENOUS
Status: CANCELLED | OUTPATIENT
Start: 2020-11-19

## 2020-09-24 RX ORDER — SODIUM CHLORIDE 0.9 % (FLUSH) 0.9 %
10 SYRINGE (ML) INJECTION
Status: CANCELLED | OUTPATIENT
Start: 2020-11-19

## 2020-09-24 RX ORDER — EPINEPHRINE 1 MG/ML
0.3 INJECTION, SOLUTION, CONCENTRATE INTRAVENOUS
Status: CANCELLED | OUTPATIENT
Start: 2020-11-19

## 2020-09-24 RX ORDER — IPRATROPIUM BROMIDE AND ALBUTEROL SULFATE 2.5; .5 MG/3ML; MG/3ML
3 SOLUTION RESPIRATORY (INHALATION)
Status: CANCELLED | OUTPATIENT
Start: 2020-11-19

## 2020-09-24 RX ORDER — METHYLPREDNISOLONE SOD SUCC 125 MG
25 VIAL (EA) INJECTION ONCE
Status: CANCELLED
Start: 2020-11-19

## 2020-09-24 RX ORDER — ACETAMINOPHEN 325 MG/1
650 TABLET ORAL
Status: CANCELLED | OUTPATIENT
Start: 2020-11-19

## 2020-09-24 RX ORDER — SODIUM CHLORIDE 0.9 % (FLUSH) 0.9 %
10 SYRINGE (ML) INJECTION
Status: DISCONTINUED | OUTPATIENT
Start: 2020-09-24 | End: 2020-09-24 | Stop reason: HOSPADM

## 2020-09-24 RX ADMIN — Medication 10 ML: at 09:09

## 2020-09-24 RX ADMIN — INFLIXIMAB 800 MG: 100 INJECTION, POWDER, LYOPHILIZED, FOR SOLUTION INTRAVENOUS at 10:09

## 2020-09-24 ASSESSMENT — ROUTINE ASSESSMENT OF PATIENT INDEX DATA (RAPID3): MDHAQ FUNCTION SCORE: 0.3

## 2020-09-24 ASSESSMENT — CLINICAL DISEASE ACTIVITY INDEX (CDAI)
TENDER_JOINTS_COUNT: 2
SWOLLEN_JOINTS_COUNT: 0
PATIENT_ASSESSMENT: 1
PHYSICIAN_ASSESSMENT: 1
TOTAL_SCORE: 4

## 2020-09-24 NOTE — DISCHARGE INSTRUCTIONS
Coronavirus Disease 2019 or COVID-19 is a new strand of coronavirus to infect humans. It was first detected in [place] and has now spread around the world, including the [place].       Currently outbreak levels are on the rise in the  [place], especially [place]. The information on COVID-19 is constantly changing. As the [facility] continues to update information, here are five basics facts you should know about COVID-19.      What are the symptoms of COVID-19??  COVID-19 is a respiratory disease. Some symptoms may include a runny nose, cough, sore throat, headaches and possibly fever. For people who have a weakened immune system, such as the elderly, the very young or immunocompromised patients, symptoms can become severe quickly and can cause serious respiratory tract illnesses, such as pneumonia or bronchitis.?    Should I buy a mask to protect myself from sasha COVID-19??   If you are healthy, you only need to wear a mask if you are taking care of a person with suspected 2019-nCoV infection.   Wear a mask if you are coughing or sneezing.   Masks are effective only when used in combination with frequent hand-cleaning with alcohol-based hand rub or soap and water.   If you wear a mask, then you must know how to use it and dispose of it properly.   Before putting on a mask, clean hands with alcohol-based hand rub or soap and water.   Cover mouth and nose with mask and make sure there are no gaps between your face and the mask.   Avoid touching the mask while using it; if you do, clean your hands with alcohol-based hand rub or soap and water.   Replace the mask with a new one as soon as it is damp and do not re-use single-use masks.   To remove the mask: remove it from behind (do not touch the front of mask); discard immediately in a closed bin; clean hands with alcohol-based hand rub or soap and water.   Please copy the link below; education  and instructional videos on proper use and disposal  of masks  o https://www.who.int/emergencies/diseases/novel-coronavirus-2019/advice-for-public/when-and-how-to-use-masks      Handwashing is Best    best thing to do is to wash your hands thoroughly. For best handwashing techniques, follow the?CDCs guide.   Below is video link to [facility] proper handwashing procedure  o https://www.cdc.gov/handwashing/videos.html    What preventions and treatments are available??  Currently, there is no vaccine to prevent COVID-19. The best way to prevent contraction is to avoid being exposed to the virus. There is no specific treatment for COVID-19. People who contract the virus should receive supportive care from their healthcare team to help alleviate symptoms. People with a severe case will need care that involves support for vital organs. Some everyday preventions the?CDC recommends?are:    -Stay home, you do not have to be confined to your home; its ok to go outside just make sure you remain 6 ft away from others if you walk down your street or talk to neighbors  -Absolutely Avoid close contact with people who are sick  -Avoid touching your eyes, nose and mouth  -Stay home when you are sick  -Cover your cough or sneezes with a tissue and immediately throw it away in the trash.  -Disinfect objects that you frequently use, such as your phone, computer, purse, remotes, chargers, kids toys, water bottles, etc.  -Wash your hands frequently with soap and water for at least 20 seconds, especially after going to the bathroom, before eating or after you cough or sneeze.    Can I still travel??  I would advises against travel currently both domestic and international. If travel is an an emergency take extreme caution and if trying to return to the [place], can expect restrictions on entry and quarantines upon return. The status of COVID-19 is changing every day, so continue to monitor the?CDCs list of travel notices before traveling.?    What should I do if I think I have  COVID-19??  If you are an [facility] patient and think you contracted COVID-19, reach out to [facility], our free 24/7 nurse care line. [facility], is always available when you have health concerns or need advice on care options. Our specially trained registered nurses are available to discuss your health care concerns, recommend self-care techniques and help you decide if your symptoms require a visit to urgent or emergency care.     The service is free and available by calling [phone]?or?[phone].   Or see a provider from home with an [facility] virtual visit.    Patients with questions can also call the [facility] Covid-19 Line at [phone].          Rheumatology Patients:   If you are on immunosuppressive medications   Do Not Stop your medications, continue to take as prescribed   Stopping medications may cause a flare up of your underlying conditions which would increase your risk of infection so the best advice is to continue your medications as prescribed    If you develop any signs or symptoms consistent w/Covid-19 (dry cough, shortness of breath, fever>100, chest congestion, headache, chills) at that time Please stop taking all immunosuppressive medications immediately   o Do Not stop taking prednisone as abrupt discontinuation can cause issues.   o Do Not stop taking hydroxychloroquine as it has some anti-viral properties and is being used as treatment for coronavirus immune response   Immunosuppressive meds are not limited to but include medications like:  o Humira, Enbrel, Cimzia, Remicade, Simponi  o Orencia  o Rituxan, Cytoxan  o Benlysta  o Xeljanz, Olumiant, Rinvoq  o Kineret, Ilaris  o Cosentyx, Taltz  o Stelara, Tremfya  o Methotrexate, mycophenolate (cellcept), azathioprine (imuran), leflunomide (arava), tacrolimus (prograf), cyclosporin, dapsone  o If you are taking Actemra or Kevzara  Please contact your provider   for patient specific instructions on  interrupting your dose while you go through the infection course     If you have any specific rheumatology questions please reach out to your provider via phone call [phone] or via patient portal message  o Also available resources is [facility] [phone]?or?[phone].   o [facility] virtual visit   o You can also be seen by your rheumatology provider from home with my chart video visit for face time visit     If you test positive for Covid-19 and/or Develop symptoms consistent with Covid-19 Coronavirus Infection    Please immediately notify your Rheumatology Provider Immediately    Main Clinic number : [phone] or [phone] as for [facility].    One of our staff members will call you back to discuss your issues   You can also reach us via My Chart Patient Portal Message      its important to know that we do not want your to resume your Dmard or biologic medications until you are free of symptoms for 7 full days and 14 days after onset of symptoms you have repeat covid-19 testing which are negative        More info below:copy the link into your web browser    [email or web address]        [place] [facility]  Preventing the Spread of Coronavirus Disease 2019 in Homes and Residential Communities    Prevention steps for people with confirmed or suspected COVID-19 (including persons under investigation) who do not need to be hospitalized and people with confirmed COVID-19 who were hospitalized and determined to be medically stable to go home    Your healthcare provider and public health staff will evaluate whether you can be cared for at home.   Stay home except to get medical care.   Separate yourself from other people and animals in your home   Call ahead before visiting your doctor.   Wear a facemask.   Cover your coughs and sneezes.   Clean your hands  often.   Avoid sharing personal household items.   Clean all high-touch surfaces everyday.   Monitor your symptoms.     Seek prompt medical attention if your illness is worsening (e.g.,difficulty breathing). Before seeking care, call your healthcare provider. If you have a medical emergency and need to call 911, notify the dispatch personnel that you have, or are being evaluated for COVID-19. If possible, put on a facemask before emergency medical services arrive.      Discontinuing home isolation. Call your provider about guidance to discontinue home isolation.    Recommended precautions for household members, intimate partners, and caregivers in a non healthcare setting of a patient with symptomatic laboratory-confirmed COVID-19 or a patient under investigation    Household members, intimate partners, and caregivers in a nonhealthcare setting may have close contact with a person with symptomatic, laboratory-confirmed COVID-19 or a person under investigation. Close contacts should monitor their health; they should call their healthcare provider right away if they develop symptoms suggestive of COVID-19 (e.g., fever, cough, shortness of breath).    Close contacts should also follow these recommendations:    Make sure that you understand and can help the patient follow their healthcare providers instructions for medication(s) and care. You should help the patient with basic needs in the home and provide support for getting groceries, prescriptions, and other personal needs.    Monitor the patients symptoms. If the patient is getting sicker, call his or her healthcare provider and tell them that the patient has laboratory-confirmed COVID-19. This will help the healthcare providers office take steps to keep other people in the office or waiting room from getting infected. Ask the healthcare provider to call the local or state health department for additional guidance. If the patient has a medical emergency  and you need to call 911, notify the dispatch personnel that the patient has, or is being evaluated for COVID-19.    Household members should stay in another room or be  from the patient as much as possible. Household members should use a separate bedroom and bathroom, if available.   Prohibit visitors who do not have an essential need to be in the home.    Household members should not care for any pets in the home. Do not handle pets or other animals while sick.   Make sure that shared spaces in the home have good air flow, such as by an air conditioner or an opened window, weather permitting.   Perform hand hygiene frequently. Wash your hands often with soap and water for at least 20 seconds or use an alcohol-based hand  that contains 60 to 95% alcohol, covering all surfaces of your hands and rubbing them together until they feel dry. Soap and water should be used preferentially if hands are visibly dirty.   Avoid touching your eyes, nose, and mouth with unwashed hands.   The patient should wear a facemask when you are around other people. If the patient is not able to wear a facemask (for example, because it causes trouble breathing), you, as the caregiver, should wear a mask when you are in the same room as the patient.   Wear a disposable facemask and gloves when you touch or have contact with the patients blood, stool, or body fluids, such as saliva, sputum, nasal mucus, vomit, urine.   Throw out disposable facemasks and gloves after using them. Do not reuse.   When removing personal protective equipment, first remove and dispose of gloves. Then, immediately clean your hands with soap and water or alcohol-based hand . Next,remove and dispose of facemask, and immediately clean your hands again with soap and water or alcohol-based hand .    Avoid sharing household items with the patient. You should not share dishes, drinking glasses, cups, eating utensils, towels,  bedding, or other items. After the patient uses these items, you should wash them thoroughly (see below Wash laundry thoroughly).   Clean all high-touch surfaces, such as counters, tabletops, doorknobs, bathroom fixtures, toilets, phones, keyboards, tablets, and bedside tables, every day. Also, clean any surfaces that may have blood, stool, or body fluids on them.   Use a household cleaning spray or wipe, according to the label instructions. Labels contain instructions for safe and effective use of the cleaning product including precautions you should take when applying the product, such as wearing gloves and making sure you have good ventilation during use of the product.   Wash laundry thoroughly.    Immediately remove and wash clothes or bedding that have blood, stool, or body fluids on them.   Wear disposable gloves while handling soiled items and keep soiled items away from your body. Clean your hands (with soap and water or an alcohol-based hand ) immediately after removing your gloves.      Read and follow directions on labels of laundry or clothing items and detergent.   o In general, using a normal laundry detergent according to washing machine instructions and dry thoroughly using the warmest temperatures recommended on the clothing label.    Place all used disposable gloves, facemasks, and other contaminated items in a lined container before disposing of them with other household waste.    Clean your hands (with soap and water or an alcohol-based hand ) immediately after handling these items.   o Soap and water should be used preferentially if hands are visibly dirty.   Discuss any additional questions with your state or local health department or healthcare provider.   Check available hours when contacting your local health department. [facility] and [facility]

## 2020-09-24 NOTE — NURSING
Infusion# >10    Recent labs? yes    S/S infection noted or voiced? None noted/ denies  Recent or planned surgeries/invasive procedures? Denies  Recent vaccines? Denies    Premeds? None    Remicade 800 mg administered IV at a 90 minute rate per orders; see MAR & Flow Sheet for more  details. Tolerated well without adverse events

## 2020-09-24 NOTE — PROGRESS NOTES
Subjective:       Patient ID: Jack Back is a 75 y.o. male.    Chief Complaint: Rheumatoid Arthritis      Jack is back today for rheumatology follow-up.      He has seropositive erosive rheumatoid arthritis. In the past failed mtx monotherapy. Moved to IV remicade; RA has done very well on remicade.     6/2019 had  redo of cabg- In 2017 Had MI required PTCA stent placement. 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 Prior cabc in 2012  Had some sob post d/c- cxr showed pleural effusion, admitted back to hospital, given lasix and started on daily dose  F/w visit with his CVT surgeon 6/25/19  he was cleared to resume his remicade , repeat cxr showed resolving effusion; no sob w/activity; some right sided chest wall pain but no cp w/exertion its more positional   Still on his bp meds, this is well controlled. He did loose some weight prior to and since surgery.  On repatha his cholesterol is much better    RA wise doing well; we did cut back on mtx to 10 mg/d but skin psoriasis recurred left elbow, right knee and buttocks, went back up to mtx 15 mg/wk and now rash resolving  Still taking folic acid supplement, no prednisone     Rates pain today 1-2/10- Fransico 5th mcp joints just aching but no swelling   Nothing severe or progressive     Still on IV remicade 800 mg Every 8 weeks      He has not developed heart failure. No lower ext edema, mild sob, no chest pain. No pnd    Chronic elevated esr, normal crp. 5/2017 spep showed elevated gammaglobulins, no polyclonal or monoclonal peaks in IF. Hypergammaglobulinemia. Repeat done 9/2018 IF  There is a faint linear irregularity in gamma involving IgG and lambda, non-specific at this time.  Recommend repeat studies in 3-6 months or as clinically indicated. Esr in the 50-60's consistently, crp normal  No weight loss, fevers or night sweats.       Vaccines all up to date except TdaP but pt allergic to Tetanus. Will need yearly flu vaccine, took mtx yesterday     bone density done in June 2014  "which was normal.         Review of Systems   Constitutional: Negative.  Negative for chills, fatigue and fever.   HENT: Negative.  Negative for congestion, mouth sores and trouble swallowing.    Eyes: Negative.  Negative for photophobia, redness and visual disturbance.   Respiratory: Negative.  Negative for cough, shortness of breath and wheezing.    Cardiovascular: Negative.  Negative for chest pain and leg swelling.   Gastrointestinal: Negative.  Negative for abdominal distention, abdominal pain, diarrhea, nausea and vomiting.   Genitourinary: Negative.  Negative for dysuria, flank pain, frequency and urgency.   Musculoskeletal: Positive for arthralgias. Negative for back pain, gait problem, joint swelling and myalgias.        Mild aching and stiffness   Skin: Negative.  Negative for rash.   Neurological: Negative.  Negative for dizziness, weakness and numbness.           Objective:     Ht 5' 6" (1.676 m)   Wt 90.5 kg (199 lb 8.3 oz)   BMI 32.20 kg/m²         Physical Exam   Constitutional: He is oriented to person, place, and time and well-developed, well-nourished, and in no distress. No distress.   HENT:   Head: Normocephalic and atraumatic.   Right Ear: External ear normal.   Left Ear: External ear normal.   Mouth/Throat: No oropharyngeal exudate.   Eyes: Conjunctivae and EOM are normal. Pupils are equal, round, and reactive to light. No scleral icterus.   Neck: Neck supple. No thyromegaly present.   Cardiovascular: Normal rate, regular rhythm and normal heart sounds.    No murmur heard.  Pulmonary/Chest: Effort normal and breath sounds normal. No respiratory distress.   Incision has healed nicely  No redness or drainage   Abdominal: Soft. Bowel sounds are normal.       Right Side Rheumatological Exam     Examination finds the shoulder, 1st PIP, 1st MCP, 2nd PIP, 2nd MCP, 3rd PIP, 3rd MCP, 4th PIP, 4th MCP, 5th PIP and 5th MCP normal.    Joint Exam Comments   Elbow: limited extension   Wrist: Mild reduced " flexion of wrist, no synovitis    Left Side Rheumatological Exam     Examination finds the shoulder, elbow, 1st PIP, 1st MCP, 2nd PIP, 2nd MCP, 3rd PIP, 3rd MCP, 4th PIP, 4th MCP, 5th PIP and 5th MCP normal.    The patient is tender to palpation of the knee.    Joint Exam Comments   Wrist: Limited motion left wrists, no synovitis      Lymphadenopathy:     He has no cervical adenopathy.   Neurological: He is alert and oriented to person, place, and time. No cranial nerve deficit. He exhibits normal muscle tone. Gait normal. Coordination normal.   Skin: Skin is warm and dry. Rash noted.     Left olecranon scaly dry rash   Musculoskeletal: Deformity present. No edema.      Comments: Ulnar drift 25 degrees right hand and 15 degrees left hand, right elbow fixed flexion stops about  5 degrees from baseline,  Limited motion both wrist extension  No synovitis on exam today  Fransico 5th mcp some mild ttp                     Recent Results (from the past 168 hour(s))   C-reactive protein    Collection Time: 09/24/20  8:36 AM   Result Value Ref Range    CRP 7.8 0.0 - 8.2 mg/L   Comprehensive metabolic panel    Collection Time: 09/24/20  8:36 AM   Result Value Ref Range    Sodium 140 136 - 145 mmol/L    Potassium 3.8 3.5 - 5.1 mmol/L    Chloride 102 95 - 110 mmol/L    CO2 29 23 - 29 mmol/L    Glucose 104 70 - 110 mg/dL    BUN, Bld 17 8 - 23 mg/dL    Creatinine 1.1 0.5 - 1.4 mg/dL    Calcium 9.4 8.7 - 10.5 mg/dL    Total Protein 7.5 6.0 - 8.4 g/dL    Albumin 3.4 (L) 3.5 - 5.2 g/dL    Total Bilirubin 0.4 0.1 - 1.0 mg/dL    Alkaline Phosphatase 57 55 - 135 U/L    AST 28 10 - 40 U/L    ALT 18 10 - 44 U/L    Anion Gap 9 8 - 16 mmol/L    eGFR if African American >60 >60 mL/min/1.73 m^2    eGFR if non African American >60 >60 mL/min/1.73 m^2   CBC auto differential    Collection Time: 09/24/20  8:36 AM   Result Value Ref Range    WBC 9.28 3.90 - 12.70 K/uL    RBC 4.17 (L) 4.60 - 6.20 M/uL    Hemoglobin 14.5 14.0 - 18.0 g/dL    Hematocrit  43.1 40.0 - 54.0 %    Mean Corpuscular Volume 103 (H) 82 - 98 fL    Mean Corpuscular Hemoglobin 34.8 (H) 27.0 - 31.0 pg    Mean Corpuscular Hemoglobin Conc 33.6 32.0 - 36.0 g/dL    RDW 15.2 (H) 11.5 - 14.5 %    Platelets 300 150 - 350 K/uL    MPV 8.6 (L) 9.2 - 12.9 fL    Immature Granulocytes 0.3 0.0 - 0.5 %    Gran # (ANC) 6.3 1.8 - 7.7 K/uL    Immature Grans (Abs) 0.03 0.00 - 0.04 K/uL    Lymph # 2.0 1.0 - 4.8 K/uL    Mono # 0.7 0.3 - 1.0 K/uL    Eos # 0.3 0.0 - 0.5 K/uL    Baso # 0.02 0.00 - 0.20 K/uL    nRBC 0 0 /100 WBC    Gran% 68.1 38.0 - 73.0 %    Lymph% 21.8 18.0 - 48.0 %    Mono% 7.0 4.0 - 15.0 %    Eosinophil% 2.9 0.0 - 8.0 %    Basophil% 0.2 0.0 - 1.9 %    Differential Method Automated          Lab Results   Component Value Date    HEPAIGM Negative 07/30/2020    HEPBIGM Negative 07/30/2020    HEPCAB Negative 07/30/2020        Ref. Range 7/3/2019 09:33 7/10/2019 11:18   Hep B Viral DNA IU/ML Latest Ref Range: <10 IU/mL  <10   Hepatitis B Surface Ag Unknown Positive (A)    Hepatitis B Virus DNA Latest Ref Range: Not detected   Not detected   Log HBV IU/mL Latest Ref Range: <1.00 Log (10) IU/mL  <1.00       Lab Results   Component Value Date    TBGOLDPLUS Negative 01/10/2019         spep and IF   Component      Latest Ref Rng & Units 9/25/2018   Protein, Serum      6.0 - 8.4 g/dL 7.3   Albumin grams/dl      3.35 - 5.55 g/dL 3.42   Alpha-1 grams/dl      0.17 - 0.41 g/dL 0.34   Alpha-2 grams/dl      0.43 - 0.99 g/dL 0.77   Beta grams/dl      0.50 - 1.10 g/dL 1.23 (H)   Gamma grams/dl      0.67 - 1.58 g/dL 1.53   There is a faint linear irregularity in gamma involving IgG and   lambda, non-specific at this time.  Recommend repeat studies in 3-6   months or as clinically indicated.     DEXA 6/17/14 NORMAL          6/2020, 7/2018, 6/2016, 2/2015  saloni hand and foot X-rays stable       Assessment:       1. Rheumatoid arthritis involving multiple sites with positive rheumatoid factor    2. Encounter for long-term  (current) use of high-risk medication    3. Elevated sed rate    4. Hypergammaglobulinemia    5. Immunocompromised patient            1. Seropositive Erosive RA stable on IV Remicade 800 mg Q 8 weeks and mtx 15 mg/wk - chronic damage; no RA activity       NICKERSON-28 (CRP): 2.67 (Low disease activity)    CDAI 4   LI 0.6    In the past he has failed mtx monotherapy  Has done well on combination Rermicade and mtx      2. Vaccines need TdaP but allergic to tetnus- HD flu vaccine in the fall     3. Med monitoring and immunocompromised     4. Immunocompromised no issues with recurrent infections    5. s/p Post CABG- redo 5/21/19  CAD post CABG X 3 2012 and post PTCA stent 2016 both related to MI with normal heart function on recent  ECHO- on zetia, asa and omega fish oil, repatha  RA well controlled  CRP normal  On mtx and remicade- helps reduce cardiac risk     6. Chronic elevated esr- last visit crp - hypergammaglobulins on spep, no monoclonal peaks on IF but faint irregularity noted- will send to heme/onc for work up     7. Mild skin psoriais better on mtx 15 mg/wk, flared on lower dose       8. Need for flu vaccine; took mtx yesterday     Plan:       Ok for IV Remicade 800 mg  infusion over 90 min every 8 weeks   No premeds    C/w   mtx to 15mg weekly- RA ok on lower dose but skin psoriasis exacerbation w/lower dose   Always remember to Hold mtx for signs of infection or for surgery, Discussed risk versus benefits and potential side effects of mtx.    Skin pso- c/w mtx and topicals    Remain off prednisone      Watch closely if any heart failure develops then we would consider using [place] or actemra (high esr) or anther biologic rather than going back to TNF agents    Keep his folic acid daily     bone density up-to-date repeat in 3-5 years- next visit   defer TdaP due to pt allergic to tetanus; pneumovax booster this year.   fluAd in 2-3 wks, skip that wed mtx dose    Continue all meds per cards and follow up  closely    rtc 8 weeks infusion only- reg 4 labs and dexa   rtc 16 wks visit w/lam, Infusion and labs - reg 4    Hep screening and tb up to date     call with any questions, changes, or concerns                CC:   Dr Sanjay Ansari-CARDS  Dr Shahram Benavides-PCP

## 2020-09-30 DIAGNOSIS — D84.9 IMMUNOCOMPROMISED PATIENT: Primary | Chronic | ICD-10-CM

## 2020-10-16 ENCOUNTER — CLINICAL SUPPORT (OUTPATIENT)
Dept: INTERNAL MEDICINE | Facility: CLINIC | Age: 75
End: 2020-10-16
Payer: MEDICARE

## 2020-10-16 PROCEDURE — 99213 OFFICE O/P EST LOW 20 MIN: CPT | Mod: PBBFAC,PO,25

## 2020-10-16 PROCEDURE — 99999 PR PBB SHADOW E&M-EST. PATIENT-LVL III: ICD-10-PCS | Mod: PBBFAC,,,

## 2020-10-16 PROCEDURE — 90694 VACC AIIV4 NO PRSRV 0.5ML IM: CPT | Mod: PBBFAC,PO

## 2020-10-16 PROCEDURE — 99999 PR PBB SHADOW E&M-EST. PATIENT-LVL III: CPT | Mod: PBBFAC,,,

## 2020-10-16 PROCEDURE — G0008 ADMIN INFLUENZA VIRUS VAC: HCPCS | Mod: PBBFAC,PO

## 2020-11-19 ENCOUNTER — INFUSION (OUTPATIENT)
Dept: INFUSION THERAPY | Facility: HOSPITAL | Age: 75
End: 2020-11-19
Attending: PHYSICIAN ASSISTANT
Payer: MEDICARE

## 2020-11-19 VITALS
OXYGEN SATURATION: 97 % | WEIGHT: 192.88 LBS | HEIGHT: 67 IN | RESPIRATION RATE: 18 BRPM | BODY MASS INDEX: 30.27 KG/M2 | SYSTOLIC BLOOD PRESSURE: 114 MMHG | DIASTOLIC BLOOD PRESSURE: 60 MMHG | TEMPERATURE: 98 F | HEART RATE: 51 BPM

## 2020-11-19 DIAGNOSIS — M05.79 RHEUMATOID ARTHRITIS INVOLVING MULTIPLE SITES WITH POSITIVE RHEUMATOID FACTOR: Primary | ICD-10-CM

## 2020-11-19 PROCEDURE — 96413 CHEMO IV INFUSION 1 HR: CPT

## 2020-11-19 PROCEDURE — A4216 STERILE WATER/SALINE, 10 ML: HCPCS | Performed by: PHYSICIAN ASSISTANT

## 2020-11-19 PROCEDURE — 63600175 PHARM REV CODE 636 W HCPCS: Mod: JG | Performed by: PHYSICIAN ASSISTANT

## 2020-11-19 PROCEDURE — 25000003 PHARM REV CODE 250: Performed by: PHYSICIAN ASSISTANT

## 2020-11-19 RX ORDER — METHYLPREDNISOLONE SOD SUCC 125 MG
50 VIAL (EA) INJECTION
Status: CANCELLED
Start: 2021-01-14

## 2020-11-19 RX ORDER — METHYLPREDNISOLONE SOD SUCC 125 MG
25 VIAL (EA) INJECTION ONCE
Status: CANCELLED
Start: 2021-01-14

## 2020-11-19 RX ORDER — ACETAMINOPHEN 325 MG/1
650 TABLET ORAL
Status: CANCELLED | OUTPATIENT
Start: 2021-01-14

## 2020-11-19 RX ORDER — DIPHENHYDRAMINE HYDROCHLORIDE 50 MG/ML
25 INJECTION INTRAMUSCULAR; INTRAVENOUS
Status: CANCELLED | OUTPATIENT
Start: 2021-01-14

## 2020-11-19 RX ORDER — EPINEPHRINE 1 MG/ML
0.3 INJECTION, SOLUTION, CONCENTRATE INTRAVENOUS
Status: CANCELLED | OUTPATIENT
Start: 2021-01-14

## 2020-11-19 RX ORDER — SODIUM CHLORIDE 0.9 % (FLUSH) 0.9 %
10 SYRINGE (ML) INJECTION
Status: CANCELLED | OUTPATIENT
Start: 2021-01-14

## 2020-11-19 RX ORDER — SODIUM CHLORIDE 0.9 % (FLUSH) 0.9 %
10 SYRINGE (ML) INJECTION
Status: DISCONTINUED | OUTPATIENT
Start: 2020-11-19 | End: 2020-11-19 | Stop reason: HOSPADM

## 2020-11-19 RX ORDER — DIPHENHYDRAMINE HYDROCHLORIDE 50 MG/ML
12.5 INJECTION INTRAMUSCULAR; INTRAVENOUS
Status: CANCELLED
Start: 2021-01-14

## 2020-11-19 RX ORDER — IPRATROPIUM BROMIDE AND ALBUTEROL SULFATE 2.5; .5 MG/3ML; MG/3ML
3 SOLUTION RESPIRATORY (INHALATION)
Status: CANCELLED | OUTPATIENT
Start: 2021-01-14

## 2020-11-19 RX ADMIN — Medication 10 ML: at 11:11

## 2020-11-19 RX ADMIN — INFLIXIMAB 800 MG: 100 INJECTION, POWDER, LYOPHILIZED, FOR SOLUTION INTRAVENOUS at 11:11

## 2020-11-19 NOTE — NURSING
Infusion# >10  Next infusion is scheduled for 1/14/21  Along with Provider visit with Cleo PEREZ and Trey Hooper labs.    Recent labs? Reviewed    S/S infection noted or voiced? None/Denies  Recent or planned surgeries/invasive procedures? None/Denies  Recent vaccines? None/Denies    Premeds? None    Remicade 800 mg administered IV. Titrated per orders over 90 minutes. see MAR & Flow Sheet for more  details. Tolerated well without adverse events

## 2020-11-19 NOTE — PLAN OF CARE
Provide comfort measures.  Provide snack and refreshments.  Explain POT.   SHe has been having more depression of late. SHe has a depression scale of 10. She has an anxiety scale of 10. She says her fluctuating sugars are also frustrating. She is not having trouble sleeping at night. She is taking all her medications. She says that she can't afford counseling. She says she feels more depressed. She is willing to see [facility] for additional depression monitoring. I will see her in May for her diabetes follow up. She has had suicidal thoughts in the past. She does not have right now.

## 2021-01-14 ENCOUNTER — OFFICE VISIT (OUTPATIENT)
Dept: RHEUMATOLOGY | Facility: CLINIC | Age: 76
End: 2021-01-14
Payer: MEDICARE

## 2021-01-14 ENCOUNTER — INFUSION (OUTPATIENT)
Dept: INFUSION THERAPY | Facility: HOSPITAL | Age: 76
End: 2021-01-14
Attending: INTERNAL MEDICINE
Payer: MEDICARE

## 2021-01-14 VITALS
TEMPERATURE: 98 F | DIASTOLIC BLOOD PRESSURE: 67 MMHG | HEART RATE: 56 BPM | OXYGEN SATURATION: 100 % | SYSTOLIC BLOOD PRESSURE: 119 MMHG | HEIGHT: 67 IN | WEIGHT: 192.44 LBS | BODY MASS INDEX: 30.2 KG/M2 | RESPIRATION RATE: 18 BRPM

## 2021-01-14 VITALS
WEIGHT: 192.44 LBS | HEIGHT: 67 IN | SYSTOLIC BLOOD PRESSURE: 117 MMHG | BODY MASS INDEX: 30.2 KG/M2 | DIASTOLIC BLOOD PRESSURE: 63 MMHG | HEART RATE: 73 BPM

## 2021-01-14 DIAGNOSIS — R70.0 ELEVATED SED RATE: ICD-10-CM

## 2021-01-14 DIAGNOSIS — Z79.899 ENCOUNTER FOR LONG-TERM (CURRENT) USE OF HIGH-RISK MEDICATION: Chronic | ICD-10-CM

## 2021-01-14 DIAGNOSIS — Z51.81 MEDICATION MONITORING ENCOUNTER: Chronic | ICD-10-CM

## 2021-01-14 DIAGNOSIS — M05.79 RHEUMATOID ARTHRITIS INVOLVING MULTIPLE SITES WITH POSITIVE RHEUMATOID FACTOR: Primary | ICD-10-CM

## 2021-01-14 DIAGNOSIS — D84.9 IMMUNOCOMPROMISED PATIENT: Chronic | ICD-10-CM

## 2021-01-14 DIAGNOSIS — M05.79 RHEUMATOID ARTHRITIS INVOLVING MULTIPLE SITES WITH POSITIVE RHEUMATOID FACTOR: Primary | Chronic | ICD-10-CM

## 2021-01-14 DIAGNOSIS — D89.2 HYPERGAMMAGLOBULINEMIA: ICD-10-CM

## 2021-01-14 PROCEDURE — 99214 PR OFFICE/OUTPT VISIT, EST, LEVL IV, 30-39 MIN: ICD-10-PCS | Mod: S$PBB,,, | Performed by: PHYSICIAN ASSISTANT

## 2021-01-14 PROCEDURE — 96413 CHEMO IV INFUSION 1 HR: CPT

## 2021-01-14 PROCEDURE — 99999 PR PBB SHADOW E&M-EST. PATIENT-LVL V: ICD-10-PCS | Mod: PBBFAC,,, | Performed by: PHYSICIAN ASSISTANT

## 2021-01-14 PROCEDURE — 63600175 PHARM REV CODE 636 W HCPCS: Mod: JG | Performed by: PHYSICIAN ASSISTANT

## 2021-01-14 PROCEDURE — 99214 OFFICE O/P EST MOD 30 MIN: CPT | Mod: S$PBB,,, | Performed by: PHYSICIAN ASSISTANT

## 2021-01-14 PROCEDURE — 25000003 PHARM REV CODE 250: Performed by: PHYSICIAN ASSISTANT

## 2021-01-14 PROCEDURE — 99999 PR PBB SHADOW E&M-EST. PATIENT-LVL V: CPT | Mod: PBBFAC,,, | Performed by: PHYSICIAN ASSISTANT

## 2021-01-14 PROCEDURE — 99215 OFFICE O/P EST HI 40 MIN: CPT | Mod: PBBFAC,25 | Performed by: PHYSICIAN ASSISTANT

## 2021-01-14 RX ORDER — METHYLPREDNISOLONE SOD SUCC 125 MG
50 VIAL (EA) INJECTION
Status: CANCELLED
Start: 2021-03-11

## 2021-01-14 RX ORDER — DIPHENHYDRAMINE HYDROCHLORIDE 50 MG/ML
12.5 INJECTION INTRAMUSCULAR; INTRAVENOUS
Status: CANCELLED
Start: 2021-03-11

## 2021-01-14 RX ORDER — SODIUM CHLORIDE 0.9 % (FLUSH) 0.9 %
10 SYRINGE (ML) INJECTION
Status: CANCELLED | OUTPATIENT
Start: 2021-03-11

## 2021-01-14 RX ORDER — EPINEPHRINE 1 MG/ML
0.3 INJECTION, SOLUTION, CONCENTRATE INTRAVENOUS
Status: CANCELLED | OUTPATIENT
Start: 2021-03-11

## 2021-01-14 RX ORDER — ACETAMINOPHEN 325 MG/1
650 TABLET ORAL
Status: CANCELLED | OUTPATIENT
Start: 2021-03-11

## 2021-01-14 RX ORDER — SODIUM CHLORIDE 0.9 % (FLUSH) 0.9 %
10 SYRINGE (ML) INJECTION
Status: DISCONTINUED | OUTPATIENT
Start: 2021-01-14 | End: 2021-01-14 | Stop reason: HOSPADM

## 2021-01-14 RX ORDER — METHOTREXATE 2.5 MG/1
17.5 TABLET ORAL
Qty: 84 TABLET | Refills: 4 | Status: SHIPPED | OUTPATIENT
Start: 2021-01-14 | End: 2022-02-09 | Stop reason: SDUPTHER

## 2021-01-14 RX ORDER — DIPHENHYDRAMINE HYDROCHLORIDE 50 MG/ML
25 INJECTION INTRAMUSCULAR; INTRAVENOUS
Status: CANCELLED | OUTPATIENT
Start: 2021-03-11

## 2021-01-14 RX ORDER — METHYLPREDNISOLONE SOD SUCC 125 MG
25 VIAL (EA) INJECTION ONCE
Status: DISCONTINUED | OUTPATIENT
Start: 2021-01-14 | End: 2021-01-14 | Stop reason: HOSPADM

## 2021-01-14 RX ORDER — METHYLPREDNISOLONE SOD SUCC 125 MG
25 VIAL (EA) INJECTION ONCE
Status: CANCELLED
Start: 2021-03-11

## 2021-01-14 RX ORDER — IPRATROPIUM BROMIDE AND ALBUTEROL SULFATE 2.5; .5 MG/3ML; MG/3ML
3 SOLUTION RESPIRATORY (INHALATION)
Status: CANCELLED | OUTPATIENT
Start: 2021-03-11

## 2021-01-14 RX ADMIN — INFLIXIMAB 800 MG: 100 INJECTION, POWDER, LYOPHILIZED, FOR SOLUTION INTRAVENOUS at 09:01

## 2021-01-14 ASSESSMENT — ROUTINE ASSESSMENT OF PATIENT INDEX DATA (RAPID3): MDHAQ FUNCTION SCORE: 0

## 2021-03-11 ENCOUNTER — INFUSION (OUTPATIENT)
Dept: INFUSION THERAPY | Facility: HOSPITAL | Age: 76
End: 2021-03-11
Attending: INTERNAL MEDICINE
Payer: MEDICARE

## 2021-03-11 VITALS
HEART RATE: 55 BPM | WEIGHT: 191.38 LBS | SYSTOLIC BLOOD PRESSURE: 107 MMHG | TEMPERATURE: 98 F | RESPIRATION RATE: 18 BRPM | DIASTOLIC BLOOD PRESSURE: 61 MMHG | OXYGEN SATURATION: 96 % | BODY MASS INDEX: 29.97 KG/M2

## 2021-03-11 DIAGNOSIS — M05.79 RHEUMATOID ARTHRITIS INVOLVING MULTIPLE SITES WITH POSITIVE RHEUMATOID FACTOR: Primary | ICD-10-CM

## 2021-03-11 PROCEDURE — 25000003 PHARM REV CODE 250: Performed by: PHYSICIAN ASSISTANT

## 2021-03-11 PROCEDURE — 63600175 PHARM REV CODE 636 W HCPCS: Mod: JG | Performed by: PHYSICIAN ASSISTANT

## 2021-03-11 PROCEDURE — 96413 CHEMO IV INFUSION 1 HR: CPT

## 2021-03-11 PROCEDURE — A4216 STERILE WATER/SALINE, 10 ML: HCPCS | Performed by: PHYSICIAN ASSISTANT

## 2021-03-11 RX ORDER — ACETAMINOPHEN 325 MG/1
650 TABLET ORAL
Status: CANCELLED | OUTPATIENT
Start: 2021-05-06

## 2021-03-11 RX ORDER — DIPHENHYDRAMINE HYDROCHLORIDE 50 MG/ML
12.5 INJECTION INTRAMUSCULAR; INTRAVENOUS
Status: CANCELLED
Start: 2021-05-06 | End: 2021-05-06

## 2021-03-11 RX ORDER — METHYLPREDNISOLONE SOD SUCC 125 MG
25 VIAL (EA) INJECTION ONCE
Status: CANCELLED
Start: 2021-05-06 | End: 2021-05-06

## 2021-03-11 RX ORDER — EPINEPHRINE 1 MG/ML
0.3 INJECTION, SOLUTION, CONCENTRATE INTRAVENOUS
Status: CANCELLED | OUTPATIENT
Start: 2021-05-06

## 2021-03-11 RX ORDER — METHYLPREDNISOLONE SOD SUCC 125 MG
50 VIAL (EA) INJECTION
Status: CANCELLED
Start: 2021-05-06 | End: 2021-05-06

## 2021-03-11 RX ORDER — DIPHENHYDRAMINE HYDROCHLORIDE 50 MG/ML
25 INJECTION INTRAMUSCULAR; INTRAVENOUS
Status: CANCELLED | OUTPATIENT
Start: 2021-05-06

## 2021-03-11 RX ORDER — SODIUM CHLORIDE 0.9 % (FLUSH) 0.9 %
10 SYRINGE (ML) INJECTION
Status: DISCONTINUED | OUTPATIENT
Start: 2021-03-11 | End: 2021-03-11 | Stop reason: HOSPADM

## 2021-03-11 RX ORDER — SODIUM CHLORIDE 0.9 % (FLUSH) 0.9 %
10 SYRINGE (ML) INJECTION
Status: CANCELLED | OUTPATIENT
Start: 2021-05-06

## 2021-03-11 RX ORDER — IPRATROPIUM BROMIDE AND ALBUTEROL SULFATE 2.5; .5 MG/3ML; MG/3ML
3 SOLUTION RESPIRATORY (INHALATION)
Status: CANCELLED | OUTPATIENT
Start: 2021-05-06

## 2021-03-11 RX ADMIN — INFLIXIMAB 800 MG: 100 INJECTION, POWDER, LYOPHILIZED, FOR SOLUTION INTRAVENOUS at 09:03

## 2021-03-11 RX ADMIN — Medication 10 ML: at 09:03

## 2021-05-06 ENCOUNTER — OFFICE VISIT (OUTPATIENT)
Dept: RHEUMATOLOGY | Facility: CLINIC | Age: 76
End: 2021-05-06
Payer: MEDICARE

## 2021-05-06 ENCOUNTER — INFUSION (OUTPATIENT)
Dept: INFUSION THERAPY | Facility: HOSPITAL | Age: 76
End: 2021-05-06
Attending: INTERNAL MEDICINE
Payer: MEDICARE

## 2021-05-06 VITALS
OXYGEN SATURATION: 98 % | BODY MASS INDEX: 28.69 KG/M2 | SYSTOLIC BLOOD PRESSURE: 112 MMHG | WEIGHT: 183.19 LBS | HEART RATE: 57 BPM | DIASTOLIC BLOOD PRESSURE: 68 MMHG | TEMPERATURE: 98 F | RESPIRATION RATE: 16 BRPM

## 2021-05-06 VITALS
WEIGHT: 183.19 LBS | HEIGHT: 67 IN | BODY MASS INDEX: 28.75 KG/M2 | SYSTOLIC BLOOD PRESSURE: 119 MMHG | HEART RATE: 69 BPM | DIASTOLIC BLOOD PRESSURE: 68 MMHG

## 2021-05-06 DIAGNOSIS — M05.79 RHEUMATOID ARTHRITIS INVOLVING MULTIPLE SITES WITH POSITIVE RHEUMATOID FACTOR: Primary | ICD-10-CM

## 2021-05-06 DIAGNOSIS — D84.9 IMMUNOCOMPROMISED PATIENT: ICD-10-CM

## 2021-05-06 DIAGNOSIS — R70.0 ELEVATED SED RATE: ICD-10-CM

## 2021-05-06 DIAGNOSIS — Z79.899 ENCOUNTER FOR LONG-TERM (CURRENT) USE OF HIGH-RISK MEDICATION: ICD-10-CM

## 2021-05-06 DIAGNOSIS — D89.2 HYPERGAMMAGLOBULINEMIA: ICD-10-CM

## 2021-05-06 PROCEDURE — 25000003 PHARM REV CODE 250: Performed by: PHYSICIAN ASSISTANT

## 2021-05-06 PROCEDURE — 99999 PR PBB SHADOW E&M-EST. PATIENT-LVL IV: ICD-10-PCS | Mod: PBBFAC,,, | Performed by: PHYSICIAN ASSISTANT

## 2021-05-06 PROCEDURE — 99214 OFFICE O/P EST MOD 30 MIN: CPT | Mod: PBBFAC | Performed by: PHYSICIAN ASSISTANT

## 2021-05-06 PROCEDURE — 96413 CHEMO IV INFUSION 1 HR: CPT

## 2021-05-06 PROCEDURE — 96415 CHEMO IV INFUSION ADDL HR: CPT

## 2021-05-06 PROCEDURE — 63600175 PHARM REV CODE 636 W HCPCS: Mod: JG | Performed by: PHYSICIAN ASSISTANT

## 2021-05-06 PROCEDURE — 99214 OFFICE O/P EST MOD 30 MIN: CPT | Mod: S$PBB,,, | Performed by: PHYSICIAN ASSISTANT

## 2021-05-06 PROCEDURE — 99999 PR PBB SHADOW E&M-EST. PATIENT-LVL IV: CPT | Mod: PBBFAC,,, | Performed by: PHYSICIAN ASSISTANT

## 2021-05-06 PROCEDURE — 99214 PR OFFICE/OUTPT VISIT, EST, LEVL IV, 30-39 MIN: ICD-10-PCS | Mod: S$PBB,,, | Performed by: PHYSICIAN ASSISTANT

## 2021-05-06 RX ORDER — IPRATROPIUM BROMIDE AND ALBUTEROL SULFATE 2.5; .5 MG/3ML; MG/3ML
3 SOLUTION RESPIRATORY (INHALATION)
Status: CANCELLED | OUTPATIENT
Start: 2021-07-01

## 2021-05-06 RX ORDER — SODIUM CHLORIDE 0.9 % (FLUSH) 0.9 %
10 SYRINGE (ML) INJECTION
Status: CANCELLED | OUTPATIENT
Start: 2021-07-01

## 2021-05-06 RX ORDER — ACETAMINOPHEN 325 MG/1
650 TABLET ORAL
Status: CANCELLED | OUTPATIENT
Start: 2021-07-01

## 2021-05-06 RX ORDER — DIPHENHYDRAMINE HYDROCHLORIDE 50 MG/ML
25 INJECTION INTRAMUSCULAR; INTRAVENOUS
Status: CANCELLED | OUTPATIENT
Start: 2021-07-01

## 2021-05-06 RX ORDER — METHYLPREDNISOLONE SOD SUCC 125 MG
50 VIAL (EA) INJECTION
Status: DISCONTINUED | OUTPATIENT
Start: 2021-05-06 | End: 2021-05-06 | Stop reason: HOSPADM

## 2021-05-06 RX ORDER — DIPHENHYDRAMINE HYDROCHLORIDE 50 MG/ML
12.5 INJECTION INTRAMUSCULAR; INTRAVENOUS
Status: CANCELLED
Start: 2021-07-01 | End: 2021-07-01

## 2021-05-06 RX ORDER — METHYLPREDNISOLONE SOD SUCC 125 MG
25 VIAL (EA) INJECTION ONCE
Status: CANCELLED
Start: 2021-07-01 | End: 2021-07-01

## 2021-05-06 RX ORDER — EPINEPHRINE 1 MG/ML
0.3 INJECTION, SOLUTION, CONCENTRATE INTRAVENOUS
Status: CANCELLED | OUTPATIENT
Start: 2021-07-01

## 2021-05-06 RX ORDER — DIPHENHYDRAMINE HYDROCHLORIDE 50 MG/ML
12.5 INJECTION INTRAMUSCULAR; INTRAVENOUS
Status: DISCONTINUED | OUTPATIENT
Start: 2021-05-06 | End: 2021-05-06 | Stop reason: HOSPADM

## 2021-05-06 RX ORDER — METHYLPREDNISOLONE SOD SUCC 125 MG
50 VIAL (EA) INJECTION
Status: CANCELLED
Start: 2021-07-01 | End: 2021-07-01

## 2021-05-06 RX ADMIN — INFLIXIMAB 800 MG: 100 INJECTION, POWDER, LYOPHILIZED, FOR SOLUTION INTRAVENOUS at 10:05

## 2021-05-06 ASSESSMENT — ROUTINE ASSESSMENT OF PATIENT INDEX DATA (RAPID3): MDHAQ FUNCTION SCORE: 0.3

## 2021-07-01 ENCOUNTER — INFUSION (OUTPATIENT)
Dept: INFUSION THERAPY | Facility: HOSPITAL | Age: 76
End: 2021-07-01
Attending: PHYSICIAN ASSISTANT
Payer: MEDICARE

## 2021-07-01 VITALS
HEART RATE: 60 BPM | DIASTOLIC BLOOD PRESSURE: 64 MMHG | RESPIRATION RATE: 18 BRPM | SYSTOLIC BLOOD PRESSURE: 111 MMHG | OXYGEN SATURATION: 98 % | TEMPERATURE: 98 F | WEIGHT: 186.06 LBS | BODY MASS INDEX: 29.2 KG/M2 | HEIGHT: 67 IN

## 2021-07-01 DIAGNOSIS — M05.79 RHEUMATOID ARTHRITIS INVOLVING MULTIPLE SITES WITH POSITIVE RHEUMATOID FACTOR: Primary | ICD-10-CM

## 2021-07-01 PROCEDURE — A4216 STERILE WATER/SALINE, 10 ML: HCPCS | Performed by: PHYSICIAN ASSISTANT

## 2021-07-01 PROCEDURE — 96413 CHEMO IV INFUSION 1 HR: CPT

## 2021-07-01 PROCEDURE — 25000003 PHARM REV CODE 250: Performed by: PHYSICIAN ASSISTANT

## 2021-07-01 PROCEDURE — 63600175 PHARM REV CODE 636 W HCPCS: Mod: JG | Performed by: PHYSICIAN ASSISTANT

## 2021-07-01 PROCEDURE — 96375 TX/PRO/DX INJ NEW DRUG ADDON: CPT

## 2021-07-01 PROCEDURE — 96374 THER/PROPH/DIAG INJ IV PUSH: CPT

## 2021-07-01 RX ORDER — DIPHENHYDRAMINE HYDROCHLORIDE 50 MG/ML
25 INJECTION INTRAMUSCULAR; INTRAVENOUS
Status: CANCELLED | OUTPATIENT
Start: 2021-08-26

## 2021-07-01 RX ORDER — DIPHENHYDRAMINE HYDROCHLORIDE 50 MG/ML
12.5 INJECTION INTRAMUSCULAR; INTRAVENOUS
Status: CANCELLED
Start: 2021-08-26 | End: 2021-08-26

## 2021-07-01 RX ORDER — METHYLPREDNISOLONE SOD SUCC 125 MG
25 VIAL (EA) INJECTION ONCE
Status: COMPLETED | OUTPATIENT
Start: 2021-07-01 | End: 2021-07-01

## 2021-07-01 RX ORDER — IPRATROPIUM BROMIDE AND ALBUTEROL SULFATE 2.5; .5 MG/3ML; MG/3ML
3 SOLUTION RESPIRATORY (INHALATION)
Status: CANCELLED | OUTPATIENT
Start: 2021-08-26

## 2021-07-01 RX ORDER — SODIUM CHLORIDE 0.9 % (FLUSH) 0.9 %
10 SYRINGE (ML) INJECTION
Status: DISCONTINUED | OUTPATIENT
Start: 2021-07-01 | End: 2021-07-01 | Stop reason: HOSPADM

## 2021-07-01 RX ORDER — ACETAMINOPHEN 325 MG/1
650 TABLET ORAL
Status: CANCELLED | OUTPATIENT
Start: 2021-08-26

## 2021-07-01 RX ORDER — METHYLPREDNISOLONE SOD SUCC 125 MG
50 VIAL (EA) INJECTION
Status: CANCELLED
Start: 2021-08-26 | End: 2021-08-26

## 2021-07-01 RX ORDER — EPINEPHRINE 1 MG/ML
0.3 INJECTION, SOLUTION, CONCENTRATE INTRAVENOUS
Status: CANCELLED | OUTPATIENT
Start: 2021-08-26

## 2021-07-01 RX ORDER — METHYLPREDNISOLONE SOD SUCC 125 MG
25 VIAL (EA) INJECTION ONCE
Status: CANCELLED
Start: 2021-08-26 | End: 2021-08-26

## 2021-07-01 RX ORDER — SODIUM CHLORIDE 0.9 % (FLUSH) 0.9 %
10 SYRINGE (ML) INJECTION
Status: CANCELLED | OUTPATIENT
Start: 2021-08-26

## 2021-07-01 RX ADMIN — Medication 10 ML: at 09:07

## 2021-07-01 RX ADMIN — METHYLPREDNISOLONE SODIUM SUCCINATE 25 MG: 125 INJECTION, POWDER, FOR SOLUTION INTRAMUSCULAR; INTRAVENOUS at 09:07

## 2021-07-01 RX ADMIN — INFLIXIMAB 800 MG: 100 INJECTION, POWDER, LYOPHILIZED, FOR SOLUTION INTRAVENOUS at 09:07

## 2021-08-11 ENCOUNTER — LAB VISIT (OUTPATIENT)
Dept: PRIMARY CARE CLINIC | Facility: OTHER | Age: 76
End: 2021-08-11
Payer: MEDICARE

## 2021-08-11 DIAGNOSIS — R05.9 COUGH: ICD-10-CM

## 2021-08-11 PROCEDURE — U0003 INFECTIOUS AGENT DETECTION BY NUCLEIC ACID (DNA OR RNA); SEVERE ACUTE RESPIRATORY SYNDROME CORONAVIRUS 2 (SARS-COV-2) (CORONAVIRUS DISEASE [COVID-19]), AMPLIFIED PROBE TECHNIQUE, MAKING USE OF HIGH THROUGHPUT TECHNOLOGIES AS DESCRIBED BY CMS-2020-01-R: HCPCS

## 2021-08-12 ENCOUNTER — PATIENT MESSAGE (OUTPATIENT)
Dept: RHEUMATOLOGY | Facility: CLINIC | Age: 76
End: 2021-08-12

## 2021-08-12 ENCOUNTER — TELEPHONE (OUTPATIENT)
Dept: RHEUMATOLOGY | Facility: CLINIC | Age: 76
End: 2021-08-12

## 2021-08-12 DIAGNOSIS — I25.118 CORONARY ARTERY DISEASE OF NATIVE ARTERY OF NATIVE HEART WITH STABLE ANGINA PECTORIS: ICD-10-CM

## 2021-08-12 DIAGNOSIS — Z79.631 METHOTREXATE, LONG TERM, CURRENT USE: ICD-10-CM

## 2021-08-12 DIAGNOSIS — Z79.620 INFLIXIMAB (REMICADE) LONG-TERM USE: ICD-10-CM

## 2021-08-12 DIAGNOSIS — M05.79 RHEUMATOID ARTHRITIS INVOLVING MULTIPLE SITES WITH POSITIVE RHEUMATOID FACTOR: ICD-10-CM

## 2021-08-12 DIAGNOSIS — U07.1 COVID-19 IN IMMUNOCOMPROMISED PATIENT: Primary | ICD-10-CM

## 2021-08-12 DIAGNOSIS — D84.9 IMMUNOCOMPROMISED PATIENT: ICD-10-CM

## 2021-08-12 DIAGNOSIS — D84.9 COVID-19 IN IMMUNOCOMPROMISED PATIENT: Primary | ICD-10-CM

## 2021-08-13 LAB
SARS-COV-2 RNA RESP QL NAA+PROBE: DETECTED
SARS-COV-2- CYCLE NUMBER: 18.16

## 2021-08-14 ENCOUNTER — INFUSION (OUTPATIENT)
Dept: INFECTIOUS DISEASES | Facility: HOSPITAL | Age: 76
End: 2021-08-14
Attending: FAMILY MEDICINE
Payer: MEDICARE

## 2021-08-14 VITALS
DIASTOLIC BLOOD PRESSURE: 65 MMHG | RESPIRATION RATE: 20 BRPM | OXYGEN SATURATION: 97 % | SYSTOLIC BLOOD PRESSURE: 137 MMHG | TEMPERATURE: 98 F | HEART RATE: 90 BPM | HEIGHT: 67 IN | WEIGHT: 186 LBS | BODY MASS INDEX: 29.19 KG/M2

## 2021-08-14 DIAGNOSIS — M05.79 RHEUMATOID ARTHRITIS INVOLVING MULTIPLE SITES WITH POSITIVE RHEUMATOID FACTOR: ICD-10-CM

## 2021-08-14 DIAGNOSIS — D84.9 COVID-19 IN IMMUNOCOMPROMISED PATIENT: ICD-10-CM

## 2021-08-14 DIAGNOSIS — D84.9 IMMUNOCOMPROMISED PATIENT: ICD-10-CM

## 2021-08-14 DIAGNOSIS — U07.1 COVID-19 IN IMMUNOCOMPROMISED PATIENT: ICD-10-CM

## 2021-08-14 DIAGNOSIS — I25.118 CORONARY ARTERY DISEASE OF NATIVE ARTERY OF NATIVE HEART WITH STABLE ANGINA PECTORIS: ICD-10-CM

## 2021-08-14 DIAGNOSIS — Z79.631 METHOTREXATE, LONG TERM, CURRENT USE: ICD-10-CM

## 2021-08-14 DIAGNOSIS — Z79.620 INFLIXIMAB (REMICADE) LONG-TERM USE: ICD-10-CM

## 2021-08-14 DIAGNOSIS — U07.1 COVID-19: Primary | ICD-10-CM

## 2021-08-14 PROCEDURE — M0243 CASIRIVI AND IMDEVI INFUSION: HCPCS | Performed by: INTERNAL MEDICINE

## 2021-08-14 PROCEDURE — 63600175 PHARM REV CODE 636 W HCPCS: Performed by: INTERNAL MEDICINE

## 2021-08-14 PROCEDURE — 25000003 PHARM REV CODE 250: Performed by: INTERNAL MEDICINE

## 2021-08-14 RX ORDER — SODIUM CHLORIDE 0.9 % (FLUSH) 0.9 %
10 SYRINGE (ML) INJECTION
Status: ACTIVE | OUTPATIENT
Start: 2021-08-14

## 2021-08-14 RX ORDER — ALBUTEROL SULFATE 90 UG/1
2 AEROSOL, METERED RESPIRATORY (INHALATION)
Status: ACTIVE | OUTPATIENT
Start: 2021-08-14

## 2021-08-14 RX ORDER — ONDANSETRON 4 MG/1
4 TABLET, ORALLY DISINTEGRATING ORAL ONCE AS NEEDED
Status: ACTIVE | OUTPATIENT
Start: 2021-08-14 | End: 2033-01-09

## 2021-08-14 RX ORDER — EPINEPHRINE 0.3 MG/.3ML
0.3 INJECTION SUBCUTANEOUS
Status: ACTIVE | OUTPATIENT
Start: 2021-08-14

## 2021-08-14 RX ORDER — DIPHENHYDRAMINE HYDROCHLORIDE 50 MG/ML
25 INJECTION INTRAMUSCULAR; INTRAVENOUS ONCE AS NEEDED
Status: ACTIVE | OUTPATIENT
Start: 2021-08-14 | End: 2033-01-09

## 2021-08-14 RX ORDER — ACETAMINOPHEN 325 MG/1
650 TABLET ORAL ONCE AS NEEDED
Status: ACTIVE | OUTPATIENT
Start: 2021-08-14 | End: 2033-01-09

## 2021-08-14 RX ADMIN — CASIRIVIMAB AND IMDEVIMAB 600 MG: 600; 600 INJECTION, SOLUTION, CONCENTRATE INTRAVENOUS at 08:08

## 2021-08-27 ENCOUNTER — PATIENT MESSAGE (OUTPATIENT)
Dept: RHEUMATOLOGY | Facility: CLINIC | Age: 76
End: 2021-08-27

## 2021-08-27 ENCOUNTER — TELEPHONE (OUTPATIENT)
Dept: RHEUMATOLOGY | Facility: CLINIC | Age: 76
End: 2021-08-27

## 2021-08-27 ENCOUNTER — LAB VISIT (OUTPATIENT)
Dept: PRIMARY CARE CLINIC | Facility: OTHER | Age: 76
End: 2021-08-27
Attending: INTERNAL MEDICINE
Payer: MEDICARE

## 2021-08-27 DIAGNOSIS — R05.9 COUGH: ICD-10-CM

## 2021-08-27 PROCEDURE — U0003 INFECTIOUS AGENT DETECTION BY NUCLEIC ACID (DNA OR RNA); SEVERE ACUTE RESPIRATORY SYNDROME CORONAVIRUS 2 (SARS-COV-2) (CORONAVIRUS DISEASE [COVID-19]), AMPLIFIED PROBE TECHNIQUE, MAKING USE OF HIGH THROUGHPUT TECHNOLOGIES AS DESCRIBED BY CMS-2020-01-R: HCPCS | Performed by: INTERNAL MEDICINE

## 2021-08-28 ENCOUNTER — TELEPHONE (OUTPATIENT)
Dept: RHEUMATOLOGY | Facility: CLINIC | Age: 76
End: 2021-08-28

## 2021-08-30 LAB
SARS-COV-2 RNA RESP QL NAA+PROBE: NOT DETECTED
SARS-COV-2- CYCLE NUMBER: NORMAL

## 2021-09-03 ENCOUNTER — INFUSION (OUTPATIENT)
Dept: INFUSION THERAPY | Facility: HOSPITAL | Age: 76
End: 2021-09-03
Attending: INTERNAL MEDICINE
Payer: MEDICARE

## 2021-09-03 VITALS
SYSTOLIC BLOOD PRESSURE: 112 MMHG | HEART RATE: 50 BPM | OXYGEN SATURATION: 98 % | RESPIRATION RATE: 18 BRPM | WEIGHT: 186.5 LBS | BODY MASS INDEX: 29.27 KG/M2 | TEMPERATURE: 98 F | DIASTOLIC BLOOD PRESSURE: 60 MMHG | HEIGHT: 67 IN

## 2021-09-03 DIAGNOSIS — M05.79 RHEUMATOID ARTHRITIS INVOLVING MULTIPLE SITES WITH POSITIVE RHEUMATOID FACTOR: Primary | ICD-10-CM

## 2021-09-03 PROCEDURE — 96415 CHEMO IV INFUSION ADDL HR: CPT

## 2021-09-03 PROCEDURE — 96413 CHEMO IV INFUSION 1 HR: CPT

## 2021-09-03 PROCEDURE — 25000003 PHARM REV CODE 250: Performed by: INTERNAL MEDICINE

## 2021-09-03 PROCEDURE — 63600175 PHARM REV CODE 636 W HCPCS: Mod: JG | Performed by: INTERNAL MEDICINE

## 2021-09-03 RX ORDER — DIPHENHYDRAMINE HYDROCHLORIDE 50 MG/ML
25 INJECTION INTRAMUSCULAR; INTRAVENOUS
Status: CANCELLED | OUTPATIENT
Start: 2021-10-22

## 2021-09-03 RX ORDER — IPRATROPIUM BROMIDE AND ALBUTEROL SULFATE 2.5; .5 MG/3ML; MG/3ML
3 SOLUTION RESPIRATORY (INHALATION)
Status: CANCELLED | OUTPATIENT
Start: 2021-10-22

## 2021-09-03 RX ORDER — DIPHENHYDRAMINE HYDROCHLORIDE 50 MG/ML
12.5 INJECTION INTRAMUSCULAR; INTRAVENOUS
Status: CANCELLED
Start: 2021-10-22 | End: 2021-10-22

## 2021-09-03 RX ORDER — ACETAMINOPHEN 325 MG/1
650 TABLET ORAL
Status: CANCELLED | OUTPATIENT
Start: 2021-10-22

## 2021-09-03 RX ORDER — METHYLPREDNISOLONE SOD SUCC 125 MG
25 VIAL (EA) INJECTION ONCE
Status: CANCELLED
Start: 2021-10-22 | End: 2021-10-22

## 2021-09-03 RX ORDER — METHYLPREDNISOLONE SOD SUCC 125 MG
50 VIAL (EA) INJECTION
Status: CANCELLED
Start: 2021-10-22 | End: 2021-10-22

## 2021-09-03 RX ORDER — SODIUM CHLORIDE 0.9 % (FLUSH) 0.9 %
10 SYRINGE (ML) INJECTION
Status: CANCELLED | OUTPATIENT
Start: 2021-10-22

## 2021-09-03 RX ORDER — METHYLPREDNISOLONE SOD SUCC 125 MG
25 VIAL (EA) INJECTION ONCE
Status: DISCONTINUED | OUTPATIENT
Start: 2021-09-03 | End: 2021-09-03 | Stop reason: HOSPADM

## 2021-09-03 RX ORDER — EPINEPHRINE 1 MG/ML
0.3 INJECTION, SOLUTION, CONCENTRATE INTRAVENOUS
Status: CANCELLED | OUTPATIENT
Start: 2021-10-22

## 2021-09-03 RX ADMIN — INFLIXIMAB 800 MG: 100 INJECTION, POWDER, LYOPHILIZED, FOR SOLUTION INTRAVENOUS at 09:09

## 2021-09-30 ENCOUNTER — TELEPHONE (OUTPATIENT)
Dept: RHEUMATOLOGY | Facility: CLINIC | Age: 76
End: 2021-09-30

## 2021-10-28 ENCOUNTER — INFUSION (OUTPATIENT)
Dept: INFUSION THERAPY | Facility: HOSPITAL | Age: 76
End: 2021-10-28
Attending: INTERNAL MEDICINE
Payer: MEDICARE

## 2021-10-28 VITALS
SYSTOLIC BLOOD PRESSURE: 96 MMHG | BODY MASS INDEX: 29.9 KG/M2 | WEIGHT: 190.94 LBS | OXYGEN SATURATION: 96 % | DIASTOLIC BLOOD PRESSURE: 59 MMHG | TEMPERATURE: 61 F | RESPIRATION RATE: 16 BRPM | HEART RATE: 60 BPM

## 2021-10-28 DIAGNOSIS — M05.79 RHEUMATOID ARTHRITIS INVOLVING MULTIPLE SITES WITH POSITIVE RHEUMATOID FACTOR: Primary | ICD-10-CM

## 2021-10-28 PROCEDURE — 96415 CHEMO IV INFUSION ADDL HR: CPT

## 2021-10-28 PROCEDURE — 96413 CHEMO IV INFUSION 1 HR: CPT

## 2021-10-28 PROCEDURE — 25000003 PHARM REV CODE 250: Performed by: INTERNAL MEDICINE

## 2021-10-28 PROCEDURE — 63600175 PHARM REV CODE 636 W HCPCS: Mod: JG | Performed by: INTERNAL MEDICINE

## 2021-10-28 RX ORDER — DIPHENHYDRAMINE HYDROCHLORIDE 50 MG/ML
12.5 INJECTION INTRAMUSCULAR; INTRAVENOUS
Status: CANCELLED
Start: 2021-12-23 | End: 2021-12-23

## 2021-10-28 RX ORDER — DIPHENHYDRAMINE HYDROCHLORIDE 50 MG/ML
25 INJECTION INTRAMUSCULAR; INTRAVENOUS
Status: CANCELLED | OUTPATIENT
Start: 2021-12-23

## 2021-10-28 RX ORDER — SODIUM CHLORIDE 0.9 % (FLUSH) 0.9 %
10 SYRINGE (ML) INJECTION
Status: CANCELLED | OUTPATIENT
Start: 2021-12-23

## 2021-10-28 RX ORDER — ACETAMINOPHEN 325 MG/1
650 TABLET ORAL
Status: CANCELLED | OUTPATIENT
Start: 2021-12-23

## 2021-10-28 RX ORDER — SODIUM CHLORIDE 0.9 % (FLUSH) 0.9 %
10 SYRINGE (ML) INJECTION
Status: DISCONTINUED | OUTPATIENT
Start: 2021-10-28 | End: 2021-10-28 | Stop reason: HOSPADM

## 2021-10-28 RX ORDER — EPINEPHRINE 1 MG/ML
0.3 INJECTION, SOLUTION, CONCENTRATE INTRAVENOUS
Status: CANCELLED | OUTPATIENT
Start: 2021-12-23

## 2021-10-28 RX ORDER — DIPHENHYDRAMINE HYDROCHLORIDE 50 MG/ML
12.5 INJECTION INTRAMUSCULAR; INTRAVENOUS
Status: DISCONTINUED | OUTPATIENT
Start: 2021-10-28 | End: 2021-10-28 | Stop reason: HOSPADM

## 2021-10-28 RX ORDER — METHYLPREDNISOLONE SOD SUCC 125 MG
25 VIAL (EA) INJECTION ONCE
Status: CANCELLED
Start: 2021-12-23 | End: 2021-12-23

## 2021-10-28 RX ORDER — METHYLPREDNISOLONE SOD SUCC 125 MG
50 VIAL (EA) INJECTION
Status: CANCELLED
Start: 2021-12-23 | End: 2021-12-23

## 2021-10-28 RX ORDER — METHYLPREDNISOLONE SOD SUCC 125 MG
50 VIAL (EA) INJECTION
Status: DISCONTINUED | OUTPATIENT
Start: 2021-10-28 | End: 2021-10-28 | Stop reason: HOSPADM

## 2021-10-28 RX ORDER — IPRATROPIUM BROMIDE AND ALBUTEROL SULFATE 2.5; .5 MG/3ML; MG/3ML
3 SOLUTION RESPIRATORY (INHALATION)
Status: CANCELLED | OUTPATIENT
Start: 2021-12-23

## 2021-10-28 RX ADMIN — INFLIXIMAB 800 MG: 100 INJECTION, POWDER, LYOPHILIZED, FOR SOLUTION INTRAVENOUS at 10:10

## 2021-11-23 ENCOUNTER — TELEPHONE (OUTPATIENT)
Dept: RHEUMATOLOGY | Facility: CLINIC | Age: 76
End: 2021-11-23
Payer: MEDICARE

## 2021-12-23 ENCOUNTER — INFUSION (OUTPATIENT)
Dept: INFUSION THERAPY | Facility: HOSPITAL | Age: 76
End: 2021-12-23
Attending: INTERNAL MEDICINE
Payer: MEDICARE

## 2021-12-23 VITALS
RESPIRATION RATE: 18 BRPM | TEMPERATURE: 98 F | SYSTOLIC BLOOD PRESSURE: 122 MMHG | BODY MASS INDEX: 30.17 KG/M2 | HEART RATE: 55 BPM | OXYGEN SATURATION: 96 % | WEIGHT: 192.25 LBS | HEIGHT: 67 IN | DIASTOLIC BLOOD PRESSURE: 69 MMHG

## 2021-12-23 DIAGNOSIS — M05.79 RHEUMATOID ARTHRITIS INVOLVING MULTIPLE SITES WITH POSITIVE RHEUMATOID FACTOR: Primary | ICD-10-CM

## 2021-12-23 PROCEDURE — 63600175 PHARM REV CODE 636 W HCPCS: Mod: JG | Performed by: INTERNAL MEDICINE

## 2021-12-23 PROCEDURE — 96375 TX/PRO/DX INJ NEW DRUG ADDON: CPT

## 2021-12-23 PROCEDURE — 96413 CHEMO IV INFUSION 1 HR: CPT

## 2021-12-23 PROCEDURE — 25000003 PHARM REV CODE 250: Performed by: INTERNAL MEDICINE

## 2021-12-23 RX ORDER — SODIUM CHLORIDE 0.9 % (FLUSH) 0.9 %
10 SYRINGE (ML) INJECTION
Status: DISCONTINUED | OUTPATIENT
Start: 2021-12-23 | End: 2021-12-23 | Stop reason: HOSPADM

## 2021-12-23 RX ORDER — METHYLPREDNISOLONE SOD SUCC 125 MG
25 VIAL (EA) INJECTION ONCE
Status: CANCELLED
Start: 2022-02-17 | End: 2022-02-17

## 2021-12-23 RX ORDER — DIPHENHYDRAMINE HYDROCHLORIDE 50 MG/ML
25 INJECTION INTRAMUSCULAR; INTRAVENOUS
Status: CANCELLED | OUTPATIENT
Start: 2022-02-17

## 2021-12-23 RX ORDER — SODIUM CHLORIDE 0.9 % (FLUSH) 0.9 %
10 SYRINGE (ML) INJECTION
Status: CANCELLED | OUTPATIENT
Start: 2022-02-17

## 2021-12-23 RX ORDER — METHYLPREDNISOLONE SOD SUCC 125 MG
25 VIAL (EA) INJECTION ONCE
Status: DISCONTINUED | OUTPATIENT
Start: 2021-12-23 | End: 2021-12-23

## 2021-12-23 RX ORDER — DIPHENHYDRAMINE HYDROCHLORIDE 50 MG/ML
12.5 INJECTION INTRAMUSCULAR; INTRAVENOUS
Status: DISCONTINUED | OUTPATIENT
Start: 2021-12-23 | End: 2021-12-23 | Stop reason: HOSPADM

## 2021-12-23 RX ORDER — METHYLPREDNISOLONE SOD SUCC 125 MG
50 VIAL (EA) INJECTION
Status: COMPLETED | OUTPATIENT
Start: 2021-12-23 | End: 2021-12-23

## 2021-12-23 RX ORDER — METHYLPREDNISOLONE SOD SUCC 125 MG
50 VIAL (EA) INJECTION
Status: CANCELLED
Start: 2022-02-17 | End: 2022-02-17

## 2021-12-23 RX ORDER — IPRATROPIUM BROMIDE AND ALBUTEROL SULFATE 2.5; .5 MG/3ML; MG/3ML
3 SOLUTION RESPIRATORY (INHALATION)
Status: CANCELLED | OUTPATIENT
Start: 2022-02-17

## 2021-12-23 RX ORDER — DIPHENHYDRAMINE HYDROCHLORIDE 50 MG/ML
12.5 INJECTION INTRAMUSCULAR; INTRAVENOUS
Status: CANCELLED
Start: 2022-02-17 | End: 2022-02-17

## 2021-12-23 RX ORDER — ACETAMINOPHEN 325 MG/1
650 TABLET ORAL
Status: CANCELLED | OUTPATIENT
Start: 2022-02-17

## 2021-12-23 RX ORDER — EPINEPHRINE 1 MG/ML
0.3 INJECTION, SOLUTION, CONCENTRATE INTRAVENOUS
Status: CANCELLED | OUTPATIENT
Start: 2022-02-17

## 2021-12-23 RX ADMIN — INFLIXIMAB 800 MG: 100 INJECTION, POWDER, LYOPHILIZED, FOR SOLUTION INTRAVENOUS at 09:12

## 2021-12-23 RX ADMIN — METHYLPREDNISOLONE SODIUM SUCCINATE 50 MG: 125 INJECTION, POWDER, FOR SOLUTION INTRAMUSCULAR; INTRAVENOUS at 09:12

## 2022-02-04 ENCOUNTER — PATIENT MESSAGE (OUTPATIENT)
Dept: RHEUMATOLOGY | Facility: CLINIC | Age: 77
End: 2022-02-04
Payer: MEDICARE

## 2022-02-04 DIAGNOSIS — Z51.81 MEDICATION MONITORING ENCOUNTER: Chronic | ICD-10-CM

## 2022-02-04 DIAGNOSIS — M05.79 RHEUMATOID ARTHRITIS INVOLVING MULTIPLE SITES WITH POSITIVE RHEUMATOID FACTOR: Chronic | ICD-10-CM

## 2022-02-08 RX ORDER — METHOTREXATE 2.5 MG/1
17.5 TABLET ORAL
Qty: 84 TABLET | Refills: 4 | Status: CANCELLED | OUTPATIENT
Start: 2022-02-08

## 2022-02-08 NOTE — TELEPHONE ENCOUNTER
Harry Crouch, can you check on his Methotrexate. Looks like he needs a prior authorization from the provider.

## 2022-02-09 RX ORDER — METHOTREXATE 2.5 MG/1
17.5 TABLET ORAL
Qty: 84 TABLET | Refills: 4 | Status: SHIPPED | OUTPATIENT
Start: 2022-02-09 | End: 2022-03-23 | Stop reason: SDUPTHER

## 2022-02-09 NOTE — TELEPHONE ENCOUNTER
No. It was last refill by Cleo on 1/14/21 and hasn't been refilled since.       I put in the right pharmacy also. It was suppose to be [facility] instead of [facility].

## 2022-02-16 ENCOUNTER — TELEPHONE (OUTPATIENT)
Dept: RHEUMATOLOGY | Facility: CLINIC | Age: 77
End: 2022-02-16
Payer: MEDICARE

## 2022-02-17 ENCOUNTER — OFFICE VISIT (OUTPATIENT)
Dept: RHEUMATOLOGY | Facility: CLINIC | Age: 77
End: 2022-02-17
Payer: MEDICARE

## 2022-02-17 ENCOUNTER — TELEPHONE (OUTPATIENT)
Dept: RHEUMATOLOGY | Facility: CLINIC | Age: 77
End: 2022-02-17

## 2022-02-17 ENCOUNTER — INFUSION (OUTPATIENT)
Dept: INFUSION THERAPY | Facility: HOSPITAL | Age: 77
End: 2022-02-17
Attending: PHYSICIAN ASSISTANT
Payer: MEDICARE

## 2022-02-17 ENCOUNTER — PATIENT MESSAGE (OUTPATIENT)
Dept: RHEUMATOLOGY | Facility: CLINIC | Age: 77
End: 2022-02-17

## 2022-02-17 VITALS
HEART RATE: 56 BPM | HEIGHT: 67 IN | RESPIRATION RATE: 14 BRPM | SYSTOLIC BLOOD PRESSURE: 118 MMHG | DIASTOLIC BLOOD PRESSURE: 61 MMHG | TEMPERATURE: 99 F | BODY MASS INDEX: 30.69 KG/M2 | OXYGEN SATURATION: 97 % | WEIGHT: 195.56 LBS

## 2022-02-17 VITALS
SYSTOLIC BLOOD PRESSURE: 113 MMHG | BODY MASS INDEX: 30.69 KG/M2 | HEART RATE: 68 BPM | HEIGHT: 67 IN | WEIGHT: 195.56 LBS | DIASTOLIC BLOOD PRESSURE: 62 MMHG

## 2022-02-17 DIAGNOSIS — D84.9 IMMUNOCOMPROMISED PATIENT: ICD-10-CM

## 2022-02-17 DIAGNOSIS — M05.79 RHEUMATOID ARTHRITIS INVOLVING MULTIPLE SITES WITH POSITIVE RHEUMATOID FACTOR: Primary | ICD-10-CM

## 2022-02-17 DIAGNOSIS — Z51.81 MEDICATION MONITORING ENCOUNTER: ICD-10-CM

## 2022-02-17 DIAGNOSIS — D89.2 HYPERGAMMAGLOBULINEMIA: ICD-10-CM

## 2022-02-17 PROCEDURE — 99215 PR OFFICE/OUTPT VISIT, EST, LEVL V, 40-54 MIN: ICD-10-PCS | Mod: S$PBB,,, | Performed by: PHYSICIAN ASSISTANT

## 2022-02-17 PROCEDURE — 96415 CHEMO IV INFUSION ADDL HR: CPT

## 2022-02-17 PROCEDURE — 99999 PR PBB SHADOW E&M-EST. PATIENT-LVL IV: ICD-10-PCS | Mod: PBBFAC,,, | Performed by: PHYSICIAN ASSISTANT

## 2022-02-17 PROCEDURE — 96413 CHEMO IV INFUSION 1 HR: CPT

## 2022-02-17 PROCEDURE — 99999 PR PBB SHADOW E&M-EST. PATIENT-LVL IV: CPT | Mod: PBBFAC,,, | Performed by: PHYSICIAN ASSISTANT

## 2022-02-17 PROCEDURE — 99214 OFFICE O/P EST MOD 30 MIN: CPT | Mod: PBBFAC,25 | Performed by: PHYSICIAN ASSISTANT

## 2022-02-17 PROCEDURE — 25000003 PHARM REV CODE 250: Performed by: INTERNAL MEDICINE

## 2022-02-17 PROCEDURE — 63600175 PHARM REV CODE 636 W HCPCS: Mod: JG | Performed by: INTERNAL MEDICINE

## 2022-02-17 PROCEDURE — 99215 OFFICE O/P EST HI 40 MIN: CPT | Mod: S$PBB,,, | Performed by: PHYSICIAN ASSISTANT

## 2022-02-17 RX ORDER — SODIUM CHLORIDE 0.9 % (FLUSH) 0.9 %
10 SYRINGE (ML) INJECTION
Status: CANCELLED | OUTPATIENT
Start: 2022-04-14

## 2022-02-17 RX ORDER — EPINEPHRINE 1 MG/ML
0.3 INJECTION, SOLUTION, CONCENTRATE INTRAVENOUS
Status: CANCELLED | OUTPATIENT
Start: 2022-04-14

## 2022-02-17 RX ORDER — ACETAMINOPHEN 325 MG/1
650 TABLET ORAL
Status: CANCELLED | OUTPATIENT
Start: 2022-04-14

## 2022-02-17 RX ORDER — IPRATROPIUM BROMIDE AND ALBUTEROL SULFATE 2.5; .5 MG/3ML; MG/3ML
3 SOLUTION RESPIRATORY (INHALATION)
Status: CANCELLED | OUTPATIENT
Start: 2022-04-14

## 2022-02-17 RX ORDER — ASCORBIC ACID 1000 MG
TABLET ORAL
COMMUNITY
End: 2023-01-20

## 2022-02-17 RX ORDER — CALCIUM CARB/VITAMIN D3/VIT K1 500-500-40
TABLET,CHEWABLE ORAL
COMMUNITY

## 2022-02-17 RX ORDER — METHYLPREDNISOLONE SOD SUCC 125 MG
50 VIAL (EA) INJECTION
Status: CANCELLED
Start: 2022-04-14 | End: 2022-04-14

## 2022-02-17 RX ORDER — DIPHENHYDRAMINE HYDROCHLORIDE 50 MG/ML
12.5 INJECTION INTRAMUSCULAR; INTRAVENOUS
Status: CANCELLED
Start: 2022-04-14 | End: 2022-04-14

## 2022-02-17 RX ORDER — METHYLPREDNISOLONE SOD SUCC 125 MG
25 VIAL (EA) INJECTION ONCE
Status: CANCELLED
Start: 2022-04-14 | End: 2022-04-14

## 2022-02-17 RX ORDER — DIPHENHYDRAMINE HYDROCHLORIDE 50 MG/ML
25 INJECTION INTRAMUSCULAR; INTRAVENOUS
Status: CANCELLED | OUTPATIENT
Start: 2022-04-14

## 2022-02-17 RX ADMIN — INFLIXIMAB 800 MG: 100 INJECTION, POWDER, LYOPHILIZED, FOR SOLUTION INTRAVENOUS at 09:02

## 2022-02-17 NOTE — DISCHARGE INSTRUCTIONS
WAYS TO HELP PREVENT INFECTION         WASH YOUR HANDS OFTEN DURING THE DAY, ESPECIALLY BEFORE YOU EAT, AFTER USING THE BATHROOM, AND AFTER TOUCHING ANIMALS     STAY AWAY FROM PEOPLE WHO HAVE ILLNESSES YOU CAN CATCH; SUCH AS COLDS, FLU, CHICKEN POX     TRY TO AVOID CROWDS     STAY AWAY FROM CHILDREN WHO RECENTLY HAVE RECEIVED LIVE VIRUS VACCINES     MAINTAIN GOOD MOUTH CARE     DO NOT SQUEEZE OR SCRATCH PIMPLES     CLEAN CUTS & SCRAPES RIGHT AWAY AND DAILY UNTIL HEALED WITH WARM WATER, SOAP & AN ANTISEPTIC     AVOID CONTACT WITH LITTER BOXES, BIRD CAGES, & FISH TANKS     AVOID STANDING WATER, IE., BIRD BATHS, FLOWER POTS/VASES, OR HUMIDIFIERS     WEAR GLOVES WHEN GARDENING OR CLEANING UP AFTER OTHERS, ESPECIALLY BABIES & SMALL CHILDREN     DO NOT EAT RAW FISH, SEAFOOD, MEAT, OR EGGS

## 2022-02-17 NOTE — PLAN OF CARE
Patient tolerated Remicade well today; no adverse reaction noted.  No significant complaints voiced.  IV discontinued with catheter intact and pressure dressing applied to the site.  Has f/u appt(s) scheduled per MD request.  No questions or concerns voiced.  NAD noted upon discharge.

## 2022-02-17 NOTE — PROGRESS NOTES
RHEUMATOLOGY OUTPATIENT CLINIC NOTE    2/16/2022    Attending Rheumatologist: Poornima Calix PA-C  Primary Care Provider: Nabil Benavides MD   Chief Complaint/Reason For Consultation:  Rheumatoid Arthritis      Subjective:        Jack Back is a 77 y.o. male here today for follow up seropositive erosive rheumatoid arthritis. Stable on current regimen of MTX and remicade. Rates pain today 0/10. No trouble with recurrent infections. He has not developed heart failure. No lower ext edema, mild sob, no chest pain. No pnd. Rheumatologic systems otherwise negative.     6/2019 had  redo of cabg- In 2017 -MI required PTCA stent placement.   MI X3 Prior cabc in 2012  pleural effusion, admitted back to hospital, given lasix and started on daily dose- resolved   On repatha his cholesterol is much better     Chronic elevated esr, normal crp. 5/2017 spep showed elevated gammaglobulins, no polyclonal or monoclonal peaks in IF. Hypergammaglobulinemia. Repeat done 9/2018 IF  There is a faint linear irregularity in gamma involving IgG and lambda, non-specific at this time.   indicated. Esr in the 50-60's consistently, crp normal     bone density done in June 2014 and Nov 2020- Results NORMAL     Serologies  Lab Results   Component Value Date    TBGOLDPLUS Negative 10/28/2021       No results found for: DELORES  Lab Results   Component Value Date    RF 42 (H) 06/28/2004       No components found for: CYCLICCITRULPEPTIDEANTIBODY     Lab Results   Component Value Date    HEPAIGM Negative 07/30/2020    HEPBIGM Negative 07/30/2020    HEPCAB Negative 07/30/2020         Current Rheum Medications:  · Remicade Q2mos, MTX 17.5 mg/wk  Previous Rheum Medications:   · MTX monotherapy    Past Medical History:   Diagnosis Date    Arthritis     Congestive heart failure 06/07/2019    Depression     Lung disease     Rheumatoid arthritis involving multiple sites with positive rheumatoid factor 12/1/2015    Rheumatoid  "arthritis(714.0)     Thyroid disease      Social History     Socioeconomic History    Marital status:    Tobacco Use    Smoking status: Former Smoker    Smokeless tobacco: Never Used   Substance and Sexual Activity    Alcohol use: No    Drug use: No     Review of patient's allergies indicates:   Allergen Reactions    Penicillins Other (See Comments) and Hives     unknown    Tetanus vaccines and toxoid Other (See Comments)     unknown  Fever       Objective:   /62   Pulse 68   Ht 5' 7" (1.702 m)   Wt 88.7 kg (195 lb 8.8 oz)   BMI 30.63 kg/m²     Immunization History   Administered Date(s) Administered    Hepatitis A / Hepatitis B 07/01/2019    Influenza (FLUAD) - Quadrivalent - Adjuvanted - PF *Preferred* (65+) 10/16/2020    Influenza - High Dose - PF (65 years and older) 11/05/2013, 10/07/2014, 10/06/2015, 11/15/2016, 10/17/2017, 10/24/2018, 10/30/2019    Pneumococcal Conjugate - 13 Valent 02/25/2014    Pneumococcal Polysaccharide - 23 Valent 06/17/2014    Zoster 12/02/2014       Current Outpatient Medications:     ascorbic acid, vitamin C, (VITAMIN C) 100 MG tablet, Take 100 mg by mouth once daily., Disp: , Rfl:     aspirin (ECOTRIN) 81 MG EC tablet, Take 325 mg by mouth once daily. , Disp: , Rfl:     cholecalciferol, vitamin D3, 10 mcg (400 unit) Cap, , Disp: , Rfl:     co-enzyme Q-10 30 mg capsule, Take 30 mg by mouth once daily., Disp: , Rfl:     cyanocobalamin (VITAMIN B-12) 1000 MCG tablet, Take 100 mcg by mouth once daily., Disp: , Rfl:     evolocumab (REPATHA SYRINGE SUBQ), Inject 140 mg into the skin every 14 (fourteen) days., Disp: , Rfl:     folic acid (FOLVITE) 800 MCG Tab, Take 1 tablet (800 mcg total) by mouth 2 (two) times daily., Disp: , Rfl:     furosemide (LASIX) 40 MG tablet, , Disp: , Rfl: 1    VANNESSA ORAL, Take by mouth., Disp: , Rfl:     INFLIXIMAB (REMICADE IV), Inject into the vein. , Disp: , Rfl:     methotrexate 2.5 MG Tab, Take 7 tablets (17.5 " mg total) by mouth every 7 days. TAKE 3 TABLETS BY MOUTH IN THE MORNING, THEN 4 TABLETS IN THE EVENING ONCE A WEEK= 17.5 MG WEEK ON THE SAME DAY OF EACH WEEK, Disp: 84 tablet, Rfl: 4    milk thistle 175 mg tablet, Take 175 mg by mouth once daily., Disp: , Rfl:     milk thistle 175 mg tablet, 1 tablet, Disp: , Rfl:     multivit with minerals/lutein (MULTIVITAMIN 50 PLUS ORAL), , Disp: , Rfl:     multivitamin with minerals tablet, Take 1 tablet by mouth once daily., Disp: , Rfl:     NITROSTAT 0.4 mg SL tablet, 0.4 mg., Disp: , Rfl:     omega 3-dha-epa-fish oil 500-100-1,000 mg Cap, Take by mouth once daily., Disp: , Rfl:     oxyCODONE-acetaminophen (PERCOCET) 5-325 mg per tablet, Take 1 tablet by mouth every 4 (four) hours as needed., Disp: , Rfl:     potassium chloride (MICRO-K) 10 MEQ CpSR, Take 10 mEq by mouth once., Disp: , Rfl:     ramipril (ALTACE) 10 MG capsule, Take 10 mg by mouth once daily., Disp: , Rfl:     SYNTHROID 88 mcg tablet, 88 mcg once daily., Disp: , Rfl: 5    testosterone cypionate (DEPOTESTOTERONE CYPIONATE) 200 mg/mL injection, , Disp: , Rfl: 0    TURMERIC, BULK, MISC, 500 mg by Misc.(Non-Drug; Combo Route) route., Disp: , Rfl:     vit C/E/Zn/coppr/lutein/zeaxan (PRESERVISION AREDS-2 ORAL), Take by mouth once daily., Disp: , Rfl:     vitamin B12-folic acid 0.5-1 mg Tab, , Disp: , Rfl:     vitamin D 1000 units Tab, Take 5,000 mg by mouth once daily. , Disp: , Rfl:     VITAMIN K2 ORAL, Take 100 mcg by mouth., Disp: , Rfl:     zinc gluconate 50 mg tablet, Take 50 mg by mouth once daily., Disp: , Rfl:     prasterone, dhea, (DHEA) 50 mg Cap, Take by mouth., Disp: , Rfl:     triamcinolone acetonide 0.1% (KENALOG) 0.1 % cream, Apply topically 2 (two) times daily. for 10 days, Disp: 45 g, Rfl: 2    Current Facility-Administered Medications:     acetaminophen tablet 650 mg, 650 mg, Oral, Once PRN, Farooq Corey MD    albuterol inhaler 2 puff, 2 puff, Inhalation, Q20 Min PRN,  Farooq Corey MD    diphenhydrAMINE injection 25 mg, 25 mg, Intravenous, Once PRN, Farooq Corey MD    EPINEPHrine (EPIPEN) 0.3 mg/0.3 mL pen injection 0.3 mg, 0.3 mg, Intramuscular, PRN, Farooq Corey MD    methylPREDNISolone sodium succinate injection 40 mg, 40 mg, Intravenous, Once PRN, Farooq Corey MD    ondansetron disintegrating tablet 4 mg, 4 mg, Oral, Once PRN, Farooq Corey MD    sodium chloride 0.9% 500 mL flush bag, , Intravenous, PRN, Farooq Corey MD    sodium chloride 0.9% flush 10 mL, 10 mL, Intravenous, PRN, Farooq Corey MD    Facility-Administered Medications Ordered in Other Visits:     inFLIXimab (REMICADE) 800 mg in sodium chloride 0.9% 250 mL IVPB, 800 mg, Intravenous, 1 time in Clinic/HOD, Jhonatan Rashid MD    Physical Exam   Constitutional: He is oriented to person, place, and time. No distress.   HENT:   Head: Normocephalic and atraumatic.   Pulmonary/Chest: Effort normal. No respiratory distress.   Abdominal: Normal appearance.   Musculoskeletal:         General: Deformity present. No tenderness.      Comments: Chronic deformity of bilateral hands secondary to RA   Neurological: He is alert and oriented to person, place, and time.   Skin: Skin is warm and dry.   Psychiatric: His behavior is normal. Mood normal.   Nursing note and vitals reviewed.         Recent Results (from the past 336 hour(s))   C-Reactive Protein    Collection Time: 02/17/22  8:25 AM   Result Value Ref Range    CRP 4.1 0.0 - 8.2 mg/L   Comprehensive Metabolic Panel    Collection Time: 02/17/22  8:25 AM   Result Value Ref Range    Sodium 141 136 - 145 mmol/L    Potassium 4.0 3.5 - 5.1 mmol/L    Chloride 102 95 - 110 mmol/L    CO2 30 (H) 23 - 29 mmol/L    Glucose 100 70 - 110 mg/dL    BUN 18 8 - 23 mg/dL    Creatinine 1.2 0.5 - 1.4 mg/dL    Calcium 9.7 8.7 - 10.5 mg/dL    Total Protein 7.5 6.0 - 8.4 g/dL    Albumin 3.5 3.5 - 5.2 g/dL    Total Bilirubin 0.4 0.1 - 1.0 mg/dL    Alkaline  Phosphatase 59 55 - 135 U/L    AST 31 10 - 40 U/L    ALT 24 10 - 44 U/L    Anion Gap 9 8 - 16 mmol/L    eGFR if African American >60 >60 mL/min/1.73 m^2    eGFR if non African American 58 (A) >60 mL/min/1.73 m^2   CBC Auto Differential    Collection Time: 02/17/22  8:25 AM   Result Value Ref Range    WBC 8.11 3.90 - 12.70 K/uL    RBC 3.76 (L) 4.60 - 6.20 M/uL    Hemoglobin 13.4 (L) 14.0 - 18.0 g/dL    Hematocrit 39.1 (L) 40.0 - 54.0 %     (H) 82 - 98 fL    MCH 35.6 (H) 27.0 - 31.0 pg    MCHC 34.3 32.0 - 36.0 g/dL    RDW 15.7 (H) 11.5 - 14.5 %    Platelets 286 150 - 450 K/uL    MPV 8.5 (L) 9.2 - 12.9 fL    Immature Granulocytes 0.2 0.0 - 0.5 %    Gran # (ANC) 5.1 1.8 - 7.7 K/uL    Immature Grans (Abs) 0.02 0.00 - 0.04 K/uL    Lymph # 1.8 1.0 - 4.8 K/uL    Mono # 0.8 0.3 - 1.0 K/uL    Eos # 0.3 0.0 - 0.5 K/uL    Baso # 0.03 0.00 - 0.20 K/uL    nRBC 0 0 /100 WBC    Gran % 63.1 38.0 - 73.0 %    Lymph % 22.2 18.0 - 48.0 %    Mono % 10.4 4.0 - 15.0 %    Eosinophil % 3.7 0.0 - 8.0 %    Basophil % 0.4 0.0 - 1.9 %    Differential Method Automated        Assessment:     1. Rheumatoid arthritis involving multiple sites with positive rheumatoid factor    2. Medication monitoring encounter    3. Immunocompromised patient    4. Hypergammaglobulinemia            Plan:       · RA well controlled w/ current regimen of MTX and remicade. Continue same.  · Compromised immune system secondary to autoimmune disease and use of immunosuppressive drugs. Monitor carefully for infections and toxicities- Currently denies issues with recurrent infections. Advised to get immediate medical care if any infection.  · Recommend Yearly Skin Cancer Screening by Dermatology for all patients on biologic or Bob. advised strict adherence to age appropriate vaccinations (shingles, pneumonia, flu and covid) and cancer screenings with PCP   · Patient advised to hold DMARD and/or biologic therapy for signs of infection or for surgery. If you are  unsure what to do please call our office for instruction.[facility] [phone]  · no current medication related issues, no evidence of toxicity. I ordered labs for toxicity monitoring;  results reviewed and discussed findings with the patient   · Return to clinic: 4 mos w/ Dr DON     The patient understands, chooses and consents to this plan and accepts all the risks which include but are not limited to the risks mentioned above.     Method of contact with patient concerns: [facility]    60 minutes of total time spent on the encounter, which includes face to face time and non-face to face time preparing to see the patient (eg, review of tests), Obtaining and/or reviewing separately obtained history, Documenting clinical information in the electronic or other health record, Independently interpreting results (not separately reported) and communicating results to the patient/family/caregiver, or Care coordination (not separately reported).             Poornima Calix PA-C  Rheumatology Department   [facility]

## 2022-02-17 NOTE — NURSING
Infusion Remicade 800 mg q 8 weeks  Last dose- 12/23/2021    Any:  -recent illness, infection, or antibiotic use in past week- denied  -open wounds or mouth sores- denied  -invasive procedures or surgeries in past 4 weeks or in upcoming 4 weeks- denied  -vaccinations in past week- denied  -any new symptoms/change in symptoms-denied  -chance you may be pregnant- n/a      Recent labs? TODAY  Last Rheumatology provider visit- Seen by ANA Noguera on TODAY     Premeds-none needed     Remicade 800 mg administered IV at a 90 minute rate per orders; see MAR and vitals for more details.

## 2022-02-21 ENCOUNTER — TELEPHONE (OUTPATIENT)
Dept: RHEUMATOLOGY | Facility: CLINIC | Age: 77
End: 2022-02-21
Payer: MEDICARE

## 2022-03-02 ENCOUNTER — PATIENT MESSAGE (OUTPATIENT)
Dept: RESEARCH | Facility: HOSPITAL | Age: 77
End: 2022-03-02
Payer: MEDICARE

## 2022-03-18 ENCOUNTER — TELEPHONE (OUTPATIENT)
Dept: RHEUMATOLOGY | Facility: CLINIC | Age: 77
End: 2022-03-18
Payer: MEDICARE

## 2022-03-18 NOTE — TELEPHONE ENCOUNTER
Received call from pt asking for our fax number to send in a refill request for his new pharmacy he just started using. He states they asked him to fax this paper over to receive his prescriptions. Gave pt our fax number ([phone]). Pt verbalized his gratitude and understanding.

## 2022-03-21 ENCOUNTER — PATIENT MESSAGE (OUTPATIENT)
Dept: RHEUMATOLOGY | Facility: CLINIC | Age: 77
End: 2022-03-21
Payer: MEDICARE

## 2022-03-21 DIAGNOSIS — Z51.81 MEDICATION MONITORING ENCOUNTER: Chronic | ICD-10-CM

## 2022-03-21 DIAGNOSIS — M05.79 RHEUMATOID ARTHRITIS INVOLVING MULTIPLE SITES WITH POSITIVE RHEUMATOID FACTOR: Chronic | ICD-10-CM

## 2022-03-23 RX ORDER — METHOTREXATE 2.5 MG/1
17.5 TABLET ORAL
Qty: 84 TABLET | Refills: 4 | Status: SHIPPED | OUTPATIENT
Start: 2022-03-23 | End: 2022-09-26

## 2022-03-23 NOTE — TELEPHONE ENCOUNTER
I'm not sure what he's asking; is he needing all refills going forward to be at [facility]?

## 2022-04-14 ENCOUNTER — INFUSION (OUTPATIENT)
Dept: INFUSION THERAPY | Facility: HOSPITAL | Age: 77
End: 2022-04-14
Attending: INTERNAL MEDICINE
Payer: MEDICARE

## 2022-04-14 ENCOUNTER — TELEPHONE (OUTPATIENT)
Dept: HEMATOLOGY/ONCOLOGY | Facility: CLINIC | Age: 77
End: 2022-04-14
Payer: MEDICARE

## 2022-04-14 VITALS
RESPIRATION RATE: 16 BRPM | WEIGHT: 193.13 LBS | OXYGEN SATURATION: 96 % | BODY MASS INDEX: 30.25 KG/M2 | TEMPERATURE: 98 F | DIASTOLIC BLOOD PRESSURE: 69 MMHG | HEART RATE: 60 BPM | SYSTOLIC BLOOD PRESSURE: 110 MMHG

## 2022-04-14 DIAGNOSIS — M05.79 RHEUMATOID ARTHRITIS INVOLVING MULTIPLE SITES WITH POSITIVE RHEUMATOID FACTOR: Primary | ICD-10-CM

## 2022-04-14 PROCEDURE — 96415 CHEMO IV INFUSION ADDL HR: CPT

## 2022-04-14 PROCEDURE — 63600175 PHARM REV CODE 636 W HCPCS: Mod: JG | Performed by: INTERNAL MEDICINE

## 2022-04-14 PROCEDURE — 25000003 PHARM REV CODE 250: Performed by: INTERNAL MEDICINE

## 2022-04-14 PROCEDURE — 96413 CHEMO IV INFUSION 1 HR: CPT

## 2022-04-14 RX ORDER — METHYLPREDNISOLONE SOD SUCC 125 MG
25 VIAL (EA) INJECTION ONCE
Status: DISCONTINUED | OUTPATIENT
Start: 2022-04-14 | End: 2022-04-14

## 2022-04-14 RX ORDER — SODIUM CHLORIDE 0.9 % (FLUSH) 0.9 %
10 SYRINGE (ML) INJECTION
Status: CANCELLED | OUTPATIENT
Start: 2022-06-09

## 2022-04-14 RX ORDER — EPINEPHRINE 1 MG/ML
0.3 INJECTION, SOLUTION, CONCENTRATE INTRAVENOUS
Status: CANCELLED | OUTPATIENT
Start: 2022-06-09

## 2022-04-14 RX ORDER — METHYLPREDNISOLONE SOD SUCC 125 MG
50 VIAL (EA) INJECTION
Status: CANCELLED
Start: 2022-06-09 | End: 2022-06-09

## 2022-04-14 RX ORDER — DIPHENHYDRAMINE HYDROCHLORIDE 50 MG/ML
12.5 INJECTION INTRAMUSCULAR; INTRAVENOUS
Status: CANCELLED
Start: 2022-06-09 | End: 2022-06-09

## 2022-04-14 RX ORDER — SODIUM CHLORIDE 0.9 % (FLUSH) 0.9 %
10 SYRINGE (ML) INJECTION
Status: DISCONTINUED | OUTPATIENT
Start: 2022-04-14 | End: 2022-04-14 | Stop reason: HOSPADM

## 2022-04-14 RX ORDER — METHYLPREDNISOLONE SOD SUCC 125 MG
50 VIAL (EA) INJECTION
Status: DISCONTINUED | OUTPATIENT
Start: 2022-04-14 | End: 2022-04-14

## 2022-04-14 RX ORDER — ACETAMINOPHEN 325 MG/1
650 TABLET ORAL
Status: CANCELLED | OUTPATIENT
Start: 2022-06-09

## 2022-04-14 RX ORDER — DIPHENHYDRAMINE HYDROCHLORIDE 50 MG/ML
12.5 INJECTION INTRAMUSCULAR; INTRAVENOUS
Status: DISCONTINUED | OUTPATIENT
Start: 2022-04-14 | End: 2022-04-14 | Stop reason: HOSPADM

## 2022-04-14 RX ORDER — DIPHENHYDRAMINE HYDROCHLORIDE 50 MG/ML
25 INJECTION INTRAMUSCULAR; INTRAVENOUS
Status: CANCELLED | OUTPATIENT
Start: 2022-06-09

## 2022-04-14 RX ORDER — IPRATROPIUM BROMIDE AND ALBUTEROL SULFATE 2.5; .5 MG/3ML; MG/3ML
3 SOLUTION RESPIRATORY (INHALATION)
Status: CANCELLED | OUTPATIENT
Start: 2022-06-09

## 2022-04-14 RX ORDER — METHYLPREDNISOLONE SOD SUCC 125 MG
25 VIAL (EA) INJECTION ONCE
Status: CANCELLED
Start: 2022-06-09 | End: 2022-06-09

## 2022-04-14 RX ADMIN — INFLIXIMAB 800 MG: 100 INJECTION, POWDER, LYOPHILIZED, FOR SOLUTION INTRAVENOUS at 02:04

## 2022-04-14 NOTE — PLAN OF CARE
Problem: Adult Inpatient Plan of Care  Goal: Plan of Care Review  Outcome: Ongoing, Progressing  Flowsheets (Taken 4/14/2022 1514)  Plan of Care Reviewed With: patient  Goal: Patient-Specific Goal (Individualized)  Outcome: Ongoing, Progressing  Flowsheets (Taken 4/14/2022 1514)  Anxieties, Fears or Concerns: asked to talk to the insurance person..Radha Figueroa notified to call him. She said ok.  Individualized Care Needs: feet up.  Patient-Specific Goals (Include Timeframe): get treatment  Goal: Optimal Comfort and Wellbeing  Outcome: Ongoing, Progressing  Intervention: Provide Person-Centered Care  Flowsheets (Taken 4/14/2022 1514)  Trust Relationship/Rapport:   care explained   questions encouraged   choices provided   reassurance provided   emotional support provided   thoughts/feelings acknowledged   empathic listening provided   questions answered     Problem: Fall Injury Risk  Goal: Absence of Fall and Fall-Related Injury  Outcome: Ongoing, Progressing  Intervention: Identify and Manage Contributors  Flowsheets (Taken 4/14/2022 1514)  Self-Care Promotion: BADL personal routines maintained  Medication Review/Management: medications reviewed  Intervention: Promote Injury-Free Environment  Flowsheets (Taken 4/14/2022 1514)  Safety Promotion/Fall Prevention:   in recliner, wheels locked   nonskid shoes/socks when out of bed

## 2022-04-14 NOTE — DISCHARGE INSTRUCTIONS
[facility]  [id number] [address]  [address]  [phone] phone     [phone] fax  Hours of Operation: Monday- Friday 8:00am- 5:00pm  After hours phone  [phone]  Hematology / Oncology Physicians on call      VINNIE Mitchell Dr., Dr., NP Sydney Prescott, HARLAN Hutchinson FNP    Please call with any concerns regarding your appointment today.

## 2022-06-05 ENCOUNTER — PATIENT MESSAGE (OUTPATIENT)
Dept: RHEUMATOLOGY | Facility: CLINIC | Age: 77
End: 2022-06-05
Payer: MEDICARE

## 2022-06-06 NOTE — TELEPHONE ENCOUNTER
Dr DON, Mr. Back gets remicade infusions q 8 weeks.   He saw Poornima last 2/17   He reported a CVA on 3/20 treated in ICU at [facility]  Holter Monitor revealed A-Fib. New med xarelto  He has a heart cath scheduled for 6/8 and his remicade is scheduled for 6/9.   He is scheduled to see you on 6/23  Pt asking is it OK to have remicade on 6/9.   Please advise.

## 2022-06-09 ENCOUNTER — INFUSION (OUTPATIENT)
Dept: INFUSION THERAPY | Facility: HOSPITAL | Age: 77
End: 2022-06-09
Attending: INTERNAL MEDICINE
Payer: MEDICARE

## 2022-06-09 VITALS
RESPIRATION RATE: 16 BRPM | HEART RATE: 63 BPM | OXYGEN SATURATION: 97 % | TEMPERATURE: 98 F | BODY MASS INDEX: 31.01 KG/M2 | SYSTOLIC BLOOD PRESSURE: 132 MMHG | DIASTOLIC BLOOD PRESSURE: 67 MMHG | WEIGHT: 198 LBS

## 2022-06-09 DIAGNOSIS — M05.79 RHEUMATOID ARTHRITIS INVOLVING MULTIPLE SITES WITH POSITIVE RHEUMATOID FACTOR: Primary | ICD-10-CM

## 2022-06-09 PROCEDURE — A4216 STERILE WATER/SALINE, 10 ML: HCPCS | Performed by: INTERNAL MEDICINE

## 2022-06-09 PROCEDURE — 63600175 PHARM REV CODE 636 W HCPCS: Mod: JG | Performed by: INTERNAL MEDICINE

## 2022-06-09 PROCEDURE — 25000003 PHARM REV CODE 250: Performed by: INTERNAL MEDICINE

## 2022-06-09 PROCEDURE — 96413 CHEMO IV INFUSION 1 HR: CPT

## 2022-06-09 PROCEDURE — 96415 CHEMO IV INFUSION ADDL HR: CPT

## 2022-06-09 RX ORDER — ACETAMINOPHEN 325 MG/1
650 TABLET ORAL
Status: CANCELLED | OUTPATIENT
Start: 2022-08-04

## 2022-06-09 RX ORDER — IPRATROPIUM BROMIDE AND ALBUTEROL SULFATE 2.5; .5 MG/3ML; MG/3ML
3 SOLUTION RESPIRATORY (INHALATION)
Status: CANCELLED | OUTPATIENT
Start: 2022-08-04

## 2022-06-09 RX ORDER — PNV NO.95/FERROUS FUM/FOLIC AC 28MG-0.8MG
1000 TABLET ORAL DAILY
COMMUNITY

## 2022-06-09 RX ORDER — DIPHENHYDRAMINE HYDROCHLORIDE 50 MG/ML
25 INJECTION INTRAMUSCULAR; INTRAVENOUS
Status: CANCELLED | OUTPATIENT
Start: 2022-08-04

## 2022-06-09 RX ORDER — SODIUM CHLORIDE 0.9 % (FLUSH) 0.9 %
10 SYRINGE (ML) INJECTION
Status: DISCONTINUED | OUTPATIENT
Start: 2022-06-09 | End: 2022-06-09 | Stop reason: HOSPADM

## 2022-06-09 RX ORDER — GUAIFENESIN/PHENYLPROPANOLAMIN
450 EXPECTORANT ORAL DAILY
COMMUNITY

## 2022-06-09 RX ORDER — METHYLPREDNISOLONE SOD SUCC 125 MG
50 VIAL (EA) INJECTION
Status: CANCELLED
Start: 2022-08-04 | End: 2022-08-04

## 2022-06-09 RX ORDER — METHYLPREDNISOLONE SOD SUCC 125 MG
25 VIAL (EA) INJECTION ONCE
Status: CANCELLED
Start: 2022-08-04 | End: 2022-08-04

## 2022-06-09 RX ORDER — APIXABAN 5 MG/1
5 TABLET, FILM COATED ORAL 2 TIMES DAILY
COMMUNITY
Start: 2022-05-27

## 2022-06-09 RX ORDER — DIPHENHYDRAMINE HYDROCHLORIDE 50 MG/ML
12.5 INJECTION INTRAMUSCULAR; INTRAVENOUS
Status: CANCELLED
Start: 2022-08-04 | End: 2022-08-04

## 2022-06-09 RX ORDER — SODIUM CHLORIDE 0.9 % (FLUSH) 0.9 %
10 SYRINGE (ML) INJECTION
Status: CANCELLED | OUTPATIENT
Start: 2022-08-04

## 2022-06-09 RX ORDER — EPINEPHRINE 1 MG/ML
0.3 INJECTION, SOLUTION, CONCENTRATE INTRAVENOUS
Status: CANCELLED | OUTPATIENT
Start: 2022-08-04

## 2022-06-09 RX ADMIN — INFLIXIMAB 800 MG: 100 INJECTION, POWDER, LYOPHILIZED, FOR SOLUTION INTRAVENOUS at 10:06

## 2022-06-09 RX ADMIN — Medication 10 ML: at 10:06

## 2022-06-09 NOTE — DISCHARGE INSTRUCTIONS
WAYS TO HELP PREVENT INFECTION        WASH YOUR HANDS OFTEN DURING THE DAY, ESPECIALLY BEFORE YOU EAT, AFTER USING THE BATHROOM, AND AFTER TOUCHING ANIMALS    STAY AWAY FROM PEOPLE WHO HAVE ILLNESSES YOU CAN CATCH; SUCH AS COLDS, FLU, CHICKEN POX    TRY TO AVOID CROWDS    STAY AWAY FROM CHILDREN WHO RECENTLY HAVE RECEIVED LIVE VIRUS VACCINES    MAINTAIN GOOD MOUTH CARE    DO NOT SQUEEZE OR SCRATCH PIMPLES    CLEAN CUTS & SCRAPES RIGHT AWAY AND DAILY UNTIL HEALED WITH WARM WATER, SOAP & AN ANTISEPTIC    AVOID CONTACT WITH LITTER BOXES, BIRD CAGES, & FISH TANKS    AVOID STANDING WATER, IE., BIRD BATHS, FLOWER POTS/VASES, OR HUMIDIFIERS    WEAR GLOVES WHEN GARDENING OR CLEANING UP AFTER OTHERS, ESPECIALLY BABIES & SMALL CHILDREN    DO NOT EAT RAW FISH, SEAFOOD, MEAT, OR EGGS

## 2022-06-09 NOTE — PLAN OF CARE
Plan of care reviewed with pt. All needs and concerns addressed.   Problem: Adult Inpatient Plan of Care  Goal: Plan of Care Review  Outcome: Ongoing, Progressing  Flowsheets (Taken 6/9/2022 1333)  Plan of Care Reviewed With: patient  Goal: Patient-Specific Goal (Individualized)  Outcome: Ongoing, Progressing  Flowsheets (Taken 6/9/2022 1333)  Anxieties, Fears or Concerns: Pt concerned about Methotrexate being denied. Greardo in clinic notified- she spoke with insurance and pharmacy and reported back to pt.  Individualized Care Needs: Feet elevated in recliner.  Patient-Specific Goals (Include Timeframe): tolerate treatment today  Goal: Optimal Comfort and Wellbeing  Outcome: Ongoing, Progressing     Problem: Infection  Goal: Absence of Infection Signs and Symptoms  Outcome: Ongoing, Progressing  Intervention: Prevent or Manage Infection  Flowsheets (Taken 6/9/2022 1335)  Infection Management: aseptic technique maintained

## 2022-06-22 PROBLEM — Z79.631 METHOTREXATE, LONG TERM, CURRENT USE: Status: ACTIVE | Noted: 2022-06-22

## 2022-06-22 NOTE — PROGRESS NOTES
RHEUMATOLOGY CLINIC FOLLOW UP VISIT-1st visit with me.  Chief complaints, HPI, ROS, EXAM, Assessment & Plans:-  Jack Sommer a 77 y.o. pleasant male comes in for follow-up visit.  Longstanding history of seropositive erosive rheumatoid arthritis on Remicade and methotrexate for the past 20 years.  Complains of mild worsening joint pain, stiffness and swelling of MCP joints.  Rheumatological review of system negative otherwise.  Exam shows mild synovitis over multiple MCP joints with chronic deformities.    1. Rheumatoid arthritis involving multiple sites with positive rheumatoid factor    2. Immunocompromised patient    3. Infliximab (Remicade) long-term use    4. Methotrexate, long term, current use    5. Elevated sed rate    6. Flare of rheumatoid arthritis      Problem List Items Addressed This Visit     Rheumatoid arthritis involving multiple sites with positive rheumatoid factor - Primary (Chronic)    Relevant Medications    leflunomide (ARAVA) 20 MG Tab    Immunocompromised patient (Chronic)    Infliximab (Remicade) long-term use    Flare of rheumatoid arthritis    Relevant Medications    leflunomide (ARAVA) 20 MG Tab    Elevated sed rate    Methotrexate, long term, current use           Active seropositive erosive rheumatoid arthritis on Remicade and methotrexate.   Discontinue methotrexate and try Arava.   Continue Remicade infusion every 8 weeks.   If no significant improvement on the combination including improvement of inflammatory markers, try Orencia insert of Remicade.   On anticoagulation for severe coronary artery disease status post stenting.   Compromised immune system secondary to autoimmune disease and use of immunosuppressive drugs. Monitor carefully for infections. Advised to get immediate medical care if any infection. Also advised strict adherence to age appropriate vaccinations and cancer screenings with PCP.   Hold Remicade  and Arava if any infection   Continue safety labs every 8 weeks with Remicade infusion   Stop Arava if any adverse effects   # Follow up in about 3 months (around 9/23/2022).      Disclaimer: This note was prepared using voice recognition system and is likely to have sound alike errors and is not proof read.  Please call me with any questions.

## 2022-06-23 ENCOUNTER — OFFICE VISIT (OUTPATIENT)
Dept: RHEUMATOLOGY | Facility: CLINIC | Age: 77
End: 2022-06-23
Payer: MEDICARE

## 2022-06-23 ENCOUNTER — LAB VISIT (OUTPATIENT)
Dept: LAB | Facility: HOSPITAL | Age: 77
End: 2022-06-23
Attending: PHYSICIAN ASSISTANT
Payer: MEDICARE

## 2022-06-23 VITALS — BODY MASS INDEX: 31.01 KG/M2 | HEIGHT: 67 IN

## 2022-06-23 DIAGNOSIS — D84.9 IMMUNOCOMPROMISED PATIENT: ICD-10-CM

## 2022-06-23 DIAGNOSIS — M06.9 FLARE OF RHEUMATOID ARTHRITIS: ICD-10-CM

## 2022-06-23 DIAGNOSIS — R70.0 ELEVATED SED RATE: ICD-10-CM

## 2022-06-23 DIAGNOSIS — Z79.631 METHOTREXATE, LONG TERM, CURRENT USE: ICD-10-CM

## 2022-06-23 DIAGNOSIS — Z79.620 INFLIXIMAB (REMICADE) LONG-TERM USE: ICD-10-CM

## 2022-06-23 DIAGNOSIS — M05.79 RHEUMATOID ARTHRITIS INVOLVING MULTIPLE SITES WITH POSITIVE RHEUMATOID FACTOR: Primary | ICD-10-CM

## 2022-06-23 DIAGNOSIS — M05.79 RHEUMATOID ARTHRITIS INVOLVING MULTIPLE SITES WITH POSITIVE RHEUMATOID FACTOR: ICD-10-CM

## 2022-06-23 LAB
ALBUMIN SERPL BCP-MCNC: 3.5 G/DL (ref 3.5–5.2)
ALP SERPL-CCNC: 60 U/L (ref 55–135)
ALT SERPL W/O P-5'-P-CCNC: 17 U/L (ref 10–44)
ANION GAP SERPL CALC-SCNC: 8 MMOL/L (ref 8–16)
AST SERPL-CCNC: 25 U/L (ref 10–40)
BASOPHILS # BLD AUTO: 0.01 K/UL (ref 0–0.2)
BASOPHILS NFR BLD: 0.1 % (ref 0–1.9)
BILIRUB SERPL-MCNC: 0.6 MG/DL (ref 0.1–1)
BUN SERPL-MCNC: 14 MG/DL (ref 8–23)
CALCIUM SERPL-MCNC: 9.1 MG/DL (ref 8.7–10.5)
CHLORIDE SERPL-SCNC: 102 MMOL/L (ref 95–110)
CO2 SERPL-SCNC: 28 MMOL/L (ref 23–29)
CREAT SERPL-MCNC: 1 MG/DL (ref 0.5–1.4)
CRP SERPL-MCNC: 8.9 MG/L (ref 0–8.2)
DIFFERENTIAL METHOD: ABNORMAL
EOSINOPHIL # BLD AUTO: 0.2 K/UL (ref 0–0.5)
EOSINOPHIL NFR BLD: 2.6 % (ref 0–8)
ERYTHROCYTE [DISTWIDTH] IN BLOOD BY AUTOMATED COUNT: 15.9 % (ref 11.5–14.5)
ERYTHROCYTE [SEDIMENTATION RATE] IN BLOOD BY WESTERGREN METHOD: 39 MM/HR (ref 0–23)
EST. GFR  (AFRICAN AMERICAN): >60 ML/MIN/1.73 M^2
EST. GFR  (NON AFRICAN AMERICAN): >60 ML/MIN/1.73 M^2
GLUCOSE SERPL-MCNC: 99 MG/DL (ref 70–110)
HCT VFR BLD AUTO: 38.8 % (ref 40–54)
HGB BLD-MCNC: 13.8 G/DL (ref 14–18)
IMM GRANULOCYTES # BLD AUTO: 0.03 K/UL (ref 0–0.04)
IMM GRANULOCYTES NFR BLD AUTO: 0.3 % (ref 0–0.5)
LYMPHOCYTES # BLD AUTO: 1.5 K/UL (ref 1–4.8)
LYMPHOCYTES NFR BLD: 16.6 % (ref 18–48)
MCH RBC QN AUTO: 36.8 PG (ref 27–31)
MCHC RBC AUTO-ENTMCNC: 35.6 G/DL (ref 32–36)
MCV RBC AUTO: 104 FL (ref 82–98)
MONOCYTES # BLD AUTO: 0.5 K/UL (ref 0.3–1)
MONOCYTES NFR BLD: 6 % (ref 4–15)
NEUTROPHILS # BLD AUTO: 6.6 K/UL (ref 1.8–7.7)
NEUTROPHILS NFR BLD: 74.4 % (ref 38–73)
NRBC BLD-RTO: 0 /100 WBC
PLATELET # BLD AUTO: 312 K/UL (ref 150–450)
PMV BLD AUTO: 8.3 FL (ref 9.2–12.9)
POTASSIUM SERPL-SCNC: 4.3 MMOL/L (ref 3.5–5.1)
PROT SERPL-MCNC: 7 G/DL (ref 6–8.4)
RBC # BLD AUTO: 3.75 M/UL (ref 4.6–6.2)
SODIUM SERPL-SCNC: 138 MMOL/L (ref 136–145)
WBC # BLD AUTO: 8.86 K/UL (ref 3.9–12.7)

## 2022-06-23 PROCEDURE — 99214 OFFICE O/P EST MOD 30 MIN: CPT | Mod: PBBFAC | Performed by: INTERNAL MEDICINE

## 2022-06-23 PROCEDURE — 99999 PR PBB SHADOW E&M-EST. PATIENT-LVL IV: ICD-10-PCS | Mod: PBBFAC,,, | Performed by: INTERNAL MEDICINE

## 2022-06-23 PROCEDURE — 86140 C-REACTIVE PROTEIN: CPT | Performed by: PHYSICIAN ASSISTANT

## 2022-06-23 PROCEDURE — 99215 OFFICE O/P EST HI 40 MIN: CPT | Mod: S$PBB,,, | Performed by: INTERNAL MEDICINE

## 2022-06-23 PROCEDURE — 85652 RBC SED RATE AUTOMATED: CPT | Performed by: PHYSICIAN ASSISTANT

## 2022-06-23 PROCEDURE — 99999 PR PBB SHADOW E&M-EST. PATIENT-LVL IV: CPT | Mod: PBBFAC,,, | Performed by: INTERNAL MEDICINE

## 2022-06-23 PROCEDURE — 80053 COMPREHEN METABOLIC PANEL: CPT | Performed by: PHYSICIAN ASSISTANT

## 2022-06-23 PROCEDURE — 85025 COMPLETE CBC W/AUTO DIFF WBC: CPT | Performed by: PHYSICIAN ASSISTANT

## 2022-06-23 PROCEDURE — 99215 PR OFFICE/OUTPT VISIT, EST, LEVL V, 40-54 MIN: ICD-10-PCS | Mod: S$PBB,,, | Performed by: INTERNAL MEDICINE

## 2022-06-23 RX ORDER — LEFLUNOMIDE 20 MG/1
20 TABLET ORAL DAILY
Qty: 30 TABLET | Refills: 11 | Status: SHIPPED | OUTPATIENT
Start: 2022-06-23 | End: 2023-01-20

## 2022-06-23 NOTE — PATIENT INSTRUCTIONS
Louise    Hatchet Flap Text: The defect edges were debeveled with a #15 scalpel blade.  Given the location of the defect, shape of the defect and the proximity to free margins a hatchet flap was deemed most appropriate.  Using a sterile surgical marker, an appropriate hatchet flap was drawn incorporating the defect and placing the expected incisions within the relaxed skin tension lines where possible.    The area thus outlined was incised deep to adipose tissue with a #15 scalpel blade.  The skin margins were undermined to an appropriate distance in all directions utilizing iris scissors.

## 2022-08-04 ENCOUNTER — INFUSION (OUTPATIENT)
Dept: INFUSION THERAPY | Facility: HOSPITAL | Age: 77
End: 2022-08-04
Attending: PHYSICIAN ASSISTANT
Payer: MEDICARE

## 2022-08-04 VITALS
DIASTOLIC BLOOD PRESSURE: 65 MMHG | OXYGEN SATURATION: 95 % | SYSTOLIC BLOOD PRESSURE: 125 MMHG | TEMPERATURE: 98 F | HEART RATE: 61 BPM | HEIGHT: 67 IN | BODY MASS INDEX: 30.1 KG/M2 | WEIGHT: 191.81 LBS | RESPIRATION RATE: 16 BRPM

## 2022-08-04 DIAGNOSIS — M05.79 RHEUMATOID ARTHRITIS INVOLVING MULTIPLE SITES WITH POSITIVE RHEUMATOID FACTOR: Primary | ICD-10-CM

## 2022-08-04 PROCEDURE — 25000003 PHARM REV CODE 250: Performed by: INTERNAL MEDICINE

## 2022-08-04 PROCEDURE — 63600175 PHARM REV CODE 636 W HCPCS: Mod: JG | Performed by: INTERNAL MEDICINE

## 2022-08-04 PROCEDURE — 96413 CHEMO IV INFUSION 1 HR: CPT

## 2022-08-04 PROCEDURE — 96415 CHEMO IV INFUSION ADDL HR: CPT

## 2022-08-04 RX ORDER — METHYLPREDNISOLONE SOD SUCC 125 MG
25 VIAL (EA) INJECTION ONCE
Status: CANCELLED
Start: 2022-09-29 | End: 2022-09-29

## 2022-08-04 RX ORDER — METHYLPREDNISOLONE SOD SUCC 125 MG
50 VIAL (EA) INJECTION
Status: CANCELLED
Start: 2022-09-29 | End: 2022-09-29

## 2022-08-04 RX ORDER — EPINEPHRINE 1 MG/ML
0.3 INJECTION, SOLUTION, CONCENTRATE INTRAVENOUS
Status: CANCELLED | OUTPATIENT
Start: 2022-09-29

## 2022-08-04 RX ORDER — DIPHENHYDRAMINE HYDROCHLORIDE 50 MG/ML
12.5 INJECTION INTRAMUSCULAR; INTRAVENOUS
Status: CANCELLED
Start: 2022-09-29 | End: 2022-09-29

## 2022-08-04 RX ORDER — ACETAMINOPHEN 325 MG/1
650 TABLET ORAL
Status: CANCELLED | OUTPATIENT
Start: 2022-09-29

## 2022-08-04 RX ORDER — SODIUM CHLORIDE 0.9 % (FLUSH) 0.9 %
10 SYRINGE (ML) INJECTION
Status: CANCELLED | OUTPATIENT
Start: 2022-09-29

## 2022-08-04 RX ORDER — DIPHENHYDRAMINE HYDROCHLORIDE 50 MG/ML
25 INJECTION INTRAMUSCULAR; INTRAVENOUS
Status: CANCELLED | OUTPATIENT
Start: 2022-09-29

## 2022-08-04 RX ORDER — IPRATROPIUM BROMIDE AND ALBUTEROL SULFATE 2.5; .5 MG/3ML; MG/3ML
3 SOLUTION RESPIRATORY (INHALATION)
Status: CANCELLED | OUTPATIENT
Start: 2022-09-29

## 2022-08-04 RX ADMIN — INFLIXIMAB 800 MG: 100 INJECTION, POWDER, LYOPHILIZED, FOR SOLUTION INTRAVENOUS at 10:08

## 2022-08-04 NOTE — NURSING
Infusion - Remicade mg q 8 weeks  Last dose- 6/9/22     Any:  -recent illness, infection, or antibiotic use in past week- denied  -open wounds or mouth sores- denied  -invasive procedures or surgeries in past 4 weeks or in upcoming 4 weeks- denied  -vaccinations in past week- denied  -any new symptoms/change in symptoms-denied  -chance you may be pregnant- denied      Recent labs? TODAY  Last Rheumatology provider visit- Seen by Dr DON on 6/23/2022     Premeds-NONE NEEDED     Remicade 800 mg administered IV at a 90 minute rate per orders; see MAR and vitals for more details.

## 2022-09-26 ENCOUNTER — INFUSION (OUTPATIENT)
Dept: INFUSION THERAPY | Facility: HOSPITAL | Age: 77
End: 2022-09-26
Attending: INTERNAL MEDICINE
Payer: MEDICARE

## 2022-09-26 ENCOUNTER — LAB VISIT (OUTPATIENT)
Dept: LAB | Facility: HOSPITAL | Age: 77
End: 2022-09-26
Attending: PHYSICIAN ASSISTANT
Payer: MEDICARE

## 2022-09-26 ENCOUNTER — OFFICE VISIT (OUTPATIENT)
Dept: RHEUMATOLOGY | Facility: CLINIC | Age: 77
End: 2022-09-26
Payer: MEDICARE

## 2022-09-26 VITALS
HEART RATE: 69 BPM | HEIGHT: 67 IN | DIASTOLIC BLOOD PRESSURE: 81 MMHG | BODY MASS INDEX: 29.83 KG/M2 | SYSTOLIC BLOOD PRESSURE: 132 MMHG | WEIGHT: 190.06 LBS

## 2022-09-26 VITALS
DIASTOLIC BLOOD PRESSURE: 69 MMHG | RESPIRATION RATE: 16 BRPM | TEMPERATURE: 98 F | SYSTOLIC BLOOD PRESSURE: 123 MMHG | OXYGEN SATURATION: 95 % | HEART RATE: 61 BPM

## 2022-09-26 DIAGNOSIS — Z51.81 MEDICATION MONITORING ENCOUNTER: ICD-10-CM

## 2022-09-26 DIAGNOSIS — M05.79 RHEUMATOID ARTHRITIS INVOLVING MULTIPLE SITES WITH POSITIVE RHEUMATOID FACTOR: Primary | ICD-10-CM

## 2022-09-26 DIAGNOSIS — M05.79 RHEUMATOID ARTHRITIS INVOLVING MULTIPLE SITES WITH POSITIVE RHEUMATOID FACTOR: ICD-10-CM

## 2022-09-26 DIAGNOSIS — D84.9 IMMUNOCOMPROMISED PATIENT: ICD-10-CM

## 2022-09-26 LAB
ALBUMIN SERPL BCP-MCNC: 3.8 G/DL (ref 3.5–5.2)
ALP SERPL-CCNC: 62 U/L (ref 55–135)
ALT SERPL W/O P-5'-P-CCNC: 22 U/L (ref 10–44)
ANION GAP SERPL CALC-SCNC: 11 MMOL/L (ref 8–16)
AST SERPL-CCNC: 33 U/L (ref 10–40)
BASOPHILS # BLD AUTO: 0.03 K/UL (ref 0–0.2)
BASOPHILS NFR BLD: 0.3 % (ref 0–1.9)
BILIRUB SERPL-MCNC: 0.7 MG/DL (ref 0.1–1)
BUN SERPL-MCNC: 17 MG/DL (ref 8–23)
CALCIUM SERPL-MCNC: 9.4 MG/DL (ref 8.7–10.5)
CHLORIDE SERPL-SCNC: 98 MMOL/L (ref 95–110)
CO2 SERPL-SCNC: 30 MMOL/L (ref 23–29)
CREAT SERPL-MCNC: 1.2 MG/DL (ref 0.5–1.4)
CRP SERPL-MCNC: 3 MG/L (ref 0–8.2)
DIFFERENTIAL METHOD: ABNORMAL
EOSINOPHIL # BLD AUTO: 0.2 K/UL (ref 0–0.5)
EOSINOPHIL NFR BLD: 2.7 % (ref 0–8)
ERYTHROCYTE [DISTWIDTH] IN BLOOD BY AUTOMATED COUNT: 13.7 % (ref 11.5–14.5)
ERYTHROCYTE [SEDIMENTATION RATE] IN BLOOD BY PHOTOMETRIC METHOD: 34 MM/HR (ref 0–23)
EST. GFR  (NO RACE VARIABLE): >60 ML/MIN/1.73 M^2
GLUCOSE SERPL-MCNC: 131 MG/DL (ref 70–110)
HCT VFR BLD AUTO: 45.4 % (ref 40–54)
HGB BLD-MCNC: 16.1 G/DL (ref 14–18)
IMM GRANULOCYTES # BLD AUTO: 0.03 K/UL (ref 0–0.04)
IMM GRANULOCYTES NFR BLD AUTO: 0.3 % (ref 0–0.5)
LYMPHOCYTES # BLD AUTO: 2 K/UL (ref 1–4.8)
LYMPHOCYTES NFR BLD: 22.2 % (ref 18–48)
MCH RBC QN AUTO: 34.8 PG (ref 27–31)
MCHC RBC AUTO-ENTMCNC: 35.5 G/DL (ref 32–36)
MCV RBC AUTO: 98 FL (ref 82–98)
MONOCYTES # BLD AUTO: 0.6 K/UL (ref 0.3–1)
MONOCYTES NFR BLD: 6.2 % (ref 4–15)
NEUTROPHILS # BLD AUTO: 6 K/UL (ref 1.8–7.7)
NEUTROPHILS NFR BLD: 68.3 % (ref 38–73)
NRBC BLD-RTO: 0 /100 WBC
PLATELET # BLD AUTO: 272 K/UL (ref 150–450)
PMV BLD AUTO: 8.5 FL (ref 9.2–12.9)
POTASSIUM SERPL-SCNC: 4 MMOL/L (ref 3.5–5.1)
PROT SERPL-MCNC: 8 G/DL (ref 6–8.4)
RBC # BLD AUTO: 4.63 M/UL (ref 4.6–6.2)
SODIUM SERPL-SCNC: 139 MMOL/L (ref 136–145)
WBC # BLD AUTO: 8.82 K/UL (ref 3.9–12.7)

## 2022-09-26 PROCEDURE — 86140 C-REACTIVE PROTEIN: CPT | Performed by: PHYSICIAN ASSISTANT

## 2022-09-26 PROCEDURE — 80053 COMPREHEN METABOLIC PANEL: CPT | Performed by: PHYSICIAN ASSISTANT

## 2022-09-26 PROCEDURE — 96415 CHEMO IV INFUSION ADDL HR: CPT | Performed by: PHYSICIAN ASSISTANT

## 2022-09-26 PROCEDURE — 99215 OFFICE O/P EST HI 40 MIN: CPT | Mod: S$PBB,,, | Performed by: PHYSICIAN ASSISTANT

## 2022-09-26 PROCEDURE — 96413 CHEMO IV INFUSION 1 HR: CPT | Performed by: PHYSICIAN ASSISTANT

## 2022-09-26 PROCEDURE — 63600175 PHARM REV CODE 636 W HCPCS: Mod: JG | Performed by: INTERNAL MEDICINE

## 2022-09-26 PROCEDURE — 99999 PR PBB SHADOW E&M-EST. PATIENT-LVL III: ICD-10-PCS | Mod: PBBFAC,,, | Performed by: PHYSICIAN ASSISTANT

## 2022-09-26 PROCEDURE — 99999 PR PBB SHADOW E&M-EST. PATIENT-LVL III: CPT | Mod: PBBFAC,,, | Performed by: PHYSICIAN ASSISTANT

## 2022-09-26 PROCEDURE — 99215 PR OFFICE/OUTPT VISIT, EST, LEVL V, 40-54 MIN: ICD-10-PCS | Mod: S$PBB,,, | Performed by: PHYSICIAN ASSISTANT

## 2022-09-26 PROCEDURE — 99213 OFFICE O/P EST LOW 20 MIN: CPT | Mod: PBBFAC | Performed by: PHYSICIAN ASSISTANT

## 2022-09-26 PROCEDURE — 85025 COMPLETE CBC W/AUTO DIFF WBC: CPT | Performed by: PHYSICIAN ASSISTANT

## 2022-09-26 PROCEDURE — 25000003 PHARM REV CODE 250: Performed by: INTERNAL MEDICINE

## 2022-09-26 PROCEDURE — 85652 RBC SED RATE AUTOMATED: CPT | Performed by: PHYSICIAN ASSISTANT

## 2022-09-26 RX ORDER — DIPHENHYDRAMINE HYDROCHLORIDE 50 MG/ML
12.5 INJECTION INTRAMUSCULAR; INTRAVENOUS
Status: CANCELLED
Start: 2022-11-21 | End: 2022-11-21

## 2022-09-26 RX ORDER — DIPHENHYDRAMINE HYDROCHLORIDE 50 MG/ML
25 INJECTION INTRAMUSCULAR; INTRAVENOUS
Status: CANCELLED | OUTPATIENT
Start: 2022-11-21

## 2022-09-26 RX ORDER — METHYLPREDNISOLONE SOD SUCC 125 MG
50 VIAL (EA) INJECTION
Status: CANCELLED
Start: 2022-11-21 | End: 2022-11-21

## 2022-09-26 RX ORDER — ACETAMINOPHEN 325 MG/1
650 TABLET ORAL
Status: CANCELLED | OUTPATIENT
Start: 2022-11-21

## 2022-09-26 RX ORDER — METHYLPREDNISOLONE SOD SUCC 125 MG
25 VIAL (EA) INJECTION ONCE
Status: CANCELLED
Start: 2022-11-21 | End: 2022-11-21

## 2022-09-26 RX ORDER — SODIUM CHLORIDE 0.9 % (FLUSH) 0.9 %
10 SYRINGE (ML) INJECTION
Status: CANCELLED | OUTPATIENT
Start: 2022-11-21

## 2022-09-26 RX ORDER — EPINEPHRINE 1 MG/ML
0.3 INJECTION, SOLUTION, CONCENTRATE INTRAVENOUS
Status: CANCELLED | OUTPATIENT
Start: 2022-11-21

## 2022-09-26 RX ORDER — IPRATROPIUM BROMIDE AND ALBUTEROL SULFATE 2.5; .5 MG/3ML; MG/3ML
3 SOLUTION RESPIRATORY (INHALATION)
Status: CANCELLED | OUTPATIENT
Start: 2022-11-21

## 2022-09-26 RX ORDER — METHOTREXATE 2.5 MG/1
20 TABLET ORAL
Qty: 96 TABLET | Refills: 0 | Status: SHIPPED | OUTPATIENT
Start: 2022-09-26 | End: 2022-11-23

## 2022-09-26 RX ADMIN — INFLIXIMAB 800 MG: 100 INJECTION, POWDER, LYOPHILIZED, FOR SOLUTION INTRAVENOUS at 03:09

## 2022-09-26 ASSESSMENT — ROUTINE ASSESSMENT OF PATIENT INDEX DATA (RAPID3): MDHAQ FUNCTION SCORE: 0.6

## 2022-09-26 NOTE — PLAN OF CARE
Patient tolerated Remicade well today; no adverse reaction noted.  No significant complaints voiced.  IV discontinued with catheter intact and pressure dressing applied to the site.  Has f/u appt(s) scheduled per MD request.  No questions or concerns voiced.  NAD noted upon discharge.      
No

## 2022-09-26 NOTE — PROGRESS NOTES
RHEUMATOLOGY OUTPATIENT CLINIC NOTE    9/26/2022    Attending Rheumatologist: Poornima Calix PA-C  Primary Care Provider: Nabil Benavides MD   Chief Complaint/Reason For Consultation:  Rheumatoid Arthritis      Subjective:        Jack Back is a 77 y.o. male here today for follow up seropositive erosive rheumatoid arthritis. MTX was changed to arava at last OV w/ Dr. DON. Pt has been on the medicine but has been having recurrent diarrhea as well. Would like to try MTX again if possible. Rates pain today 0/10. No trouble with recurrent infections. He has not developed heart failure. No lower ext edema, mild sob, no chest pain. No pnd. Rheumatologic systems otherwise negative. Physical exam shows no active synovitis of MCP or PIP joints today. No wrist tenderness.     6/2019 had  redo of cabg- In 2017 -MI required PTCA stent placement.   MI X3 Prior cabc in 2012  pleural effusion, admitted back to hospital, given lasix and started on daily dose- resolved   On repatha his cholesterol is much better     Chronic elevated esr, normal crp. 5/2017 spep showed elevated gammaglobulins, no polyclonal or monoclonal peaks in IF. Hypergammaglobulinemia. Repeat done 9/2018 IF  There is a faint linear irregularity in gamma involving IgG and lambda, non-specific at this time.   indicated. Esr in the 50-60's consistently, crp normal     bone density done in June 2014 and Nov 2020- Results NORMAL     Serologies  Lab Results   Component Value Date    TBGOLDPLUS Negative 10/28/2021     Lab Results   Component Value Date    RF 42 (H) 06/28/2004     No components found for: CYCLICCITRULPEPTIDEANTIBODY     Lab Results   Component Value Date    HEPAIGM Negative 07/30/2020    HEPBIGM Negative 07/30/2020    HEPCAB Negative 07/30/2020          Current Rheum Medications:  Remicade Q2mos, arava  Previous Rheum Medications:   MTX monotherapy    Past Medical History:   Diagnosis Date    Arthritis     Congestive heart failure  "06/07/2019    Depression     Lung disease     Rheumatoid arthritis involving multiple sites with positive rheumatoid factor 12/1/2015    Rheumatoid arthritis(714.0)     Thyroid disease      Social History     Socioeconomic History    Marital status:    Tobacco Use    Smoking status: Former    Smokeless tobacco: Never   Substance and Sexual Activity    Alcohol use: No    Drug use: No     Review of patient's allergies indicates:   Allergen Reactions    Penicillins Other (See Comments) and Hives     unknown    Tetanus vaccines and toxoid Other (See Comments)     unknown  Fever       Objective:   /81   Pulse 69   Ht 5' 7" (1.702 m)   Wt 86.2 kg (190 lb 0.6 oz)   BMI 29.76 kg/m²     Immunization History   Administered Date(s) Administered    Hepatitis A / Hepatitis B 07/01/2019    Influenza (FLUAD) - Quadrivalent - Adjuvanted - PF *Preferred* (65+) 10/16/2020    Influenza - High Dose - PF (65 years and older) 11/05/2013, 10/07/2014, 10/06/2015, 11/15/2016, 10/17/2017, 10/24/2018, 10/30/2019    Pneumococcal Conjugate - 13 Valent 02/25/2014    Pneumococcal Polysaccharide - 23 Valent 06/17/2014    Zoster 12/02/2014       Current Outpatient Medications:     ascorbic acid, vitamin C, (VITAMIN C) 100 MG tablet, Take 100 mg by mouth once daily., Disp: , Rfl:     aspirin (ECOTRIN) 81 MG EC tablet, Take 325 mg by mouth once daily. , Disp: , Rfl:     cholecalciferol, vitamin D3, 10 mcg (400 unit) Cap, , Disp: , Rfl:     co-enzyme Q-10 30 mg capsule, Take 30 mg by mouth once daily., Disp: , Rfl:     cyanocobalamin (VITAMIN B-12) 1000 MCG tablet, Take 300 mcg by mouth once daily., Disp: , Rfl:     ELIQUIS 5 mg Tab, Take 5 mg by mouth 2 (two) times daily., Disp: , Rfl:     evolocumab (REPATHA SYRINGE SUBQ), Inject 140 mg into the skin every 14 (fourteen) days., Disp: , Rfl:     folic acid (FOLVITE) 800 MCG Tab, Take 1 tablet (800 mcg total) by mouth 2 (two) times daily. (Patient taking differently: Take 800 mcg by " mouth once daily.), Disp: , Rfl:     furosemide (LASIX) 40 MG tablet, , Disp: , Rfl: 1    VANNESSA ORAL, Take by mouth. 565 mg daily, Disp: , Rfl:     INFLIXIMAB (REMICADE IV), Inject into the vein. , Disp: , Rfl:     INV folate 400 MCG tablet, Take 400 mcg by mouth once daily. FOR INVESTIGATIONAL USE ONLY, Disp: , Rfl:     leflunomide (ARAVA) 20 MG Tab, Take 1 tablet (20 mg total) by mouth once daily., Disp: 30 tablet, Rfl: 11    methotrexate 2.5 MG Tab, Take 7 tablets (17.5 mg total) by mouth every 7 days. TAKE 3 TABLETS BY MOUTH IN THE MORNING, THEN 4 TABLETS IN THE EVENING ONCE A WEEK= 17.5 MG WEEK ON THE SAME DAY OF EACH WEEK, Disp: 84 tablet, Rfl: 4    milk thistle 175 mg tablet, Take 500 mg by mouth once daily., Disp: , Rfl:     milk thistle 175 mg tablet, 1 tablet, Disp: , Rfl:     multivit with minerals/lutein (MULTIVITAMIN 50 PLUS ORAL), , Disp: , Rfl:     NITROSTAT 0.4 mg SL tablet, 0.4 mg., Disp: , Rfl:     omega 3-dha-epa-fish oil 500-100-1,000 mg Cap, Take by mouth 2 (two) times daily., Disp: , Rfl:     oxyCODONE-acetaminophen (PERCOCET) 5-325 mg per tablet, Take 1 tablet by mouth every 4 (four) hours as needed., Disp: , Rfl:     potassium chloride (MICRO-K) 10 MEQ CpSR, Take 10 mEq by mouth once., Disp: , Rfl:     potassium/magnesium (MAGNESIUM-POTASSIUM ORAL), Take 600 mg by mouth once daily. 600 mg/ 198, Disp: , Rfl:     ramipril (ALTACE) 10 MG capsule, Take 10 mg by mouth once daily., Disp: , Rfl:     saw palmetto 500 MG capsule, Take 450 mg by mouth once daily., Disp: , Rfl:     SYNTHROID 88 mcg tablet, 88 mcg once daily., Disp: , Rfl: 5    testosterone cypionate (DEPOTESTOTERONE CYPIONATE) 200 mg/mL injection, , Disp: , Rfl: 0    ticagrelor (BRILINTA) 90 mg tablet, Take 90 mg by mouth once daily. Pts states taking daily, Disp: , Rfl:     triamcinolone acetonide 0.1% (KENALOG) 0.1 % cream, Apply topically 2 (two) times daily. for 10 days, Disp: 45 g, Rfl: 2    TURMERIC, BULK, MISC, 500 mg by  Misc.(Non-Drug; Combo Route) route., Disp: , Rfl:     vit C/E/Zn/coppr/lutein/zeaxan (PRESERVISION AREDS-2 ORAL), Take by mouth once daily., Disp: , Rfl:     vitamin B12-folic acid 0.5-1 mg Tab, , Disp: , Rfl:     vitamin D 1000 units Tab, Take 5,000 mg by mouth once daily. , Disp: , Rfl:     vitamin E 1000 UNIT capsule, Take 1,000 Units by mouth once daily., Disp: , Rfl:     VITAMIN K2 ORAL, Take 100 mcg by mouth., Disp: , Rfl:     zinc gluconate 50 mg tablet, Take 50 mg by mouth once daily., Disp: , Rfl:     Current Facility-Administered Medications:     acetaminophen tablet 650 mg, 650 mg, Oral, Once PRN, Farooq Corey MD    albuterol inhaler 2 puff, 2 puff, Inhalation, Q20 Min PRN, Farooq Corey MD    diphenhydrAMINE injection 25 mg, 25 mg, Intravenous, Once PRN, Farooq Corey MD    EPINEPHrine (EPIPEN) 0.3 mg/0.3 mL pen injection 0.3 mg, 0.3 mg, Intramuscular, PRN, Farooq Corey MD    methylPREDNISolone sodium succinate injection 40 mg, 40 mg, Intravenous, Once PRN, Farooq Corey MD    ondansetron disintegrating tablet 4 mg, 4 mg, Oral, Once PRN, Farooq Corey MD    sodium chloride 0.9% 500 mL flush bag, , Intravenous, PRN, Farooq Corey MD    sodium chloride 0.9% flush 10 mL, 10 mL, Intravenous, PRN, Farooq Corey MD       Recent Results (from the past 336 hour(s))   CBC Auto Differential    Collection Time: 09/26/22  2:06 PM   Result Value Ref Range    WBC 8.82 3.90 - 12.70 K/uL    RBC 4.63 4.60 - 6.20 M/uL    Hemoglobin 16.1 14.0 - 18.0 g/dL    Hematocrit 45.4 40.0 - 54.0 %    MCV 98 82 - 98 fL    MCH 34.8 (H) 27.0 - 31.0 pg    MCHC 35.5 32.0 - 36.0 g/dL    RDW 13.7 11.5 - 14.5 %    Platelets 272 150 - 450 K/uL    MPV 8.5 (L) 9.2 - 12.9 fL    Immature Granulocytes 0.3 0.0 - 0.5 %    Gran # (ANC) 6.0 1.8 - 7.7 K/uL    Immature Grans (Abs) 0.03 0.00 - 0.04 K/uL    Lymph # 2.0 1.0 - 4.8 K/uL    Mono # 0.6 0.3 - 1.0 K/uL    Eos # 0.2 0.0 - 0.5 K/uL    Baso # 0.03 0.00 - 0.20 K/uL     nRBC 0 0 /100 WBC    Gran % 68.3 38.0 - 73.0 %    Lymph % 22.2 18.0 - 48.0 %    Mono % 6.2 4.0 - 15.0 %    Eosinophil % 2.7 0.0 - 8.0 %    Basophil % 0.3 0.0 - 1.9 %    Differential Method Automated        Assessment:     1. Rheumatoid arthritis involving multiple sites with positive rheumatoid factor    2. Immunocompromised patient    3. Medication monitoring encounter          Plan:       D/c arava 2' GI upset and resume MTX 20 mg/wk w/ FA daily.  Compromised immune system secondary to autoimmune disease and use of immunosuppressive drugs. Monitor carefully for infections and toxicities- Currently denies issues with recurrent infections. Advised to get immediate medical care if any infection.  Recommend Yearly Skin Cancer Screening by Dermatology for all patients on biologic or Bob. advised strict adherence to age appropriate vaccinations (shingles, pneumonia, flu and covid) and cancer screenings with PCP   Patient advised to hold DMARD and/or biologic therapy for signs of infection or for surgery. If you are unsure what to do please call our office for instruction.[facility] [phone]  no current medication related issues, no evidence of toxicity. I ordered labs for toxicity monitoring;  results reviewed and discussed findings with the patient   Return to clinic: 4 mos w/ early reg 4    The patient understands, chooses and consents to this plan and accepts all the risks which include but are not limited to the risks mentioned above.     Method of contact with patient concerns: Brenda attn Rheumatology    60 minutes of total time spent on the encounter, which includes face to face time and non-face to face time preparing to see the patient (eg, review of tests), Obtaining and/or reviewing separately obtained history, Documenting clinical information in the electronic or other health record, Independently interpreting results (not separately reported) and communicating results to the  patient/family/caregiver, or Care coordination (not separately reported).             Poornima Calix PA-C  Rheumatology Department   [facility]

## 2022-09-26 NOTE — NURSING
Infusion - Remicade mg q 8 weeks  Last dose- 8/4/2022        Any:  -recent illness, infection, or antibiotic use in past week- denied  -open wounds or mouth sores- denied  -invasive procedures or surgeries in past 4 weeks or in upcoming 4 weeks- denied  -vaccinations in past week- denied  -any new symptoms/change in symptoms-denied  -chance you may be pregnant- denied        Recent labs? TODAY  Last Rheumatology provider visit- Seen by ANA Noguera on TODAY     Premeds-NONE NEEDED     Remicade 800 mg administered IV at a 90 minute rate per orders; see MAR and vitals for more details

## 2022-11-23 ENCOUNTER — INFUSION (OUTPATIENT)
Dept: INFUSION THERAPY | Facility: HOSPITAL | Age: 77
End: 2022-11-23
Attending: PHYSICIAN ASSISTANT
Payer: MEDICARE

## 2022-11-23 VITALS
HEART RATE: 59 BPM | WEIGHT: 196.63 LBS | RESPIRATION RATE: 16 BRPM | DIASTOLIC BLOOD PRESSURE: 72 MMHG | OXYGEN SATURATION: 95 % | SYSTOLIC BLOOD PRESSURE: 118 MMHG | BODY MASS INDEX: 30.8 KG/M2 | TEMPERATURE: 97 F

## 2022-11-23 DIAGNOSIS — M05.79 RHEUMATOID ARTHRITIS INVOLVING MULTIPLE SITES WITH POSITIVE RHEUMATOID FACTOR: Primary | ICD-10-CM

## 2022-11-23 PROCEDURE — 25000003 PHARM REV CODE 250: Performed by: INTERNAL MEDICINE

## 2022-11-23 PROCEDURE — 96415 CHEMO IV INFUSION ADDL HR: CPT

## 2022-11-23 PROCEDURE — 63600175 PHARM REV CODE 636 W HCPCS: Mod: JG | Performed by: INTERNAL MEDICINE

## 2022-11-23 PROCEDURE — 96413 CHEMO IV INFUSION 1 HR: CPT

## 2022-11-23 PROCEDURE — A4216 STERILE WATER/SALINE, 10 ML: HCPCS | Performed by: INTERNAL MEDICINE

## 2022-11-23 RX ORDER — DIPHENHYDRAMINE HYDROCHLORIDE 50 MG/ML
12.5 INJECTION INTRAMUSCULAR; INTRAVENOUS
Status: CANCELLED
Start: 2023-01-18 | End: 2023-01-18

## 2022-11-23 RX ORDER — EPINEPHRINE 1 MG/ML
0.3 INJECTION, SOLUTION, CONCENTRATE INTRAVENOUS
Status: CANCELLED | OUTPATIENT
Start: 2023-01-18

## 2022-11-23 RX ORDER — METHYLPREDNISOLONE SOD SUCC 125 MG
25 VIAL (EA) INJECTION ONCE
Status: CANCELLED
Start: 2023-01-18 | End: 2023-01-18

## 2022-11-23 RX ORDER — ACETAMINOPHEN 325 MG/1
650 TABLET ORAL
Status: CANCELLED | OUTPATIENT
Start: 2023-01-18

## 2022-11-23 RX ORDER — METHYLPREDNISOLONE SOD SUCC 125 MG
50 VIAL (EA) INJECTION
Status: CANCELLED
Start: 2023-01-18 | End: 2023-01-18

## 2022-11-23 RX ORDER — SODIUM CHLORIDE 0.9 % (FLUSH) 0.9 %
10 SYRINGE (ML) INJECTION
Status: DISCONTINUED | OUTPATIENT
Start: 2022-11-23 | End: 2022-11-23 | Stop reason: HOSPADM

## 2022-11-23 RX ORDER — IPRATROPIUM BROMIDE AND ALBUTEROL SULFATE 2.5; .5 MG/3ML; MG/3ML
3 SOLUTION RESPIRATORY (INHALATION)
Status: CANCELLED | OUTPATIENT
Start: 2023-01-18

## 2022-11-23 RX ORDER — SODIUM CHLORIDE 0.9 % (FLUSH) 0.9 %
10 SYRINGE (ML) INJECTION
Status: CANCELLED | OUTPATIENT
Start: 2023-01-18

## 2022-11-23 RX ORDER — DIPHENHYDRAMINE HYDROCHLORIDE 50 MG/ML
25 INJECTION INTRAMUSCULAR; INTRAVENOUS
Status: CANCELLED | OUTPATIENT
Start: 2023-01-18

## 2022-11-23 RX ADMIN — INFLIXIMAB 800 MG: 100 INJECTION, POWDER, LYOPHILIZED, FOR SOLUTION INTRAVENOUS at 09:11

## 2022-11-23 RX ADMIN — Medication 10 ML: at 08:11

## 2022-11-23 NOTE — PLAN OF CARE
Plan of care reviewed with patient. Discussed if there are any new or ongoing concerns. Denies.    Problem: Adult Inpatient Plan of Care  Goal: Plan of Care Review  Description: Patient here today for his Remicade infusion.    Outcome: Ongoing, Progressing  Goal: Patient-Specific Goal (Individualized)  Description: Patient to tolerate infusion well today.  Outcome: Ongoing, Progressing  Goal: Optimal Comfort and Wellbeing  Description: Pt likes feet elevated and IV to hands.   Outcome: Ongoing, Progressing  Intervention: Provide Person-Centered Care  Flowsheets (Taken 11/23/2022 0906)  Trust Relationship/Rapport:   care explained   reassurance provided   choices provided   thoughts/feelings acknowledged   emotional support provided   empathic listening provided   questions answered   questions encouraged  Goal: Absence of Hospital-Acquired Illness or Injury  Outcome: Ongoing, Progressing  Intervention: Identify and Manage Fall Risk  Flowsheets (Taken 11/23/2022 0906)  Safety Promotion/Fall Prevention: in recliner, wheels locked

## 2022-12-27 ENCOUNTER — LAB VISIT (OUTPATIENT)
Dept: LAB | Facility: HOSPITAL | Age: 77
End: 2022-12-27
Attending: PHYSICIAN ASSISTANT
Payer: MEDICARE

## 2022-12-27 DIAGNOSIS — M05.79 RHEUMATOID ARTHRITIS INVOLVING MULTIPLE SITES WITH POSITIVE RHEUMATOID FACTOR: ICD-10-CM

## 2022-12-27 LAB
CRP SERPL-MCNC: 5.8 MG/L (ref 0–8.2)
ERYTHROCYTE [SEDIMENTATION RATE] IN BLOOD BY PHOTOMETRIC METHOD: 28 MM/HR (ref 0–23)

## 2022-12-27 PROCEDURE — 85652 RBC SED RATE AUTOMATED: CPT | Performed by: PHYSICIAN ASSISTANT

## 2022-12-27 PROCEDURE — 36415 COLL VENOUS BLD VENIPUNCTURE: CPT | Performed by: PHYSICIAN ASSISTANT

## 2022-12-27 PROCEDURE — 86140 C-REACTIVE PROTEIN: CPT | Performed by: PHYSICIAN ASSISTANT

## 2023-01-10 ENCOUNTER — TELEPHONE (OUTPATIENT)
Dept: RHEUMATOLOGY | Facility: CLINIC | Age: 78
End: 2023-01-10
Payer: MEDICARE

## 2023-01-10 NOTE — TELEPHONE ENCOUNTER
----- Message from Martha Oswald MA sent at 1/10/2023  1:48 PM CST -----  Regarding: Pre approval for Remicade  Contact: self [phone]  Good afternoon,     This patient needs a pre approval on remicade. Please advise. Patient is scheduled for remicade on 01/20    -Martha   ----- Message -----  From: Sarah Rivers  Sent: 1/10/2023  10:27 AM CST  To: Fifi Noguera Staff    Patient called in stating the medication Remacade that he is now, is not listed on his drug plan. He need a pre approval in odder to receive his infusion. Please call [phone]. Thanks tpw

## 2023-01-10 NOTE — TELEPHONE ENCOUNTER
Informed pt that the pre service dept is verifying his benefits and getting reauthorization on all infusions every January. We will call him if his remicade infusion is not auth'd by 1/20/23. Verbalized understanding

## 2023-01-17 ENCOUNTER — LAB VISIT (OUTPATIENT)
Dept: LAB | Facility: HOSPITAL | Age: 78
End: 2023-01-17
Attending: PHYSICIAN ASSISTANT
Payer: MEDICARE

## 2023-01-17 DIAGNOSIS — M05.79 RHEUMATOID ARTHRITIS INVOLVING MULTIPLE SITES WITH POSITIVE RHEUMATOID FACTOR: ICD-10-CM

## 2023-01-17 LAB
ALBUMIN SERPL BCP-MCNC: 3.7 G/DL (ref 3.5–5.2)
ALP SERPL-CCNC: 60 U/L (ref 55–135)
ALT SERPL W/O P-5'-P-CCNC: 18 U/L (ref 10–44)
ANION GAP SERPL CALC-SCNC: 9 MMOL/L (ref 8–16)
AST SERPL-CCNC: 27 U/L (ref 10–40)
BASOPHILS # BLD AUTO: 0.02 K/UL (ref 0–0.2)
BASOPHILS NFR BLD: 0.3 % (ref 0–1.9)
BILIRUB SERPL-MCNC: 0.6 MG/DL (ref 0.1–1)
BUN SERPL-MCNC: 14 MG/DL (ref 8–23)
CALCIUM SERPL-MCNC: 8.9 MG/DL (ref 8.7–10.5)
CHLORIDE SERPL-SCNC: 102 MMOL/L (ref 95–110)
CO2 SERPL-SCNC: 26 MMOL/L (ref 23–29)
CREAT SERPL-MCNC: 1 MG/DL (ref 0.5–1.4)
CRP SERPL-MCNC: 2.3 MG/L (ref 0–8.2)
DIFFERENTIAL METHOD: ABNORMAL
EOSINOPHIL # BLD AUTO: 0.3 K/UL (ref 0–0.5)
EOSINOPHIL NFR BLD: 4 % (ref 0–8)
ERYTHROCYTE [DISTWIDTH] IN BLOOD BY AUTOMATED COUNT: 14.6 % (ref 11.5–14.5)
ERYTHROCYTE [SEDIMENTATION RATE] IN BLOOD BY WESTERGREN METHOD: 9 MM/HR (ref 0–10)
EST. GFR  (NO RACE VARIABLE): >60 ML/MIN/1.73 M^2
GLUCOSE SERPL-MCNC: 110 MG/DL (ref 70–110)
HCT VFR BLD AUTO: 39.8 % (ref 40–54)
HGB BLD-MCNC: 14.4 G/DL (ref 14–18)
IMM GRANULOCYTES # BLD AUTO: 0.03 K/UL (ref 0–0.04)
IMM GRANULOCYTES NFR BLD AUTO: 0.4 % (ref 0–0.5)
LYMPHOCYTES # BLD AUTO: 2.3 K/UL (ref 1–4.8)
LYMPHOCYTES NFR BLD: 31.5 % (ref 18–48)
MCH RBC QN AUTO: 38.5 PG (ref 27–31)
MCHC RBC AUTO-ENTMCNC: 36.2 G/DL (ref 32–36)
MCV RBC AUTO: 106 FL (ref 82–98)
MONOCYTES # BLD AUTO: 0.6 K/UL (ref 0.3–1)
MONOCYTES NFR BLD: 8.6 % (ref 4–15)
NEUTROPHILS # BLD AUTO: 4 K/UL (ref 1.8–7.7)
NEUTROPHILS NFR BLD: 55.2 % (ref 38–73)
NRBC BLD-RTO: 0 /100 WBC
PLATELET # BLD AUTO: 264 K/UL (ref 150–450)
PMV BLD AUTO: 8.2 FL (ref 9.2–12.9)
POTASSIUM SERPL-SCNC: 4.2 MMOL/L (ref 3.5–5.1)
PROT SERPL-MCNC: 7.4 G/DL (ref 6–8.4)
RBC # BLD AUTO: 3.74 M/UL (ref 4.6–6.2)
SODIUM SERPL-SCNC: 137 MMOL/L (ref 136–145)
WBC # BLD AUTO: 7.23 K/UL (ref 3.9–12.7)

## 2023-01-17 PROCEDURE — 80053 COMPREHEN METABOLIC PANEL: CPT | Performed by: PHYSICIAN ASSISTANT

## 2023-01-17 PROCEDURE — 86140 C-REACTIVE PROTEIN: CPT | Performed by: PHYSICIAN ASSISTANT

## 2023-01-17 PROCEDURE — 36415 COLL VENOUS BLD VENIPUNCTURE: CPT | Performed by: PHYSICIAN ASSISTANT

## 2023-01-17 PROCEDURE — 85025 COMPLETE CBC W/AUTO DIFF WBC: CPT | Performed by: PHYSICIAN ASSISTANT

## 2023-01-17 PROCEDURE — 85651 RBC SED RATE NONAUTOMATED: CPT | Performed by: PHYSICIAN ASSISTANT

## 2023-01-20 ENCOUNTER — HOSPITAL ENCOUNTER (OUTPATIENT)
Dept: RADIOLOGY | Facility: HOSPITAL | Age: 78
Discharge: HOME | End: 2023-01-20
Attending: PHYSICIAN ASSISTANT
Payer: MEDICARE

## 2023-01-20 ENCOUNTER — PATIENT MESSAGE (OUTPATIENT)
Dept: RHEUMATOLOGY | Facility: CLINIC | Age: 78
End: 2023-01-20

## 2023-01-20 ENCOUNTER — OFFICE VISIT (OUTPATIENT)
Dept: RHEUMATOLOGY | Facility: CLINIC | Age: 78
End: 2023-01-20
Payer: MEDICARE

## 2023-01-20 ENCOUNTER — INFUSION (OUTPATIENT)
Dept: INFUSION THERAPY | Facility: HOSPITAL | Age: 78
End: 2023-01-20
Attending: PHYSICIAN ASSISTANT
Payer: MEDICARE

## 2023-01-20 VITALS
SYSTOLIC BLOOD PRESSURE: 131 MMHG | WEIGHT: 201.25 LBS | HEIGHT: 67 IN | HEART RATE: 63 BPM | DIASTOLIC BLOOD PRESSURE: 77 MMHG | BODY MASS INDEX: 31.59 KG/M2

## 2023-01-20 VITALS
TEMPERATURE: 98 F | SYSTOLIC BLOOD PRESSURE: 106 MMHG | RESPIRATION RATE: 18 BRPM | DIASTOLIC BLOOD PRESSURE: 63 MMHG | OXYGEN SATURATION: 98 % | HEART RATE: 57 BPM

## 2023-01-20 DIAGNOSIS — R70.0 ELEVATED SED RATE: ICD-10-CM

## 2023-01-20 DIAGNOSIS — Z79.631 METHOTREXATE, LONG TERM, CURRENT USE: ICD-10-CM

## 2023-01-20 DIAGNOSIS — M05.79 RHEUMATOID ARTHRITIS INVOLVING MULTIPLE SITES WITH POSITIVE RHEUMATOID FACTOR: Primary | ICD-10-CM

## 2023-01-20 DIAGNOSIS — Z51.81 MEDICATION MONITORING ENCOUNTER: ICD-10-CM

## 2023-01-20 DIAGNOSIS — D84.9 IMMUNOCOMPROMISED PATIENT: ICD-10-CM

## 2023-01-20 DIAGNOSIS — M05.79 RHEUMATOID ARTHRITIS INVOLVING MULTIPLE SITES WITH POSITIVE RHEUMATOID FACTOR: ICD-10-CM

## 2023-01-20 DIAGNOSIS — I25.118 CORONARY ARTERY DISEASE OF NATIVE ARTERY OF NATIVE HEART WITH STABLE ANGINA PECTORIS: ICD-10-CM

## 2023-01-20 DIAGNOSIS — Z79.620 INFLIXIMAB (REMICADE) LONG-TERM USE: ICD-10-CM

## 2023-01-20 PROCEDURE — 63600175 PHARM REV CODE 636 W HCPCS: Mod: JG | Performed by: INTERNAL MEDICINE

## 2023-01-20 PROCEDURE — 73630 X-RAY EXAM OF FOOT: CPT | Mod: 26,50,, | Performed by: STUDENT IN AN ORGANIZED HEALTH CARE EDUCATION/TRAINING PROGRAM

## 2023-01-20 PROCEDURE — 71046 XR CHEST PA AND LATERAL: ICD-10-PCS | Mod: 26,,, | Performed by: STUDENT IN AN ORGANIZED HEALTH CARE EDUCATION/TRAINING PROGRAM

## 2023-01-20 PROCEDURE — 71046 X-RAY EXAM CHEST 2 VIEWS: CPT | Mod: 26,,, | Performed by: STUDENT IN AN ORGANIZED HEALTH CARE EDUCATION/TRAINING PROGRAM

## 2023-01-20 PROCEDURE — 71046 X-RAY EXAM CHEST 2 VIEWS: CPT | Mod: TC

## 2023-01-20 PROCEDURE — 99999 PR PBB SHADOW E&M-EST. PATIENT-LVL V: CPT | Mod: PBBFAC,,, | Performed by: PHYSICIAN ASSISTANT

## 2023-01-20 PROCEDURE — 99215 OFFICE O/P EST HI 40 MIN: CPT | Mod: PBBFAC | Performed by: PHYSICIAN ASSISTANT

## 2023-01-20 PROCEDURE — 73130 XR HAND COMPLETE 3 VIEWS BILATERAL: ICD-10-PCS | Mod: 26,50,, | Performed by: STUDENT IN AN ORGANIZED HEALTH CARE EDUCATION/TRAINING PROGRAM

## 2023-01-20 PROCEDURE — 96413 CHEMO IV INFUSION 1 HR: CPT

## 2023-01-20 PROCEDURE — 99999 PR PBB SHADOW E&M-EST. PATIENT-LVL V: ICD-10-PCS | Mod: PBBFAC,,, | Performed by: PHYSICIAN ASSISTANT

## 2023-01-20 PROCEDURE — 25000003 PHARM REV CODE 250: Performed by: INTERNAL MEDICINE

## 2023-01-20 PROCEDURE — 73630 XR FOOT COMPLETE 3 VIEW BILATERAL: ICD-10-PCS | Mod: 26,50,, | Performed by: STUDENT IN AN ORGANIZED HEALTH CARE EDUCATION/TRAINING PROGRAM

## 2023-01-20 PROCEDURE — 99215 OFFICE O/P EST HI 40 MIN: CPT | Mod: S$PBB,,, | Performed by: PHYSICIAN ASSISTANT

## 2023-01-20 PROCEDURE — 73630 X-RAY EXAM OF FOOT: CPT | Mod: TC,50

## 2023-01-20 PROCEDURE — 73130 X-RAY EXAM OF HAND: CPT | Mod: 26,50,, | Performed by: STUDENT IN AN ORGANIZED HEALTH CARE EDUCATION/TRAINING PROGRAM

## 2023-01-20 PROCEDURE — 99215 PR OFFICE/OUTPT VISIT, EST, LEVL V, 40-54 MIN: ICD-10-PCS | Mod: S$PBB,,, | Performed by: PHYSICIAN ASSISTANT

## 2023-01-20 PROCEDURE — 96415 CHEMO IV INFUSION ADDL HR: CPT

## 2023-01-20 PROCEDURE — 73130 X-RAY EXAM OF HAND: CPT | Mod: TC,50

## 2023-01-20 RX ORDER — DIPHENHYDRAMINE HYDROCHLORIDE 50 MG/ML
12.5 INJECTION INTRAMUSCULAR; INTRAVENOUS
Status: CANCELLED
Start: 2023-03-17 | End: 2023-03-17

## 2023-01-20 RX ORDER — CLOPIDOGREL BISULFATE 75 MG/1
1 TABLET ORAL DAILY
COMMUNITY
Start: 2022-12-01

## 2023-01-20 RX ORDER — IPRATROPIUM BROMIDE AND ALBUTEROL SULFATE 2.5; .5 MG/3ML; MG/3ML
3 SOLUTION RESPIRATORY (INHALATION)
Status: CANCELLED | OUTPATIENT
Start: 2023-03-17

## 2023-01-20 RX ORDER — METHYLPREDNISOLONE SOD SUCC 125 MG
50 VIAL (EA) INJECTION
Status: CANCELLED
Start: 2023-03-17 | End: 2023-03-17

## 2023-01-20 RX ORDER — SODIUM CHLORIDE 0.9 % (FLUSH) 0.9 %
10 SYRINGE (ML) INJECTION
Status: CANCELLED | OUTPATIENT
Start: 2023-03-17

## 2023-01-20 RX ORDER — DICLOFENAC SODIUM 10 MG/G
2 GEL TOPICAL DAILY
Qty: 1 G | Refills: 0 | Status: CANCELLED | OUTPATIENT
Start: 2023-01-20

## 2023-01-20 RX ORDER — METHYLPREDNISOLONE SOD SUCC 125 MG
25 VIAL (EA) INJECTION ONCE
Status: CANCELLED
Start: 2023-03-17 | End: 2023-03-17

## 2023-01-20 RX ORDER — DIPHENHYDRAMINE HYDROCHLORIDE 50 MG/ML
25 INJECTION INTRAMUSCULAR; INTRAVENOUS
Status: CANCELLED | OUTPATIENT
Start: 2023-03-17

## 2023-01-20 RX ORDER — EPINEPHRINE 1 MG/ML
0.3 INJECTION, SOLUTION, CONCENTRATE INTRAVENOUS
Status: CANCELLED | OUTPATIENT
Start: 2023-03-17

## 2023-01-20 RX ORDER — ACETAMINOPHEN 325 MG/1
650 TABLET ORAL
Status: CANCELLED | OUTPATIENT
Start: 2023-03-17

## 2023-01-20 RX ADMIN — INFLIXIMAB 800 MG: 100 INJECTION, POWDER, LYOPHILIZED, FOR SOLUTION INTRAVENOUS at 10:01

## 2023-01-20 ASSESSMENT — ROUTINE ASSESSMENT OF PATIENT INDEX DATA (RAPID3): MDHAQ FUNCTION SCORE: 0.6

## 2023-01-20 NOTE — PLAN OF CARE
Plan of care reviewed with patient. Discussed if there are any new or ongoing concerns.Denies.    Problem: Adult Inpatient Plan of Care  Goal: Plan of Care Review  Description: Patient here today for his Remicade infusion.    Outcome: Ongoing, Progressing  Goal: Patient-Specific Goal (Individualized)  Description: Patient to tolerate infusion well today.  Outcome: Ongoing, Progressing  Goal: Optimal Comfort and Wellbeing  Description: Pt likes feet elevated and IV to hands.   Outcome: Ongoing, Progressing  Intervention: Provide Person-Centered Care  Flowsheets (Taken 1/20/2023 3406)  Trust Relationship/Rapport:   care explained   reassurance provided   choices provided   thoughts/feelings acknowledged   emotional support provided   empathic listening provided   questions answered   questions encouraged  Goal: Absence of Hospital-Acquired Illness or Injury  Outcome: Ongoing, Progressing  Intervention: Identify and Manage Fall Risk  Flowsheets (Taken 1/20/2023 3868)  Safety Promotion/Fall Prevention: in recliner, wheels locked

## 2023-01-20 NOTE — PROGRESS NOTES
RHEUMATOLOGY OUTPATIENT CLINIC NOTE    Attending Rheumatologist: Poornima Calix PA-C  Primary Care Provider: Nabil Benavides MD   Chief Complaint/Reason For Consultation:  Rheumatoid Arthritis    Subjective:        Jack Back is a 78 y.o. male here today for follow up seropositive erosive rheumatoid arthritis. MTX resumed at last visit since GI SE w/ arava. Tolerating well. Pain today 0/10. No trouble with recurrent infections. He has not developed heart failure, no recent exacerbations. No lower ext edema, mild sob, no chest pain. No pnd. Some chest congestion today- no fevers or other URI sx. Per pt some scarring in lower lungs from pneumonia several years ago. Rheumatologic systems otherwise negative. Physical exam shows no active synovitis of MCP or PIP joints today. Chronic RA changes- ulnar deviation of fingers. No wrist tenderness.    Sig cardiac hx  6/2019 had  redo of cabg- In 2017 -MI required PTCA stent placement. MI X3 Prior cabc in 2012  pleural effusion, admitted back to hospital, given lasix and started on daily dose- resolved      Chronic elevated esr, normal crp. 5/2017 spep showed elevated gammaglobulins, no polyclonal or monoclonal peaks in IF. Hypergammaglobulinemia. Repeat done 9/2018 IF  There is a faint linear irregularity in gamma involving IgG and lambda, non-specific at this time.   indicated. Esr in the 50-60's consistently, crp normal     bone density done in June 2014 and Nov 2020- Results NORMAL       Serologies  Lab Results   Component Value Date    TBGOLDPLUS Negative 10/28/2021     Lab Results   Component Value Date    RF 42 (H) 06/28/2004     Lab Results   Component Value Date    HEPAIGM Negative 07/30/2020    HEPBIGM Negative 07/30/2020    HEPCAB Negative 07/30/2020        Current Rheum Medications:  Remicade Q2mos, MTX 20 mg/wk, folic acid  Previous Rheum Medications:   MTX monotherapy, arava (severe diarrhea)    Past Medical History:   Diagnosis Date    Arthritis  "    Congestive heart failure 06/07/2019    Depression     Lung disease     Rheumatoid arthritis involving multiple sites with positive rheumatoid factor 12/1/2015    Rheumatoid arthritis(714.0)     Thyroid disease      Social History     Socioeconomic History    Marital status:    Tobacco Use    Smoking status: Former    Smokeless tobacco: Never   Substance and Sexual Activity    Alcohol use: No    Drug use: No     Review of patient's allergies indicates:   Allergen Reactions    Penicillins Other (See Comments) and Hives     unknown    Tetanus vaccines and toxoid Other (See Comments)     unknown  Fever       Objective:   /77 (BP Location: Left arm, Patient Position: Sitting, BP Method: Medium (Automatic))   Pulse 63   Ht 5' 7" (1.702 m)   Wt 91.3 kg (201 lb 4.5 oz)   BMI 31.52 kg/m²     Immunization History   Administered Date(s) Administered    Hepatitis A / Hepatitis B 07/01/2019    Influenza (FLUAD) - Quadrivalent - Adjuvanted - PF *Preferred* (65+) 10/16/2020    Influenza - High Dose - PF (65 years and older) 11/05/2013, 10/07/2014, 10/06/2015, 11/15/2016, 10/17/2017, 10/24/2018, 10/30/2019    Pneumococcal Conjugate - 13 Valent 02/25/2014    Pneumococcal Polysaccharide - 23 Valent 06/17/2014    Zoster 12/02/2014     Current Outpatient Medications   Medication Instructions    ascorbic acid (vitamin C) (VITAMIN C) 100 mg, Oral, Daily    aspirin (ECOTRIN) 325 mg, Oral, Daily    cholecalciferol, vitamin D3, 10 mcg (400 unit) Cap No dose, route, or frequency recorded.    clopidogreL (PLAVIX) 75 mg tablet 1 tablet, Oral, Daily    co-enzyme Q-10 30 mg, Daily    cyanocobalamin (VITAMIN B-12) 300 mcg, Oral, Daily    ELIQUIS 5 mg, Oral, 2 times daily    evolocumab (REPATHA SYRINGE SUBQ) 140 mg, Subcutaneous, Every 14 days    folic acid (FOLVITE) 800 mcg, Oral, 2 times daily    furosemide (LASIX) 40 MG tablet No dose, route, or frequency recorded.    VANNESSA ORAL Oral, 565 mg daily    INFLIXIMAB " (REMICADE IV) Intravenous    INV folate 400 mcg, Oral, Daily, FOR INVESTIGATIONAL USE ONLY    leflunomide (ARAVA) 20 mg, Oral, Daily    methotrexate 2.5 MG Tab TAKE 8 TABLETS BY MOUTH ONCE A WEEK    milk thistle 175 mg tablet 1 tablet    milk thistle 500 mg, Oral, Daily    multivit with minerals/lutein (MULTIVITAMIN 50 PLUS ORAL) No dose, route, or frequency recorded.    NITROSTAT 0.4 mg    omega 3-dha-epa-fish oil 500-100-1,000 mg Cap Oral, 2 times daily    oxyCODONE-acetaminophen (PERCOCET) 5-325 mg per tablet 1 tablet, Oral, Every 4 hours PRN    potassium chloride (MICRO-K) 10 MEQ CpSR 10 mEq, Oral, Once    potassium/magnesium (MAGNESIUM-POTASSIUM ORAL) 600 mg, Oral, Daily, 600 mg/ 198    ramipriL (ALTACE) 10 mg, Oral, Daily    saw palmetto 450 mg, Oral, Daily    SYNTHROID 88 mcg, Daily    testosterone cypionate (DEPOTESTOTERONE CYPIONATE) 200 mg/mL injection No dose, route, or frequency recorded.    triamcinolone acetonide 0.1% (KENALOG) 0.1 % cream Topical (Top), 2 times daily    TURMERIC, BULK, MISC 500 mg, Misc.(Non-Drug; Combo Route)    vit C/E/Zn/coppr/lutein/zeaxan (PRESERVISION AREDS-2 ORAL) Oral, Daily    vitamin B12-folic acid 0.5-1 mg Tab No dose, route, or frequency recorded.    vitamin D (VITAMIN D3) 5,000 mg, Oral, Daily    vitamin E 1,000 Units, Oral, Daily    VITAMIN K2 ORAL 100 mcg, Oral    zinc gluconate 50 mg, Oral, Daily       Recent Results (from the past 336 hour(s))   CBC Auto Differential    Collection Time: 01/17/23  9:51 AM   Result Value Ref Range    WBC 7.23 3.90 - 12.70 K/uL    RBC 3.74 (L) 4.60 - 6.20 M/uL    Hemoglobin 14.4 14.0 - 18.0 g/dL    Hematocrit 39.8 (L) 40.0 - 54.0 %     (H) 82 - 98 fL    MCH 38.5 (H) 27.0 - 31.0 pg    MCHC 36.2 (H) 32.0 - 36.0 g/dL    RDW 14.6 (H) 11.5 - 14.5 %    Platelets 264 150 - 450 K/uL    MPV 8.2 (L) 9.2 - 12.9 fL    Immature Granulocytes 0.4 0.0 - 0.5 %    Gran # (ANC) 4.0 1.8 - 7.7 K/uL    Immature Grans (Abs) 0.03 0.00 - 0.04 K/uL     Lymph # 2.3 1.0 - 4.8 K/uL    Mono # 0.6 0.3 - 1.0 K/uL    Eos # 0.3 0.0 - 0.5 K/uL    Baso # 0.02 0.00 - 0.20 K/uL    nRBC 0 0 /100 WBC    Gran % 55.2 38.0 - 73.0 %    Lymph % 31.5 18.0 - 48.0 %    Mono % 8.6 4.0 - 15.0 %    Eosinophil % 4.0 0.0 - 8.0 %    Basophil % 0.3 0.0 - 1.9 %    Differential Method Automated    C-Reactive Protein    Collection Time: 01/17/23  9:51 AM   Result Value Ref Range    CRP 2.3 0.0 - 8.2 mg/L   Sedimentation rate    Collection Time: 01/17/23  9:51 AM   Result Value Ref Range    Sed Rate 9 0 - 10 mm/Hr   Comprehensive Metabolic Panel    Collection Time: 01/17/23  9:51 AM   Result Value Ref Range    Sodium 137 136 - 145 mmol/L    Potassium 4.2 3.5 - 5.1 mmol/L    Chloride 102 95 - 110 mmol/L    CO2 26 23 - 29 mmol/L    Glucose 110 70 - 110 mg/dL    BUN 14 8 - 23 mg/dL    Creatinine 1.0 0.5 - 1.4 mg/dL    Calcium 8.9 8.7 - 10.5 mg/dL    Total Protein 7.4 6.0 - 8.4 g/dL    Albumin 3.7 3.5 - 5.2 g/dL    Total Bilirubin 0.6 0.1 - 1.0 mg/dL    Alkaline Phosphatase 60 55 - 135 U/L    AST 27 10 - 40 U/L    ALT 18 10 - 44 U/L    Anion Gap 9 8 - 16 mmol/L    eGFR >60 >60 mL/min/1.73 m^2       Assessment:     1. Rheumatoid arthritis involving multiple sites with positive rheumatoid factor    2. Methotrexate, long term, current use    3. Infliximab (Remicade) long-term use    4. Immunocompromised patient    5. Medication monitoring encounter    6. Coronary artery disease of native artery of native heart with stable angina pectoris    7. Elevated sed rate    8. Body mass index (BMI) 31.0-31.9, adult          Plan:     Check vit D level and CCP (not on file)  Seropositive erosive RA on remiacde + MTX approx 20 years- well controlled RA  Concern for possible RA lung dz/lung changes from MTX- xray chest today and will likely need CT as well  Update xray hands and feet today  Discussed w/ pt lung changes may req d/c MTX- will know more after imaging  No recent CHF exacerbation, no change in  CAD  Update TB gold and hepatitis labs  Compromised immune system secondary to autoimmune disease and use of immunosuppressive drugs.   Patient denies recurrent infections  Monitor carefully for infections and toxicities- Currently denies issues with recurrent infections. Advised to get immediate medical care if any infection.  Recommend Yearly Skin Cancer Screening by Dermatology for all patients on biologic or Bob. advised strict adherence to age appropriate vaccinations (shingles, pneumonia, flu and covid) and cancer screenings with PCP   Patient advised to hold DMARD and/or biologic therapy for signs of infection or for surgery. If you are unsure what to do please call our office for instruction.[facility] [phone]  no current medication related issues, no evidence of toxicity. I ordered labs for toxicity monitoring;  results reviewed and discussed findings with the patient   Return to clinic: 4 mos w/ early reg 4    The patient understands, chooses and consents to this plan and accepts all the risks which include but are not limited to the risks mentioned above.     Method of contact with patient concerns: Brenda attn Rheumatology    40 minutes of total time spent on the encounter, which includes face to face time and non-face to face time preparing to see the patient (eg, review of tests), Obtaining and/or reviewing separately obtained history, Documenting clinical information in the electronic or other health record, Independently interpreting results (not separately reported) and communicating results to the patient/family/caregiver, or Care coordination (not separately reported).             Poornima Calix PA-C  Rheumatology Department   [facility] [place]

## 2023-03-16 ENCOUNTER — INFUSION (OUTPATIENT)
Dept: INFUSION THERAPY | Facility: HOSPITAL | Age: 78
End: 2023-03-16
Attending: PHYSICIAN ASSISTANT
Payer: MEDICARE

## 2023-03-16 VITALS
HEART RATE: 65 BPM | TEMPERATURE: 98 F | OXYGEN SATURATION: 98 % | SYSTOLIC BLOOD PRESSURE: 130 MMHG | DIASTOLIC BLOOD PRESSURE: 80 MMHG | RESPIRATION RATE: 16 BRPM | BODY MASS INDEX: 31.77 KG/M2 | WEIGHT: 202.81 LBS

## 2023-03-16 DIAGNOSIS — M05.79 RHEUMATOID ARTHRITIS INVOLVING MULTIPLE SITES WITH POSITIVE RHEUMATOID FACTOR: Primary | ICD-10-CM

## 2023-03-16 PROCEDURE — 63600175 PHARM REV CODE 636 W HCPCS: Mod: JZ,JG | Performed by: INTERNAL MEDICINE

## 2023-03-16 PROCEDURE — 25000003 PHARM REV CODE 250: Performed by: INTERNAL MEDICINE

## 2023-03-16 PROCEDURE — 96413 CHEMO IV INFUSION 1 HR: CPT

## 2023-03-16 RX ORDER — SODIUM CHLORIDE 0.9 % (FLUSH) 0.9 %
10 SYRINGE (ML) INJECTION
Status: CANCELLED | OUTPATIENT
Start: 2023-05-11

## 2023-03-16 RX ORDER — METHYLPREDNISOLONE SOD SUCC 125 MG
50 VIAL (EA) INJECTION
Status: CANCELLED
Start: 2023-05-11 | End: 2023-05-11

## 2023-03-16 RX ORDER — DIPHENHYDRAMINE HYDROCHLORIDE 50 MG/ML
12.5 INJECTION INTRAMUSCULAR; INTRAVENOUS
Status: CANCELLED
Start: 2023-05-11 | End: 2023-05-11

## 2023-03-16 RX ORDER — METHYLPREDNISOLONE SOD SUCC 125 MG
25 VIAL (EA) INJECTION ONCE
Status: CANCELLED
Start: 2023-05-11 | End: 2023-05-11

## 2023-03-16 RX ORDER — ACETAMINOPHEN 325 MG/1
650 TABLET ORAL
Status: CANCELLED | OUTPATIENT
Start: 2023-05-11

## 2023-03-16 RX ORDER — IPRATROPIUM BROMIDE AND ALBUTEROL SULFATE 2.5; .5 MG/3ML; MG/3ML
3 SOLUTION RESPIRATORY (INHALATION)
Status: CANCELLED | OUTPATIENT
Start: 2023-05-11

## 2023-03-16 RX ORDER — SODIUM CHLORIDE 0.9 % (FLUSH) 0.9 %
10 SYRINGE (ML) INJECTION
Status: DISCONTINUED | OUTPATIENT
Start: 2023-03-16 | End: 2023-03-16 | Stop reason: HOSPADM

## 2023-03-16 RX ORDER — EPINEPHRINE 1 MG/ML
0.3 INJECTION, SOLUTION, CONCENTRATE INTRAVENOUS
Status: CANCELLED | OUTPATIENT
Start: 2023-05-11

## 2023-03-16 RX ORDER — DIPHENHYDRAMINE HYDROCHLORIDE 50 MG/ML
25 INJECTION INTRAMUSCULAR; INTRAVENOUS
Status: CANCELLED | OUTPATIENT
Start: 2023-05-11

## 2023-03-16 RX ADMIN — INFLIXIMAB 800 MG: 100 INJECTION, POWDER, LYOPHILIZED, FOR SOLUTION INTRAVENOUS at 08:03

## 2023-05-11 ENCOUNTER — OFFICE VISIT (OUTPATIENT)
Dept: RHEUMATOLOGY | Facility: CLINIC | Age: 78
End: 2023-05-11
Payer: MEDICARE

## 2023-05-11 ENCOUNTER — INFUSION (OUTPATIENT)
Dept: INFUSION THERAPY | Facility: HOSPITAL | Age: 78
End: 2023-05-11
Attending: PHYSICIAN ASSISTANT
Payer: MEDICARE

## 2023-05-11 VITALS
BODY MASS INDEX: 31.49 KG/M2 | DIASTOLIC BLOOD PRESSURE: 65 MMHG | HEART RATE: 56 BPM | WEIGHT: 201.06 LBS | OXYGEN SATURATION: 95 % | TEMPERATURE: 98 F | SYSTOLIC BLOOD PRESSURE: 126 MMHG | RESPIRATION RATE: 16 BRPM

## 2023-05-11 VITALS
HEART RATE: 65 BPM | BODY MASS INDEX: 31.71 KG/M2 | WEIGHT: 202 LBS | HEIGHT: 67 IN | DIASTOLIC BLOOD PRESSURE: 73 MMHG | SYSTOLIC BLOOD PRESSURE: 118 MMHG

## 2023-05-11 DIAGNOSIS — M05.79 RHEUMATOID ARTHRITIS INVOLVING MULTIPLE SITES WITH POSITIVE RHEUMATOID FACTOR: Primary | ICD-10-CM

## 2023-05-11 DIAGNOSIS — Z51.81 MEDICATION MONITORING ENCOUNTER: ICD-10-CM

## 2023-05-11 DIAGNOSIS — Z79.631 METHOTREXATE, LONG TERM, CURRENT USE: ICD-10-CM

## 2023-05-11 DIAGNOSIS — D84.9 IMMUNOCOMPROMISED PATIENT: ICD-10-CM

## 2023-05-11 DIAGNOSIS — Z79.620 INFLIXIMAB (REMICADE) LONG-TERM USE: ICD-10-CM

## 2023-05-11 DIAGNOSIS — Z79.899 HIGH RISK MEDICATION USE: ICD-10-CM

## 2023-05-11 PROCEDURE — 96413 CHEMO IV INFUSION 1 HR: CPT

## 2023-05-11 PROCEDURE — 63600175 PHARM REV CODE 636 W HCPCS: Mod: JZ,JG | Performed by: INTERNAL MEDICINE

## 2023-05-11 PROCEDURE — 99999 PR PBB SHADOW E&M-EST. PATIENT-LVL V: CPT | Mod: PBBFAC,,, | Performed by: PHYSICIAN ASSISTANT

## 2023-05-11 PROCEDURE — 99999 PR PBB SHADOW E&M-EST. PATIENT-LVL V: ICD-10-PCS | Mod: PBBFAC,,, | Performed by: PHYSICIAN ASSISTANT

## 2023-05-11 PROCEDURE — 99214 OFFICE O/P EST MOD 30 MIN: CPT | Mod: S$PBB,,, | Performed by: PHYSICIAN ASSISTANT

## 2023-05-11 PROCEDURE — 99215 OFFICE O/P EST HI 40 MIN: CPT | Mod: PBBFAC,25 | Performed by: PHYSICIAN ASSISTANT

## 2023-05-11 PROCEDURE — 25000003 PHARM REV CODE 250: Performed by: INTERNAL MEDICINE

## 2023-05-11 PROCEDURE — 99214 PR OFFICE/OUTPT VISIT, EST, LEVL IV, 30-39 MIN: ICD-10-PCS | Mod: S$PBB,,, | Performed by: PHYSICIAN ASSISTANT

## 2023-05-11 RX ORDER — DIPHENHYDRAMINE HYDROCHLORIDE 50 MG/ML
25 INJECTION INTRAMUSCULAR; INTRAVENOUS
Status: CANCELLED | OUTPATIENT
Start: 2023-07-06

## 2023-05-11 RX ORDER — DIPHENHYDRAMINE HYDROCHLORIDE 50 MG/ML
12.5 INJECTION INTRAMUSCULAR; INTRAVENOUS
Status: CANCELLED
Start: 2023-07-06 | End: 2023-07-06

## 2023-05-11 RX ORDER — SODIUM CHLORIDE 0.9 % (FLUSH) 0.9 %
10 SYRINGE (ML) INJECTION
Status: CANCELLED | OUTPATIENT
Start: 2023-07-06

## 2023-05-11 RX ORDER — METHYLPREDNISOLONE SOD SUCC 125 MG
50 VIAL (EA) INJECTION
Status: CANCELLED
Start: 2023-07-06 | End: 2023-07-06

## 2023-05-11 RX ORDER — EPINEPHRINE 1 MG/ML
0.3 INJECTION, SOLUTION, CONCENTRATE INTRAVENOUS
Status: CANCELLED | OUTPATIENT
Start: 2023-07-06

## 2023-05-11 RX ORDER — IPRATROPIUM BROMIDE AND ALBUTEROL SULFATE 2.5; .5 MG/3ML; MG/3ML
3 SOLUTION RESPIRATORY (INHALATION)
Status: CANCELLED | OUTPATIENT
Start: 2023-07-06

## 2023-05-11 RX ORDER — METHYLPREDNISOLONE SOD SUCC 125 MG
25 VIAL (EA) INJECTION ONCE
Status: CANCELLED
Start: 2023-07-06 | End: 2023-07-06

## 2023-05-11 RX ORDER — ACETAMINOPHEN 325 MG/1
650 TABLET ORAL
Status: CANCELLED | OUTPATIENT
Start: 2023-07-06

## 2023-05-11 RX ADMIN — INFLIXIMAB 800 MG: 100 INJECTION, POWDER, LYOPHILIZED, FOR SOLUTION INTRAVENOUS at 09:05

## 2023-05-11 NOTE — NURSING
Infusion # Remicade - 800 mg q 8 wk  Last dose- 3/16/2023    Any:  -recent illness, infection, or antibiotic use in past week- denies  -open wounds or mouth sores- denies  -invasive procedures or surgeries in past 4 weeks or in upcoming 4 weeks- denies  -vaccinations in past week- denies  -any new symptoms/change in symptoms-denies  -chance you may be pregnant- n/a      Recent labs? 5/11/2023  Last Rheumatology provider visit- Seen by Janet Hampton on 5/11/2023  Next Rheumatology provider visit - Seen by Janet Hampton on 8/31/2023  Premeds-n/a     Remicade 800 mg administered IV at a 90 minute rate per orders; see MAR and vitals for more details. Tolerated well without adverse events, discharged and ambulatory out of clinic.

## 2023-05-11 NOTE — PLAN OF CARE
Problem: Adult Inpatient Plan of Care  Goal: Plan of Care Review  Outcome: Ongoing, Progressing  Flowsheets (Taken 5/11/2023 0923)  Plan of Care Reviewed With: patient  Goal: Patient-Specific Goal (Individualized)  Outcome: Ongoing, Progressing  Flowsheets (Taken 5/11/2023 0923)  Anxieties, Fears or Concerns: denies  Individualized Care Needs: Reclined in chair with feet elevated.  Goal: Optimal Comfort and Wellbeing  Outcome: Ongoing, Progressing  Intervention: Monitor Pain and Promote Comfort  Flowsheets (Taken 5/11/2023 0923)  Pain Management Interventions:   quiet environment facilitated   relaxation techniques promoted  Intervention: Provide Person-Centered Care  Flowsheets (Taken 5/11/2023 0923)  Trust Relationship/Rapport:   care explained   questions encouraged   choices provided   reassurance provided   emotional support provided   thoughts/feelings acknowledged   empathic listening provided   questions answered     Problem: Infection  Goal: Absence of Infection Signs and Symptoms  Outcome: Ongoing, Progressing  Intervention: Prevent or Manage Infection  Flowsheets (Taken 5/11/2023 0923)  Infection Management: aseptic technique maintained  Isolation Precautions: contact

## 2023-05-11 NOTE — PROGRESS NOTES
Subjective:      Patient ID: Jack Back is a 78 y.o. male.    Chief Complaint: Rheumatoid Arthritis      HPI   Jack Back  is a 78 y.o. male seen for follow-up seropositive RA.  Although established within the department, he is a new patient to my clinic.  On Remicade for 20+ years.  Tolerating it well.  RA is stable.  Also on methotrexate 20 mg weekly split dose with folic acid 800 mcg daily.  Overall happy with how he is doing.  Pain level 2/10.  Main complaint today is the base of the right thumb.  Worsened with repetitive use and gripping.      He has chronic RA deformities.  These are stable.  Minimal SOB which is stable.  No chest pain.  Has history of congestive heart failure.  No acute symptoms.  This has been stable while on Remicade as well.    Patient denies fevers, chills, photosensitivity, eye pain, chest pain, hematuria, blood in the stool, rash, sicca symptoms, raynauds, finger ulcerations.  Rheumatologic systems otherwise negative.    MHAQ: 0.7  Serologies/Labs:  +RF (42) and + CCP (1028.1)  Current Treatment:  Remicade   MTX 20 mg weekly split dose  Folic Acid 800 mcg daily  Vit D3 1000 units daily  Previous Treatment:   Arava      Current Outpatient Medications:     ascorbic acid, vitamin C, (VITAMIN C) 100 MG tablet, Take 100 mg by mouth once daily., Disp: , Rfl:     aspirin (ECOTRIN) 81 MG EC tablet, Take 325 mg by mouth once daily. , Disp: , Rfl:     cholecalciferol, vitamin D3, 10 mcg (400 unit) Cap, , Disp: , Rfl:     clopidogreL (PLAVIX) 75 mg tablet, Take 1 tablet by mouth Daily., Disp: , Rfl:     co-enzyme Q-10 30 mg capsule, Take 30 mg by mouth once daily., Disp: , Rfl:     cyanocobalamin (VITAMIN B-12) 1000 MCG tablet, Take 300 mcg by mouth once daily., Disp: , Rfl:     ELIQUIS 5 mg Tab, Take 5 mg by mouth 2 (two) times daily., Disp: , Rfl:     evolocumab (REPATHA SYRINGE SUBQ), Inject 140 mg into the skin every 14 (fourteen) days., Disp: , Rfl:     folic acid (FOLVITE) 800  MCG Tab, Take 1 tablet (800 mcg total) by mouth 2 (two) times daily. (Patient taking differently: Take 800 mcg by mouth once daily.), Disp: , Rfl:     furosemide (LASIX) 40 MG tablet, , Disp: , Rfl: 1    VANNESSA ORAL, Take by mouth. 565 mg daily, Disp: , Rfl:     INFLIXIMAB (REMICADE IV), Inject into the vein. , Disp: , Rfl:     INV folate 400 MCG tablet, Take 400 mcg by mouth once daily. FOR INVESTIGATIONAL USE ONLY, Disp: , Rfl:     methotrexate 2.5 MG Tab, TAKE 8 TABLETS BY MOUTH ONCE A WEEK, Disp: 96 tablet, Rfl: 0    milk thistle 175 mg tablet, Take 500 mg by mouth once daily., Disp: , Rfl:     multivit with minerals/lutein (MULTIVITAMIN 50 PLUS ORAL), , Disp: , Rfl:     NITROSTAT 0.4 mg SL tablet, 0.4 mg., Disp: , Rfl:     omega 3-dha-epa-fish oil 500-100-1,000 mg Cap, Take by mouth 2 (two) times daily., Disp: , Rfl:     oxyCODONE-acetaminophen (PERCOCET) 5-325 mg per tablet, Take 1 tablet by mouth every 4 (four) hours as needed., Disp: , Rfl:     potassium chloride (MICRO-K) 10 MEQ CpSR, Take 10 mEq by mouth once., Disp: , Rfl:     potassium/magnesium (MAGNESIUM-POTASSIUM ORAL), Take 600 mg by mouth once daily. 600 mg/ 198, Disp: , Rfl:     ramipril (ALTACE) 10 MG capsule, Take 10 mg by mouth once daily., Disp: , Rfl:     saw palmetto 500 MG capsule, Take 450 mg by mouth once daily., Disp: , Rfl:     SYNTHROID 88 mcg tablet, 88 mcg once daily., Disp: , Rfl: 5    testosterone cypionate (DEPOTESTOTERONE CYPIONATE) 200 mg/mL injection, , Disp: , Rfl: 0    TURMERIC, BULK, MISC, 500 mg by Misc.(Non-Drug; Combo Route) route., Disp: , Rfl:     vit C/E/Zn/coppr/lutein/zeaxan (PRESERVISION AREDS-2 ORAL), Take by mouth once daily., Disp: , Rfl:     vitamin B12-folic acid 0.5-1 mg Tab, , Disp: , Rfl:     vitamin D 1000 units Tab, Take 5,000 mg by mouth once daily. , Disp: , Rfl:     vitamin E 1000 UNIT capsule, Take 1,000 Units by mouth once daily., Disp: , Rfl:     VITAMIN K2 ORAL, Take 100 mcg by mouth., Disp: ,  Rfl:     zinc gluconate 50 mg tablet, Take 50 mg by mouth once daily., Disp: , Rfl:     triamcinolone acetonide 0.1% (KENALOG) 0.1 % cream, Apply topically 2 (two) times daily. for 10 days, Disp: 45 g, Rfl: 2    Current Facility-Administered Medications:     acetaminophen tablet 650 mg, 650 mg, Oral, Once PRN, Farooq Corey MD    albuterol inhaler 2 puff, 2 puff, Inhalation, Q20 Min PRN, Farooq Corey MD    diphenhydrAMINE injection 25 mg, 25 mg, Intravenous, Once PRN, Farooq Corey MD    EPINEPHrine (EPIPEN) 0.3 mg/0.3 mL pen injection 0.3 mg, 0.3 mg, Intramuscular, PRN, Farooq Corey MD    methylPREDNISolone sodium succinate injection 40 mg, 40 mg, Intravenous, Once PRN, Farooq Corey MD    ondansetron disintegrating tablet 4 mg, 4 mg, Oral, Once PRN, Farooq Corey MD    sodium chloride 0.9% 500 mL flush bag, , Intravenous, PRN, Farooq Corey MD    sodium chloride 0.9% flush 10 mL, 10 mL, Intravenous, PRN, Farooq Corey MD    Facility-Administered Medications Ordered in Other Visits:     inFLIXimab (REMICADE) 800 mg in sodium chloride 0.9% 285 mL IVPB, 800 mg, Intravenous, 1 time in Clinic/HOD, Ernie Cool MD    Past Medical History:   Diagnosis Date    Arthritis     Congestive heart failure 06/07/2019    Depression     Lung disease     Rheumatoid arthritis involving multiple sites with positive rheumatoid factor 12/1/2015    Rheumatoid arthritis(714.0)     Thyroid disease      Family History   Problem Relation Age of Onset    Diabetes Mellitus Mother     Heart disease Mother     Heart disease Father     Rheum arthritis Father     Diabetes Mellitus Brother     Heart disease Brother      Social History     Socioeconomic History    Marital status:    Tobacco Use    Smoking status: Former    Smokeless tobacco: Never   Substance and Sexual Activity    Alcohol use: No    Drug use: No     Review of patient's allergies indicates:   Allergen Reactions    Arava  "[leflunomide] Diarrhea    Penicillins Other (See Comments) and Hives     unknown    Tetanus vaccines and toxoid Other (See Comments)     unknown  Fever       Objective:   /73   Pulse 65   Ht 5' 7" (1.702 m)   Wt 91.6 kg (202 lb)   BMI 31.64 kg/m²   Immunization History   Administered Date(s) Administered    Hepatitis A / Hepatitis B 07/01/2019    Influenza (FLUAD) - Quadrivalent - Adjuvanted - PF *Preferred* (65+) 10/16/2020, 11/03/2021    Influenza - High Dose - PF (65 years and older) 11/05/2013, 10/07/2014, 10/06/2015, 11/15/2016, 10/17/2017, 10/24/2018, 10/30/2019    Influenza A (H1N1) 2009 Monovalent - IM 01/05/2010    PPD Test 05/29/2013    Pneumococcal Conjugate - 13 Valent 02/25/2014    Pneumococcal Conjugate - 7 Valent 09/29/2009    Pneumococcal Polysaccharide - 23 Valent 06/17/2014    Zoster 12/02/2014       Physical Exam   Constitutional: He is oriented to person, place, and time. No distress.   HENT:   Head: Normocephalic and atraumatic.   Pulmonary/Chest: Effort normal.   Musculoskeletal:         General: Normal range of motion.   Neurological: He is alert and oriented to person, place, and time.   Psychiatric: His behavior is normal. Mood normal.   Nursing note and vitals reviewed.  No synovitis, no dactylitis, no enthesitis  No effusions of large or small joints  100% fist formation  Well preserved ROM  Chronic RA deformities bilateral hands stable per patient report   TTP and crepitus right thumb CMC as well as pain with rotary compression      Recent Results (from the past 672 hour(s))   CBC Auto Differential    Collection Time: 05/11/23  8:07 AM   Result Value Ref Range    WBC 6.68 3.90 - 12.70 K/uL    RBC 4.06 (L) 4.60 - 6.20 M/uL    Hemoglobin 14.9 14.0 - 18.0 g/dL    Hematocrit 41.7 40.0 - 54.0 %     (H) 82 - 98 fL    MCH 36.7 (H) 27.0 - 31.0 pg    MCHC 35.7 32.0 - 36.0 g/dL    RDW 15.7 (H) 11.5 - 14.5 %    Platelets 266 150 - 450 K/uL    MPV 8.3 (L) 9.2 - 12.9 fL    Immature " Granulocytes 0.3 0.0 - 0.5 %    Gran # (ANC) 4.0 1.8 - 7.7 K/uL    Immature Grans (Abs) 0.02 0.00 - 0.04 K/uL    Lymph # 1.8 1.0 - 4.8 K/uL    Mono # 0.6 0.3 - 1.0 K/uL    Eos # 0.2 0.0 - 0.5 K/uL    Baso # 0.03 0.00 - 0.20 K/uL    nRBC 0 0 /100 WBC    Gran % 60.3 38.0 - 73.0 %    Lymph % 26.8 18.0 - 48.0 %    Mono % 9.1 4.0 - 15.0 %    Eosinophil % 3.1 0.0 - 8.0 %    Basophil % 0.4 0.0 - 1.9 %    Differential Method Automated    C-Reactive Protein    Collection Time: 05/11/23  8:07 AM   Result Value Ref Range    CRP 3.6 0.0 - 8.2 mg/L   Comprehensive Metabolic Panel    Collection Time: 05/11/23  8:07 AM   Result Value Ref Range    Sodium 140 136 - 145 mmol/L    Potassium 4.0 3.5 - 5.1 mmol/L    Chloride 99 95 - 110 mmol/L    CO2 31 (H) 23 - 29 mmol/L    Glucose 102 70 - 110 mg/dL    BUN 17 8 - 23 mg/dL    Creatinine 1.1 0.5 - 1.4 mg/dL    Calcium 9.7 8.7 - 10.5 mg/dL    Total Protein 8.1 6.0 - 8.4 g/dL    Albumin 4.1 3.5 - 5.2 g/dL    Total Bilirubin 0.6 0.1 - 1.0 mg/dL    Alkaline Phosphatase 58 55 - 135 U/L    AST 32 10 - 40 U/L    ALT 26 10 - 44 U/L    Anion Gap 10 8 - 16 mmol/L    eGFR >60 >60 mL/min/1.73 m^2         Lab Results   Component Value Date    TBGOLDPLUS Negative 01/20/2023      Lab Results   Component Value Date    HEPAIGM Negative 07/30/2020    HEPBIGM Negative 07/30/2020    HEPBCAB Non-reactive 01/20/2023    HEPCAB Non-reactive 01/20/2023        Imaging  I have personally reviewed images and reports as below.  I agree with the interpretation.  X-Ray Foot Complete Bilateral  Narrative: EXAMINATION:  Six radiographic views of the FOOT (BILATERAL).    CLINICAL HISTORY:  Rheumatoid arthritis with rheumatoid factor of multiple sites without organ or systems involvement    TECHNIQUE:  Six radiographic views of the FOOT (BILATERAL)    COMPARISON:  Foot radiograph 06/04/2020.    FINDINGS:  Three views of the left foot and three views of the right foot demonstrate normal alignment.  There is no fracture.   The erosive changes in the metatarsal heads are stable from the prior exam.  There is no new erosion.  There is no soft tissue swelling.  There is no radiopaque foreign body.  There are small right-sided calcaneal enthesophytes.  Impression: As above.    Electronically signed by: Joshua Granados MD  Date:    01/20/2023  Time:    08:42  X-Ray Hand Complete Bilateral  Narrative: EXAMINATION:  Six radiographic views of the HAND (BILATERAL).    CLINICAL HISTORY:  Rheumatoid arthritis with rheumatoid factor of multiple sites without organ or systems involvement    TECHNIQUE:  Six radiographic views of the HAND (BILATERAL)    COMPARISON:  Bilateral hand radiograph 06/04/2020.    FINDINGS:  Three views of the left hand and three views of the right hand demonstrate unchanged ulnar deviation of the 2nd through 5th MTP joints bilaterally.  There is degenerative changes in the radiocarpal joint bilaterally and 1st CMC joint bilaterally.  There are degenerative changes in the PIP and D IP joints bilaterally.  There is no fracture.  There is no periosteal reaction.  There is no soft tissue swelling.  Impression: As above.    Electronically signed by: Joshua Granados MD  Date:    01/20/2023  Time:    08:41  X-Ray Chest PA And Lateral  Narrative: EXAMINATION:  Two view chest radiograph.    CLINICAL HISTORY:  Long term (current) use of antimetabolite agent    TECHNIQUE:  Two view of the chest.    COMPARISON:  Chest radiograph 06/12/2019.    FINDINGS:  The lungs are clear without consolidation or effusion.  There is no pneumothorax.  The cardiac silhouette is normal in size.  There is no acute osseous abnormality.  Impression: No acute cardiopulmonary abnormality.    Electronically signed by: Joshua Granados MD  Date:    01/20/2023  Time:    08:40        Assessment:     1. Rheumatoid arthritis involving multiple sites with positive rheumatoid factor    2. Infliximab (Remicade) long-term use    3. Methotrexate, long term, current use    4.  Immunocompromised patient    5. Medication monitoring encounter    6. High risk medication use            Plan:     Jack was seen today for rheumatoid arthritis.    Diagnoses and all orders for this visit:    Rheumatoid arthritis involving multiple sites with positive rheumatoid factor    Infliximab (Remicade) long-term use    Methotrexate, long term, current use    Immunocompromised patient    Medication monitoring encounter    High risk medication use        Seropositive RA stable  History of congestive heart failure  Chest x-ray stable January 2023  No worsening symptoms  Stable on Remicade for years  If he develops exacerbation of CHF, we will need to consider DC TNF  CBC and CMP stable today  CRP wnl; ESR pending  C/w Remicade - ok to get today  C/w MTX 20 mg split dose and folic acid 800 mcg daily  Follow-up 16 weeks with Reg 4  CMC arthritis bilateral thumbs  Thumb spica splint p.r.n.  Discussed risks and benefits of CSI.  Patient deferred today stating symptoms not bad enough  Drug therapy requiring intensive monitoring for toxicity  High Risk Medication Monitoring encounter  No current medication related issues, no evidence of toxicity  I ordered labs for toxicity monitoring, have personally reviewed the findings, and discussed them with the patient.  Pending labs will be sent via the portal  Compromised immune system secondary to autoimmune disease and/or use of immunosuppressive drugs.  Monitor carefully for infections.  Advised patient to get immediate medical care if any infection arises.  Also advised strict adherence age-appropriate vaccinations and cancer screenings with PCP.  Patient advised to hold DMARD and/or biologic therapy for signs of infection or for surgery. If you are unsure what to do please call our office for instruction.[facility] [phone]  Return to clinic: 16 wks w Reg 4 day of per pt request    Follow up in about 16 weeks (around 8/31/2023).    The patient  understands, chooses and consents to this plan and accepts all the risks which include but are not limited to the risks mentioned above.     Disclaimer: This note was prepared using a voice recognition system and is likely to have sound alike errors within the text.

## 2023-05-15 DIAGNOSIS — M05.79 RHEUMATOID ARTHRITIS INVOLVING MULTIPLE SITES WITH POSITIVE RHEUMATOID FACTOR: ICD-10-CM

## 2023-05-15 RX ORDER — METHOTREXATE 2.5 MG/1
TABLET ORAL
Qty: 96 TABLET | Refills: 0 | Status: SHIPPED | OUTPATIENT
Start: 2023-05-15 | End: 2023-05-16 | Stop reason: SDUPTHER

## 2023-05-16 DIAGNOSIS — M05.79 RHEUMATOID ARTHRITIS INVOLVING MULTIPLE SITES WITH POSITIVE RHEUMATOID FACTOR: ICD-10-CM

## 2023-05-17 ENCOUNTER — TELEPHONE (OUTPATIENT)
Dept: RHEUMATOLOGY | Facility: CLINIC | Age: 78
End: 2023-05-17
Payer: MEDICARE

## 2023-05-17 DIAGNOSIS — R70.0 ELEVATED SED RATE: ICD-10-CM

## 2023-05-17 DIAGNOSIS — M05.79 RHEUMATOID ARTHRITIS INVOLVING MULTIPLE SITES WITH POSITIVE RHEUMATOID FACTOR: Primary | ICD-10-CM

## 2023-05-17 RX ORDER — METHOTREXATE 2.5 MG/1
20 TABLET ORAL WEEKLY
Qty: 96 TABLET | Refills: 0 | Status: SHIPPED | OUTPATIENT
Start: 2023-05-17

## 2023-05-17 NOTE — TELEPHONE ENCOUNTER
----- Message from Janet Tran PA-C sent at 5/17/2023 12:24 PM CDT -----  Repeat ESR in 2 weeks and add SPEP/DEEJAY, please

## 2023-05-31 ENCOUNTER — LAB VISIT (OUTPATIENT)
Dept: LAB | Facility: HOSPITAL | Age: 78
End: 2023-05-31
Attending: PHYSICIAN ASSISTANT
Payer: MEDICARE

## 2023-05-31 DIAGNOSIS — R70.0 ELEVATED SED RATE: ICD-10-CM

## 2023-05-31 DIAGNOSIS — M05.79 RHEUMATOID ARTHRITIS INVOLVING MULTIPLE SITES WITH POSITIVE RHEUMATOID FACTOR: ICD-10-CM

## 2023-05-31 LAB — ERYTHROCYTE [SEDIMENTATION RATE] IN BLOOD BY PHOTOMETRIC METHOD: 64 MM/HR (ref 0–23)

## 2023-05-31 PROCEDURE — 36415 COLL VENOUS BLD VENIPUNCTURE: CPT | Performed by: PHYSICIAN ASSISTANT

## 2023-05-31 PROCEDURE — 86334 IMMUNOFIX E-PHORESIS SERUM: CPT | Performed by: PHYSICIAN ASSISTANT

## 2023-05-31 PROCEDURE — 84165 PROTEIN E-PHORESIS SERUM: CPT | Mod: 26,,, | Performed by: PATHOLOGY

## 2023-05-31 PROCEDURE — 86334 IMMUNOFIX E-PHORESIS SERUM: CPT | Mod: 26,,, | Performed by: PATHOLOGY

## 2023-05-31 PROCEDURE — 84165 PATHOLOGIST INTERPRETATION SPE: ICD-10-PCS | Mod: 26,,, | Performed by: PATHOLOGY

## 2023-05-31 PROCEDURE — 85652 RBC SED RATE AUTOMATED: CPT | Performed by: PHYSICIAN ASSISTANT

## 2023-05-31 PROCEDURE — 84165 PROTEIN E-PHORESIS SERUM: CPT | Performed by: PHYSICIAN ASSISTANT

## 2023-05-31 PROCEDURE — 86334 PATHOLOGIST INTERPRETATION IFE: ICD-10-PCS | Mod: 26,,, | Performed by: PATHOLOGY

## 2023-06-01 LAB
ALBUMIN SERPL ELPH-MCNC: 3.86 G/DL (ref 3.35–5.55)
ALPHA1 GLOB SERPL ELPH-MCNC: 0.25 G/DL (ref 0.17–0.41)
ALPHA2 GLOB SERPL ELPH-MCNC: 0.62 G/DL (ref 0.43–0.99)
B-GLOBULIN SERPL ELPH-MCNC: 1.19 G/DL (ref 0.5–1.1)
GAMMA GLOB SERPL ELPH-MCNC: 1.47 G/DL (ref 0.67–1.58)
INTERPRETATION SERPL IFE-IMP: NORMAL
PATHOLOGIST INTERPRETATION IFE: NORMAL
PATHOLOGIST INTERPRETATION SPE: NORMAL
PROT SERPL-MCNC: 7.4 G/DL (ref 6–8.4)

## 2023-07-06 ENCOUNTER — INFUSION (OUTPATIENT)
Dept: INFUSION THERAPY | Facility: HOSPITAL | Age: 78
End: 2023-07-06
Attending: PHYSICIAN ASSISTANT
Payer: MEDICARE

## 2023-07-06 VITALS
BODY MASS INDEX: 32.42 KG/M2 | WEIGHT: 207 LBS | OXYGEN SATURATION: 96 % | SYSTOLIC BLOOD PRESSURE: 105 MMHG | RESPIRATION RATE: 16 BRPM | HEART RATE: 63 BPM | DIASTOLIC BLOOD PRESSURE: 61 MMHG | TEMPERATURE: 98 F

## 2023-07-06 DIAGNOSIS — M05.79 RHEUMATOID ARTHRITIS INVOLVING MULTIPLE SITES WITH POSITIVE RHEUMATOID FACTOR: Primary | ICD-10-CM

## 2023-07-06 PROCEDURE — 25000003 PHARM REV CODE 250: Performed by: PHYSICIAN ASSISTANT

## 2023-07-06 PROCEDURE — 96415 CHEMO IV INFUSION ADDL HR: CPT

## 2023-07-06 PROCEDURE — 63600175 PHARM REV CODE 636 W HCPCS: Mod: JZ,JG | Performed by: PHYSICIAN ASSISTANT

## 2023-07-06 PROCEDURE — 96413 CHEMO IV INFUSION 1 HR: CPT

## 2023-07-06 RX ORDER — METHYLPREDNISOLONE SOD SUCC 125 MG
25 VIAL (EA) INJECTION ONCE
Status: CANCELLED
Start: 2023-08-31 | End: 2023-08-31

## 2023-07-06 RX ORDER — METHYLPREDNISOLONE SOD SUCC 125 MG
25 VIAL (EA) INJECTION ONCE
Status: DISCONTINUED | OUTPATIENT
Start: 2023-07-06 | End: 2023-07-06 | Stop reason: HOSPADM

## 2023-07-06 RX ORDER — DIPHENHYDRAMINE HYDROCHLORIDE 50 MG/ML
25 INJECTION INTRAMUSCULAR; INTRAVENOUS
Status: CANCELLED | OUTPATIENT
Start: 2023-08-31

## 2023-07-06 RX ORDER — ACETAMINOPHEN 325 MG/1
650 TABLET ORAL
Status: CANCELLED | OUTPATIENT
Start: 2023-08-31

## 2023-07-06 RX ORDER — METHYLPREDNISOLONE SOD SUCC 125 MG
50 VIAL (EA) INJECTION
Status: CANCELLED
Start: 2023-08-31 | End: 2023-08-31

## 2023-07-06 RX ORDER — DIPHENHYDRAMINE HYDROCHLORIDE 50 MG/ML
12.5 INJECTION INTRAMUSCULAR; INTRAVENOUS
Status: CANCELLED
Start: 2023-08-31 | End: 2023-08-31

## 2023-07-06 RX ORDER — IPRATROPIUM BROMIDE AND ALBUTEROL SULFATE 2.5; .5 MG/3ML; MG/3ML
3 SOLUTION RESPIRATORY (INHALATION)
Status: CANCELLED | OUTPATIENT
Start: 2023-08-31

## 2023-07-06 RX ORDER — EPINEPHRINE 1 MG/ML
0.3 INJECTION, SOLUTION, CONCENTRATE INTRAVENOUS
Status: CANCELLED | OUTPATIENT
Start: 2023-08-31

## 2023-07-06 RX ORDER — SODIUM CHLORIDE 0.9 % (FLUSH) 0.9 %
10 SYRINGE (ML) INJECTION
Status: CANCELLED | OUTPATIENT
Start: 2023-08-31

## 2023-07-06 RX ADMIN — INFLIXIMAB 800 MG: 100 INJECTION, POWDER, LYOPHILIZED, FOR SOLUTION INTRAVENOUS at 09:07

## 2023-07-06 NOTE — PLAN OF CARE
Discussed plan of care with pt. Addressed any and ongoing concerns. Pt denies    Problem: Adult Inpatient Plan of Care  Goal: Plan of Care Review  Outcome: Ongoing, Progressing  Flowsheets (Taken 7/6/2023 1016)  Plan of Care Reviewed With: patient  Goal: Patient-Specific Goal (Individualized)  Outcome: Ongoing, Progressing  Goal: Absence of Hospital-Acquired Illness or Injury  Outcome: Ongoing, Progressing  Intervention: Identify and Manage Fall Risk  Flowsheets (Taken 7/6/2023 1016)  Safety Promotion/Fall Prevention: in recliner, wheels locked  Intervention: Prevent Infection  Flowsheets (Taken 7/6/2023 1016)  Infection Prevention:   hand hygiene promoted   equipment surfaces disinfected  Goal: Optimal Comfort and Wellbeing  Outcome: Ongoing, Progressing  Intervention: Provide Person-Centered Care  Flowsheets (Taken 7/6/2023 1016)  Trust Relationship/Rapport:   care explained   choices provided   reassurance provided   thoughts/feelings acknowledged   emotional support provided   empathic listening provided   questions answered   questions encouraged

## 2023-07-06 NOTE — PLAN OF CARE
Discussed plan of care with pt. Addressed any and ongoing concerns. Pt denies    Problem: Adult Inpatient Plan of Care  Goal: Plan of Care Review  7/6/2023 1531 by Cristel English RN  Outcome: Ongoing, Progressing  Flowsheets (Taken 7/6/2023 1531)  Plan of Care Reviewed With: patient  7/6/2023 1016 by Cristel English RN  Outcome: Ongoing, Progressing  Flowsheets (Taken 7/6/2023 1016)  Plan of Care Reviewed With: patient  Goal: Patient-Specific Goal (Individualized)  7/6/2023 1531 by Cristel English RN  Outcome: Ongoing, Progressing  7/6/2023 1016 by Cristel English RN  Outcome: Ongoing, Progressing  Goal: Absence of Hospital-Acquired Illness or Injury  7/6/2023 1531 by Cristel English RN  Outcome: Ongoing, Progressing  7/6/2023 1016 by Cristel English RN  Outcome: Ongoing, Progressing  Intervention: Identify and Manage Fall Risk  7/6/2023 1531 by Cristel English RN  Flowsheets (Taken 7/6/2023 1531)  Safety Promotion/Fall Prevention: in recliner, wheels locked  7/6/2023 1016 by Cristel English RN  Flowsheets (Taken 7/6/2023 1016)  Safety Promotion/Fall Prevention: in recliner, wheels locked  Intervention: Prevent Infection  7/6/2023 1531 by Cristel English RN  Flowsheets (Taken 7/6/2023 1531)  Infection Prevention:   hand hygiene promoted   equipment surfaces disinfected  7/6/2023 1016 by Cristel English RN  Flowsheets (Taken 7/6/2023 1016)  Infection Prevention:   hand hygiene promoted   equipment surfaces disinfected  Goal: Optimal Comfort and Wellbeing  7/6/2023 1531 by Cristel English RN  Outcome: Ongoing, Progressing  7/6/2023 1016 by Cristel English RN  Outcome: Ongoing, Progressing  Intervention: Provide Person-Centered Care  7/6/2023 1531 by Cristel English RN  Flowsheets (Taken 7/6/2023 1531)  Trust Relationship/Rapport:   care explained   reassurance provided   choices provided   thoughts/feelings acknowledged   emotional support provided   empathic listening provided   questions answered   questions  encouraged  7/6/2023 1016 by Cristel English RN  Flowsheets (Taken 7/6/2023 1016)  Trust Relationship/Rapport:   care explained   choices provided   reassurance provided   thoughts/feelings acknowledged   emotional support provided   empathic listening provided   questions answered   questions encouraged

## 2023-08-17 ENCOUNTER — PATIENT MESSAGE (OUTPATIENT)
Dept: RHEUMATOLOGY | Facility: CLINIC | Age: 78
End: 2023-08-17
Payer: MEDICARE

## 2023-08-18 ENCOUNTER — TELEPHONE (OUTPATIENT)
Dept: RHEUMATOLOGY | Facility: CLINIC | Age: 78
End: 2023-08-18
Payer: MEDICARE

## 2023-08-18 NOTE — TELEPHONE ENCOUNTER
----- Message from Andie Robert sent at 8/18/2023 11:29 AM CDT -----  Regarding: pt call back  Name of Who is Calling:SALVATORE MERIDA [[id number]]          What is the request in detail:pt would like a call back concerning appt , pt states he is currently in         hospital  at [facility] , for a hip infection her had his hip replace           Can the clinic reply by [facility]: no           What Number to Call Back if not in [facility]: [phone]

## 2023-08-18 NOTE — TELEPHONE ENCOUNTER
Patient called and stated that he is having emergency hip surgery and he has a remicade infusion on 08/31. He doesn't know if he will be able to make it to the infusion. Patient wanted to know if he can be on an injectable version of remicade if possible.? Patient currently in hospital    Please advise

## 2023-08-23 ENCOUNTER — TELEPHONE (OUTPATIENT)
Dept: RHEUMATOLOGY | Facility: CLINIC | Age: 78
End: 2023-08-23
Payer: MEDICARE

## 2023-08-23 NOTE — TELEPHONE ENCOUNTER
Returned patient call.  Patient states to cancel all his appointments on 08/31/2023.  He is unable to make it.  He said he will call back to reschedule at a later date.

## 2023-08-23 NOTE — TELEPHONE ENCOUNTER
----- Message from Eula Lr sent at 8/23/2023 10:21 AM CDT -----  Contact: Jack  .Patient is calling to speak with the nurse regarding appt . Reports needing to reschedule appt  .  Please give patient a call back at .[phone].

## 2023-09-13 ENCOUNTER — LAB VISIT (OUTPATIENT)
Dept: LAB | Facility: HOSPITAL | Age: 78
End: 2023-09-13
Attending: INTERNAL MEDICINE
Payer: MEDICARE

## 2023-09-13 DIAGNOSIS — A41.2: ICD-10-CM

## 2023-09-13 DIAGNOSIS — N45.1 EPIDIDYMITIS: Primary | ICD-10-CM

## 2023-09-13 LAB
ALBUMIN SERPL BCP-MCNC: 3.6 G/DL (ref 3.5–5.2)
ALP SERPL-CCNC: 107 U/L (ref 55–135)
ALT SERPL W/O P-5'-P-CCNC: 93 U/L (ref 10–44)
ANION GAP SERPL CALC-SCNC: 14 MMOL/L (ref 8–16)
AST SERPL-CCNC: 63 U/L (ref 10–40)
BASOPHILS # BLD AUTO: 0.07 K/UL (ref 0–0.2)
BASOPHILS NFR BLD: 0.8 % (ref 0–1.9)
BILIRUB SERPL-MCNC: 0.4 MG/DL (ref 0.1–1)
BUN SERPL-MCNC: 26 MG/DL (ref 8–23)
CALCIUM SERPL-MCNC: 9.1 MG/DL (ref 8.7–10.5)
CHLORIDE SERPL-SCNC: 98 MMOL/L (ref 95–110)
CO2 SERPL-SCNC: 26 MMOL/L (ref 23–29)
CREAT SERPL-MCNC: 1.2 MG/DL (ref 0.5–1.4)
CRP SERPL-MCNC: 11.7 MG/L (ref 0–8.2)
DIFFERENTIAL METHOD: ABNORMAL
EOSINOPHIL # BLD AUTO: 1.1 K/UL (ref 0–0.5)
EOSINOPHIL NFR BLD: 13.4 % (ref 0–8)
ERYTHROCYTE [DISTWIDTH] IN BLOOD BY AUTOMATED COUNT: 15.1 % (ref 11.5–14.5)
ERYTHROCYTE [SEDIMENTATION RATE] IN BLOOD BY WESTERGREN METHOD: 88 MM/HR (ref 0–10)
EST. GFR  (NO RACE VARIABLE): >60 ML/MIN/1.73 M^2
GLUCOSE SERPL-MCNC: 95 MG/DL (ref 70–110)
HCT VFR BLD AUTO: 36.2 % (ref 40–54)
HGB BLD-MCNC: 11.9 G/DL (ref 14–18)
IMM GRANULOCYTES # BLD AUTO: 0.02 K/UL (ref 0–0.04)
IMM GRANULOCYTES NFR BLD AUTO: 0.2 % (ref 0–0.5)
LYMPHOCYTES # BLD AUTO: 2.3 K/UL (ref 1–4.8)
LYMPHOCYTES NFR BLD: 27.3 % (ref 18–48)
MCH RBC QN AUTO: 33.8 PG (ref 27–31)
MCHC RBC AUTO-ENTMCNC: 32.9 G/DL (ref 32–36)
MCV RBC AUTO: 103 FL (ref 82–98)
MONOCYTES # BLD AUTO: 0.9 K/UL (ref 0.3–1)
MONOCYTES NFR BLD: 10.4 % (ref 4–15)
NEUTROPHILS # BLD AUTO: 4 K/UL (ref 1.8–7.7)
NEUTROPHILS NFR BLD: 47.9 % (ref 38–73)
NRBC BLD-RTO: 0 /100 WBC
PLATELET # BLD AUTO: 300 K/UL (ref 150–450)
PMV BLD AUTO: 9.1 FL (ref 9.2–12.9)
POTASSIUM SERPL-SCNC: 4.4 MMOL/L (ref 3.5–5.1)
PROT SERPL-MCNC: 8.4 G/DL (ref 6–8.4)
RBC # BLD AUTO: 3.52 M/UL (ref 4.6–6.2)
SODIUM SERPL-SCNC: 138 MMOL/L (ref 136–145)
WBC # BLD AUTO: 8.36 K/UL (ref 3.9–12.7)

## 2023-09-13 PROCEDURE — 36415 COLL VENOUS BLD VENIPUNCTURE: CPT | Performed by: INTERNAL MEDICINE

## 2023-09-13 PROCEDURE — 85025 COMPLETE CBC W/AUTO DIFF WBC: CPT | Performed by: INTERNAL MEDICINE

## 2023-09-13 PROCEDURE — 85651 RBC SED RATE NONAUTOMATED: CPT | Performed by: INTERNAL MEDICINE

## 2023-09-13 PROCEDURE — 80053 COMPREHEN METABOLIC PANEL: CPT | Performed by: INTERNAL MEDICINE

## 2023-09-13 PROCEDURE — 86140 C-REACTIVE PROTEIN: CPT | Performed by: INTERNAL MEDICINE

## 2023-09-14 ENCOUNTER — TELEPHONE (OUTPATIENT)
Dept: INFUSION THERAPY | Facility: HOSPITAL | Age: 78
End: 2023-09-14
Payer: MEDICARE

## 2023-09-14 ENCOUNTER — TELEPHONE (OUTPATIENT)
Dept: RHEUMATOLOGY | Facility: CLINIC | Age: 78
End: 2023-09-14
Payer: MEDICARE

## 2023-09-14 NOTE — TELEPHONE ENCOUNTER
----- Message from July Garibay sent at 9/14/2023 11:25 AM CDT -----  Good Morning,    Please schedule pt's missed appt...thanks  ----- Message -----  From: Sandra Villeda  Sent: 9/14/2023   9:25 AM CDT  To: [facility] Staff    Pt's wife would like a call back regarding the last remicade treatment. The pt had emergency hip replacement surgery and need to reschedule the infusion appt. Call back number is [phone] x.EL

## 2023-09-14 NOTE — TELEPHONE ENCOUNTER
----- Message from Sandra Villeda sent at 9/14/2023  9:21 AM CDT -----  Pt's wife would like a call back regarding the last remicade treatment. The pt had emergency hip replacement surgery and need to reschedule the infusion appt. Call back number is [phone] Thx.EL

## 2023-09-20 ENCOUNTER — OFFICE VISIT (OUTPATIENT)
Dept: RHEUMATOLOGY | Facility: CLINIC | Age: 78
End: 2023-09-20
Payer: MEDICARE

## 2023-09-20 ENCOUNTER — LAB VISIT (OUTPATIENT)
Dept: LAB | Facility: HOSPITAL | Age: 78
End: 2023-09-20
Attending: INTERNAL MEDICINE
Payer: MEDICARE

## 2023-09-20 DIAGNOSIS — D84.821 IMMUNODEFICIENCY DUE TO DRUGS: ICD-10-CM

## 2023-09-20 DIAGNOSIS — M05.79 RHEUMATOID ARTHRITIS INVOLVING MULTIPLE SITES WITH POSITIVE RHEUMATOID FACTOR: Primary | ICD-10-CM

## 2023-09-20 DIAGNOSIS — Z79.620 INFLIXIMAB (REMICADE) LONG-TERM USE: ICD-10-CM

## 2023-09-20 DIAGNOSIS — T84.52XA PROSTHETIC JOINT INFECTION OF LEFT HIP: Primary | ICD-10-CM

## 2023-09-20 DIAGNOSIS — A41.2: ICD-10-CM

## 2023-09-20 DIAGNOSIS — Z79.631 METHOTREXATE, LONG TERM, CURRENT USE: ICD-10-CM

## 2023-09-20 DIAGNOSIS — Z79.899 IMMUNODEFICIENCY DUE TO DRUGS: ICD-10-CM

## 2023-09-20 LAB
ALBUMIN SERPL BCP-MCNC: 3.5 G/DL (ref 3.5–5.2)
ALP SERPL-CCNC: 94 U/L (ref 55–135)
ALT SERPL W/O P-5'-P-CCNC: 78 U/L (ref 10–44)
ANION GAP SERPL CALC-SCNC: 7 MMOL/L (ref 8–16)
AST SERPL-CCNC: 51 U/L (ref 10–40)
BASOPHILS # BLD AUTO: 0.07 K/UL (ref 0–0.2)
BASOPHILS NFR BLD: 1.1 % (ref 0–1.9)
BILIRUB SERPL-MCNC: 0.4 MG/DL (ref 0.1–1)
BUN SERPL-MCNC: 21 MG/DL (ref 8–23)
CALCIUM SERPL-MCNC: 9 MG/DL (ref 8.7–10.5)
CHLORIDE SERPL-SCNC: 103 MMOL/L (ref 95–110)
CO2 SERPL-SCNC: 27 MMOL/L (ref 23–29)
CREAT SERPL-MCNC: 1 MG/DL (ref 0.5–1.4)
CRP SERPL-MCNC: 8.3 MG/L (ref 0–8.2)
DIFFERENTIAL METHOD: ABNORMAL
EOSINOPHIL # BLD AUTO: 0.9 K/UL (ref 0–0.5)
EOSINOPHIL NFR BLD: 14.3 % (ref 0–8)
ERYTHROCYTE [DISTWIDTH] IN BLOOD BY AUTOMATED COUNT: 15 % (ref 11.5–14.5)
ERYTHROCYTE [SEDIMENTATION RATE] IN BLOOD BY WESTERGREN METHOD: 80 MM/HR (ref 0–10)
EST. GFR  (NO RACE VARIABLE): >60 ML/MIN/1.73 M^2
GLUCOSE SERPL-MCNC: 103 MG/DL (ref 70–110)
HCT VFR BLD AUTO: 35.5 % (ref 40–54)
HGB BLD-MCNC: 11.5 G/DL (ref 14–18)
IMM GRANULOCYTES # BLD AUTO: 0.02 K/UL (ref 0–0.04)
IMM GRANULOCYTES NFR BLD AUTO: 0.3 % (ref 0–0.5)
LYMPHOCYTES # BLD AUTO: 2 K/UL (ref 1–4.8)
LYMPHOCYTES NFR BLD: 30.2 % (ref 18–48)
MCH RBC QN AUTO: 32.9 PG (ref 27–31)
MCHC RBC AUTO-ENTMCNC: 32.4 G/DL (ref 32–36)
MCV RBC AUTO: 101 FL (ref 82–98)
MONOCYTES # BLD AUTO: 0.7 K/UL (ref 0.3–1)
MONOCYTES NFR BLD: 10.3 % (ref 4–15)
NEUTROPHILS # BLD AUTO: 2.9 K/UL (ref 1.8–7.7)
NEUTROPHILS NFR BLD: 43.8 % (ref 38–73)
NRBC BLD-RTO: 0 /100 WBC
PLATELET # BLD AUTO: 279 K/UL (ref 150–450)
PMV BLD AUTO: 8.8 FL (ref 9.2–12.9)
POTASSIUM SERPL-SCNC: 4.1 MMOL/L (ref 3.5–5.1)
PROT SERPL-MCNC: 7.9 G/DL (ref 6–8.4)
RBC # BLD AUTO: 3.5 M/UL (ref 4.6–6.2)
SODIUM SERPL-SCNC: 137 MMOL/L (ref 136–145)
WBC # BLD AUTO: 6.59 K/UL (ref 3.9–12.7)

## 2023-09-20 PROCEDURE — 85025 COMPLETE CBC W/AUTO DIFF WBC: CPT | Performed by: INTERNAL MEDICINE

## 2023-09-20 PROCEDURE — 99215 OFFICE O/P EST HI 40 MIN: CPT | Mod: 95,,, | Performed by: STUDENT IN AN ORGANIZED HEALTH CARE EDUCATION/TRAINING PROGRAM

## 2023-09-20 PROCEDURE — 85651 RBC SED RATE NONAUTOMATED: CPT | Performed by: INTERNAL MEDICINE

## 2023-09-20 PROCEDURE — 36415 COLL VENOUS BLD VENIPUNCTURE: CPT | Performed by: INTERNAL MEDICINE

## 2023-09-20 PROCEDURE — 80053 COMPREHEN METABOLIC PANEL: CPT | Performed by: INTERNAL MEDICINE

## 2023-09-20 PROCEDURE — 99215 PR OFFICE/OUTPT VISIT, EST, LEVL V, 40-54 MIN: ICD-10-PCS | Mod: 95,,, | Performed by: STUDENT IN AN ORGANIZED HEALTH CARE EDUCATION/TRAINING PROGRAM

## 2023-09-20 PROCEDURE — 86140 C-REACTIVE PROTEIN: CPT | Performed by: INTERNAL MEDICINE

## 2023-09-20 NOTE — Clinical Note
Needs 3 month f/u with reg 4 labs. He needs to reschedule his Remicade and restart MTX when he is done with his antibiotics and cleared from infectious standpoint.  Yes

## 2023-09-27 ENCOUNTER — LAB VISIT (OUTPATIENT)
Dept: LAB | Facility: HOSPITAL | Age: 78
End: 2023-09-27
Attending: INTERNAL MEDICINE
Payer: MEDICARE

## 2023-09-27 DIAGNOSIS — A41.2: ICD-10-CM

## 2023-09-27 DIAGNOSIS — T84.52XA PROSTHETIC JOINT INFECTION OF LEFT HIP: Primary | ICD-10-CM

## 2023-09-27 LAB
ALBUMIN SERPL BCP-MCNC: 3.3 G/DL (ref 3.5–5.2)
ALP SERPL-CCNC: 88 U/L (ref 55–135)
ALT SERPL W/O P-5'-P-CCNC: 46 U/L (ref 10–44)
ANION GAP SERPL CALC-SCNC: 10 MMOL/L (ref 8–16)
ANISOCYTOSIS BLD QL SMEAR: SLIGHT
AST SERPL-CCNC: 34 U/L (ref 10–40)
BASOPHILS # BLD AUTO: 0.04 K/UL (ref 0–0.2)
BASOPHILS NFR BLD: 0.6 % (ref 0–1.9)
BILIRUB SERPL-MCNC: 0.3 MG/DL (ref 0.1–1)
BUN SERPL-MCNC: 23 MG/DL (ref 8–23)
CALCIUM SERPL-MCNC: 9 MG/DL (ref 8.7–10.5)
CHLORIDE SERPL-SCNC: 103 MMOL/L (ref 95–110)
CO2 SERPL-SCNC: 27 MMOL/L (ref 23–29)
CREAT SERPL-MCNC: 1.1 MG/DL (ref 0.5–1.4)
CRP SERPL-MCNC: 6.9 MG/L (ref 0–8.2)
DACRYOCYTES BLD QL SMEAR: ABNORMAL
DIFFERENTIAL METHOD: ABNORMAL
EOSINOPHIL # BLD AUTO: 0.7 K/UL (ref 0–0.5)
EOSINOPHIL NFR BLD: 10.9 % (ref 0–8)
ERYTHROCYTE [DISTWIDTH] IN BLOOD BY AUTOMATED COUNT: 14.9 % (ref 11.5–14.5)
ERYTHROCYTE [SEDIMENTATION RATE] IN BLOOD BY WESTERGREN METHOD: 104 MM/HR (ref 0–10)
EST. GFR  (NO RACE VARIABLE): >60 ML/MIN/1.73 M^2
GLUCOSE SERPL-MCNC: 101 MG/DL (ref 70–110)
HCT VFR BLD AUTO: 27.8 % (ref 40–54)
HGB BLD-MCNC: 11.3 G/DL (ref 14–18)
IMM GRANULOCYTES # BLD AUTO: 0.01 K/UL (ref 0–0.04)
IMM GRANULOCYTES NFR BLD AUTO: 0.2 % (ref 0–0.5)
LYMPHOCYTES # BLD AUTO: 2.1 K/UL (ref 1–4.8)
LYMPHOCYTES NFR BLD: 33.7 % (ref 18–48)
MCH RBC QN AUTO: 42.8 PG (ref 27–31)
MCHC RBC AUTO-ENTMCNC: 40.6 G/DL (ref 32–36)
MCV RBC AUTO: 105 FL (ref 82–98)
MONOCYTES # BLD AUTO: 0.6 K/UL (ref 0.3–1)
MONOCYTES NFR BLD: 10 % (ref 4–15)
NEUTROPHILS # BLD AUTO: 2.8 K/UL (ref 1.8–7.7)
NEUTROPHILS NFR BLD: 44.6 % (ref 38–73)
NRBC BLD-RTO: 0 /100 WBC
PLATELET # BLD AUTO: 248 K/UL (ref 150–450)
PLATELET BLD QL SMEAR: ABNORMAL
PMV BLD AUTO: 8.6 FL (ref 9.2–12.9)
POTASSIUM SERPL-SCNC: 4.3 MMOL/L (ref 3.5–5.1)
PROT SERPL-MCNC: 7.6 G/DL (ref 6–8.4)
RBC # BLD AUTO: 2.64 M/UL (ref 4.6–6.2)
SODIUM SERPL-SCNC: 140 MMOL/L (ref 136–145)
WBC # BLD AUTO: 6.32 K/UL (ref 3.9–12.7)

## 2023-09-27 PROCEDURE — 85025 COMPLETE CBC W/AUTO DIFF WBC: CPT | Performed by: INTERNAL MEDICINE

## 2023-09-27 PROCEDURE — 80053 COMPREHEN METABOLIC PANEL: CPT | Performed by: INTERNAL MEDICINE

## 2023-09-27 PROCEDURE — 36415 COLL VENOUS BLD VENIPUNCTURE: CPT | Performed by: INTERNAL MEDICINE

## 2023-09-27 PROCEDURE — 86140 C-REACTIVE PROTEIN: CPT | Performed by: INTERNAL MEDICINE

## 2023-09-27 PROCEDURE — 85651 RBC SED RATE NONAUTOMATED: CPT | Performed by: INTERNAL MEDICINE

## 2023-10-05 ENCOUNTER — TELEPHONE (OUTPATIENT)
Dept: INFUSION THERAPY | Facility: HOSPITAL | Age: 78
End: 2023-10-05
Payer: MEDICARE

## 2023-10-05 ENCOUNTER — PATIENT MESSAGE (OUTPATIENT)
Dept: RHEUMATOLOGY | Facility: CLINIC | Age: 78
End: 2023-10-05
Payer: MEDICARE

## 2023-10-05 NOTE — TELEPHONE ENCOUNTER
----- Message from Martha Oswald MA sent at 10/5/2023  2:17 PM CDT -----  Regarding: infusion  Good afternoon,     Can you all please reach out to patient to get him rescheduled for his remicade infusion?     Thanks in advance

## 2023-10-05 NOTE — PROGRESS NOTES
RHEUMATOLOGY CLINIC ESTABLISHED PATIENT TELEMED VISIT    The patient location is: Home  The chief complaint leading to consultation is:  Rheumatoid arthritis    Visit type: Audiovisual    Face to Face time with patient: 21  32 minutes of total time spent on the encounter, which includes face to face time and non-face to face time preparing to see the patient (eg, review of tests), Obtaining and/or reviewing separately obtained history, Documenting clinical information in the electronic or other health record, Independently interpreting results (not separately reported) and communicating results to the patient/family/caregiver, or Care coordination (not separately reported).     Each patient to whom he or she provides medical services by telemedicine is:  (1) informed of the relationship between the physician and patient and the respective role of any other health care provider with respect to management of the patient; and (2) notified that he or she may decline to receive medical services by telemedicine and may withdraw from such care at any time.    Reason for consult:- rheumatoid arthritis    Chief complaints, HPI, ROS, EXAM, Assessment & Plans:-    Jack Back is a 78 y.o. pleasant male who presents to follow-up for seropositive rheumatoid arthritis.  His last visit was with Josephine May 11th.  He has been maintained on long-term regimen of Remicade and methotrexate which has worked well for his rheumatoid arthritis.  He missed his last Remicade infusion.  Was planned for Remicade infusion in a couple of days.  However of note patient had a hip replacement in August.  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  He is currently getting antibiotics with rifampin and cefepime IV.  He is continued taking methotrexate.  Overall his joint pains feel okay otherwise.  Does have a little bit of increased stiffness.  No fever or chills.  Rheumatologic review of systems otherwise  negative.  Appears well on video visit.  100% full fist formation.      Reviewed all available old and outside pertinent medical records.    All lab results personally reviewed and interpreted by me.    1. Rheumatoid arthritis involving multiple sites with positive rheumatoid factor    2. Infliximab (Remicade) long-term use    3. Methotrexate, long term, current use    4. Immunodeficiency due to drugs        Problem List Items Addressed This Visit          Immunology/Multi System    Rheumatoid arthritis involving multiple sites with positive rheumatoid factor - Primary (Chronic)       Oncology    Methotrexate, long term, current use       Palliative Care    Infliximab (Remicade) long-term use     Other Visit Diagnoses       Immunodeficiency due to drugs                Patient following up for seropositive rheumatoid arthritis treatment  He has been doing well for some time on Remicade as well as methotrexate 20 mg weekly  However Remicade has been delayed due to hip replacement  His hip replacement has been complicated by prosthetic joint infection for which he is now on long-term antibiotics which he is scheduled to complete on the 27th of September  We will need to hold his Remicade infusion until he completes antibiotics and has been cleared from infectious standpoint  should hold his methotrexate as well  Advised patient to plan to reschedule Remicade infusions for when he is done with antibiotics and no infections  Drug therapy requiring intensive monitoring for toxicity  High Risk Medication Monitoring encounter  No current medication related issues, no evidence of toxicity  I ordered labs for toxicity monitoring, have personally reviewed the findings, and discussed them with the patient.  Pending labs will be sent via the portal  Compromised immune system secondary to autoimmune disease and/or use of immunosuppressive drugs.  Monitor carefully for infections.  Advised patient to get immediate medical care if  any infection arises.  Also advised strict adherence age-appropriate vaccinations and cancer screenings with PCP.  Patient advised to hold DMARD and/or biologic therapy for signs of infection or for surgery. If you are unsure what to do please call our office for instruction.[facility] [phone]    # No follow-ups on file.    Chronic comorbid conditions affecting medical decision making today:    Past Medical History:   Diagnosis Date    Arthritis     Congestive heart failure 06/07/2019    Depression     Lung disease     Rheumatoid arthritis involving multiple sites with positive rheumatoid factor 12/1/2015    Rheumatoid arthritis(714.0)     Thyroid disease        Past Surgical History:   Procedure Laterality Date    CREATION OF AORTOCORONARY ARTERY BYPASS      HERNIA REPAIR      JOINT REPLACEMENT      bi lateral hips    PA CABG, ARTERY-VEIN, FOUR  05/21/2019    Coronary Artery Bypass, 4    SPINE SURGERY          Social History     Tobacco Use    Smoking status: Former    Smokeless tobacco: Never   Substance Use Topics    Alcohol use: No    Drug use: No       Family History   Problem Relation Age of Onset    Diabetes Mellitus Mother     Heart disease Mother     Heart disease Father     Rheum arthritis Father     Diabetes Mellitus Brother     Heart disease Brother        Review of patient's allergies indicates:   Allergen Reactions    Arava [leflunomide] Diarrhea    Penicillins Other (See Comments) and Hives     unknown    Tetanus vaccines and toxoid Other (See Comments)     unknown  Fever       Medication List with Changes/Refills   Current Medications    ASCORBIC ACID, VITAMIN C, (VITAMIN C) 100 MG TABLET    Take 100 mg by mouth once daily.    ASPIRIN (ECOTRIN) 81 MG EC TABLET    Take 325 mg by mouth once daily.     CHOLECALCIFEROL, VITAMIN D3, 10 MCG (400 UNIT) CAP        CLOPIDOGREL (PLAVIX) 75 MG TABLET    Take 1 tablet by mouth Daily.    CO-ENZYME Q-10 30 MG CAPSULE    Take 30 mg by mouth  once daily.    CYANOCOBALAMIN (VITAMIN B-12) 1000 MCG TABLET    Take 300 mcg by mouth once daily.    ELIQUIS 5 MG TAB    Take 5 mg by mouth 2 (two) times daily.    EVOLOCUMAB (REPATHA SYRINGE SUBQ)    Inject 140 mg into the skin every 14 (fourteen) days.    FOLIC ACID (FOLVITE) 800 MCG TAB    Take 1 tablet (800 mcg total) by mouth 2 (two) times daily.    FUROSEMIDE (LASIX) 40 MG TABLET        VANNESSA ORAL    Take by mouth. 565 mg daily    INFLIXIMAB (REMICADE IV)    Inject into the vein.     INV FOLATE 400 MCG TABLET    Take 400 mcg by mouth once daily. FOR INVESTIGATIONAL USE ONLY    METHOTREXATE 2.5 MG TAB    Take 8 tablets (20 mg total) by mouth once a week.    MILK THISTLE 175 MG TABLET    Take 500 mg by mouth once daily.    MULTIVIT WITH MINERALS/LUTEIN (MULTIVITAMIN 50 PLUS ORAL)        NITROSTAT 0.4 MG SL TABLET    0.4 mg.    OMEGA 3-DHA-EPA-FISH -100-1,000 MG CAP    Take by mouth 2 (two) times daily.    OXYCODONE-ACETAMINOPHEN (PERCOCET) 5-325 MG PER TABLET    Take 1 tablet by mouth every 4 (four) hours as needed.    POTASSIUM CHLORIDE (MICRO-K) 10 MEQ CPSR    Take 10 mEq by mouth once.    POTASSIUM/MAGNESIUM (MAGNESIUM-POTASSIUM ORAL)    Take 600 mg by mouth once daily. 600 mg/ 198    RAMIPRIL (ALTACE) 10 MG CAPSULE    Take 10 mg by mouth once daily.    SAW PALMETTO 500 MG CAPSULE    Take 450 mg by mouth once daily.    SYNTHROID 88 MCG TABLET    88 mcg once daily.    TESTOSTERONE CYPIONATE (DEPOTESTOTERONE CYPIONATE) 200 MG/ML INJECTION        TRIAMCINOLONE ACETONIDE 0.1% (KENALOG) 0.1 % CREAM    Apply topically 2 (two) times daily. for 10 days    TURMERIC, BULK, MISC    500 mg by Misc.(Non-Drug; Combo Route) route.    VIT C/E/ZN/COPPR/LUTEIN/ZEAXAN (PRESERVISION AREDS-2 ORAL)    Take by mouth once daily.    VITAMIN B12-FOLIC ACID 0.5-1 MG TAB        VITAMIN D 1000 UNITS TAB    Take 5,000 mg by mouth once daily.     VITAMIN E 1000 UNIT CAPSULE    Take 1,000 Units by mouth once daily.    VITAMIN K2  ORAL    Take 100 mcg by mouth.    ZINC GLUCONATE 50 MG TABLET    Take 50 mg by mouth once daily.         Disclaimer: This note was prepared using voice recognition system and is likely to have sound alike errors and is not proofread.  Please message me with any questions.    32 minutes of total time spent on the encounter, which includes face to face time and non-face to face time preparing to see the patient (eg, review of tests), Obtaining and/or reviewing separately obtained history, Documenting clinical information in the electronic or other health record, Independently interpreting results (not separately reported) and communicating results to the patient/family/caregiver, or Care coordination (not separately reported).     Thank you for allowing me to participate in the care of Jack Back.    Jean Carlos Franklin MD

## 2023-10-12 ENCOUNTER — INFUSION (OUTPATIENT)
Dept: INFUSION THERAPY | Facility: HOSPITAL | Age: 78
End: 2023-10-12
Attending: PHYSICIAN ASSISTANT
Payer: MEDICARE

## 2023-10-12 VITALS
OXYGEN SATURATION: 98 % | HEART RATE: 58 BPM | BODY MASS INDEX: 30.11 KG/M2 | WEIGHT: 192.25 LBS | SYSTOLIC BLOOD PRESSURE: 115 MMHG | RESPIRATION RATE: 16 BRPM | TEMPERATURE: 98 F | DIASTOLIC BLOOD PRESSURE: 61 MMHG

## 2023-10-12 DIAGNOSIS — M05.79 RHEUMATOID ARTHRITIS INVOLVING MULTIPLE SITES WITH POSITIVE RHEUMATOID FACTOR: Primary | ICD-10-CM

## 2023-10-12 PROCEDURE — 25000003 PHARM REV CODE 250: Performed by: PHYSICIAN ASSISTANT

## 2023-10-12 PROCEDURE — 63600175 PHARM REV CODE 636 W HCPCS: Mod: JZ,JG | Performed by: PHYSICIAN ASSISTANT

## 2023-10-12 PROCEDURE — 96413 CHEMO IV INFUSION 1 HR: CPT

## 2023-10-12 RX ORDER — SODIUM CHLORIDE 0.9 % (FLUSH) 0.9 %
10 SYRINGE (ML) INJECTION
Status: CANCELLED | OUTPATIENT
Start: 2023-10-26

## 2023-10-12 RX ORDER — IPRATROPIUM BROMIDE AND ALBUTEROL SULFATE 2.5; .5 MG/3ML; MG/3ML
3 SOLUTION RESPIRATORY (INHALATION)
Status: CANCELLED | OUTPATIENT
Start: 2023-10-26

## 2023-10-12 RX ORDER — DIPHENHYDRAMINE HYDROCHLORIDE 50 MG/ML
25 INJECTION INTRAMUSCULAR; INTRAVENOUS
Status: CANCELLED | OUTPATIENT
Start: 2023-10-26

## 2023-10-12 RX ORDER — ACETAMINOPHEN 325 MG/1
650 TABLET ORAL
Status: CANCELLED | OUTPATIENT
Start: 2023-10-26

## 2023-10-12 RX ORDER — METHYLPREDNISOLONE SOD SUCC 125 MG
50 VIAL (EA) INJECTION
Status: CANCELLED
Start: 2023-10-26 | End: 2023-10-26

## 2023-10-12 RX ORDER — DIPHENHYDRAMINE HYDROCHLORIDE 50 MG/ML
12.5 INJECTION INTRAMUSCULAR; INTRAVENOUS
Status: CANCELLED
Start: 2023-10-26 | End: 2023-10-26

## 2023-10-12 RX ORDER — EPINEPHRINE 1 MG/ML
0.3 INJECTION, SOLUTION, CONCENTRATE INTRAVENOUS
Status: CANCELLED | OUTPATIENT
Start: 2023-10-26

## 2023-10-12 RX ORDER — METHYLPREDNISOLONE SOD SUCC 125 MG
25 VIAL (EA) INJECTION ONCE
Status: CANCELLED
Start: 2023-10-26 | End: 2023-10-26

## 2023-10-12 RX ADMIN — INFLIXIMAB 800 MG: 100 INJECTION, POWDER, LYOPHILIZED, FOR SOLUTION INTRAVENOUS at 01:10

## 2023-10-12 NOTE — PLAN OF CARE
Problem: Infection  Goal: Absence of Infection Signs and Symptoms  Outcome: Ongoing, Progressing     Problem: Adult Inpatient Plan of Care  Goal: Plan of Care Review  Outcome: Ongoing, Progressing  Goal: Patient-Specific Goal (Individualized)  Outcome: Ongoing, Progressing  Goal: Optimal Comfort and Wellbeing  Outcome: Ongoing, Progressing     Problem: Fall Injury Risk  Goal: Absence of Fall and Fall-Related Injury  Outcome: Ongoing, Progressing

## 2023-10-12 NOTE — NURSING
Infusion # >10 - Remicade 800 mg q 8 wk  Last dose- 7/6/23    Any:  -recent illness, infection, or antibiotic use in past week- no  -open wounds or mouth sores- no  -invasive procedures or surgeries in past 4 weeks or in upcoming 4 weeks- no  -vaccinations in past week- no  -any new symptoms/change in symptoms: no        Recent labs? 9/27/23  Last Rheumatology provider visit- Seen by Dr. Franklin on 9/20/23        Remicade 800 mg administered IV at a 90 minute rate per orders; see MAR and vitals for more details. Tolerated well without adverse events, discharged and ambulatory out of clinic.

## 2023-12-27 ENCOUNTER — TELEPHONE (OUTPATIENT)
Dept: INFUSION THERAPY | Facility: HOSPITAL | Age: 78
End: 2023-12-27
Payer: MEDICARE

## 2023-12-27 NOTE — TELEPHONE ENCOUNTER
I spoke with patient.  He said his remicade had been canceled multiple times due to flu and hospital visit.  He wanted have his treatment.   I scheduled patient after checking with Rheumatology Navigator.

## 2024-01-05 ENCOUNTER — INFUSION (OUTPATIENT)
Dept: INFUSION THERAPY | Facility: HOSPITAL | Age: 79
End: 2024-01-05
Attending: PHYSICIAN ASSISTANT
Payer: MEDICARE

## 2024-01-05 VITALS
BODY MASS INDEX: 29.41 KG/M2 | OXYGEN SATURATION: 96 % | SYSTOLIC BLOOD PRESSURE: 99 MMHG | WEIGHT: 187.38 LBS | DIASTOLIC BLOOD PRESSURE: 61 MMHG | TEMPERATURE: 98 F | HEIGHT: 67 IN | HEART RATE: 104 BPM | RESPIRATION RATE: 18 BRPM

## 2024-01-05 DIAGNOSIS — M05.79 RHEUMATOID ARTHRITIS INVOLVING MULTIPLE SITES WITH POSITIVE RHEUMATOID FACTOR: Primary | ICD-10-CM

## 2024-01-05 PROCEDURE — 96413 CHEMO IV INFUSION 1 HR: CPT

## 2024-01-05 PROCEDURE — 96415 CHEMO IV INFUSION ADDL HR: CPT

## 2024-01-05 PROCEDURE — 63600175 PHARM REV CODE 636 W HCPCS: Mod: JZ,JG | Performed by: PHYSICIAN ASSISTANT

## 2024-01-05 PROCEDURE — 25000003 PHARM REV CODE 250: Performed by: PHYSICIAN ASSISTANT

## 2024-01-05 RX ORDER — EPINEPHRINE 1 MG/ML
0.3 INJECTION, SOLUTION, CONCENTRATE INTRAVENOUS
OUTPATIENT
Start: 2024-02-02

## 2024-01-05 RX ORDER — DIPHENHYDRAMINE HYDROCHLORIDE 50 MG/ML
12.5 INJECTION, SOLUTION INTRAMUSCULAR; INTRAVENOUS
Start: 2024-02-02 | End: 2024-02-02

## 2024-01-05 RX ORDER — METHYLPREDNISOLONE SOD SUCC 125 MG
50 VIAL (EA) INJECTION
Start: 2024-02-02 | End: 2024-02-02

## 2024-01-05 RX ORDER — IPRATROPIUM BROMIDE AND ALBUTEROL SULFATE 2.5; .5 MG/3ML; MG/3ML
3 SOLUTION RESPIRATORY (INHALATION)
OUTPATIENT
Start: 2024-02-02

## 2024-01-05 RX ORDER — DIPHENHYDRAMINE HYDROCHLORIDE 50 MG/ML
25 INJECTION, SOLUTION INTRAMUSCULAR; INTRAVENOUS
OUTPATIENT
Start: 2024-02-02

## 2024-01-05 RX ORDER — SODIUM CHLORIDE 0.9 % (FLUSH) 0.9 %
10 SYRINGE (ML) INJECTION
OUTPATIENT
Start: 2024-02-02

## 2024-01-05 RX ORDER — ACETAMINOPHEN 325 MG/1
650 TABLET ORAL
OUTPATIENT
Start: 2024-02-02

## 2024-01-05 RX ORDER — METHYLPREDNISOLONE SOD SUCC 125 MG
25 VIAL (EA) INJECTION ONCE
Start: 2024-02-02 | End: 2024-02-02

## 2024-01-05 RX ADMIN — INFLIXIMAB 800 MG: 100 INJECTION, POWDER, LYOPHILIZED, FOR SOLUTION INTRAVENOUS at 11:01

## 2024-01-05 NOTE — PLAN OF CARE
Patient tolerated Remicade well today; no adverse reaction noted.  POC reviewed with pt.  No questions or concerns voiced.  NAD noted upon discharge.  No significant new complaints voiced.   Has f/u appt(s) scheduled per MD request.      Problem: Adult Inpatient Plan of Care  Goal: Plan of Care Review  Outcome: Ongoing, Progressing  Flowsheets (Taken 1/5/2024 1229)  Plan of Care Reviewed With: patient  Goal: Patient-Specific Goal (Individualized)  Outcome: Ongoing, Progressing  Flowsheets (Taken 1/5/2024 1229)  Anxieties, Fears or Concerns: pt denies  Individualized Care Needs: reclined position, lights dimmed, feet elevated. wife at chairside. ice water provided  Goal: Optimal Comfort and Wellbeing  Outcome: Ongoing, Progressing  Intervention: Provide Person-Centered Care  Flowsheets (Taken 1/5/2024 1229)  Trust Relationship/Rapport:   care explained   questions encouraged   choices provided   reassurance provided   emotional support provided   thoughts/feelings acknowledged   empathic listening provided   questions answered

## 2024-01-24 ENCOUNTER — TELEPHONE (OUTPATIENT)
Dept: RHEUMATOLOGY | Facility: CLINIC | Age: 79
End: 2024-01-24
Payer: MEDICARE

## 2024-02-01 ENCOUNTER — PATIENT MESSAGE (OUTPATIENT)
Dept: RHEUMATOLOGY | Facility: CLINIC | Age: 79
End: 2024-02-01

## 2024-03-01 ENCOUNTER — INFUSION (OUTPATIENT)
Dept: INFUSION THERAPY | Facility: HOSPITAL | Age: 79
End: 2024-03-01
Attending: PHYSICIAN ASSISTANT
Payer: MEDICARE

## 2024-03-01 VITALS
WEIGHT: 189.63 LBS | DIASTOLIC BLOOD PRESSURE: 65 MMHG | RESPIRATION RATE: 18 BRPM | HEART RATE: 84 BPM | SYSTOLIC BLOOD PRESSURE: 105 MMHG | HEIGHT: 67 IN | TEMPERATURE: 98 F | BODY MASS INDEX: 29.76 KG/M2 | OXYGEN SATURATION: 98 %

## 2024-03-01 DIAGNOSIS — M05.79 RHEUMATOID ARTHRITIS INVOLVING MULTIPLE SITES WITH POSITIVE RHEUMATOID FACTOR: Primary | ICD-10-CM

## 2024-03-01 PROCEDURE — 25000003 PHARM REV CODE 250: Performed by: PHYSICIAN ASSISTANT

## 2024-03-01 PROCEDURE — 96415 CHEMO IV INFUSION ADDL HR: CPT

## 2024-03-01 PROCEDURE — 63600175 PHARM REV CODE 636 W HCPCS: Mod: JZ,JG | Performed by: PHYSICIAN ASSISTANT

## 2024-03-01 PROCEDURE — 96413 CHEMO IV INFUSION 1 HR: CPT

## 2024-03-01 RX ORDER — DIPHENHYDRAMINE HYDROCHLORIDE 50 MG/ML
25 INJECTION INTRAMUSCULAR; INTRAVENOUS
Status: CANCELLED | OUTPATIENT
Start: 2024-04-26

## 2024-03-01 RX ORDER — METHYLPREDNISOLONE SOD SUCC 125 MG
50 VIAL (EA) INJECTION
Status: CANCELLED
Start: 2024-04-26 | End: 2024-04-26

## 2024-03-01 RX ORDER — SODIUM CHLORIDE 0.9 % (FLUSH) 0.9 %
10 SYRINGE (ML) INJECTION
Status: CANCELLED | OUTPATIENT
Start: 2024-04-26

## 2024-03-01 RX ORDER — METHYLPREDNISOLONE SOD SUCC 125 MG
25 VIAL (EA) INJECTION ONCE
Status: CANCELLED
Start: 2024-04-26 | End: 2024-04-26

## 2024-03-01 RX ORDER — IPRATROPIUM BROMIDE AND ALBUTEROL SULFATE 2.5; .5 MG/3ML; MG/3ML
3 SOLUTION RESPIRATORY (INHALATION)
Status: CANCELLED | OUTPATIENT
Start: 2024-04-26

## 2024-03-01 RX ORDER — EPINEPHRINE 1 MG/ML
0.3 INJECTION, SOLUTION, CONCENTRATE INTRAVENOUS
Status: CANCELLED | OUTPATIENT
Start: 2024-04-26

## 2024-03-01 RX ORDER — DIPHENHYDRAMINE HYDROCHLORIDE 50 MG/ML
12.5 INJECTION INTRAMUSCULAR; INTRAVENOUS
Status: CANCELLED
Start: 2024-04-26 | End: 2024-04-26

## 2024-03-01 RX ORDER — ACETAMINOPHEN 325 MG/1
650 TABLET ORAL
Status: CANCELLED | OUTPATIENT
Start: 2024-04-26

## 2024-03-01 RX ADMIN — INFLIXIMAB 800 MG: 100 INJECTION, POWDER, LYOPHILIZED, FOR SOLUTION INTRAVENOUS at 10:03

## 2024-03-01 NOTE — DISCHARGE INSTRUCTIONS
Thank you for allowing me to care for you today,  DAWIT GrayN, RN    [facility]  [address] [address]  [phone] phone     [phone] fax  Hours of Operation: Monday- Friday 7:00am- 5:30pm    Falls often occur due to slipping, tripping or losing your balance. Here are ways to reduce your risk of falling again.   Was there anything that caused your fall that can be fixed, removed or replaced?   Make your home safe by keeping walkways clear of objects you may trip over.   Use non-slip pads under rugs.   Do not walk in poorly lit areas.   Do not stand on chairs or wobbly ladders.   Use caution when reaching overhead or looking upward. This position can cause a loss of balance.   Be sure your shoes fit properly, have non-slip bottoms and are in good condition.   Be cautious when going up and down stairs, curbs, and when walking on uneven sidewalks.   If your balance is poor, consider using a cane or walker.   If your fall was related to alcohol use, stop or limit alcohol intake.   If your fall was related to use of sleeping medicines, talk to your doctor about this. You may need to reduce your dosage at bedtime if you awaken during the night to go to the bathroom.   To reduce the need for nighttime bathroom trips:   Avoid drinking fluids for several hours before going to bed   Empty your bladder before going to bed   Men can keep a urinal at the bedside   © 3610-1755 [facility], [address] [place] [address], [place] [address]. All rights reserved. This information is not intended as a substitute for professional medical care. Always follow your healthcare professional's instructions.

## 2024-03-01 NOTE — PLAN OF CARE
Pt is stable, pt administered Remicade today. Pt voiced he was doing well. Pt will follow up in eight weeks. POC discussed and no concerns noted.       Problem: Adult Inpatient Plan of Care  Goal: Plan of Care Review  Outcome: Ongoing, Progressing  Goal: Patient-Specific Goal (Individualized)  Outcome: Ongoing, Progressing  Flowsheets (Taken 3/1/2024 1012)  Anxieties, Fears or Concerns: Pt denies.  Individualized Care Needs:   Pt provided pillow   legs elevated in reclined position. Wife at chairside.  Goal: Optimal Comfort and Wellbeing  Outcome: Ongoing, Progressing     Problem: Infection  Goal: Absence of Infection Signs and Symptoms  Outcome: Ongoing, Progressing     Problem: Fall Injury Risk  Goal: Absence of Fall and Fall-Related Injury  Outcome: Ongoing, Progressing

## 2024-04-03 ENCOUNTER — PATIENT MESSAGE (OUTPATIENT)
Dept: RHEUMATOLOGY | Facility: CLINIC | Age: 79
End: 2024-04-03
Payer: MEDICARE

## 2024-04-04 ENCOUNTER — LAB VISIT (OUTPATIENT)
Dept: LAB | Facility: HOSPITAL | Age: 79
End: 2024-04-04
Attending: PHYSICIAN ASSISTANT
Payer: MEDICARE

## 2024-04-04 DIAGNOSIS — R70.0 ELEVATED SED RATE: ICD-10-CM

## 2024-04-04 DIAGNOSIS — M05.79 RHEUMATOID ARTHRITIS INVOLVING MULTIPLE SITES WITH POSITIVE RHEUMATOID FACTOR: ICD-10-CM

## 2024-04-04 LAB
ALBUMIN SERPL BCP-MCNC: 3.3 G/DL (ref 3.5–5.2)
ALP SERPL-CCNC: 107 U/L (ref 55–135)
ALT SERPL W/O P-5'-P-CCNC: 18 U/L (ref 10–44)
ANION GAP SERPL CALC-SCNC: 10 MMOL/L (ref 8–16)
AST SERPL-CCNC: 22 U/L (ref 10–40)
BASOPHILS # BLD AUTO: 0.05 K/UL (ref 0–0.2)
BASOPHILS NFR BLD: 0.5 % (ref 0–1.9)
BILIRUB SERPL-MCNC: 0.4 MG/DL (ref 0.1–1)
BUN SERPL-MCNC: 21 MG/DL (ref 8–23)
CALCIUM SERPL-MCNC: 9.8 MG/DL (ref 8.7–10.5)
CHLORIDE SERPL-SCNC: 100 MMOL/L (ref 95–110)
CO2 SERPL-SCNC: 29 MMOL/L (ref 23–29)
CREAT SERPL-MCNC: 1 MG/DL (ref 0.5–1.4)
CRP SERPL-MCNC: 105 MG/L (ref 0–8.2)
DIFFERENTIAL METHOD BLD: ABNORMAL
EOSINOPHIL # BLD AUTO: 0.4 K/UL (ref 0–0.5)
EOSINOPHIL NFR BLD: 4.8 % (ref 0–8)
ERYTHROCYTE [DISTWIDTH] IN BLOOD BY AUTOMATED COUNT: 15.1 % (ref 11.5–14.5)
ERYTHROCYTE [SEDIMENTATION RATE] IN BLOOD BY PHOTOMETRIC METHOD: 88 MM/HR (ref 0–23)
EST. GFR  (NO RACE VARIABLE): >60 ML/MIN/1.73 M^2
GLUCOSE SERPL-MCNC: 104 MG/DL (ref 70–110)
HCT VFR BLD AUTO: 33 % (ref 40–54)
HGB BLD-MCNC: 11.3 G/DL (ref 14–18)
IMM GRANULOCYTES # BLD AUTO: 0.03 K/UL (ref 0–0.04)
IMM GRANULOCYTES NFR BLD AUTO: 0.3 % (ref 0–0.5)
LYMPHOCYTES # BLD AUTO: 2.2 K/UL (ref 1–4.8)
LYMPHOCYTES NFR BLD: 23.7 % (ref 18–48)
MCH RBC QN AUTO: 33.2 PG (ref 27–31)
MCHC RBC AUTO-ENTMCNC: 34.2 G/DL (ref 32–36)
MCV RBC AUTO: 97 FL (ref 82–98)
MONOCYTES # BLD AUTO: 1 K/UL (ref 0.3–1)
MONOCYTES NFR BLD: 10.6 % (ref 4–15)
NEUTROPHILS # BLD AUTO: 5.5 K/UL (ref 1.8–7.7)
NEUTROPHILS NFR BLD: 60.1 % (ref 38–73)
NRBC BLD-RTO: 0 /100 WBC
PLATELET # BLD AUTO: 274 K/UL (ref 150–450)
PMV BLD AUTO: 8.3 FL (ref 9.2–12.9)
POTASSIUM SERPL-SCNC: 3.6 MMOL/L (ref 3.5–5.1)
PROT SERPL-MCNC: 8.3 G/DL (ref 6–8.4)
RBC # BLD AUTO: 3.4 M/UL (ref 4.6–6.2)
SODIUM SERPL-SCNC: 139 MMOL/L (ref 136–145)
WBC # BLD AUTO: 9.12 K/UL (ref 3.9–12.7)

## 2024-04-04 PROCEDURE — 85025 COMPLETE CBC W/AUTO DIFF WBC: CPT | Performed by: PHYSICIAN ASSISTANT

## 2024-04-04 PROCEDURE — 86140 C-REACTIVE PROTEIN: CPT | Performed by: PHYSICIAN ASSISTANT

## 2024-04-04 PROCEDURE — 85652 RBC SED RATE AUTOMATED: CPT | Performed by: PHYSICIAN ASSISTANT

## 2024-04-04 PROCEDURE — 36415 COLL VENOUS BLD VENIPUNCTURE: CPT | Performed by: PHYSICIAN ASSISTANT

## 2024-04-04 PROCEDURE — 80053 COMPREHEN METABOLIC PANEL: CPT | Performed by: PHYSICIAN ASSISTANT

## 2024-04-05 ENCOUNTER — TELEPHONE (OUTPATIENT)
Dept: RHEUMATOLOGY | Facility: CLINIC | Age: 79
End: 2024-04-05
Payer: MEDICARE

## 2024-04-05 NOTE — TELEPHONE ENCOUNTER
----- Message from Janet Tran PA-C sent at 4/4/2024  9:08 PM CDT -----  Please check on him.  CRP is >100.  That is more typical of infection.  Is he flaring?  Having an acute infection?  If no to both, lets repeat CRP in 7-10 days.

## 2024-04-11 ENCOUNTER — PATIENT MESSAGE (OUTPATIENT)
Dept: RHEUMATOLOGY | Facility: CLINIC | Age: 79
End: 2024-04-11
Payer: MEDICARE

## 2024-04-11 ENCOUNTER — OFFICE VISIT (OUTPATIENT)
Dept: RHEUMATOLOGY | Facility: CLINIC | Age: 79
End: 2024-04-11
Payer: MEDICARE

## 2024-04-11 ENCOUNTER — TELEPHONE (OUTPATIENT)
Dept: RHEUMATOLOGY | Facility: CLINIC | Age: 79
End: 2024-04-11
Payer: MEDICARE

## 2024-04-11 VITALS
WEIGHT: 182.75 LBS | HEART RATE: 59 BPM | BODY MASS INDEX: 28.68 KG/M2 | HEIGHT: 67 IN | SYSTOLIC BLOOD PRESSURE: 95 MMHG | DIASTOLIC BLOOD PRESSURE: 71 MMHG

## 2024-04-11 DIAGNOSIS — I50.42 CHRONIC COMBINED SYSTOLIC AND DIASTOLIC CONGESTIVE HEART FAILURE: ICD-10-CM

## 2024-04-11 DIAGNOSIS — Z79.899 IMMUNOSUPPRESSION DUE TO DRUG THERAPY: ICD-10-CM

## 2024-04-11 DIAGNOSIS — M05.79 RHEUMATOID ARTHRITIS INVOLVING MULTIPLE SITES WITH POSITIVE RHEUMATOID FACTOR: Primary | ICD-10-CM

## 2024-04-11 DIAGNOSIS — D84.821 IMMUNOSUPPRESSION DUE TO DRUG THERAPY: ICD-10-CM

## 2024-04-11 DIAGNOSIS — Z51.81 MEDICATION MONITORING ENCOUNTER: ICD-10-CM

## 2024-04-11 DIAGNOSIS — R79.82 CRP ELEVATED: ICD-10-CM

## 2024-04-11 DIAGNOSIS — J84.9 INTERSTITIAL PULMONARY DISEASE, UNSPECIFIED: ICD-10-CM

## 2024-04-11 PROCEDURE — 99215 OFFICE O/P EST HI 40 MIN: CPT | Mod: S$PBB,,, | Performed by: INTERNAL MEDICINE

## 2024-04-11 PROCEDURE — 99214 OFFICE O/P EST MOD 30 MIN: CPT | Mod: PBBFAC | Performed by: INTERNAL MEDICINE

## 2024-04-11 PROCEDURE — 99999 PR PBB SHADOW E&M-EST. PATIENT-LVL IV: CPT | Mod: PBBFAC,,, | Performed by: INTERNAL MEDICINE

## 2024-04-11 NOTE — PROGRESS NOTES
RHEUMATOLOGY OUTPATIENT CLINIC NOTE    4/11/2024    Attending Rheumatologist: Jhonatan Rashid  Primary Care Provider/Physician Requesting Consultation: Nabil Benavides MD   Chief Complaint/Reason For Consultation:  Rheumatoid Arthritis      Subjective:     Jack Back is a 79 y.o. White male with SPRA    No acute complaints.  Back on IFX after adequate hold for surgical hip replacement.  Resumed TNFi 2 months ago. Intermittent arthralgias w/ inflammatory features.    Addendum 6/20: msg received by patient reporting loss of efficacy to MTX and IFX.  Maximized on both drugs.  Recommend f/u encounter at earliest convenience to discuss alternative biologic therapy w/ either Actemra or Orencia (UIP pattern PNA on CT chest)    Review of Systems   Constitutional:  Negative for fever.   Eyes:  Negative for photophobia and pain.   Respiratory:  Negative for cough, hemoptysis and shortness of breath (UGALDE, stable.).    Cardiovascular:  Negative for chest pain.   Gastrointestinal:  Negative for blood in stool and melena.        No hx of diverticulitis   Genitourinary:  Negative for hematuria.   Musculoskeletal:  Negative for joint pain.   Skin:  Negative for rash.   Neurological:  Negative for focal weakness, weakness and headaches.   Endo/Heme/Allergies:         No hx of DVT/PE       Chronic comorbid conditions affecting medical decision making today:  Past Medical History:   Diagnosis Date    Arthritis     Congestive heart failure 06/07/2019    Depression     Lung disease     Rheumatoid arthritis involving multiple sites with positive rheumatoid factor 12/1/2015    Rheumatoid arthritis(714.0)     Thyroid disease      Past Surgical History:   Procedure Laterality Date    CREATION OF AORTOCORONARY ARTERY BYPASS      HERNIA REPAIR      JOINT REPLACEMENT      bi lateral hips    IL CABG, ARTERY-VEIN, FOUR  05/21/2019    Coronary Artery Bypass, 4    SPINE SURGERY       Family History   Problem Relation Age of Onset     Diabetes Mellitus Mother     Heart disease Mother     Heart disease Father     Rheum arthritis Father     Diabetes Mellitus Brother     Heart disease Brother      Social History     Tobacco Use   Smoking Status Former   Smokeless Tobacco Never       Current Outpatient Medications:     ascorbic acid, vitamin C, (VITAMIN C) 100 MG tablet, Take 100 mg by mouth once daily., Disp: , Rfl:     aspirin (ECOTRIN) 81 MG EC tablet, Take 325 mg by mouth once daily. , Disp: , Rfl:     cholecalciferol, vitamin D3, 10 mcg (400 unit) Cap, , Disp: , Rfl:     co-enzyme Q-10 30 mg capsule, Take 30 mg by mouth once daily., Disp: , Rfl:     cyanocobalamin (VITAMIN B-12) 1000 MCG tablet, Take 300 mcg by mouth once daily., Disp: , Rfl:     ELIQUIS 5 mg Tab, Take 5 mg by mouth 2 (two) times daily., Disp: , Rfl:     folic acid (FOLVITE) 800 MCG Tab, Take 1 tablet (800 mcg total) by mouth 2 (two) times daily. (Patient taking differently: Take 800 mcg by mouth once daily.), Disp: , Rfl:     furosemide (LASIX) 40 MG tablet, , Disp: , Rfl: 1    VANNESSA ORAL, Take by mouth. 565 mg daily, Disp: , Rfl:     methotrexate 2.5 MG Tab, Take 8 tablets (20 mg total) by mouth once a week., Disp: 96 tablet, Rfl: 0    milk thistle 175 mg tablet, Take 500 mg by mouth once daily., Disp: , Rfl:     multivit with minerals/lutein (MULTIVITAMIN 50 PLUS ORAL), , Disp: , Rfl:     omega 3-dha-epa-fish oil 500-100-1,000 mg Cap, Take by mouth 2 (two) times daily., Disp: , Rfl:     oxyCODONE-acetaminophen (PERCOCET) 5-325 mg per tablet, Take 1 tablet by mouth every 4 (four) hours as needed., Disp: , Rfl:     potassium chloride (MICRO-K) 10 MEQ CpSR, Take 10 mEq by mouth once., Disp: , Rfl:     potassium/magnesium (MAGNESIUM-POTASSIUM ORAL), Take 600 mg by mouth once daily. 600 mg/ 198, Disp: , Rfl:     saw palmetto 500 MG capsule, Take 450 mg by mouth once daily., Disp: , Rfl:     TURMERIC, BULK, MISC, 500 mg by Misc.(Non-Drug; Combo Route) route., Disp: , Rfl:      vitamin B12-folic acid 0.5-1 mg Tab, , Disp: , Rfl:     vitamin E 1000 UNIT capsule, Take 1,000 Units by mouth once daily., Disp: , Rfl:     VITAMIN K2 ORAL, Take 100 mcg by mouth., Disp: , Rfl:     zinc gluconate 50 mg tablet, Take 50 mg by mouth once daily., Disp: , Rfl:     clopidogreL (PLAVIX) 75 mg tablet, Take 1 tablet by mouth Daily., Disp: , Rfl:     evolocumab (REPATHA SYRINGE SUBQ), Inject 140 mg into the skin every 14 (fourteen) days., Disp: , Rfl:     INFLIXIMAB (REMICADE IV), Inject into the vein. , Disp: , Rfl:     INV folate 400 MCG tablet, Take 400 mcg by mouth once daily. FOR INVESTIGATIONAL USE ONLY, Disp: , Rfl:     NITROSTAT 0.4 mg SL tablet, 0.4 mg., Disp: , Rfl:     ramipril (ALTACE) 10 MG capsule, Take 10 mg by mouth once daily., Disp: , Rfl:     SYNTHROID 88 mcg tablet, 88 mcg once daily., Disp: , Rfl: 5    testosterone cypionate (DEPOTESTOTERONE CYPIONATE) 200 mg/mL injection, , Disp: , Rfl: 0    triamcinolone acetonide 0.1% (KENALOG) 0.1 % cream, Apply topically 2 (two) times daily. for 10 days, Disp: 45 g, Rfl: 2    vit C/E/Zn/coppr/lutein/zeaxan (PRESERVISION AREDS-2 ORAL), Take by mouth once daily. (Patient not taking: Reported on 4/11/2024), Disp: , Rfl:     vitamin D 1000 units Tab, Take 5,000 mg by mouth once daily.  (Patient not taking: Reported on 4/11/2024), Disp: , Rfl:     Current Facility-Administered Medications:     acetaminophen tablet 650 mg, 650 mg, Oral, Once PRN, Farooq Corey MD    albuterol inhaler 2 puff, 2 puff, Inhalation, Q20 Min PRN, Farooq Corey MD    diphenhydrAMINE injection 25 mg, 25 mg, Intravenous, Once PRN, Farooq Corey MD    EPINEPHrine (EPIPEN) 0.3 mg/0.3 mL pen injection 0.3 mg, 0.3 mg, Intramuscular, PRN, Farooq Corey MD    methylPREDNISolone sodium succinate injection 40 mg, 40 mg, Intravenous, Once PRN, Farooq Corey MD    ondansetron disintegrating tablet 4 mg, 4 mg, Oral, Once PRN, Farooq Corey MD    sodium chloride  0.9% 500 mL flush bag, , Intravenous, PRN, Farooq Corey MD    sodium chloride 0.9% flush 10 mL, 10 mL, Intravenous, PRN, Farooq Corey MD     Objective:     Vitals:    04/11/24 1114   BP: 95/71   Pulse: (!) 59     Physical Exam   Pulmonary/Chest: Effort normal. No respiratory distress.   Musculoskeletal:         General: Swelling and deformity present. No tenderness.   Skin: No rash noted.       Reviewed available old and all outside pertinent medical records available.    All lab results personally reviewed and interpreted by me.       ASSESSMENT / PLAN     1. Rheumatoid arthritis involving multiple sites with positive rheumatoid factor  CDAI; moderate disease activity.  Palindromic mild flares.  Slow onset of IFX efficacy since resuming 2 mo ago, premature to determine response (vs development of ab, on infusion for >25y).    Need to update TTE results: will c/w IFX and increase if possible, contingent on EF on echo.  C/w MTX 20mg unchanged w/ daily FA  C-Reactive Protein      2. Interstitial pulmonary disease, unspecified  CT Chest Without Contrast.  Consider Orencia or actemra if significant progressive fibrotic ILD      3. Medication monitoring encounter  Comprehensive Metabolic Panel      4. Immunosuppression due to drug therapy  Vaccination per guidelines.  Withhold immunosuppression in event of active infections, malignancy, surgeries.  CBC Auto Differential    Hepatitis B Surface Antigen    Hepatitis B Core Antibody, Total    HEPATITIS B VIRAL DNA, QUANTITATIVE    Quantiferon Gold TB      5. Chronic combined systolic and diastolic congestive heart failure  Fax recent external TTE.  TNF CI if EF >50%      6. CRP elevated  CT Chest Without Contrast.  Fax upcoming BM bx results, along w/ Hem/onc interpretation.                Jhonatan Rashid M.D.

## 2024-04-11 NOTE — Clinical Note
Plan to continue remicade for now.  Patient would like to transfer infusion to [facility].

## 2024-04-11 NOTE — TELEPHONE ENCOUNTER
----- Message from Jhonatan Rashid MD sent at 4/11/2024 11:59 AM CDT -----  Plan to continue remicade for now.  Patient would like to transfer infusion to [facility].

## 2024-04-16 ENCOUNTER — TELEPHONE (OUTPATIENT)
Dept: TRANSPLANT | Facility: CLINIC | Age: 79
End: 2024-04-16
Payer: MEDICARE

## 2024-04-24 ENCOUNTER — HOSPITAL ENCOUNTER (OUTPATIENT)
Dept: RADIOLOGY | Facility: HOSPITAL | Age: 79
Discharge: HOME | End: 2024-04-24
Attending: INTERNAL MEDICINE
Payer: MEDICARE

## 2024-04-24 DIAGNOSIS — R79.82 CRP ELEVATED: ICD-10-CM

## 2024-04-24 DIAGNOSIS — J84.9 INTERSTITIAL PULMONARY DISEASE, UNSPECIFIED: ICD-10-CM

## 2024-04-24 DIAGNOSIS — E85.9 AMYLOIDOSIS, UNSPECIFIED TYPE: Primary | ICD-10-CM

## 2024-04-24 PROCEDURE — 71250 CT THORAX DX C-: CPT | Mod: 26,,, | Performed by: RADIOLOGY

## 2024-04-24 PROCEDURE — 71250 CT THORAX DX C-: CPT | Mod: TC

## 2024-04-26 ENCOUNTER — INFUSION (OUTPATIENT)
Dept: INFUSION THERAPY | Facility: HOSPITAL | Age: 79
End: 2024-04-26
Attending: PHYSICIAN ASSISTANT
Payer: MEDICARE

## 2024-04-26 VITALS
DIASTOLIC BLOOD PRESSURE: 57 MMHG | HEART RATE: 80 BPM | SYSTOLIC BLOOD PRESSURE: 111 MMHG | WEIGHT: 190.25 LBS | HEIGHT: 67 IN | OXYGEN SATURATION: 97 % | RESPIRATION RATE: 18 BRPM | BODY MASS INDEX: 29.86 KG/M2 | TEMPERATURE: 98 F

## 2024-04-26 DIAGNOSIS — M05.79 RHEUMATOID ARTHRITIS INVOLVING MULTIPLE SITES WITH POSITIVE RHEUMATOID FACTOR: Primary | ICD-10-CM

## 2024-04-26 PROCEDURE — 96413 CHEMO IV INFUSION 1 HR: CPT

## 2024-04-26 PROCEDURE — 96366 THER/PROPH/DIAG IV INF ADDON: CPT

## 2024-04-26 PROCEDURE — 96415 CHEMO IV INFUSION ADDL HR: CPT

## 2024-04-26 PROCEDURE — 25000003 PHARM REV CODE 250: Performed by: PHYSICIAN ASSISTANT

## 2024-04-26 PROCEDURE — 63600175 PHARM REV CODE 636 W HCPCS: Mod: JZ,JG | Performed by: PHYSICIAN ASSISTANT

## 2024-04-26 RX ORDER — SODIUM CHLORIDE 0.9 % (FLUSH) 0.9 %
10 SYRINGE (ML) INJECTION
OUTPATIENT
Start: 2024-06-21

## 2024-04-26 RX ORDER — DIPHENHYDRAMINE HYDROCHLORIDE 50 MG/ML
25 INJECTION INTRAMUSCULAR; INTRAVENOUS
OUTPATIENT
Start: 2024-06-21

## 2024-04-26 RX ORDER — ACETAMINOPHEN 325 MG/1
650 TABLET ORAL
OUTPATIENT
Start: 2024-06-21

## 2024-04-26 RX ORDER — IPRATROPIUM BROMIDE AND ALBUTEROL SULFATE 2.5; .5 MG/3ML; MG/3ML
3 SOLUTION RESPIRATORY (INHALATION)
OUTPATIENT
Start: 2024-06-21

## 2024-04-26 RX ORDER — METHYLPREDNISOLONE SOD SUCC 125 MG
50 VIAL (EA) INJECTION
Start: 2024-06-21 | End: 2024-06-21

## 2024-04-26 RX ORDER — METHYLPREDNISOLONE SOD SUCC 125 MG
25 VIAL (EA) INJECTION ONCE
Start: 2024-06-21 | End: 2024-06-21

## 2024-04-26 RX ORDER — DIPHENHYDRAMINE HYDROCHLORIDE 50 MG/ML
12.5 INJECTION INTRAMUSCULAR; INTRAVENOUS
Start: 2024-06-21 | End: 2024-06-21

## 2024-04-26 RX ORDER — EPINEPHRINE 1 MG/ML
0.3 INJECTION, SOLUTION, CONCENTRATE INTRAVENOUS
OUTPATIENT
Start: 2024-06-21

## 2024-04-26 RX ORDER — DIPHENHYDRAMINE HYDROCHLORIDE 50 MG/ML
12.5 INJECTION INTRAMUSCULAR; INTRAVENOUS
Status: DISCONTINUED | OUTPATIENT
Start: 2024-04-26 | End: 2024-04-26 | Stop reason: HOSPADM

## 2024-04-26 RX ORDER — METHYLPREDNISOLONE SOD SUCC 125 MG
50 VIAL (EA) INJECTION
Status: DISCONTINUED | OUTPATIENT
Start: 2024-04-26 | End: 2024-04-26 | Stop reason: HOSPADM

## 2024-04-26 RX ADMIN — INFLIXIMAB 800 MG: 100 INJECTION, POWDER, LYOPHILIZED, FOR SOLUTION INTRAVENOUS at 02:04

## 2024-04-26 NOTE — PLAN OF CARE
Problem: Adult Inpatient Plan of Care  Goal: Plan of Care Review  Outcome: Progressing  Flowsheets (Taken 4/26/2024 1426)  Plan of Care Reviewed With: patient  Goal: Patient-Specific Goal (Individualized)  Outcome: Progressing  Flowsheets (Taken 4/26/2024 1426)  Individualized Care Needs: pillow, reclining position  Anxieties, Fears or Concerns:   pt states he could tell need for treatment getting closer   c/o stiffness     Problem: Infection  Goal: Absence of Infection Signs and Symptoms  Outcome: Progressing  Intervention: Prevent or Manage Infection  Flowsheets (Taken 4/26/2024 1426)  Infection Management: aseptic technique maintained

## 2024-05-03 DIAGNOSIS — M05.79 RHEUMATOID ARTHRITIS INVOLVING MULTIPLE SITES WITH POSITIVE RHEUMATOID FACTOR: ICD-10-CM

## 2024-05-05 DIAGNOSIS — M05.79 RHEUMATOID ARTHRITIS INVOLVING MULTIPLE SITES WITH POSITIVE RHEUMATOID FACTOR: ICD-10-CM

## 2024-05-06 ENCOUNTER — LAB VISIT (OUTPATIENT)
Dept: LAB | Facility: HOSPITAL | Age: 79
End: 2024-05-06
Attending: INTERNAL MEDICINE
Payer: MEDICARE

## 2024-05-06 ENCOUNTER — TELEPHONE (OUTPATIENT)
Dept: RHEUMATOLOGY | Facility: CLINIC | Age: 79
End: 2024-05-06
Payer: MEDICARE

## 2024-05-06 DIAGNOSIS — E85.9 AMYLOIDOSIS, UNSPECIFIED TYPE: ICD-10-CM

## 2024-05-06 LAB
ALBUMIN SERPL BCP-MCNC: 3.5 G/DL (ref 3.5–5.2)
ALP SERPL-CCNC: 94 U/L (ref 55–135)
ALT SERPL W/O P-5'-P-CCNC: 13 U/L (ref 10–44)
ANION GAP SERPL CALC-SCNC: 11 MMOL/L (ref 8–16)
AST SERPL-CCNC: 24 U/L (ref 10–40)
BILIRUB SERPL-MCNC: 0.3 MG/DL (ref 0.1–1)
BNP SERPL-MCNC: 323 PG/ML (ref 0–99)
BUN SERPL-MCNC: 27 MG/DL (ref 8–23)
CALCIUM SERPL-MCNC: 9.8 MG/DL (ref 8.7–10.5)
CHLORIDE SERPL-SCNC: 99 MMOL/L (ref 95–110)
CO2 SERPL-SCNC: 27 MMOL/L (ref 23–29)
CREAT SERPL-MCNC: 1 MG/DL (ref 0.5–1.4)
EST. GFR  (NO RACE VARIABLE): >60 ML/MIN/1.73 M^2
GLUCOSE SERPL-MCNC: 83 MG/DL (ref 70–110)
POTASSIUM SERPL-SCNC: 4.5 MMOL/L (ref 3.5–5.1)
PROT SERPL-MCNC: 8.5 G/DL (ref 6–8.4)
SODIUM SERPL-SCNC: 137 MMOL/L (ref 136–145)

## 2024-05-06 PROCEDURE — 83521 IG LIGHT CHAINS FREE EACH: CPT | Performed by: INTERNAL MEDICINE

## 2024-05-06 PROCEDURE — 86334 IMMUNOFIX E-PHORESIS SERUM: CPT | Performed by: INTERNAL MEDICINE

## 2024-05-06 PROCEDURE — 86334 IMMUNOFIX E-PHORESIS SERUM: CPT | Mod: 26,,, | Performed by: PATHOLOGY

## 2024-05-06 PROCEDURE — 84484 ASSAY OF TROPONIN QUANT: CPT | Performed by: INTERNAL MEDICINE

## 2024-05-06 PROCEDURE — 83880 ASSAY OF NATRIURETIC PEPTIDE: CPT | Performed by: INTERNAL MEDICINE

## 2024-05-06 PROCEDURE — 83880 ASSAY OF NATRIURETIC PEPTIDE: CPT | Mod: 59 | Performed by: INTERNAL MEDICINE

## 2024-05-06 PROCEDURE — 36415 COLL VENOUS BLD VENIPUNCTURE: CPT | Performed by: INTERNAL MEDICINE

## 2024-05-06 PROCEDURE — 80053 COMPREHEN METABOLIC PANEL: CPT | Performed by: INTERNAL MEDICINE

## 2024-05-06 NOTE — TELEPHONE ENCOUNTER
Patient needs MTX refill. Sent to [facility]. Pt states they only have one dose left

## 2024-05-06 NOTE — TELEPHONE ENCOUNTER
----- Message from Diamone Speed sent at 5/6/2024  9:11 AM CDT -----  Regarding: wife      Type: RX Refill Request      Who Called: wife     Have you contacted your pharmacy: yes       Refill or New Rx:refill       RX Name and Strength:methotrexate 2.5 MG Tab            Preferred Pharmacy with phone number:      [id number] - [place] - [address]  [address] [address]  Phone: [phone] Fax: [phone]         Local or Mail Order:local         Would the patient rather a call back or a response via [facility]? Call back       Best Call Back Number:[phone]

## 2024-05-06 NOTE — TELEPHONE ENCOUNTER
Pt     Pt needs MTX refill. Sent to [facility]. Pt states they only have one dose left

## 2024-05-07 LAB
INTERPRETATION SERPL IFE-IMP: NORMAL
KAPPA LC SER QL IA: 7.3 MG/DL (ref 0.33–1.94)
KAPPA LC/LAMBDA SER IA: 1.62 (ref 0.26–1.65)
LAMBDA LC SER QL IA: 4.51 MG/DL (ref 0.57–2.63)
PATHOLOGIST INTERPRETATION IFE: NORMAL

## 2024-05-07 RX ORDER — METHOTREXATE 2.5 MG/1
20 TABLET ORAL WEEKLY
Qty: 96 TABLET | Refills: 0 | Status: SHIPPED | OUTPATIENT
Start: 2024-05-07 | End: 2024-05-14 | Stop reason: SDUPTHER

## 2024-05-07 RX ORDER — METHOTREXATE 2.5 MG/1
20 TABLET ORAL WEEKLY
Qty: 96 TABLET | Refills: 0 | Status: SHIPPED | OUTPATIENT
Start: 2024-05-07 | End: 2024-05-14

## 2024-05-09 ENCOUNTER — HOSPITAL ENCOUNTER (OUTPATIENT)
Dept: CARDIOLOGY | Facility: HOSPITAL | Age: 79
Discharge: HOME | End: 2024-05-09
Attending: INTERNAL MEDICINE
Payer: MEDICARE

## 2024-05-09 ENCOUNTER — OFFICE VISIT (OUTPATIENT)
Dept: TRANSPLANT | Facility: CLINIC | Age: 79
End: 2024-05-09
Payer: MEDICARE

## 2024-05-09 VITALS
DIASTOLIC BLOOD PRESSURE: 66 MMHG | SYSTOLIC BLOOD PRESSURE: 120 MMHG | BODY MASS INDEX: 29.86 KG/M2 | WEIGHT: 190.25 LBS | HEIGHT: 67 IN | HEART RATE: 84 BPM

## 2024-05-09 VITALS
WEIGHT: 188.06 LBS | HEIGHT: 67 IN | DIASTOLIC BLOOD PRESSURE: 57 MMHG | SYSTOLIC BLOOD PRESSURE: 101 MMHG | BODY MASS INDEX: 29.52 KG/M2 | HEART RATE: 77 BPM

## 2024-05-09 DIAGNOSIS — Z95.1 HX OF CABG: ICD-10-CM

## 2024-05-09 DIAGNOSIS — E78.5 HYPERLIPIDEMIA, UNSPECIFIED HYPERLIPIDEMIA TYPE: ICD-10-CM

## 2024-05-09 DIAGNOSIS — M05.79 RHEUMATOID ARTHRITIS INVOLVING MULTIPLE SITES WITH POSITIVE RHEUMATOID FACTOR: Chronic | ICD-10-CM

## 2024-05-09 DIAGNOSIS — Z98.890 POST-OPERATIVE STATE: ICD-10-CM

## 2024-05-09 DIAGNOSIS — I25.702 CORONARY ARTERY DISEASE INVOLVING CORONARY BYPASS GRAFT OF NATIVE HEART WITH REFRACTORY ANGINA PECTORIS: ICD-10-CM

## 2024-05-09 DIAGNOSIS — I10 PRIMARY HYPERTENSION: ICD-10-CM

## 2024-05-09 DIAGNOSIS — I48.19 PERSISTENT ATRIAL FIBRILLATION: ICD-10-CM

## 2024-05-09 DIAGNOSIS — E85.9 AMYLOIDOSIS, UNSPECIFIED TYPE: ICD-10-CM

## 2024-05-09 DIAGNOSIS — Z79.620 INFLIXIMAB (REMICADE) LONG-TERM USE: ICD-10-CM

## 2024-05-09 DIAGNOSIS — E85.9 AMYLOIDOSIS, UNSPECIFIED TYPE: Primary | ICD-10-CM

## 2024-05-09 DIAGNOSIS — I50.42 CHRONIC COMBINED SYSTOLIC AND DIASTOLIC HEART FAILURE: ICD-10-CM

## 2024-05-09 LAB
ASCENDING AORTA: 3.65 CM
AV INDEX (PROSTH): 0.8
AV MEAN GRADIENT: 2 MMHG
AV PEAK GRADIENT: 4 MMHG
AV VALVE AREA BY VELOCITY RATIO: 3.81 CM²
AV VALVE AREA: 3.66 CM²
AV VELOCITY RATIO: 0.84
BSA FOR ECHO PROCEDURE: 2.02 M2
CV ECHO LV RWT: 0.25 CM
DOP CALC AO PEAK VEL: 0.97 M/S
DOP CALC AO VTI: 16.89 CM
DOP CALC LVOT AREA: 4.6 CM2
DOP CALC LVOT DIAMETER: 2.41 CM
DOP CALC LVOT PEAK VEL: 0.81 M/S
DOP CALC LVOT STROKE VOLUME: 61.78 CM3
DOP CALCLVOT PEAK VEL VTI: 13.55 CM
E/E' RATIO: 8.2 M/S
ECHO LV POSTERIOR WALL: 0.75 CM (ref 0.6–1.1)
EJECTION FRACTION: 25 %
FRACTIONAL SHORTENING: 15 % (ref 28–44)
GLOBAL LONGITUIDAL STRAIN: 8 %
INTERVENTRICULAR SEPTUM: 0.75 CM (ref 0.6–1.1)
LA MAJOR: 6.51 CM
LA MINOR: 6.16 CM
LA WIDTH: 5.29 CM
LEFT ATRIUM SIZE: 5.14 CM
LEFT ATRIUM VOLUME INDEX MOD: 67.5 ML/M2
LEFT ATRIUM VOLUME INDEX: 73.9 ML/M2
LEFT ATRIUM VOLUME MOD: 133.72 CM3
LEFT ATRIUM VOLUME: 146.3 CM3
LEFT INTERNAL DIMENSION IN SYSTOLE: 5.11 CM (ref 2.1–4)
LEFT VENTRICLE DIASTOLIC VOLUME INDEX: 91.03 ML/M2
LEFT VENTRICLE DIASTOLIC VOLUME: 180.24 ML
LEFT VENTRICLE MASS INDEX: 87 G/M2
LEFT VENTRICLE SYSTOLIC VOLUME INDEX: 62.7 ML/M2
LEFT VENTRICLE SYSTOLIC VOLUME: 124.16 ML
LEFT VENTRICULAR INTERNAL DIMENSION IN DIASTOLE: 6 CM (ref 3.5–6)
LEFT VENTRICULAR MASS: 171.89 G
LV LATERAL E/E' RATIO: 6.31 M/S
LV SEPTAL E/E' RATIO: 11.71 M/S
MV PEAK E VEL: 0.82 M/S
NT-PROBNP SERPL IA-MCNC: 2820 PG/ML
OHS CV RV/LV RATIO: 0.75 CM
OHS LV EJECTION FRACTION SIMPSONS BIPLANE MOD: 27 %
PISA TR MAX VEL: 2.4 M/S
PULM VEIN S/D RATIO: 0.26
PV PEAK D VEL: 0.73 M/S
PV PEAK S VEL: 0.19 M/S
RA MAJOR: 6.02 CM
RA PRESSURE ESTIMATED: 3 MMHG
RA WIDTH: 4.33 CM
RIGHT VENTRICULAR END-DIASTOLIC DIMENSION: 4.5 CM
RV TB RVSP: 5 MMHG
SINUS: 3.77 CM
STJ: 3.24 CM
TDI LATERAL: 0.13 M/S
TDI SEPTAL: 0.07 M/S
TDI: 0.1 M/S
TR MAX PG: 23 MMHG
TRICUSPID ANNULAR PLANE SYSTOLIC EXCURSION: 1.34 CM
TROPONIN T SERPL-MCNC: 22 NG/L
TV REST PULMONARY ARTERY PRESSURE: 26 MMHG
Z-SCORE OF LEFT VENTRICULAR DIMENSION IN END DIASTOLE: 0.48
Z-SCORE OF LEFT VENTRICULAR DIMENSION IN END SYSTOLE: 2.96

## 2024-05-09 PROCEDURE — 93306 TTE W/DOPPLER COMPLETE: CPT

## 2024-05-09 PROCEDURE — 99215 OFFICE O/P EST HI 40 MIN: CPT | Mod: PBBFAC,25 | Performed by: INTERNAL MEDICINE

## 2024-05-09 PROCEDURE — 99205 OFFICE O/P NEW HI 60 MIN: CPT | Mod: S$PBB,,, | Performed by: INTERNAL MEDICINE

## 2024-05-09 PROCEDURE — 93356 MYOCRD STRAIN IMG SPCKL TRCK: CPT | Mod: ,,, | Performed by: INTERNAL MEDICINE

## 2024-05-09 PROCEDURE — 99999 PR PBB SHADOW E&M-EST. PATIENT-LVL V: CPT | Mod: PBBFAC,,, | Performed by: INTERNAL MEDICINE

## 2024-05-09 PROCEDURE — 93306 TTE W/DOPPLER COMPLETE: CPT | Mod: 26,,, | Performed by: INTERNAL MEDICINE

## 2024-05-09 RX ORDER — METOPROLOL SUCCINATE 25 MG/1
12.5 TABLET, EXTENDED RELEASE ORAL DAILY
Qty: 45 TABLET | Refills: 3 | Status: SHIPPED | OUTPATIENT
Start: 2024-05-09 | End: 2025-05-09

## 2024-05-09 RX ORDER — OMEPRAZOLE 40 MG/1
40 CAPSULE, DELAYED RELEASE ORAL EVERY MORNING
COMMUNITY
Start: 2024-05-01

## 2024-05-09 RX ORDER — EZETIMIBE 10 MG/1
10 TABLET ORAL DAILY
COMMUNITY

## 2024-05-09 RX ORDER — LOSARTAN POTASSIUM 25 MG/1
25 TABLET ORAL DAILY
Qty: 90 TABLET | Refills: 3 | Status: SHIPPED | OUTPATIENT
Start: 2024-05-09 | End: 2025-05-09

## 2024-05-09 NOTE — PROGRESS NOTES
Subjective   Patient ID:  Jack Back is a 79 y.o. male who presents for evaluation of possible cardiac amyloidosis.    78 YO M w/ HFrEF, LVEF 25%, LVEDD 6 cm, CAD s/p CABG CABG X 3 2012, PCI with stent 2016, redo 3V CABG 5/21/2019, AF on AC, HTN, HLD, hypothyroidism, RA who presents for evaluation of possible cardiac amyloidosis.    He sees Dr. Jason Galeana MD as his primary cardiologist. Some limited records are available under scanned media and were reviewed prior to today's visit.    He comes in today with his wife who also helps provide history.  He says that he has longstanding heart disease with coronary artery disease, anatomy noted as above.  However he has never had a history of heart failure until April of this year when he was admitted to his local hospital after a syncopal episode and was told that he had a reduced ejection fraction.  He was treated with IV diuresis, and the PCP note mentions that he was also on IV dobutamine at that time.  During that hospital admission, a bone marrow biopsy was done for evaluation of anemia.  These records are available under scanned outside media and they demonstrated normal findings with no evidence of either myocardial expansion, or amyloidosis.  He was ultimately discharged on oral diuretics, recently had an increase in his Lasix to 40 mg daily.    When talking with him, he says that since discharge he has been feeling better.  He still has shortness of breath with mild-to-moderate exertion, but overall is better than when he came into the hospital.  He denies any chest discomfort, leg swelling, PND, orthopnea, presyncope, or recurrent syncopal episodes since his initial episode in April.  He does not have a defibrillator.    He did have a loop recorder implanted, and while he was waiting for his clinic visit to start today, he received a phone call from his primary cardiologist's office stating that they found something abnormal and he needs to come in to see  Dr. Lei (electrophysiologist).    He has never had an admission for heart failure.  He carries a diagnosis of atrial fibrillation and is also on Eliquis for anticoagulation.  Tells me that he was 1st told he had atrial fibrillation in February when he was in for a hip replacement, and was placed on anticoagulation at that time.  However they have never attempted restoring sinus rhythm, and he tells me that they have never tried any chemical or electrical cardioversion.    PMH/FH/SH  Past medical history is significant for coronary artery disease, atrial fibrillation, hypertension, and new cardiomyopathy as noted above    Family history is significant for coronary artery disease in his dad and brother    Never smoker, denies alcohol use, recreational drug use    TTE 5/9/2024    Left Ventricle: The left ventricle is mildly dilated. Ventricular mass is normal. Normal wall thickness. Global hypokinesis present. There is severely reduced systolic function. Ejection fraction by visual approximation is 25%. Biplane (2D) method of discs ejection fraction is 27%. Global longitudinal strain is -8.0%. Global longitudinal strain is reduced with a base to apical gradient. There is diastolic dysfunction.    Right Ventricle: Moderate right ventricular enlargement. Wall thickness is normal. Right ventricle wall motion has global hypokinesis. Systolic function is moderately reduced.    Left Atrium: Left atrium is severely dilated.    Right Atrium: Right atrium is dilated.    Aortic Valve: There is mild aortic regurgitation.    Mitral Valve: There is mild regurgitation.    Tricuspid Valve: There is mild regurgitation.    Pulmonary Artery: The estimated pulmonary artery systolic pressure is 26 mmHg.    IVC/SVC: Normal venous pressure at 3 mmHg.    Review of Systems   Constitutional: Negative for chills and fever.   HENT:  Negative for hearing loss.    Eyes:  Negative for visual disturbance.   Cardiovascular:  Positive for dyspnea  on exertion. Negative for chest pain, irregular heartbeat, leg swelling, orthopnea, palpitations, paroxysmal nocturnal dyspnea and syncope.   Respiratory:  Positive for shortness of breath. Negative for cough.    Musculoskeletal:  Negative for muscle weakness.   Gastrointestinal:  Negative for diarrhea, nausea and vomiting.   Neurological:  Negative for focal weakness.   Psychiatric/Behavioral:  Negative for memory loss.           Objective   Vitals:    05/09/24 1316   BP: (!) 101/57   Pulse: 77       Physical Exam  Vitals reviewed.   Constitutional:       General: He is not in acute distress.  HENT:      Head: Atraumatic.   Eyes:      Extraocular Movements: Extraocular movements intact.   Cardiovascular:      Rate and Rhythm: Normal rate and regular rhythm.      Heart sounds: Normal heart sounds.   Pulmonary:      Breath sounds: Normal breath sounds.   Abdominal:      Palpations: Abdomen is soft.      Tenderness: There is no abdominal tenderness.   Musculoskeletal:         General: Normal range of motion.      Right lower leg: No edema.      Left lower leg: No edema.   Neurological:      General: No focal deficit present.      Mental Status: He is alert and oriented to person, place, and time.            Assessment and Plan     1. Amyloidosis, unspecified type    2. Chronic combined systolic and diastolic heart failure    3. Coronary artery disease involving coronary bypass graft of native heart with refractory angina pectoris    4. Hx of CABG    5. Persistent atrial fibrillation    6. Primary hypertension    7. Hyperlipidemia, unspecified hyperlipidemia type    8. Rheumatoid arthritis involving multiple sites with positive rheumatoid factor    9. Infliximab (Remicade) long-term use    10. Post-operative state- s/p CABG redo 2019        Plan:  HFrEF, LVEF 25%, LVEDD 6 cm  CAD s/p CABG  Prior PCI  AF on AC  HTN  HLD  Hypothyroidism  RA  - presents today to be evaluated for cardiac amyloidosis.  - he had an  echocardiogram done earlier today with strain imaging.  While the strained does demonstrate apical sparing, his echocardiogram overall does not appear to be consistent with infiltrative cardiomyopathy as his wall thickness is completely normal.  - he had a bone marrow biopsy done at the outside hospital which was also normal with no evidence of polyclonal expansion, or evidence of amyloidosis.  - we will however still do our due diligence and evaluate him for cardiac amyloidosis with SPEP, UPEP, DEEJAY, free light chains, and PYP scan for TTR amyloidosis.  - overall however I suspect that the primary  of his new drop in ejection fraction is his atrial fibrillation.  He was diagnosed with atrial fibrillation in February, and appears to still be in atrial fibrillation.  No attempt at restoring sinus rhythm has been performed.  Two months after his atrial fibrillation diagnosis is when he was admitted with heart failure.  It appears that they are planning on having him see electrophysiology locally, and I also encouraged him and his wife to talk with them about attempting restoration of sinus rhythm.  - in the interim, I also recommended initiation of guideline directed medical therapy.  I started him on metoprolol succinate 12.5 mg daily, and losartan 25 mg daily.  I recommended that they talk with his primary cardiology team about instituting other pillars of guideline directed medical therapy including MRA and SGLT2 inhibitor if renal function and blood pressure tolerates.    We will have him return to clinic after completion of amyloidosis workup.  If amyloid workup is negative, continue follow-up with his primary cardiology team in [place].       Advance Care Planning     Date: 05/09/2024    Power of   I initiated the process of voluntary advance care planning today and explained the importance of this process to the patient.  I introduced the concept of advance directives to the patient, as well.  Then the patient received detailed information about the importance of designating a Health Care Power of  (HCPOA). He was also instructed to communicate with this person about their wishes for future healthcare, should he become sick and lose decision-making capacity. The patient has not previously appointed a HCPOA. After our discussion, the patient has decided to complete a HCPOA. I spent a total time of 15 minutes discussing this issue with the patient.

## 2024-05-09 NOTE — PATIENT INSTRUCTIONS
We are starting you on two new medications called metoprolol and losartan  Talk with your heart doctor about starting additional meds for your heart (Spironolactone and SGLT2 inhibitor)  We are evaluating you for amyloidosis with blood work and a PYP scan  Please follow up with your cardiologist and talk about a referral to an electrophysiologist (Electrical heart doctor) about your A Fib and trying to get you back into a regular rhythm as I suspect that this is driving your drop in heart function

## 2024-05-14 DIAGNOSIS — M05.79 RHEUMATOID ARTHRITIS INVOLVING MULTIPLE SITES WITH POSITIVE RHEUMATOID FACTOR: ICD-10-CM

## 2024-05-14 RX ORDER — METHOTREXATE 2.5 MG/1
20 TABLET ORAL WEEKLY
Qty: 96 TABLET | Refills: 0 | Status: SHIPPED | OUTPATIENT
Start: 2024-05-14

## 2024-06-19 ENCOUNTER — HOSPITAL ENCOUNTER (OUTPATIENT)
Dept: CARDIOLOGY | Facility: HOSPITAL | Age: 79
Discharge: HOME | End: 2024-06-19
Attending: INTERNAL MEDICINE
Payer: MEDICARE

## 2024-06-19 VITALS — BODY MASS INDEX: 29.51 KG/M2 | HEIGHT: 67 IN | WEIGHT: 188 LBS

## 2024-06-19 DIAGNOSIS — E85.9 AMYLOIDOSIS, UNSPECIFIED TYPE: ICD-10-CM

## 2024-06-19 PROCEDURE — 78803 RP LOCLZJ TUM SPECT 1 AREA: CPT | Mod: 26,,, | Performed by: INTERNAL MEDICINE

## 2024-06-19 PROCEDURE — 78803 RP LOCLZJ TUM SPECT 1 AREA: CPT

## 2024-06-21 ENCOUNTER — INFUSION (OUTPATIENT)
Dept: INFUSION THERAPY | Facility: HOSPITAL | Age: 79
End: 2024-06-21
Attending: PHYSICIAN ASSISTANT
Payer: MEDICARE

## 2024-06-21 VITALS
HEIGHT: 67 IN | SYSTOLIC BLOOD PRESSURE: 108 MMHG | OXYGEN SATURATION: 98 % | TEMPERATURE: 97 F | HEART RATE: 53 BPM | WEIGHT: 188.5 LBS | BODY MASS INDEX: 29.58 KG/M2 | RESPIRATION RATE: 18 BRPM | DIASTOLIC BLOOD PRESSURE: 55 MMHG

## 2024-06-21 DIAGNOSIS — M05.79 RHEUMATOID ARTHRITIS INVOLVING MULTIPLE SITES WITH POSITIVE RHEUMATOID FACTOR: Primary | ICD-10-CM

## 2024-06-21 PROCEDURE — 25000003 PHARM REV CODE 250: Performed by: INTERNAL MEDICINE

## 2024-06-21 PROCEDURE — 96413 CHEMO IV INFUSION 1 HR: CPT

## 2024-06-21 PROCEDURE — 63600175 PHARM REV CODE 636 W HCPCS: Mod: JZ,JG | Performed by: INTERNAL MEDICINE

## 2024-06-21 RX ORDER — IPRATROPIUM BROMIDE AND ALBUTEROL SULFATE 2.5; .5 MG/3ML; MG/3ML
3 SOLUTION RESPIRATORY (INHALATION)
OUTPATIENT
Start: 2024-08-16

## 2024-06-21 RX ORDER — SODIUM CHLORIDE 0.9 % (FLUSH) 0.9 %
10 SYRINGE (ML) INJECTION
Status: DISCONTINUED | OUTPATIENT
Start: 2024-06-21 | End: 2024-06-21 | Stop reason: HOSPADM

## 2024-06-21 RX ORDER — DIPHENHYDRAMINE HYDROCHLORIDE 50 MG/ML
12.5 INJECTION INTRAMUSCULAR; INTRAVENOUS
Status: DISCONTINUED | OUTPATIENT
Start: 2024-06-21 | End: 2024-06-21 | Stop reason: HOSPADM

## 2024-06-21 RX ORDER — SODIUM CHLORIDE 0.9 % (FLUSH) 0.9 %
10 SYRINGE (ML) INJECTION
OUTPATIENT
Start: 2024-08-16

## 2024-06-21 RX ORDER — DIPHENHYDRAMINE HYDROCHLORIDE 50 MG/ML
12.5 INJECTION INTRAMUSCULAR; INTRAVENOUS
Start: 2024-08-16 | End: 2024-08-16

## 2024-06-21 RX ORDER — METHYLPREDNISOLONE SOD SUCC 125 MG
50 VIAL (EA) INJECTION
Status: DISCONTINUED | OUTPATIENT
Start: 2024-06-21 | End: 2024-06-21 | Stop reason: HOSPADM

## 2024-06-21 RX ORDER — EPINEPHRINE 1 MG/ML
0.3 INJECTION, SOLUTION, CONCENTRATE INTRAVENOUS
OUTPATIENT
Start: 2024-08-16

## 2024-06-21 RX ORDER — DIPHENHYDRAMINE HYDROCHLORIDE 50 MG/ML
25 INJECTION INTRAMUSCULAR; INTRAVENOUS
OUTPATIENT
Start: 2024-08-16

## 2024-06-21 RX ORDER — ACETAMINOPHEN 325 MG/1
650 TABLET ORAL
OUTPATIENT
Start: 2024-08-16

## 2024-06-21 RX ORDER — METHYLPREDNISOLONE SOD SUCC 125 MG
50 VIAL (EA) INJECTION
Start: 2024-08-16 | End: 2024-08-16

## 2024-06-21 RX ORDER — METHYLPREDNISOLONE SOD SUCC 125 MG
25 VIAL (EA) INJECTION ONCE
Start: 2024-08-16 | End: 2024-08-16

## 2024-06-21 RX ADMIN — INFLIXIMAB 800 MG: 100 INJECTION, POWDER, LYOPHILIZED, FOR SOLUTION INTRAVENOUS at 11:06

## 2024-06-21 NOTE — PLAN OF CARE
Discussed plan of care with pt. Addressed any and ongoing concerns. Pt denies   Problem: Adult Inpatient Plan of Care  Goal: Plan of Care Review  Outcome: Progressing  Goal: Patient-Specific Goal (Individualized)  Outcome: Progressing  Flowsheets (Taken 6/21/2024 1155)  Individualized Care Needs: pillow and warm blanket, apple juice  Anxieties, Fears or Concerns: Doesn't feel the remicade is working as good as it used to , may need to switch to a new drug  Goal: Absence of Hospital-Acquired Illness or Injury  Outcome: Progressing  Intervention: Identify and Manage Fall Risk  Flowsheets (Taken 6/21/2024 1156)  Safety Promotion/Fall Prevention: in recliner, wheels locked  Intervention: Prevent Infection  Flowsheets (Taken 6/21/2024 1156)  Infection Prevention:   equipment surfaces disinfected   hand hygiene promoted   personal protective equipment utilized  Goal: Optimal Comfort and Wellbeing  Outcome: Progressing  Intervention: Provide Person-Centered Care  Flowsheets (Taken 6/21/2024 1156)  Trust Relationship/Rapport:   empathic listening provided   questions answered   questions encouraged   care explained   choices provided   thoughts/feelings acknowledged

## 2024-06-21 NOTE — DISCHARGE INSTRUCTIONS
[facility]  [address] [address]  [address] [address]  [phone] phone     [phone] fax  Hours of Operation: Monday- Friday 8:00am- 5:00pm  After hours phone  [phone]  Hematology / Oncology Physicians on call      VINNIE Santacruz Dr., NP Phaon Dunbar, NP Khelsea Conley, FNP    Please call with any concerns regarding your appointment today.

## 2024-06-21 NOTE — ADDENDUM NOTE
Addended by: ANGELO MAHER on: 6/21/2024 11:06 AM     Modules accepted: Orders     December 22, 2022    [place]  [address]  [facility] [id number] [facility]   Welcome to [facility]s Complex Care Management Program.  It was a pleasure talking with you today.  My name is Divina Bush RN. I look forward to working with you as your .  My goal is to help you function at the healthiest and highest level possible.  You can contact me directly at [phone]    As an [facility] patient with [employer], some of the services we may be able to provide include:      Development of an individualized care plan with a Registered Nurse    Connection with a    Connection with available resources and services     Coordinate communication among your care team members    Provide coaching and education    Help you understand your doctors treatment plan   Help you obtain information about your insurance coverage.     All services provided by [facility]s Complex Care Managers and other care team members are coordinated with and communicated to your primary care team.    As part of your enrollment, you will be receiving education materials and more information about these services in your [facility] account, by phone or through the mail.      Sincerely,        Divina Bush RN  [facility]   Out-patient RN Complex Care Manager

## 2024-07-02 ENCOUNTER — DOCUMENTATION ONLY (OUTPATIENT)
Dept: TRANSPLANT | Facility: CLINIC | Age: 79
End: 2024-07-02
Payer: MEDICARE

## 2024-07-02 ENCOUNTER — OFFICE VISIT (OUTPATIENT)
Dept: TRANSPLANT | Facility: CLINIC | Age: 79
End: 2024-07-02
Attending: INTERNAL MEDICINE
Payer: MEDICARE

## 2024-07-02 VITALS
HEART RATE: 60 BPM | BODY MASS INDEX: 30.47 KG/M2 | SYSTOLIC BLOOD PRESSURE: 100 MMHG | WEIGHT: 189.63 LBS | DIASTOLIC BLOOD PRESSURE: 57 MMHG | HEIGHT: 66 IN

## 2024-07-02 DIAGNOSIS — I25.5 ISCHEMIC CARDIOMYOPATHY: ICD-10-CM

## 2024-07-02 DIAGNOSIS — M05.79 RHEUMATOID ARTHRITIS INVOLVING MULTIPLE SITES WITH POSITIVE RHEUMATOID FACTOR: Chronic | ICD-10-CM

## 2024-07-02 DIAGNOSIS — I50.42 CHRONIC COMBINED SYSTOLIC AND DIASTOLIC HEART FAILURE: Primary | ICD-10-CM

## 2024-07-02 DIAGNOSIS — Z95.1 HX OF CABG: ICD-10-CM

## 2024-07-02 PROCEDURE — 99214 OFFICE O/P EST MOD 30 MIN: CPT | Mod: PBBFAC | Performed by: INTERNAL MEDICINE

## 2024-07-02 PROCEDURE — 99214 OFFICE O/P EST MOD 30 MIN: CPT | Mod: S$PBB,,, | Performed by: INTERNAL MEDICINE

## 2024-07-02 PROCEDURE — 99999 PR PBB SHADOW E&M-EST. PATIENT-LVL IV: CPT | Mod: PBBFAC,,, | Performed by: INTERNAL MEDICINE

## 2024-07-02 RX ORDER — TAMSULOSIN HYDROCHLORIDE 0.4 MG/1
1 CAPSULE ORAL NIGHTLY
COMMUNITY
Start: 2024-05-20

## 2024-07-02 RX ORDER — SOLIFENACIN SUCCINATE 5 MG/1
5 TABLET, FILM COATED ORAL EVERY MORNING
COMMUNITY
Start: 2024-06-11

## 2024-07-02 RX ORDER — LEVOTHYROXINE SODIUM 100 UG/1
100 TABLET ORAL EVERY MORNING
COMMUNITY
Start: 2024-06-24

## 2024-07-02 NOTE — NURSING
7/24/24: Mr. Jack Back was seen in clinic today by Dr. Conde to completed amyloidosis evaluation. PYP negative, FLC/DEEJAY low risk for amyloid. Patient instructed to follow up referring physician, Dr. Jason Galeana. Amyloid episode closed.

## 2024-08-01 ENCOUNTER — LAB VISIT (OUTPATIENT)
Dept: LAB | Facility: HOSPITAL | Age: 79
End: 2024-08-01
Attending: INTERNAL MEDICINE
Payer: MEDICARE

## 2024-08-01 DIAGNOSIS — D84.821 IMMUNOSUPPRESSION DUE TO DRUG THERAPY: ICD-10-CM

## 2024-08-01 DIAGNOSIS — Z79.899 IMMUNOSUPPRESSION DUE TO DRUG THERAPY: ICD-10-CM

## 2024-08-01 DIAGNOSIS — M05.79 RHEUMATOID ARTHRITIS INVOLVING MULTIPLE SITES WITH POSITIVE RHEUMATOID FACTOR: ICD-10-CM

## 2024-08-01 DIAGNOSIS — Z51.81 MEDICATION MONITORING ENCOUNTER: ICD-10-CM

## 2024-08-01 LAB
ALBUMIN SERPL BCP-MCNC: 3.4 G/DL (ref 3.5–5.2)
ALP SERPL-CCNC: 85 U/L (ref 55–135)
ALT SERPL W/O P-5'-P-CCNC: 13 U/L (ref 10–44)
ANION GAP SERPL CALC-SCNC: 14 MMOL/L (ref 8–16)
AST SERPL-CCNC: 23 U/L (ref 10–40)
BASOPHILS # BLD AUTO: 0.02 K/UL (ref 0–0.2)
BASOPHILS NFR BLD: 0.3 % (ref 0–1.9)
BILIRUB SERPL-MCNC: 0.4 MG/DL (ref 0.1–1)
BUN SERPL-MCNC: 24 MG/DL (ref 8–23)
CALCIUM SERPL-MCNC: 9.8 MG/DL (ref 8.7–10.5)
CHLORIDE SERPL-SCNC: 102 MMOL/L (ref 95–110)
CO2 SERPL-SCNC: 26 MMOL/L (ref 23–29)
CREAT SERPL-MCNC: 1.1 MG/DL (ref 0.5–1.4)
CRP SERPL-MCNC: 4 MG/L (ref 0–8.2)
DIFFERENTIAL METHOD BLD: ABNORMAL
EOSINOPHIL # BLD AUTO: 0.3 K/UL (ref 0–0.5)
EOSINOPHIL NFR BLD: 3.6 % (ref 0–8)
ERYTHROCYTE [DISTWIDTH] IN BLOOD BY AUTOMATED COUNT: 15.3 % (ref 11.5–14.5)
EST. GFR  (NO RACE VARIABLE): >60 ML/MIN/1.73 M^2
GLUCOSE SERPL-MCNC: 114 MG/DL (ref 70–110)
HBV CORE AB SERPL QL IA: NORMAL
HBV SURFACE AG SERPL QL IA: NORMAL
HCT VFR BLD AUTO: 33.2 % (ref 40–54)
HGB BLD-MCNC: 11.3 G/DL (ref 14–18)
IMM GRANULOCYTES # BLD AUTO: 0.03 K/UL (ref 0–0.04)
IMM GRANULOCYTES NFR BLD AUTO: 0.4 % (ref 0–0.5)
LYMPHOCYTES # BLD AUTO: 1.9 K/UL (ref 1–4.8)
LYMPHOCYTES NFR BLD: 26.8 % (ref 18–48)
MCH RBC QN AUTO: 33.2 PG (ref 27–31)
MCHC RBC AUTO-ENTMCNC: 34 G/DL (ref 32–36)
MCV RBC AUTO: 98 FL (ref 82–98)
MONOCYTES # BLD AUTO: 0.5 K/UL (ref 0.3–1)
MONOCYTES NFR BLD: 6.8 % (ref 4–15)
NEUTROPHILS # BLD AUTO: 4.3 K/UL (ref 1.8–7.7)
NEUTROPHILS NFR BLD: 62.1 % (ref 38–73)
NRBC BLD-RTO: 0 /100 WBC
PLATELET # BLD AUTO: 246 K/UL (ref 150–450)
PMV BLD AUTO: 8.1 FL (ref 9.2–12.9)
POTASSIUM SERPL-SCNC: 4.3 MMOL/L (ref 3.5–5.1)
PROT SERPL-MCNC: 8 G/DL (ref 6–8.4)
RBC # BLD AUTO: 3.4 M/UL (ref 4.6–6.2)
SODIUM SERPL-SCNC: 142 MMOL/L (ref 136–145)
WBC # BLD AUTO: 6.94 K/UL (ref 3.9–12.7)

## 2024-08-01 PROCEDURE — 86480 TB TEST CELL IMMUN MEASURE: CPT | Performed by: INTERNAL MEDICINE

## 2024-08-01 PROCEDURE — 85025 COMPLETE CBC W/AUTO DIFF WBC: CPT | Performed by: INTERNAL MEDICINE

## 2024-08-01 PROCEDURE — 86140 C-REACTIVE PROTEIN: CPT | Performed by: INTERNAL MEDICINE

## 2024-08-01 PROCEDURE — 87517 HEPATITIS B DNA QUANT: CPT | Performed by: INTERNAL MEDICINE

## 2024-08-01 PROCEDURE — 80053 COMPREHEN METABOLIC PANEL: CPT | Performed by: INTERNAL MEDICINE

## 2024-08-01 PROCEDURE — 36415 COLL VENOUS BLD VENIPUNCTURE: CPT | Performed by: INTERNAL MEDICINE

## 2024-08-01 PROCEDURE — 87340 HEPATITIS B SURFACE AG IA: CPT | Performed by: INTERNAL MEDICINE

## 2024-08-01 PROCEDURE — 86704 HEP B CORE ANTIBODY TOTAL: CPT | Performed by: INTERNAL MEDICINE

## 2024-08-02 LAB
GAMMA INTERFERON BACKGROUND BLD IA-ACNC: 0.01 IU/ML
HEPATITIS B VIRUS DNA: NORMAL
HEPATITIS B VIRUS PCR, QUANT: NOT DETECTED IU/ML
M TB IFN-G CD4+ BCKGRND COR BLD-ACNC: -0 IU/ML
M TB IFN-G CD4+ BCKGRND COR BLD-ACNC: 0 IU/ML
MITOGEN IGNF BCKGRD COR BLD-ACNC: 9.99 IU/ML
TB GOLD PLUS: NEGATIVE

## 2024-08-08 ENCOUNTER — OFFICE VISIT (OUTPATIENT)
Dept: RHEUMATOLOGY | Facility: CLINIC | Age: 79
End: 2024-08-08
Payer: MEDICARE

## 2024-08-08 VITALS
HEART RATE: 62 BPM | HEIGHT: 66 IN | WEIGHT: 192.88 LBS | BODY MASS INDEX: 31 KG/M2 | SYSTOLIC BLOOD PRESSURE: 109 MMHG | DIASTOLIC BLOOD PRESSURE: 67 MMHG

## 2024-08-08 DIAGNOSIS — D84.9 IMMUNOCOMPROMISED PATIENT: Chronic | ICD-10-CM

## 2024-08-08 DIAGNOSIS — Z51.81 MEDICATION MONITORING ENCOUNTER: ICD-10-CM

## 2024-08-08 DIAGNOSIS — I25.702 CORONARY ARTERY DISEASE INVOLVING CORONARY BYPASS GRAFT OF NATIVE HEART WITH REFRACTORY ANGINA PECTORIS: ICD-10-CM

## 2024-08-08 DIAGNOSIS — J84.9 INTERSTITIAL PULMONARY DISEASE, UNSPECIFIED: ICD-10-CM

## 2024-08-08 DIAGNOSIS — Z79.631 METHOTREXATE, LONG TERM, CURRENT USE: ICD-10-CM

## 2024-08-08 DIAGNOSIS — I50.42 CHRONIC COMBINED SYSTOLIC AND DIASTOLIC HEART FAILURE: ICD-10-CM

## 2024-08-08 DIAGNOSIS — M05.79 RHEUMATOID ARTHRITIS INVOLVING MULTIPLE SITES WITH POSITIVE RHEUMATOID FACTOR: Primary | ICD-10-CM

## 2024-08-08 PROBLEM — R70.0 ELEVATED SED RATE: Status: RESOLVED | Noted: 2017-03-07 | Resolved: 2024-08-08

## 2024-08-08 PROCEDURE — 99215 OFFICE O/P EST HI 40 MIN: CPT | Mod: PBBFAC | Performed by: INTERNAL MEDICINE

## 2024-08-08 PROCEDURE — 99999 PR PBB SHADOW E&M-EST. PATIENT-LVL V: CPT | Mod: PBBFAC,,, | Performed by: INTERNAL MEDICINE

## 2024-08-08 RX ORDER — DIPHENHYDRAMINE HYDROCHLORIDE 50 MG/ML
50 INJECTION INTRAMUSCULAR; INTRAVENOUS ONCE AS NEEDED
OUTPATIENT
Start: 2024-08-08

## 2024-08-08 RX ORDER — SODIUM CHLORIDE 0.9 % (FLUSH) 0.9 %
10 SYRINGE (ML) INJECTION
OUTPATIENT
Start: 2024-08-08

## 2024-08-08 RX ORDER — METHOTREXATE 2.5 MG/1
10 TABLET ORAL
Qty: 48 TABLET | Refills: 1 | Status: SHIPPED | OUTPATIENT
Start: 2024-08-08 | End: 2025-02-04

## 2024-08-08 RX ORDER — EPINEPHRINE 0.3 MG/.3ML
0.3 INJECTION SUBCUTANEOUS ONCE AS NEEDED
OUTPATIENT
Start: 2024-08-08

## 2024-08-08 RX ORDER — SERTRALINE HYDROCHLORIDE 50 MG/1
50 TABLET, FILM COATED ORAL DAILY
COMMUNITY

## 2024-08-08 RX ORDER — FOLIC ACID 0.8 MG
800 TABLET ORAL DAILY
Qty: 90 TABLET | Refills: 1 | COMMUNITY
Start: 2024-08-08 | End: 2025-02-04

## 2024-08-08 RX ORDER — METHYLPREDNISOLONE SOD SUCC 125 MG
100 VIAL (EA) INJECTION
OUTPATIENT
Start: 2024-08-08

## 2024-08-08 RX ORDER — HEPARIN 100 UNIT/ML
500 SYRINGE INTRAVENOUS
OUTPATIENT
Start: 2024-08-08

## 2024-08-08 RX ORDER — DIPHENHYDRAMINE HCL 25 MG
25 CAPSULE ORAL
Start: 2024-08-08

## 2024-08-08 RX ORDER — ACETAMINOPHEN 325 MG/1
650 TABLET ORAL
OUTPATIENT
Start: 2024-08-08

## 2024-08-08 RX ORDER — ACETAMINOPHEN 325 MG/1
650 TABLET ORAL
Start: 2024-08-08

## 2024-08-08 RX ORDER — DIPHENHYDRAMINE HYDROCHLORIDE 50 MG/ML
25 INJECTION INTRAMUSCULAR; INTRAVENOUS
OUTPATIENT
Start: 2024-08-08

## 2024-08-09 ENCOUNTER — TELEPHONE (OUTPATIENT)
Dept: INFUSION THERAPY | Facility: HOSPITAL | Age: 79
End: 2024-08-09
Payer: MEDICARE

## 2024-08-09 ENCOUNTER — PATIENT MESSAGE (OUTPATIENT)
Dept: INFUSION THERAPY | Facility: HOSPITAL | Age: 79
End: 2024-08-09
Payer: MEDICARE

## 2024-08-16 ENCOUNTER — INFUSION (OUTPATIENT)
Dept: INFUSION THERAPY | Facility: HOSPITAL | Age: 79
End: 2024-08-16
Attending: PHYSICIAN ASSISTANT
Payer: MEDICARE

## 2024-08-16 VITALS
DIASTOLIC BLOOD PRESSURE: 53 MMHG | WEIGHT: 192.25 LBS | RESPIRATION RATE: 18 BRPM | TEMPERATURE: 97 F | HEIGHT: 66 IN | OXYGEN SATURATION: 96 % | BODY MASS INDEX: 30.9 KG/M2 | SYSTOLIC BLOOD PRESSURE: 100 MMHG | HEART RATE: 56 BPM

## 2024-08-16 DIAGNOSIS — M05.79 RHEUMATOID ARTHRITIS INVOLVING MULTIPLE SITES WITH POSITIVE RHEUMATOID FACTOR: Primary | ICD-10-CM

## 2024-08-16 PROCEDURE — 25000003 PHARM REV CODE 250: Performed by: INTERNAL MEDICINE

## 2024-08-16 PROCEDURE — 96365 THER/PROPH/DIAG IV INF INIT: CPT

## 2024-08-16 RX ORDER — DIPHENHYDRAMINE HCL 25 MG
25 CAPSULE ORAL
Start: 2024-08-30

## 2024-08-16 RX ORDER — ACETAMINOPHEN 325 MG/1
650 TABLET ORAL
Status: DISCONTINUED | OUTPATIENT
Start: 2024-08-16 | End: 2024-08-16

## 2024-08-16 RX ORDER — DIPHENHYDRAMINE HYDROCHLORIDE 50 MG/ML
50 INJECTION INTRAMUSCULAR; INTRAVENOUS ONCE AS NEEDED
OUTPATIENT
Start: 2024-08-30

## 2024-08-16 RX ORDER — ACETAMINOPHEN 325 MG/1
650 TABLET ORAL
Status: COMPLETED | OUTPATIENT
Start: 2024-08-16 | End: 2024-08-16

## 2024-08-16 RX ORDER — SODIUM CHLORIDE 0.9 % (FLUSH) 0.9 %
10 SYRINGE (ML) INJECTION
OUTPATIENT
Start: 2024-08-30

## 2024-08-16 RX ORDER — SODIUM CHLORIDE 0.9 % (FLUSH) 0.9 %
10 SYRINGE (ML) INJECTION
Status: DISCONTINUED | OUTPATIENT
Start: 2024-08-16 | End: 2024-08-16 | Stop reason: HOSPADM

## 2024-08-16 RX ORDER — ACETAMINOPHEN 325 MG/1
650 TABLET ORAL
OUTPATIENT
Start: 2024-08-30

## 2024-08-16 RX ORDER — METHYLPREDNISOLONE SOD SUCC 125 MG
100 VIAL (EA) INJECTION
Status: DISCONTINUED | OUTPATIENT
Start: 2024-08-16 | End: 2024-08-16 | Stop reason: HOSPADM

## 2024-08-16 RX ORDER — EPINEPHRINE 0.3 MG/.3ML
0.3 INJECTION SUBCUTANEOUS ONCE AS NEEDED
OUTPATIENT
Start: 2024-08-30

## 2024-08-16 RX ORDER — ACETAMINOPHEN 325 MG/1
650 TABLET ORAL
Start: 2024-08-30

## 2024-08-16 RX ORDER — METHYLPREDNISOLONE SOD SUCC 125 MG
100 VIAL (EA) INJECTION
OUTPATIENT
Start: 2024-08-30

## 2024-08-16 RX ORDER — DIPHENHYDRAMINE HYDROCHLORIDE 50 MG/ML
25 INJECTION INTRAMUSCULAR; INTRAVENOUS
OUTPATIENT
Start: 2024-08-30

## 2024-08-16 RX ORDER — HEPARIN 100 UNIT/ML
500 SYRINGE INTRAVENOUS
OUTPATIENT
Start: 2024-08-30

## 2024-08-16 RX ORDER — DIPHENHYDRAMINE HCL 25 MG
25 CAPSULE ORAL
Status: COMPLETED | OUTPATIENT
Start: 2024-08-16 | End: 2024-08-16

## 2024-08-16 RX ORDER — DIPHENHYDRAMINE HYDROCHLORIDE 50 MG/ML
25 INJECTION INTRAMUSCULAR; INTRAVENOUS
Status: DISCONTINUED | OUTPATIENT
Start: 2024-08-16 | End: 2024-08-16

## 2024-08-16 RX ADMIN — SODIUM CHLORIDE 750 MG: 9 INJECTION, SOLUTION INTRAVENOUS at 11:08

## 2024-08-16 RX ADMIN — DIPHENHYDRAMINE HYDROCHLORIDE 25 MG: 25 CAPSULE ORAL at 11:08

## 2024-08-16 RX ADMIN — ACETAMINOPHEN 650 MG: 325 TABLET ORAL at 11:08

## 2024-08-16 RX ADMIN — SODIUM CHLORIDE: 9 INJECTION, SOLUTION INTRAVENOUS at 11:08

## 2024-08-16 NOTE — DISCHARGE INSTRUCTIONS
.[facility]  [address] [address]  [address] [address]  [phone] phone     [phone] fax  Hours of Operation: Monday- Friday 8:00am- 5:00pm  After hours phone  [phone]  Hematology / Oncology Physicians on call    Dr. Jimmy Hinson           Nurse Practitioners:     Meenu Ye, HARLAN Donahue, ANA Garcia, HARLAN Mendez, HARLAN Suarez, NP    Please don't hesitate to call if you have any concerns.      FALL PREVENTION   Falls often occur due to slipping, tripping or losing your balance. Here are ways to reduce your risk of falling again.   Was there anything that caused your fall that can be fixed, removed or replaced?   Make your home safe by keeping walkways clear of objects you may trip over.   Use non-slip pads under rugs.   Do not walk in poorly lit areas.   Do not stand on chairs or wobbly ladders.   Use caution when reaching overhead or looking upward. This position can cause a loss of balance.   Be sure your shoes fit properly, have non-slip bottoms and are in good condition.   Be cautious when going up and down stairs, curbs, and when walking on uneven sidewalks.   If your balance is poor, consider using a cane or walker.   If your fall was related to alcohol use, stop or limit alcohol intake.   If your fall was related to use of sleeping medicines, talk to your doctor about this. You may need to reduce your dosage at bedtime if you awaken during the night to go to the bathroom.   To reduce the need for nighttime bathroom trips:   Avoid drinking fluids for several hours before going to bed   Empty your bladder before going to bed   Men can keep a urinal at the bedside   © [id number] [employer], [address] [place] [address], [place] [address]. All rights reserved. This information is not intended as a substitute for professional medical care. Always follow your healthcare  professional's instructions.    WAYS TO HELP PREVENT INFECTION        WASH YOUR HANDS OFTEN DURING THE DAY, ESPECIALLY BEFORE YOU EAT, AFTER USING THE BATHROOM, AND AFTER TOUCHING ANIMALS    STAY AWAY FROM PEOPLE WHO HAVE ILLNESSES YOU CAN CATCH; SUCH AS COLDS, FLU, CHICKEN POX    TRY TO AVOID CROWDS    STAY AWAY FROM CHILDREN WHO RECENTLY HAVE RECEIVED LIVE VIRUS VACCINES    MAINTAIN GOOD MOUTH CARE    DO NOT SQUEEZE OR SCRATCH PIMPLES    CLEAN CUTS & SCRAPES RIGHT AWAY AND DAILY UNTIL HEALED WITH WARM WATER, SOAP & AN ANTISEPTIC    AVOID CONTACT WITH LITTER BOXES, BIRD CAGES, & FISH TANKS    AVOID STANDING WATER, IE., BIRD BATHS, FLOWER POTS/VASES, OR HUMIDIFIERS    WEAR GLOVES WHEN GARDENING OR CLEANING UP AFTER OTHERS, ESPECIALLY BABIES & SMALL CHILDREN    DO NOT EAT RAW FISH, SEAFOOD, MEAT, OR EGGS

## 2024-08-16 NOTE — PLAN OF CARE
Discussed POC with patient and his wife. Answered all questions. Patient to return in two weeks and is aware of his appointment.    Problem: Adult Inpatient Plan of Care  Goal: Plan of Care Review  Outcome: Progressing  Flowsheets (Taken 8/16/2024 1202)  Plan of Care Reviewed With:   patient   spouse  Goal: Patient-Specific Goal (Individualized)  Outcome: Progressing  Flowsheets (Taken 8/16/2024 1202)  Individualized Care Needs: Pillow under IV arm, and water provided. Declined all other comfort measures  Anxieties, Fears or Concerns: No concerns expressed besides it being his very first orencia  Patient/Family-Specific Goals (Include Timeframe): To tolerate first dose of orencia today  Goal: Optimal Comfort and Wellbeing  Outcome: Progressing  Intervention: Monitor Pain and Promote Comfort  Flowsheets (Taken 8/16/2024 1202)  Pain Management Interventions:   pillow support provided   quiet environment facilitated  Intervention: Provide Person-Centered Care  Flowsheets (Taken 8/16/2024 1202)  Trust Relationship/Rapport:   care explained   reassurance provided   choices provided   thoughts/feelings acknowledged   emotional support provided   empathic listening provided   questions answered   questions encouraged     Problem: Fall Injury Risk  Goal: Absence of Fall and Fall-Related Injury  Outcome: Progressing  Intervention: Identify and Manage Contributors  Flowsheets (Taken 8/16/2024 1202)  Self-Care Promotion: BADL personal objects within reach  Medication Review/Management:   medications reviewed   infusion initiated  Intervention: Promote Injury-Free Environment  Flowsheets (Taken 8/16/2024 1202)  Safety Promotion/Fall Prevention:   medications reviewed   lighting adjusted   instructed to call staff for mobility   in recliner, wheels locked     Problem: Chemotherapy Effects  Goal: Nausea and Vomiting Relief  Outcome: Progressing  Intervention: Prevent or Manage Nausea and Vomiting  Flowsheets (Taken 8/16/2024  8992)  Environmental Support: calm environment promoted

## 2024-08-23 DIAGNOSIS — R06.02 SOB (SHORTNESS OF BREATH): Primary | ICD-10-CM

## 2024-08-29 ENCOUNTER — HOSPITAL ENCOUNTER (OUTPATIENT)
Dept: RADIOLOGY | Facility: HOSPITAL | Age: 79
Discharge: HOME | End: 2024-08-29
Attending: INTERNAL MEDICINE
Payer: MEDICARE

## 2024-08-29 ENCOUNTER — OFFICE VISIT (OUTPATIENT)
Dept: PULMONOLOGY | Facility: CLINIC | Age: 79
End: 2024-08-29
Payer: MEDICARE

## 2024-08-29 VITALS
SYSTOLIC BLOOD PRESSURE: 94 MMHG | DIASTOLIC BLOOD PRESSURE: 56 MMHG | HEART RATE: 65 BPM | HEIGHT: 67 IN | RESPIRATION RATE: 20 BRPM | BODY MASS INDEX: 30.45 KG/M2 | WEIGHT: 194 LBS | OXYGEN SATURATION: 98 %

## 2024-08-29 DIAGNOSIS — J31.0 OTHER RHINITIS: ICD-10-CM

## 2024-08-29 DIAGNOSIS — R06.02 SOB (SHORTNESS OF BREATH): ICD-10-CM

## 2024-08-29 DIAGNOSIS — J30.5 RHINITIS DUE FOOD: ICD-10-CM

## 2024-08-29 DIAGNOSIS — J45.909 BRONCHITIS WITH ASTHMA, SUBACUTE: ICD-10-CM

## 2024-08-29 DIAGNOSIS — J84.9 INTERSTITIAL PULMONARY DISEASE, UNSPECIFIED: Primary | ICD-10-CM

## 2024-08-29 DIAGNOSIS — J45.991 COUGH VARIANT ASTHMA: ICD-10-CM

## 2024-08-29 DIAGNOSIS — D84.9 IMMUNOCOMPROMISED PATIENT: Chronic | ICD-10-CM

## 2024-08-29 DIAGNOSIS — R09.02 EXERCISE HYPOXEMIA: ICD-10-CM

## 2024-08-29 DIAGNOSIS — J20.9 BRONCHITIS WITH ASTHMA, SUBACUTE: ICD-10-CM

## 2024-08-29 DIAGNOSIS — R91.1 SOLITARY PULMONARY NODULE: ICD-10-CM

## 2024-08-29 PROCEDURE — 99999 PR PBB SHADOW E&M-EST. PATIENT-LVL V: CPT | Mod: PBBFAC,,, | Performed by: INTERNAL MEDICINE

## 2024-08-29 PROCEDURE — 99215 OFFICE O/P EST HI 40 MIN: CPT | Mod: PBBFAC,25 | Performed by: INTERNAL MEDICINE

## 2024-08-29 PROCEDURE — 71046 X-RAY EXAM CHEST 2 VIEWS: CPT | Mod: TC

## 2024-08-29 PROCEDURE — 71046 X-RAY EXAM CHEST 2 VIEWS: CPT | Mod: 26,,, | Performed by: RADIOLOGY

## 2024-08-29 RX ORDER — METHYLPREDNISOLONE 4 MG/1
TABLET ORAL
Qty: 1 EACH | Refills: 0 | Status: SHIPPED | OUTPATIENT
Start: 2024-08-29

## 2024-08-29 RX ORDER — KETOCONAZOLE 20 MG/G
CREAM TOPICAL
COMMUNITY
Start: 2024-08-19

## 2024-08-29 RX ORDER — DOXYCYCLINE 100 MG/1
100 CAPSULE ORAL EVERY 12 HOURS
Qty: 20 CAPSULE | Refills: 1 | Status: SHIPPED | OUTPATIENT
Start: 2024-08-29

## 2024-08-29 RX ORDER — ALBUTEROL SULFATE 0.83 MG/ML
2.5 SOLUTION RESPIRATORY (INHALATION)
Qty: 360 ML | Refills: 11 | Status: SHIPPED | OUTPATIENT
Start: 2024-08-29 | End: 2025-08-29

## 2024-08-29 RX ORDER — SOTALOL HYDROCHLORIDE 120 MG/1
60 TABLET ORAL 2 TIMES DAILY
COMMUNITY
Start: 2024-08-20

## 2024-08-29 RX ORDER — IPRATROPIUM BROMIDE 21 UG/1
2 SPRAY, METERED NASAL 3 TIMES DAILY PRN
Qty: 30 ML | Refills: 11 | Status: SHIPPED | OUTPATIENT
Start: 2024-08-29

## 2024-08-29 RX ORDER — ALBUTEROL SULFATE 90 UG/1
2 INHALANT RESPIRATORY (INHALATION) EVERY 4 HOURS PRN
Qty: 18 G | Refills: 11 | Status: SHIPPED | OUTPATIENT
Start: 2024-08-29

## 2024-08-29 NOTE — PROGRESS NOTES
Subjective:     Patient ID: Jack Back is a 79 y.o. male.    Chief Complaint:      HPI 79 y.o. history of rheumatoid intersitial fibrosis, Atrial Fibrillation, Coronary Artery Disease s/p SABG x 2  Hoarseness and congestion  Concerned about recent intubation and breathing problems after. Small throat - pediatric tube   On remicade  History of fibrosis in lungs  Dyspnea  Patient complains of shortness of breath. Symptoms occur with one block walking. Symptoms began 6 months ago, gradually worsening since. Associated symptoms include  difficulty breathing, dyspnea on exertion, morning cough, shortness of breath, sputum production, and wheezing. He denies chest pain, located left chest. He does not have had recent travel. Weight has been stable. Symptoms are exacerbated by moderate activity. Symptoms are alleviated by nothing.       Past Medical History:   Diagnosis Date    Arthritis     Congestive heart failure 06/07/2019    Depression     Lung disease     Rheumatoid arthritis involving multiple sites with positive rheumatoid factor 12/1/2015    Rheumatoid arthritis(714.0)     Thyroid disease      Past Surgical History:   Procedure Laterality Date    CREATION OF AORTOCORONARY ARTERY BYPASS      HERNIA REPAIR      JOINT REPLACEMENT      bi lateral hips    [place] CABG, ARTERY-VEIN, FOUR  05/21/2019    Coronary Artery Bypass, 4    SPINE SURGERY       Review of patient's allergies indicates:   Allergen Reactions    Arava [leflunomide] Diarrhea    Penicillins Other (See Comments) and Hives     unknown    Tetanus vaccines and toxoid Other (See Comments)     unknown  Fever     Current Outpatient Medications on File Prior to Visit   Medication Sig Dispense Refill    ketoconazole (NIZORAL) 2 % cream SMARTSIG:Sparingly Topical Twice Daily      sotaloL (BETAPACE) 120 MG Tab Take 60 mg by mouth 2 (two) times daily.      ascorbic acid, vitamin C, (VITAMIN C) 100 MG tablet Take 100 mg by mouth once daily.      aspirin (ECOTRIN)  81 MG EC tablet Take 325 mg by mouth once daily.       cholecalciferol, vitamin D3, 10 mcg (400 unit) Cap       co-enzyme Q-10 30 mg capsule Take 30 mg by mouth once daily.      cyanocobalamin (VITAMIN B-12) 1000 MCG tablet Take 300 mcg by mouth once daily.      ELIQUIS 5 mg Tab Take 5 mg by mouth 2 (two) times daily.      ezetimibe (ZETIA) 10 mg tablet Take 10 mg by mouth once daily.      folic acid (FOLVITE) 800 MCG Tab Take 1 tablet (800 mcg total) by mouth once daily. 90 tablet 1    furosemide (LASIX) 40 MG tablet   1    VANNESSA ORAL Take by mouth. 565 mg daily      INFLIXIMAB (REMICADE IV) Inject into the vein.       iron,carb/vit C/vit B12/folic (IRON 100 PLUS ORAL) Take by mouth.      losartan potassium (LOSARTAN ORAL) Take by mouth.      methotrexate 2.5 MG Tab Take 4 tablets (10 mg total) by mouth every 7 days. 48 tablet 1    metoprolol succinate (TOPROL-XL) 25 MG 24 hr tablet Take 0.5 tablets (12.5 mg total) by mouth once daily. 45 tablet 3    milk thistle 175 mg tablet Take 500 mg by mouth once daily.      multivit with minerals/lutein (MULTIVITAMIN 50 PLUS ORAL)       NITROSTAT 0.4 mg SL tablet 0.4 mg.      omega 3-dha-epa-fish oil 500-100-1,000 mg Cap Take by mouth 2 (two) times daily.      omeprazole (PRILOSEC) 40 MG capsule Take 40 mg by mouth every morning.      oxyCODONE-acetaminophen (PERCOCET) 5-325 mg per tablet Take 1 tablet by mouth every 4 (four) hours as needed.      potassium chloride (MICRO-K) 10 MEQ CpSR Take 10 mEq by mouth once.      potassium/magnesium (MAGNESIUM-POTASSIUM ORAL) Take 600 mg by mouth once daily. 600 mg/ 198      saw palmetto 500 MG capsule Take 450 mg by mouth once daily.      sertraline (ZOLOFT) 50 MG tablet Take 50 mg by mouth once daily.      solifenacin (VESICARE) 5 MG tablet Take 5 mg by mouth every morning.      SYNTHROID 100 mcg tablet Take 100 mcg by mouth every morning.      tamsulosin (FLOMAX) 0.4 mg Cap Take 1 capsule by mouth every evening.       triamcinolone acetonide 0.1% (KENALOG) 0.1 % cream Apply topically 2 (two) times daily. for 10 days 45 g 2    vit C/E/Zn/coppr/lutein/zeaxan (PRESERVISION AREDS-2 ORAL) Take by mouth once daily.      vitamin B12-folic acid 0.5-1 mg Tab  (Patient not taking: Reported on 8/8/2024)      vitamin D 1000 units Tab Take 5,000 mg by mouth once daily.      vitamin E 1000 UNIT capsule Take 1,000 Units by mouth once daily.      VITAMIN K2 ORAL Take 100 mcg by mouth.      zinc gluconate 50 mg tablet Take 50 mg by mouth once daily.       Current Facility-Administered Medications on File Prior to Visit   Medication Dose Route Frequency Provider Last Rate Last Admin    acetaminophen tablet 650 mg  650 mg Oral Once PRN Farooq Corey MD        albuterol inhaler 2 puff  2 puff Inhalation Q20 Min PRFarooq Zimmer MD        diphenhydrAMINE injection 25 mg  25 mg Intravenous Once PRN Farooq Corey MD        EPINEPHrine (EPIPEN) 0.3 mg/0.3 mL pen injection 0.3 mg  0.3 mg Intramuscular PRN Farooq Corey MD        methylPREDNISolone sodium succinate injection 40 mg  40 mg Intravenous Once PRFarooq Zimmer MD        ondansetron disintegrating tablet 4 mg  4 mg Oral Once PRN Farooq Corey MD        sodium chloride 0.9% 500 mL flush bag   Intravenous PRFarooq Zimmer MD        sodium chloride 0.9% flush 10 mL  10 mL Intravenous PRN Farooq Corey MD         Social History     Socioeconomic History    Marital status:    Tobacco Use    Smoking status: Former    Smokeless tobacco: Never   Substance and Sexual Activity    Alcohol use: No    Drug use: No     Social Determinants of Health     Financial Resource Strain: Low Risk  (4/22/2024)    Overall Financial Resource Strain (CARDIA)     Difficulty of Paying Living Expenses: Not hard at all   Food Insecurity: No Food Insecurity (4/22/2024)    Hunger Vital Sign     Worried About Running Out of Food in the Last Year: Never true     Ran Out of Food in  "the Last Year: Never true   Transportation Needs: No Transportation Needs (4/22/2024)    PRAPARE - Transportation     Lack of Transportation (Medical): No     Lack of Transportation (Non-Medical): No   Physical Activity: Inactive (4/22/2024)    Exercise Vital Sign     Days of Exercise per Week: 0 days     Minutes of Exercise per Session: 30 min   Stress: No Stress Concern Present (4/22/2024)    [employer] - Occupational Stress Questionnaire     Feeling of Stress : Not at all   Housing Stability: Unknown (4/22/2024)    Housing Stability Vital Sign     Unable to Pay for Housing in the Last Year: No     Family History   Problem Relation Name Age of Onset    Diabetes Mellitus Mother      Heart disease Mother      Heart disease Father      Rheum arthritis Father      Diabetes Mellitus Brother      Heart disease Brother         Review of Systems   Constitutional:  Positive for fatigue. Negative for fever.   HENT:  Positive for postnasal drip, rhinorrhea and congestion.    Eyes:  Negative for redness and itching.   Respiratory:  Positive for cough, sputum production, shortness of breath, dyspnea on extertion, use of rescue inhaler and Paroxysmal Nocturnal Dyspnea.    Cardiovascular:  Negative for chest pain, palpitations and leg swelling.   Genitourinary:  Negative for difficulty urinating and hematuria.   Endocrine:  Negative for cold intolerance and heat intolerance.    Skin:  Negative for rash.   Gastrointestinal:  Negative for nausea and abdominal pain.   Neurological:  Negative for dizziness, syncope, weakness and light-headedness.   Hematological:  Negative for adenopathy. Does not bruise/bleed easily.   Psychiatric/Behavioral:  Negative for sleep disturbance. The patient is not nervous/anxious.        Objective:      BP (!) 94/56   Pulse 65   Resp 20   Ht 5' 7" (1.702 m)   Wt 88 kg (194 lb 0.1 oz)   SpO2 98%   BMI 30.39 kg/m²   Physical Exam  Vitals and nursing note reviewed. " "  Constitutional:       Appearance: He is well-developed. He is ill-appearing.   HENT:      Head: Normocephalic and atraumatic.      Nose: Rhinorrhea present.      Mouth/Throat:      Pharynx: No oropharyngeal exudate.   Eyes:      Conjunctiva/sclera: Conjunctivae normal.      Pupils: Pupils are equal, round, and reactive to light.   Neck:      Thyroid: No thyromegaly.      Vascular: No JVD.      Trachea: No tracheal deviation.   Cardiovascular:      Rate and Rhythm: Normal rate and regular rhythm.      Heart sounds: Normal heart sounds. No murmur heard.  Pulmonary:      Effort: Pulmonary effort is normal. No respiratory distress.      Breath sounds: Examination of the right-lower field reveals rales. Examination of the left-lower field reveals rales. Decreased breath sounds, wheezing and rales present. No rhonchi.   Chest:      Chest wall: No tenderness.   Abdominal:      General: Bowel sounds are normal.      Palpations: Abdomen is soft.   Musculoskeletal:         General: No tenderness.      Cervical back: Neck supple.      Comments: Decreased range of motion of  multiple joints .     Lymphadenopathy:      Cervical: No cervical adenopathy.   Skin:     General: Skin is warm and dry.   Neurological:      Mental Status: He is alert and oriented to person, place, and time.       Personal Diagnostic Review  Chest x-ray: interstitial changes        8/29/2024     3:07 PM   Pulmonary Studies Review   SpO2 98 %   Height 5' 7" (1.702 m)   Weight 88 kg (194 lb 0.1 oz)   BMI (Calculated) 30.4   Predicted Distance 261.26   Predicted Distance Meters (Calculated) 422.93 meters   EXAMINATION:  CT CHEST WITHOUT CONTRAST     CLINICAL HISTORY:  Interstitial pulmonary disease, unspecifiedInterstitial lung disease;ILD progression, concerning masses / LAD;     COMPARISON:  None available.     TECHNIQUE:  Axial CT images were obtained of the CHEST.  Iterative reconstruction technique was used. The CT exam was performed using one or more " of the following dose reduction techniques- Automated exposure control, adjustment of the mA and/or kV according to patient size, and/or use of iterative reconstructed technique.     FINDINGS:  Lungs/pleura: Bibasilar bronchiectasis and fibrosis.  Fibrosis is in an apical to basal gradient.  There is honeycombing in the lung bilateral lung bases.  There is a pulmonary opacity with air bronchogram in the lateral right lower lobe that measures 4.6 by 1.4 cm (series 4, image 352)..  Pulmonary opacity in the right middle lobe measures 1.5 x 1.5 cm (series 2, image 79).  There is a focal opacity in the lingula that measures 1.1 x 0.5 cm (series 4, image 313).  Right basilar pleural thickening.  Calcified right-sided pleural plaques.     Coronary artery calcification: Present.     Aorta: Atherosclerosis. No Aneurysm.     Heart: Normal size. No pericardial effusion.  CABG changes.     Bones/joints: Median sternotomy wires.     Other: No mediastinal or axillary lymphadenopathy. No acute finding in the visualized upper abdomen.     Impression:     1. Pulmonary fibrosis with honeycombing in the lung bases which can be seen with UIP.  2. Bilateral pulmonary nodular opacities, the largest in the right lung base measures up to 4.6 cm.  For multiple solid nodules with any 6 mm or greater, Fleischner Society guidelines recommend follow up with non-contrast chest CT at 3-6 months and 18-24 months after discovery.  Consider three-month follow-up.  3. Bibasilar bronchiectasis.  4. Calcified pleural plaques which can be seen with prior asbestos exposure.  5. Other findings as above.        Electronically signed by:Denys Stout  Date:                                            04/24/2024  Time:                                           12:04    X-Ray Chest PA And Lateral  Narrative: EXAM:  XR CHEST PA AND LATERAL    CLINICAL HISTORY: Shortness of breath    COMPARISON: Chest CT 04/24/2024 with priors; chest radiograph  "01/04/2024    FINDINGS: Post vertebral changes of a sternotomy.  A loop recorder projects over the left chest.  Persistent bibasilar scarring/fibrosis and bronchiectasis best visualized on the prior CT.  No new pulmonary infiltrate or consolidation.  There is no pleural effusion or pneumothorax.  The cardiomediastinal silhouette is within normal size limits.  The hilar and mediastinal structures are within normal limits.  Degenerative changes of the spine.  The visualized osseous structures appear intact.  Impression:  Bibasilar pulmonary interstitial disease is best visualized on prior CT.  No acute radiographic abnormality.    Finalized on: 8/29/2024 3:02 PM By:  Mitul Fields MD  [facility]# [id number]      2024-08-29 15:04:[id number]    [facility]      Office Spirometry Results:         8/29/2024     3:07 PM 8/16/2024    12:10 PM 8/16/2024    11:01 AM 8/8/2024     9:38 AM 7/2/2024     1:27 PM 6/21/2024    12:42 PM 6/21/2024    11:04 AM   Pulmonary Function Tests   SpO2 98 % 96 % 96 %   98 % 97 %   Height 5' 7" (1.702 m)  5' 6" (1.676 m) 5' 6" (1.676 m) 5' 6" (1.676 m)  5' 7" (1.702 m)   Weight 88 kg (194 lb 0.1 oz)  87.2 kg (192 lb 3.9 oz) 87.5 kg (192 lb 14.4 oz) 86 kg (189 lb 9.5 oz)  85.5 kg (188 lb 7.9 oz)   BMI (Calculated) 30.4  31 31.2 30.6  29.5         8/29/2024     3:07 PM   Pulmonary Studies Review   SpO2 98 %   Height 5' 7" (1.702 m)   Weight 88 kg (194 lb 0.1 oz)   BMI (Calculated) 30.4   Predicted Distance 261.26   Predicted Distance Meters (Calculated) 422.93 meters           No results found for this or any previous visit (from the past 336 hour(s)).    Assessment:            Interstitial pulmonary disease, unspecified  -     CT Chest Without Contrast; Future; Expected date: 08/29/2024    Bronchitis with asthma, subacute  -     Six Minute Walk Test to qualify for Home Oxygen; Future  -     albuterol (PROVENTIL) 2.5 mg /3 mL (0.083 %) nebulizer solution; Take 3 mLs (2.5 mg total) by nebulization every 4 to 6 " hours as needed for Wheezing or Shortness of Breath.  Dispense: 360 mL; Refill: 11  -     NEBULIZER KIT (SUPPLIES) FOR HOME USE  -     NEBULIZER FOR HOME USE  -     methylPREDNISolone (MEDROL DOSEPACK) 4 mg tablet; use as directed  Dispense: 1 each; Refill: 0  -     doxycycline (MONODOX) 100 MG capsule; Take 1 capsule (100 mg total) by mouth every 12 (twelve) hours.  Dispense: 20 capsule; Refill: 1    Solitary pulmonary nodule  -     CT Chest Without Contrast; Future; Expected date: 08/29/2024    Immunocompromised patient    SOB (shortness of breath)    Cough variant asthma  -     Complete PFT with bronchodilator; Future; Expected date: 08/29/2024  -     Six Minute Walk Test to qualify for Home Oxygen; Future  -     albuterol (PROVENTIL) 2.5 mg /3 mL (0.083 %) nebulizer solution; Take 3 mLs (2.5 mg total) by nebulization every 4 to 6 hours as needed for Wheezing or Shortness of Breath.  Dispense: 360 mL; Refill: 11  -     NEBULIZER KIT (SUPPLIES) FOR HOME USE  -     NEBULIZER FOR HOME USE  -     methylPREDNISolone (MEDROL DOSEPACK) 4 mg tablet; use as directed  Dispense: 1 each; Refill: 0  -     albuterol (PROVENTIL/VENTOLIN HFA) 90 mcg/actuation inhaler; Inhale 2 puffs into the lungs every 4 (four) hours as needed for Wheezing or Shortness of Breath.  Dispense: 18 g; Refill: 11    Exercise hypoxemia  -     Six Minute Walk Test to qualify for Home Oxygen; Future    Rhinitis due food  -     ipratropium (ATROVENT) 21 mcg (0.03 %) nasal spray; 2 sprays by Each Nostril route 3 (three) times daily as needed for Rhinitis (take before eating).  Dispense: 30 mL; Refill: 11    Other rhinitis  -     ipratropium (ATROVENT) 21 mcg (0.03 %) nasal spray; 2 sprays by Each Nostril route 3 (three) times daily as needed for Rhinitis (take before eating).  Dispense: 30 mL; Refill: 11          Outpatient Encounter Medications as of 8/29/2024   Medication Sig Dispense Refill    ketoconazole (NIZORAL) 2 % cream SMARTSIG:Sparingly Topical  Twice Daily      sotaloL (BETAPACE) 120 MG Tab Take 60 mg by mouth 2 (two) times daily.      albuterol (PROVENTIL) 2.5 mg /3 mL (0.083 %) nebulizer solution Take 3 mLs (2.5 mg total) by nebulization every 4 to 6 hours as needed for Wheezing or Shortness of Breath. 360 mL 11    albuterol (PROVENTIL/VENTOLIN HFA) 90 mcg/actuation inhaler Inhale 2 puffs into the lungs every 4 (four) hours as needed for Wheezing or Shortness of Breath. 18 g 11    ascorbic acid, vitamin C, (VITAMIN C) 100 MG tablet Take 100 mg by mouth once daily.      aspirin (ECOTRIN) 81 MG EC tablet Take 325 mg by mouth once daily.       cholecalciferol, vitamin D3, 10 mcg (400 unit) Cap       co-enzyme Q-10 30 mg capsule Take 30 mg by mouth once daily.      cyanocobalamin (VITAMIN B-12) 1000 MCG tablet Take 300 mcg by mouth once daily.      doxycycline (MONODOX) 100 MG capsule Take 1 capsule (100 mg total) by mouth every 12 (twelve) hours. 20 capsule 1    ELIQUIS 5 mg Tab Take 5 mg by mouth 2 (two) times daily.      ezetimibe (ZETIA) 10 mg tablet Take 10 mg by mouth once daily.      folic acid (FOLVITE) 800 MCG Tab Take 1 tablet (800 mcg total) by mouth once daily. 90 tablet 1    furosemide (LASIX) 40 MG tablet   1    VANNESSA ORAL Take by mouth. 565 mg daily      INFLIXIMAB (REMICADE IV) Inject into the vein.       ipratropium (ATROVENT) 21 mcg (0.03 %) nasal spray 2 sprays by Each Nostril route 3 (three) times daily as needed for Rhinitis (take before eating). 30 mL 11    iron,carb/vit C/vit B12/folic (IRON 100 PLUS ORAL) Take by mouth.      losartan potassium (LOSARTAN ORAL) Take by mouth.      methotrexate 2.5 MG Tab Take 4 tablets (10 mg total) by mouth every 7 days. 48 tablet 1    methylPREDNISolone (MEDROL DOSEPACK) 4 mg tablet use as directed 1 each 0    metoprolol succinate (TOPROL-XL) 25 MG 24 hr tablet Take 0.5 tablets (12.5 mg total) by mouth once daily. 45 tablet 3    milk thistle 175 mg tablet Take 500 mg by mouth once daily.       multivit with minerals/lutein (MULTIVITAMIN 50 PLUS ORAL)       NITROSTAT 0.4 mg SL tablet 0.4 mg.      omega 3-dha-epa-fish oil 500-100-1,000 mg Cap Take by mouth 2 (two) times daily.      omeprazole (PRILOSEC) 40 MG capsule Take 40 mg by mouth every morning.      oxyCODONE-acetaminophen (PERCOCET) 5-325 mg per tablet Take 1 tablet by mouth every 4 (four) hours as needed.      potassium chloride (MICRO-K) 10 MEQ CpSR Take 10 mEq by mouth once.      potassium/magnesium (MAGNESIUM-POTASSIUM ORAL) Take 600 mg by mouth once daily. 600 mg/ 198      saw palmetto 500 MG capsule Take 450 mg by mouth once daily.      sertraline (ZOLOFT) 50 MG tablet Take 50 mg by mouth once daily.      solifenacin (VESICARE) 5 MG tablet Take 5 mg by mouth every morning.      SYNTHROID 100 mcg tablet Take 100 mcg by mouth every morning.      tamsulosin (FLOMAX) 0.4 mg Cap Take 1 capsule by mouth every evening.      triamcinolone acetonide 0.1% (KENALOG) 0.1 % cream Apply topically 2 (two) times daily. for 10 days 45 g 2    vit C/E/Zn/coppr/lutein/zeaxan (PRESERVISION AREDS-2 ORAL) Take by mouth once daily.      vitamin B12-folic acid 0.5-1 mg Tab  (Patient not taking: Reported on 8/8/2024)      vitamin D 1000 units Tab Take 5,000 mg by mouth once daily.      vitamin E 1000 UNIT capsule Take 1,000 Units by mouth once daily.      VITAMIN K2 ORAL Take 100 mcg by mouth.      zinc gluconate 50 mg tablet Take 50 mg by mouth once daily.      [DISCONTINUED] folic acid (FOLVITE) 800 MCG Tab Take 1 tablet (800 mcg total) by mouth 2 (two) times daily. (Patient taking differently: Take 800 mcg by mouth once daily.)      [DISCONTINUED] methotrexate 2.5 MG Tab Take 8 tablets (20 mg total) by mouth once a week. 96 tablet 0     Facility-Administered Encounter Medications as of 8/29/2024   Medication Dose Route Frequency Provider Last Rate Last Admin    acetaminophen tablet 650 mg  650 mg Oral Once PRN Farooq Corey MD        albuterol inhaler 2 puff  2  puff Inhalation Q20 Min PRN Farooq Corey MD        diphenhydrAMINE injection 25 mg  25 mg Intravenous Once PRN Farooq Corey MD        EPINEPHrine (EPIPEN) 0.3 mg/0.3 mL pen injection 0.3 mg  0.3 mg Intramuscular PRN Farooq Corey MD        methylPREDNISolone sodium succinate injection 40 mg  40 mg Intravenous Once PRN Farooq Coery MD        ondansetron disintegrating tablet 4 mg  4 mg Oral Once PRN Farooq Corey MD        sodium chloride 0.9% 500 mL flush bag   Intravenous PRN Farooq Corye MD        sodium chloride 0.9% flush 10 mL  10 mL Intravenous PRN Farooq Corey MD         Plan:       Requested Prescriptions     Signed Prescriptions Disp Refills    albuterol (PROVENTIL) 2.5 mg /3 mL (0.083 %) nebulizer solution 360 mL 11     Sig: Take 3 mLs (2.5 mg total) by nebulization every 4 to 6 hours as needed for Wheezing or Shortness of Breath.    methylPREDNISolone (MEDROL DOSEPACK) 4 mg tablet 1 each 0     Sig: use as directed    doxycycline (MONODOX) 100 MG capsule 20 capsule 1     Sig: Take 1 capsule (100 mg total) by mouth every 12 (twelve) hours.    albuterol (PROVENTIL/VENTOLIN HFA) 90 mcg/actuation inhaler 18 g 11     Sig: Inhale 2 puffs into the lungs every 4 (four) hours as needed for Wheezing or Shortness of Breath.    ipratropium (ATROVENT) 21 mcg (0.03 %) nasal spray 30 mL 11     Si sprays by Each Nostril route 3 (three) times daily as needed for Rhinitis (take before eating).     Problem List Items Addressed This Visit       Immunocompromised patient (Chronic)    Interstitial pulmonary disease, unspecified - Primary    Relevant Orders    CT Chest Without Contrast     Other Visit Diagnoses       Bronchitis with asthma, subacute        Relevant Medications    albuterol (PROVENTIL) 2.5 mg /3 mL (0.083 %) nebulizer solution    methylPREDNISolone (MEDROL DOSEPACK) 4 mg tablet    doxycycline (MONODOX) 100 MG capsule    Other Relevant Orders    Six Minute Walk Test to  qualify for Home Oxygen    NEBULIZER KIT (SUPPLIES) FOR HOME USE    NEBULIZER FOR HOME USE    Solitary pulmonary nodule        Relevant Orders    CT Chest Without Contrast    SOB (shortness of breath)        Cough variant asthma        Relevant Medications    albuterol (PROVENTIL) 2.5 mg /3 mL (0.083 %) nebulizer solution    methylPREDNISolone (MEDROL DOSEPACK) 4 mg tablet    albuterol (PROVENTIL/VENTOLIN HFA) 90 mcg/actuation inhaler    Other Relevant Orders    Complete PFT with bronchodilator    Six Minute Walk Test to qualify for Home Oxygen    NEBULIZER KIT (SUPPLIES) FOR HOME USE    NEBULIZER FOR HOME USE    Exercise hypoxemia        Relevant Orders    Six Minute Walk Test to qualify for Home Oxygen    Rhinitis due food        Relevant Medications    ipratropium (ATROVENT) 21 mcg (0.03 %) nasal spray    Other rhinitis        Relevant Medications    ipratropium (ATROVENT) 21 mcg (0.03 %) nasal spray               Follow up in about 4 weeks (around 9/26/2024) for CT - on return visit, PFT -return, 6 min walk - on return.    MEDICAL DECISION MAKING: Moderate to high complexity.  Overall, the multiple problems listed are of moderate to high severity that may impact quality of life and activities of daily living. Side effects of medications, treatment plan as well as options and alternatives reviewed and discussed with patient. There was counseling of patient concerning these issues.    Total time spent in counseling and coordination of care - 55  minutes of total time spent on the encounter, which includes face to face time and non-face to face time preparing to see the patient (eg, review of tests), Obtaining and/or reviewing separately obtained history, Documenting clinical information in the electronic or other health record, Independently interpreting results (not separately reported) and communicating results to the patient/family/caregiver, or Care coordination (not separately reported).    Time was used in  discussion of prognosis, risks, benefits of treatment, instructions and compliance with regimen . Discussion with other physicians and/or health care providers - home health or for use of durable medical equipment (oxygen, nebulizers, CPAP, BiPAP) occurred.

## 2024-08-30 ENCOUNTER — INFUSION (OUTPATIENT)
Dept: INFUSION THERAPY | Facility: HOSPITAL | Age: 79
End: 2024-08-30
Attending: PHYSICIAN ASSISTANT
Payer: MEDICARE

## 2024-08-30 VITALS
HEIGHT: 67 IN | DIASTOLIC BLOOD PRESSURE: 64 MMHG | WEIGHT: 194 LBS | TEMPERATURE: 97 F | HEART RATE: 59 BPM | BODY MASS INDEX: 30.45 KG/M2 | RESPIRATION RATE: 18 BRPM | SYSTOLIC BLOOD PRESSURE: 118 MMHG | OXYGEN SATURATION: 95 %

## 2024-08-30 DIAGNOSIS — M05.79 RHEUMATOID ARTHRITIS INVOLVING MULTIPLE SITES WITH POSITIVE RHEUMATOID FACTOR: Primary | ICD-10-CM

## 2024-08-30 PROCEDURE — 25000003 PHARM REV CODE 250: Performed by: INTERNAL MEDICINE

## 2024-08-30 PROCEDURE — 96365 THER/PROPH/DIAG IV INF INIT: CPT

## 2024-08-30 RX ORDER — EPINEPHRINE 0.3 MG/.3ML
0.3 INJECTION SUBCUTANEOUS ONCE AS NEEDED
OUTPATIENT
Start: 2024-09-13

## 2024-08-30 RX ORDER — DIPHENHYDRAMINE HYDROCHLORIDE 50 MG/ML
25 INJECTION INTRAMUSCULAR; INTRAVENOUS
OUTPATIENT
Start: 2024-09-13

## 2024-08-30 RX ORDER — ACETAMINOPHEN 325 MG/1
650 TABLET ORAL
OUTPATIENT
Start: 2024-09-13

## 2024-08-30 RX ORDER — METHYLPREDNISOLONE SOD SUCC 125 MG
100 VIAL (EA) INJECTION
OUTPATIENT
Start: 2024-09-13

## 2024-08-30 RX ORDER — DIPHENHYDRAMINE HCL 25 MG
25 CAPSULE ORAL
Start: 2024-09-13

## 2024-08-30 RX ORDER — SODIUM CHLORIDE 0.9 % (FLUSH) 0.9 %
10 SYRINGE (ML) INJECTION
Status: DISCONTINUED | OUTPATIENT
Start: 2024-08-30 | End: 2024-08-30 | Stop reason: HOSPADM

## 2024-08-30 RX ORDER — ACETAMINOPHEN 325 MG/1
650 TABLET ORAL
Start: 2024-09-13

## 2024-08-30 RX ORDER — DIPHENHYDRAMINE HCL 25 MG
25 CAPSULE ORAL
Status: COMPLETED | OUTPATIENT
Start: 2024-08-30 | End: 2024-08-30

## 2024-08-30 RX ORDER — ACETAMINOPHEN 325 MG/1
650 TABLET ORAL
Status: COMPLETED | OUTPATIENT
Start: 2024-08-30 | End: 2024-08-30

## 2024-08-30 RX ORDER — DIPHENHYDRAMINE HYDROCHLORIDE 50 MG/ML
50 INJECTION INTRAMUSCULAR; INTRAVENOUS ONCE AS NEEDED
OUTPATIENT
Start: 2024-09-13

## 2024-08-30 RX ORDER — SODIUM CHLORIDE 0.9 % (FLUSH) 0.9 %
10 SYRINGE (ML) INJECTION
OUTPATIENT
Start: 2024-09-13

## 2024-08-30 RX ORDER — HEPARIN 100 UNIT/ML
500 SYRINGE INTRAVENOUS
OUTPATIENT
Start: 2024-09-13

## 2024-08-30 RX ADMIN — SODIUM CHLORIDE 750 MG: 9 INJECTION, SOLUTION INTRAVENOUS at 01:08

## 2024-08-30 RX ADMIN — ACETAMINOPHEN 650 MG: 325 TABLET ORAL at 01:08

## 2024-08-30 RX ADMIN — DIPHENHYDRAMINE HYDROCHLORIDE 25 MG: 25 CAPSULE ORAL at 01:08

## 2024-08-30 NOTE — PLAN OF CARE
Problem: Adult Inpatient Plan of Care  Goal: Plan of Care Review  Outcome: Progressing  Flowsheets (Taken 8/30/2024 1353)  Plan of Care Reviewed With: patient  Goal: Patient-Specific Goal (Individualized)  Outcome: Progressing  Flowsheets (Taken 8/30/2024 1353)  Individualized Care Needs: patient likes feet elevated, pillow under IV arm, and water to take pre-meds  Anxieties, Fears or Concerns: Patient reports feeling weak and tired today  Patient/Family-Specific Goals (Include Timeframe): patient tolerated infusion well with no adverse reactions.  Goal: Optimal Comfort and Wellbeing  Outcome: Progressing     Problem: Fall Injury Risk  Goal: Absence of Fall and Fall-Related Injury  Outcome: Progressing     Problem: Infection  Goal: Absence of Infection Signs and Symptoms  Outcome: Progressing

## 2024-08-30 NOTE — DISCHARGE INSTRUCTIONS
.[facility]  [address] [address]  [address] [address]  [phone] phone     [phone] fax  Hours of Operation: Monday- Friday 8:00am- 5:00pm  After hours phone  [phone]  Hematology / Oncology Physicians on call    Dr. Jimmy Whatley        Nurse Practitioners:    Halina Suarez, HARLAN Donahue, HARLAN Martinez, HARLAN Mendez, HARLAN Moreira, PA      Please don't hesitate to call if you have any concerns.       .WAYS TO HELP PREVENT INFECTION        WASH YOUR HANDS OFTEN DURING THE DAY, ESPECIALLY BEFORE YOU EAT, AFTER USING THE BATHROOM, AND AFTER TOUCHING ANIMALS    STAY AWAY FROM PEOPLE WHO HAVE ILLNESSES YOU CAN CATCH; SUCH AS COLDS, FLU, CHICKEN POX    TRY TO AVOID CROWDS    STAY AWAY FROM CHILDREN WHO RECENTLY HAVE RECEIVED LIVE VIRUS VACCINES    MAINTAIN GOOD MOUTH CARE    DO NOT SQUEEZE OR SCRATCH PIMPLES    CLEAN CUTS & SCRAPES RIGHT AWAY AND DAILY UNTIL HEALED WITH WARM WATER, SOAP & AN ANTISEPTIC    AVOID CONTACT WITH LITTER BOXES, BIRD CAGES, & FISH TANKS    AVOID STANDING WATER, IE., BIRD BATHS, FLOWER POTS/VASES, OR HUMIDIFIERS    WEAR GLOVES WHEN GARDENING OR CLEANING UP AFTER OTHERS, ESPECIALLY BABIES & SMALL CHILDREN    DO NOT EAT RAW FISH, SEAFOOD, MEAT, OR EGGS

## 2024-09-13 ENCOUNTER — INFUSION (OUTPATIENT)
Dept: INFUSION THERAPY | Facility: HOSPITAL | Age: 79
End: 2024-09-13
Attending: PHYSICIAN ASSISTANT
Payer: MEDICARE

## 2024-09-13 VITALS
HEART RATE: 58 BPM | SYSTOLIC BLOOD PRESSURE: 111 MMHG | OXYGEN SATURATION: 97 % | RESPIRATION RATE: 18 BRPM | WEIGHT: 194 LBS | DIASTOLIC BLOOD PRESSURE: 56 MMHG | HEIGHT: 67 IN | BODY MASS INDEX: 30.45 KG/M2 | TEMPERATURE: 98 F

## 2024-09-13 DIAGNOSIS — M05.79 RHEUMATOID ARTHRITIS INVOLVING MULTIPLE SITES WITH POSITIVE RHEUMATOID FACTOR: Primary | ICD-10-CM

## 2024-09-13 PROCEDURE — 25000003 PHARM REV CODE 250: Performed by: INTERNAL MEDICINE

## 2024-09-13 PROCEDURE — 96365 THER/PROPH/DIAG IV INF INIT: CPT

## 2024-09-13 PROCEDURE — 63600175 PHARM REV CODE 636 W HCPCS: Mod: JZ,JA,JG | Performed by: INTERNAL MEDICINE

## 2024-09-13 RX ORDER — ACETAMINOPHEN 325 MG/1
650 TABLET ORAL
Start: 2024-10-11

## 2024-09-13 RX ORDER — DIPHENHYDRAMINE HCL 25 MG
25 CAPSULE ORAL
Start: 2024-10-11

## 2024-09-13 RX ORDER — ACETAMINOPHEN 325 MG/1
650 TABLET ORAL
Status: DISCONTINUED | OUTPATIENT
Start: 2024-09-13 | End: 2024-09-13 | Stop reason: HOSPADM

## 2024-09-13 RX ORDER — EPINEPHRINE 0.3 MG/.3ML
0.3 INJECTION SUBCUTANEOUS ONCE AS NEEDED
OUTPATIENT
Start: 2024-10-11

## 2024-09-13 RX ORDER — METHYLPREDNISOLONE SOD SUCC 125 MG
100 VIAL (EA) INJECTION
OUTPATIENT
Start: 2024-10-11

## 2024-09-13 RX ORDER — HEPARIN 100 UNIT/ML
500 SYRINGE INTRAVENOUS
OUTPATIENT
Start: 2024-10-11

## 2024-09-13 RX ORDER — DIPHENHYDRAMINE HYDROCHLORIDE 50 MG/ML
50 INJECTION INTRAMUSCULAR; INTRAVENOUS ONCE AS NEEDED
OUTPATIENT
Start: 2024-10-11

## 2024-09-13 RX ORDER — ACETAMINOPHEN 325 MG/1
650 TABLET ORAL
OUTPATIENT
Start: 2024-10-11

## 2024-09-13 RX ORDER — SODIUM CHLORIDE 0.9 % (FLUSH) 0.9 %
10 SYRINGE (ML) INJECTION
OUTPATIENT
Start: 2024-10-11

## 2024-09-13 RX ORDER — DIPHENHYDRAMINE HCL 25 MG
25 CAPSULE ORAL
Status: DISCONTINUED | OUTPATIENT
Start: 2024-09-13 | End: 2024-09-13 | Stop reason: HOSPADM

## 2024-09-13 RX ORDER — DIPHENHYDRAMINE HYDROCHLORIDE 50 MG/ML
25 INJECTION INTRAMUSCULAR; INTRAVENOUS
OUTPATIENT
Start: 2024-10-11

## 2024-09-13 RX ADMIN — SODIUM CHLORIDE: 9 INJECTION, SOLUTION INTRAVENOUS at 09:09

## 2024-09-13 RX ADMIN — SODIUM CHLORIDE 750 MG: 9 INJECTION, SOLUTION INTRAVENOUS at 09:09

## 2024-09-13 NOTE — PLAN OF CARE
Problem: Adult Inpatient Plan of Care  Goal: Plan of Care Review  Outcome: Progressing  Flowsheets (Taken 9/13/2024 0930)  Plan of Care Reviewed With: patient  Goal: Patient-Specific Goal (Individualized)  Outcome: Progressing  Flowsheets (Taken 9/13/2024 0930)  Individualized Care Needs: reclining position, pillow under iv extremity, declines premeds  Anxieties, Fears or Concerns: pt reports feeling tired/weak     Problem: Infection  Goal: Absence of Infection Signs and Symptoms  Outcome: Progressing  Intervention: Prevent or Manage Infection  Flowsheets (Taken 9/13/2024 0931)  Infection Management: aseptic technique maintained

## 2024-10-04 ENCOUNTER — CLINICAL SUPPORT (OUTPATIENT)
Dept: PULMONOLOGY | Facility: CLINIC | Age: 79
End: 2024-10-04
Attending: INTERNAL MEDICINE
Payer: MEDICARE

## 2024-10-04 ENCOUNTER — HOSPITAL ENCOUNTER (OUTPATIENT)
Dept: RADIOLOGY | Facility: HOSPITAL | Age: 79
Discharge: HOME | End: 2024-10-04
Attending: INTERNAL MEDICINE
Payer: MEDICARE

## 2024-10-04 VITALS
HEART RATE: 18 BPM | DIASTOLIC BLOOD PRESSURE: 66 MMHG | RESPIRATION RATE: 18 BRPM | OXYGEN SATURATION: 95 % | HEIGHT: 67 IN | WEIGHT: 194 LBS | BODY MASS INDEX: 30.45 KG/M2 | SYSTOLIC BLOOD PRESSURE: 126 MMHG

## 2024-10-04 VITALS — WEIGHT: 194 LBS | HEIGHT: 67 IN | BODY MASS INDEX: 30.45 KG/M2

## 2024-10-04 DIAGNOSIS — R91.1 SOLITARY PULMONARY NODULE: ICD-10-CM

## 2024-10-04 DIAGNOSIS — J84.9 INTERSTITIAL PULMONARY DISEASE, UNSPECIFIED: Primary | ICD-10-CM

## 2024-10-04 DIAGNOSIS — J45.991 COUGH VARIANT ASTHMA: ICD-10-CM

## 2024-10-04 DIAGNOSIS — J45.909 BRONCHITIS WITH ASTHMA, SUBACUTE: ICD-10-CM

## 2024-10-04 DIAGNOSIS — R09.02 EXERCISE HYPOXEMIA: ICD-10-CM

## 2024-10-04 DIAGNOSIS — J84.9 INTERSTITIAL PULMONARY DISEASE, UNSPECIFIED: ICD-10-CM

## 2024-10-04 DIAGNOSIS — J20.9 BRONCHITIS WITH ASTHMA, SUBACUTE: ICD-10-CM

## 2024-10-04 LAB
BRPFT: ABNORMAL
DLCO ADJ PRE: 13.63 ML/(MIN*MMHG) (ref 15.7–29.55)
DLCO SINGLE BREATH LLN: 15.7
DLCO SINGLE BREATH PRE REF: 60.2 %
DLCO SINGLE BREATH REF: 22.62
DLCOC SBVA LLN: 2.25
DLCOC SBVA PRE REF: 101.3 %
DLCOC SBVA REF: 3.48
DLCOC SINGLE BREATH LLN: 15.7
DLCOC SINGLE BREATH PRE REF: 60.2 %
DLCOC SINGLE BREATH REF: 22.62
DLCOVA LLN: 2.25
DLCOVA PRE REF: 101.3 %
DLCOVA PRE: 3.52 ML/(MIN*MMHG*L) (ref 2.25–4.71)
DLCOVA REF: 3.48
DLVAADJ PRE: 3.52 ML/(MIN*MMHG*L) (ref 2.25–4.71)
ERV LLN: -16449.15
ERV PRE REF: 74 %
ERV REF: 0.85
FEF 25 75 CHG: 6.5 %
FEF 25 75 LLN: 0.85
FEF 25 75 POST REF: 29.7 %
FEF 25 75 PRE REF: 27.9 %
FEF 25 75 REF: 2.56
FET100 CHG: -2.7 %
FEV1 CHG: 3.8 %
FEV1 FVC CHG: 3.1 %
FEV1 FVC LLN: 60
FEV1 FVC POST REF: 83.1 %
FEV1 FVC PRE REF: 80.6 %
FEV1 FVC REF: 75
FEV1 LLN: 1.79
FEV1 POST REF: 65.3 %
FEV1 PRE REF: 62.9 %
FEV1 REF: 2.59
FRCPLETH LLN: 2.62
FRCPLETH PREREF: 106 %
FRCPLETH REF: 3.61
FVC CHG: 0.7 %
FVC LLN: 2.5
FVC POST REF: 77.9 %
FVC PRE REF: 77.4 %
FVC REF: 3.47
IVC PRE: 1.9 L (ref 2.5–4.44)
IVC SINGLE BREATH LLN: 2.5
IVC SINGLE BREATH PRE REF: 54.6 %
IVC SINGLE BREATH REF: 3.47
MVV LLN: 85
MVV PRE REF: 35 %
MVV REF: 100
PEF CHG: 19.4 %
PEF LLN: 4.44
PEF POST REF: 63.5 %
PEF PRE REF: 53.2 %
PEF REF: 6.56
POST FEF 25 75: 0.76 L/S (ref 0.85–4.27)
POST FET 100: 9.02 SEC
POST FEV1 FVC: 62.43 % (ref 60.13–90.07)
POST FEV1: 1.69 L (ref 1.79–3.38)
POST FVC: 2.7 L (ref 2.5–4.44)
POST PEF: 4.16 L/S (ref 4.44–8.68)
PRE DLCO: 13.63 ML/(MIN*MMHG) (ref 15.7–29.55)
PRE ERV: 0.63 L (ref -16449.15–16450.85)
PRE FEF 25 75: 0.71 L/S (ref 0.85–4.27)
PRE FET 100: 9.26 SEC
PRE FEV1 FVC: 60.56 % (ref 60.13–90.07)
PRE FEV1: 1.63 L (ref 1.79–3.38)
PRE FRC PL: 3.83 L
PRE FVC: 2.69 L (ref 2.5–4.44)
PRE MVV: 35 L/MIN (ref 85–115)
PRE PEF: 3.49 L/S (ref 4.44–8.68)
PRE RV: 2.85 L (ref 2.08–3.43)
PRE TLC: 5.53 L (ref 5.35–7.65)
RAW LLN: 3.06
RAW PRE REF: 166.3 %
RAW PRE: 5.09 CMH2O*S/L (ref 3.06–3.06)
RAW REF: 3.06
RV LLN: 2.08
RV PRE REF: 103.3 %
RV REF: 2.76
RVTLC LLN: 36
RVTLC PRE REF: 114 %
RVTLC PRE: 51.48 % (ref 36.18–54.14)
RVTLC REF: 45
TLC LLN: 5.35
TLC PRE REF: 85.1 %
TLC REF: 6.5
VA PRE: 3.87 L (ref 6.35–6.35)
VA SINGLE BREATH LLN: 6.35
VA SINGLE BREATH PRE REF: 60.9 %
VA SINGLE BREATH REF: 6.35
VC LLN: 2.5
VC PRE REF: 77.4 %
VC PRE: 2.69 L (ref 2.5–4.44)
VC REF: 3.47
VTGRAWPRE: 3.55 L

## 2024-10-04 PROCEDURE — 99999 PR PBB SHADOW E&M-EST. PATIENT-LVL I: CPT | Mod: PBBFAC,,,

## 2024-10-04 PROCEDURE — 99999 PR PBB SHADOW E&M-EST. PATIENT-LVL V: CPT | Mod: PBBFAC,,, | Performed by: INTERNAL MEDICINE

## 2024-10-04 PROCEDURE — 99215 OFFICE O/P EST HI 40 MIN: CPT | Mod: PBBFAC,25,27 | Performed by: INTERNAL MEDICINE

## 2024-10-04 PROCEDURE — 71250 CT THORAX DX C-: CPT | Mod: TC

## 2024-10-04 PROCEDURE — 71250 CT THORAX DX C-: CPT | Mod: 26,,, | Performed by: RADIOLOGY

## 2024-10-04 PROCEDURE — 99211 OFF/OP EST MAY X REQ PHY/QHP: CPT | Mod: PBBFAC,25

## 2024-10-04 RX ORDER — GUAIFENESIN 1200 MG/1
1 TABLET, EXTENDED RELEASE ORAL 2 TIMES DAILY PRN
Qty: 60 TABLET | Refills: 11 | Status: SHIPPED | OUTPATIENT
Start: 2024-10-04 | End: 2024-10-14

## 2024-10-04 NOTE — PROCEDURES
"[facility] - Pulmonary Function  Six Minute Walk     SUMMARY     Ordering Provider: Dr. Dang   Interpreting Provider: Dr. Dang  Performing nurse/tech/RT: VERONIKA Zuleta RRT  Diagnosis: Asthma  Height: 5' 7" (170.2 cm)  Weight: 88 kg (194 lb 0.1 oz)  BMI (Calculated): 30.4                Phase Oxygen Assessment Supplemental O2 Heart   Rate Blood Pressure Jason Dyspnea Scale Rating   Resting 96 % Room Air 77 bpm 126/60 3   Exercise        Minute        1 95 % Room Air 102 bpm     2 94 % Room Air 89 bpm     3 95 % Room Air 91 bpm     4 94 % Room Air 99 bpm     5 95 % Room Air 97 bpm     6  93 % Room Air 93 bpm 137/69 2   Recovery        Minute        1 93 % Room Air 73 bpm     2 94 % Room Air 82 bpm     3 97 % Room Air 81 bpm     4 95 % Room Air 74 bpm 126/66 2     Six Minute Walk Summary  6MWT Status: completed without stopping  Patient Reported: No complaints     Interpretation:  Did the patient stop or pause?: No                                         Total Time Walked (Calculated): 360 seconds  Final Partial Lap Distance (feet): 50 feet  Total Distance Meters (Calculated): 137.16 meters  Predicted Distance Meters (Calculated): 422.93 meters  Percentage of Predicted (Calculated): 32.43  Peak VO2 (Calculated): 8.09  Mets: 2.31  Has The Patient Had a Previous Six Minute Walk Test?: No       Previous 6MWT Results  Has The Patient Had a Previous Six Minute Walk Test?: No    Interpretation:  Total distance walked in six minutes is severely reduced indicating a reduction in overall  functional capacity. The patient did not meet criteria for supplemental oxygen prescription.    Clinical correlation suggested.  [x] Mild exercise-induced hypoxemia described as an arterial oxygen saturation of 93-95% (or 3-4% less than at rest),  [] Moderate exercise-induced hypoxemia as 89-93%  [] Severe exercise induced hypoxemia as < 89% O2 saturation.  Medicare Criteria for oxygen prescription comments:   When arterial oxygen saturation is at " or below 88% during exercise on room air (severe exercise induced hypoxemia) then the patient falls under Medicare Group 1 criteria for supplemental oxygen prescription.  Details about Medicare Group Criteria coverage can be found at http://www.cms.hhs.gov/manuals/downloads/     Manuel Dang MD

## 2024-10-04 NOTE — PROGRESS NOTES
Subjective:     Patient ID: Jack Back is a 79 y.o. male.    Chief Complaint:      HPI 79 y.o. history of rheumatoid intersitial fibrosis, Atrial Fibrillation, Coronary Artery Disease s/p SABG x 2  Hoarseness and congestion  Concerned about recent intubation and breathing problems after. 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 Symptoms occur with one block walking. Symptoms began few years ago, gradually worsening since. Associated symptoms include  difficulty breathing, dyspnea on exertion, morning cough, shortness of breath, sputum production, and wheezing. He denies chest pain, located left chest. He does not have had recent travel. Weight has been stable. Symptoms are exacerbated by moderate activity. Symptoms are alleviated by nothing.       Past Medical History:   Diagnosis Date    Arthritis     Congestive heart failure 06/07/2019    Depression     Lung disease     Rheumatoid arthritis involving multiple sites with positive rheumatoid factor 12/1/2015    Rheumatoid arthritis(714.0)     Thyroid disease      Past Surgical History:   Procedure Laterality Date    CREATION OF AORTOCORONARY ARTERY BYPASS      HERNIA REPAIR      JOINT REPLACEMENT      bi lateral hips    WI CABG, ARTERY-VEIN, FOUR  05/21/2019    Coronary Artery Bypass, 4    SPINE SURGERY       Review of patient's allergies indicates:   Allergen Reactions    Arava [leflunomide] Diarrhea    Penicillins Other (See Comments) and Hives     unknown    Tetanus vaccines and toxoid Other (See Comments)     unknown  Fever     Current Outpatient Medications on File Prior to Visit   Medication Sig Dispense Refill    albuterol (PROVENTIL) 2.5 mg /3 mL (0.083 %) nebulizer solution Take 3 mLs (2.5 mg total) by nebulization every 4 to 6 hours as needed for Wheezing or  Shortness of Breath. 360 mL 11    albuterol (PROVENTIL/VENTOLIN HFA) 90 mcg/actuation inhaler Inhale 2 puffs into the lungs every 4 (four) hours as needed for Wheezing or Shortness of Breath. 18 g 11    ascorbic acid, vitamin C, (VITAMIN C) 100 MG tablet Take 100 mg by mouth once daily.      aspirin (ECOTRIN) 81 MG EC tablet Take 325 mg by mouth once daily.       cholecalciferol, vitamin D3, 10 mcg (400 unit) Cap       co-enzyme Q-10 30 mg capsule Take 30 mg by mouth once daily.      cyanocobalamin (VITAMIN B-12) 1000 MCG tablet Take 300 mcg by mouth once daily.      doxycycline (MONODOX) 100 MG capsule Take 1 capsule (100 mg total) by mouth every 12 (twelve) hours. 20 capsule 1    ELIQUIS 5 mg Tab Take 5 mg by mouth 2 (two) times daily.      ezetimibe (ZETIA) 10 mg tablet Take 10 mg by mouth once daily.      folic acid (FOLVITE) 800 MCG Tab Take 1 tablet (800 mcg total) by mouth once daily. 90 tablet 1    furosemide (LASIX) 40 MG tablet   1    VANNESSA ORAL Take by mouth. 565 mg daily      ipratropium (ATROVENT) 21 mcg (0.03 %) nasal spray 2 sprays by Each Nostril route 3 (three) times daily as needed for Rhinitis (take before eating). 30 mL 11    iron,carb/vit C/vit B12/folic (IRON 100 PLUS ORAL) Take by mouth.      ketoconazole (NIZORAL) 2 % cream SMARTSIG:Sparingly Topical Twice Daily      losartan potassium (LOSARTAN ORAL) Take by mouth.      methotrexate 2.5 MG Tab Take 4 tablets (10 mg total) by mouth every 7 days. 48 tablet 1    metoprolol succinate (TOPROL-XL) 25 MG 24 hr tablet Take 0.5 tablets (12.5 mg total) by mouth once daily. 45 tablet 3    milk thistle 175 mg tablet Take 500 mg by mouth once daily.      multivit with minerals/lutein (MULTIVITAMIN 50 PLUS ORAL)       NITROSTAT 0.4 mg SL tablet 0.4 mg.      omega 3-dha-epa-fish oil 500-100-1,000 mg Cap Take by mouth 2 (two) times daily.      omeprazole (PRILOSEC) 40 MG capsule Take 40 mg by mouth every morning.      oxyCODONE-acetaminophen (PERCOCET)  5-325 mg per tablet Take 1 tablet by mouth every 4 (four) hours as needed.      potassium chloride (MICRO-K) 10 MEQ CpSR Take 10 mEq by mouth once.      potassium/magnesium (MAGNESIUM-POTASSIUM ORAL) Take 600 mg by mouth once daily. 600 mg/ 198      saw palmetto 500 MG capsule Take 450 mg by mouth once daily.      sertraline (ZOLOFT) 50 MG tablet Take 50 mg by mouth once daily.      solifenacin (VESICARE) 5 MG tablet Take 5 mg by mouth every morning.      sotaloL (BETAPACE) 120 MG Tab Take 60 mg by mouth 2 (two) times daily.      SYNTHROID 100 mcg tablet Take 100 mcg by mouth every morning.      tamsulosin (FLOMAX) 0.4 mg Cap Take 1 capsule by mouth every evening.      vit C/E/Zn/coppr/lutein/zeaxan (PRESERVISION AREDS-2 ORAL) Take by mouth once daily.      vitamin B12-folic acid 0.5-1 mg Tab       vitamin D 1000 units Tab Take 5,000 mg by mouth once daily.      vitamin E 1000 UNIT capsule Take 1,000 Units by mouth once daily.      VITAMIN K2 ORAL Take 100 mcg by mouth.      zinc gluconate 50 mg tablet Take 50 mg by mouth once daily.      INFLIXIMAB (REMICADE IV) Inject into the vein.       triamcinolone acetonide 0.1% (KENALOG) 0.1 % cream Apply topically 2 (two) times daily. for 10 days 45 g 2    [DISCONTINUED] methylPREDNISolone (MEDROL DOSEPACK) 4 mg tablet use as directed 1 each 0     Current Facility-Administered Medications on File Prior to Visit   Medication Dose Route Frequency Provider Last Rate Last Admin    acetaminophen tablet 650 mg  650 mg Oral Once PRN Farooq Corey MD        albuterol inhaler 2 puff  2 puff Inhalation Q20 Min PRN Farooq Corey MD        diphenhydrAMINE injection 25 mg  25 mg Intravenous Once PRN Farooq Corey MD        EPINEPHrine (EPIPEN) 0.3 mg/0.3 mL pen injection 0.3 mg  0.3 mg Intramuscular PRN Farooq Corey MD        methylPREDNISolone sodium succinate injection 40 mg  40 mg Intravenous Once PRN Farooq Corey MD        ondansetron disintegrating tablet  4 mg  4 mg Oral Once PRN Farooq Corey MD        sodium chloride 0.9% 500 mL flush bag   Intravenous PRN Farooq Corey MD        sodium chloride 0.9% flush 10 mL  10 mL Intravenous PRN Farooq Corey MD         Social History     Socioeconomic History    Marital status:    Tobacco Use    Smoking status: Former    Smokeless tobacco: Never   Substance and Sexual Activity    Alcohol use: No    Drug use: No     Social Drivers of Health     Financial Resource Strain: Low Risk  (4/22/2024)    Overall Financial Resource Strain (CARDIA)     Difficulty of Paying Living Expenses: Not hard at all   Food Insecurity: No Food Insecurity (4/22/2024)    Hunger Vital Sign     Worried About Running Out of Food in the Last Year: Never true     Ran Out of Food in the Last Year: Never true   Transportation Needs: No Transportation Needs (4/22/2024)    PRAPARE - Transportation     Lack of Transportation (Medical): No     Lack of Transportation (Non-Medical): No   Physical Activity: Inactive (4/22/2024)    Exercise Vital Sign     Days of Exercise per Week: 0 days     Minutes of Exercise per Session: 30 min   Stress: No Stress Concern Present (4/22/2024)    [employer] - Occupational Stress Questionnaire     Feeling of Stress : Not at all   Housing Stability: Unknown (4/22/2024)    Housing Stability Vital Sign     Unable to Pay for Housing in the Last Year: No     Family History   Problem Relation Name Age of Onset    Diabetes Mellitus Mother      Heart disease Mother      Heart disease Father      Rheum arthritis Father      Diabetes Mellitus Brother      Heart disease Brother         Review of Systems   Constitutional:  Positive for fatigue. Negative for fever.   HENT:  Positive for postnasal drip, rhinorrhea and congestion.    Eyes:  Negative for redness and itching.   Respiratory:  Positive for cough, sputum production, shortness of breath, dyspnea on extertion, use of rescue inhaler  "and Paroxysmal Nocturnal Dyspnea.    Cardiovascular:  Negative for chest pain, palpitations and leg swelling.   Genitourinary:  Negative for difficulty urinating and hematuria.   Endocrine:  Negative for cold intolerance and heat intolerance.    Skin:  Negative for rash.   Gastrointestinal:  Negative for nausea and abdominal pain.   Neurological:  Negative for dizziness, syncope, weakness and light-headedness.   Hematological:  Negative for adenopathy. Does not bruise/bleed easily.   Psychiatric/Behavioral:  Negative for sleep disturbance. The patient is not nervous/anxious.        Objective:      /66 (Patient Position: Sitting)   Pulse (!) 18   Resp 18   Ht 5' 7" (1.702 m)   Wt 88 kg (194 lb 0.1 oz)   SpO2 95%   BMI 30.39 kg/m²   Physical Exam  Vitals and nursing note reviewed.   Constitutional:       Appearance: He is well-developed. He is ill-appearing.   HENT:      Head: Normocephalic and atraumatic.      Comments: Change in voice  Phlegm in throat     Nose: Rhinorrhea present.      Mouth/Throat:      Pharynx: No oropharyngeal exudate.   Eyes:      Conjunctiva/sclera: Conjunctivae normal.      Pupils: Pupils are equal, round, and reactive to light.   Neck:      Thyroid: No thyromegaly.      Vascular: No JVD.      Trachea: No tracheal deviation.   Cardiovascular:      Rate and Rhythm: Normal rate and regular rhythm.      Heart sounds: Normal heart sounds. No murmur heard.  Pulmonary:      Effort: Pulmonary effort is normal. No respiratory distress.      Breath sounds: Examination of the right-lower field reveals rales. Examination of the left-lower field reveals rales. Decreased breath sounds, wheezing and rales present. No rhonchi.   Chest:      Chest wall: No tenderness.   Abdominal:      General: Bowel sounds are normal.      Palpations: Abdomen is soft.   Musculoskeletal:         General: No tenderness.      Cervical back: Neck supple.      Comments: Decreased range of motion of  multiple joints " ".     Lymphadenopathy:      Cervical: No cervical adenopathy.   Skin:     General: Skin is warm and dry.   Neurological:      Mental Status: He is alert and oriented to person, place, and time.       Personal Diagnostic Review  Chest x-ray: interstitial changes        10/4/2024     1:07 PM   Pulmonary Studies Review   SpO2 95 %   Height 5' 7" (1.702 m)   Weight 88 kg (194 lb 0.1 oz)   BMI (Calculated) 30.4   Predicted Distance 261.26   Predicted Distance Meters (Calculated) 422.93 meters   EXAMINATION:  CT CHEST WITHOUT CONTRAST     CLINICAL HISTORY:  Interstitial pulmonary disease, unspecifiedInterstitial lung disease;ILD progression, concerning masses / LAD;     COMPARISON:  None available.     TECHNIQUE:  Axial CT images were obtained of the CHEST.  Iterative reconstruction technique was used. The CT exam was performed using one or more of the following dose reduction techniques- Automated exposure control, adjustment of the mA and/or kV according to patient size, and/or use of iterative reconstructed technique.     FINDINGS:  Lungs/pleura: Bibasilar bronchiectasis and fibrosis.  Fibrosis is in an apical to basal gradient.  There is honeycombing in the lung bilateral lung bases.  There is a pulmonary opacity with air bronchogram in the lateral right lower lobe that measures 4.6 by 1.4 cm (series 4, image 352)..  Pulmonary opacity in the right middle lobe measures 1.5 x 1.5 cm (series 2, image 79).  There is a focal opacity in the lingula that measures 1.1 x 0.5 cm (series 4, image 313).  Right basilar pleural thickening.  Calcified right-sided pleural plaques.     Coronary artery calcification: Present.     Aorta: Atherosclerosis. No Aneurysm.     Heart: Normal size. No pericardial effusion.  CABG changes.     Bones/joints: Median sternotomy wires.     Other: No mediastinal or axillary lymphadenopathy. No acute finding in the visualized upper abdomen.     Impression:     1. Pulmonary fibrosis with honeycombing in " the lung bases which can be seen with UIP.  2. Bilateral pulmonary nodular opacities, the largest in the right lung base measures up to 4.6 cm.  For multiple solid nodules with any 6 mm or greater, Fleischner Society guidelines recommend follow up with non-contrast chest CT at 3-6 months and 18-24 months after discovery.  Consider three-month follow-up.  3. Bibasilar bronchiectasis.  4. Calcified pleural plaques which can be seen with prior asbestos exposure.  5. Other findings as above.        Electronically signed by:Denys Stout  Date:                                            04/24/2024  Time:                                           12:04    CT Chest Without Contrast  Narrative: EXAMINATION:  CT CHEST WITHOUT CONTRAST    CLINICAL HISTORY:  Solitary pulmonary noduleAbnormal xray - lung nodule, < 1 cm, mod-high risk;Interstitial lung disease;    TECHNIQUE:  Noncontrast CT scan of the chest    COMPARISON:  08/29/2024    FINDINGS:  Bibasilar bronchiectasis along with subpleural reticular increased opacities similar to the previous exam.  There is some honeycombing in the posterior lung bases.  Previously demonstrated the 4.6 cm opacity in the right lung base has resolved.  No new pulmonary nodules or mass lesions identified.    Patient is status post CABG.    Calcified pleural plaque in the right posterior lung base.    No pleural fluid.    No adenopathy.  There are some subcentimeter lymph nodes in the mediastinum.  Impression: Pulmonary fibrosis with some honeycombing in the lower lung zones.  Calcified pleural plaque right posterior hemithorax.  Focal area consolidation in the right lung base and in the lingula has resolved.    Electronically signed by: Joshua Sheehan  Date:    10/04/2024  Time:    12:56      Office Spirometry Results:         10/4/2024     1:07 PM 10/4/2024    12:00 PM 9/13/2024     9:59 AM 9/13/2024     9:05 AM 8/30/2024     1:24 PM 8/29/2024     3:07 PM 8/16/2024    12:10 PM   Pulmonary  "Function Tests   SpO2 95 %  97 % 95 % 95 % 98 % 96 %   Ordering Provider  Dr. Dang        Performing nurse/tech/RT  C. Credeur, RRT        Diagnosis  Asthma        Height 5' 7" (1.702 m) 5' 7" (1.702 m)  5' 7" (1.702 m) 5' 7" (1.702 m) 5' 7" (1.702 m)    Weight 88 kg (194 lb 0.1 oz) 88 kg (194 lb 0.1 oz)  88 kg (194 lb 0.1 oz) 88 kg (194 lb 0.1 oz) 88 kg (194 lb 0.1 oz)    BMI (Calculated) 30.4 30.4  30.4 30.4 30.4    6MWT Status  completed without stopping        Patient Reported  No complaints        Was O2 used?  No        6MW Distance walked (feet)  450 feet        Distance walked (meters)  137.16 meters        Did patient stop?  No        Type of assistive device(s) used?  a walker        Oxygen Saturation  96 %        Supplemental Oxygen  Room Air        Heart Rate  77 bpm        Blood Pressure  126/60        Jason Dyspnea Rating   moderate        Oxygen Saturation  93 %        Supplemental Oxygen  Room Air        Heart Rate  93 bpm        Blood Pressure  137/69        Jason Dyspnea Rating   light        Recovery Time (seconds)  240 seconds        Oxygen Saturation  95 %        Supplemental Oxygen  Room Air        Heart Rate  74 bpm        Blood Pressure  126/66        Jason Dyspnea Rating   light        Is procedure ready for interpretation?  Yes        Oxygen Qualification?  No              10/4/2024     1:07 PM   Pulmonary Studies Review   SpO2 95 %   Height 5' 7" (1.702 m)   Weight 88 kg (194 lb 0.1 oz)   BMI (Calculated) 30.4   Predicted Distance 261.26   Predicted Distance Meters (Calculated) 422.93 meters           No results found for this or any previous visit (from the past 2 weeks).    Assessment:            Interstitial pulmonary disease, unspecified  -     Complete PFT with bronchodilator; Future; Expected date: 10/04/2024  -     CT Chest High Resolution Without Contrast; Future; Expected date: 10/04/2024    Other orders  -     guaiFENesin (MUCINEX) 1,200 mg Ta12; Take 1 tablet by mouth 2 (two) " times daily as needed (chest congestion).  Dispense: 60 tablet; Refill: 11            Outpatient Encounter Medications as of 10/4/2024   Medication Sig Dispense Refill    albuterol (PROVENTIL) 2.5 mg /3 mL (0.083 %) nebulizer solution Take 3 mLs (2.5 mg total) by nebulization every 4 to 6 hours as needed for Wheezing or Shortness of Breath. 360 mL 11    albuterol (PROVENTIL/VENTOLIN HFA) 90 mcg/actuation inhaler Inhale 2 puffs into the lungs every 4 (four) hours as needed for Wheezing or Shortness of Breath. 18 g 11    ascorbic acid, vitamin C, (VITAMIN C) 100 MG tablet Take 100 mg by mouth once daily.      aspirin (ECOTRIN) 81 MG EC tablet Take 325 mg by mouth once daily.       cholecalciferol, vitamin D3, 10 mcg (400 unit) Cap       co-enzyme Q-10 30 mg capsule Take 30 mg by mouth once daily.      cyanocobalamin (VITAMIN B-12) 1000 MCG tablet Take 300 mcg by mouth once daily.      doxycycline (MONODOX) 100 MG capsule Take 1 capsule (100 mg total) by mouth every 12 (twelve) hours. 20 capsule 1    ELIQUIS 5 mg Tab Take 5 mg by mouth 2 (two) times daily.      ezetimibe (ZETIA) 10 mg tablet Take 10 mg by mouth once daily.      folic acid (FOLVITE) 800 MCG Tab Take 1 tablet (800 mcg total) by mouth once daily. 90 tablet 1    furosemide (LASIX) 40 MG tablet   1    VANNESSA ORAL Take by mouth. 565 mg daily      ipratropium (ATROVENT) 21 mcg (0.03 %) nasal spray 2 sprays by Each Nostril route 3 (three) times daily as needed for Rhinitis (take before eating). 30 mL 11    iron,carb/vit C/vit B12/folic (IRON 100 PLUS ORAL) Take by mouth.      ketoconazole (NIZORAL) 2 % cream SMARTSIG:Sparingly Topical Twice Daily      losartan potassium (LOSARTAN ORAL) Take by mouth.      methotrexate 2.5 MG Tab Take 4 tablets (10 mg total) by mouth every 7 days. 48 tablet 1    metoprolol succinate (TOPROL-XL) 25 MG 24 hr tablet Take 0.5 tablets (12.5 mg total) by mouth once daily. 45 tablet 3    milk thistle 175 mg tablet Take 500 mg by  mouth once daily.      multivit with minerals/lutein (MULTIVITAMIN 50 PLUS ORAL)       NITROSTAT 0.4 mg SL tablet 0.4 mg.      omega 3-dha-epa-fish oil 500-100-1,000 mg Cap Take by mouth 2 (two) times daily.      omeprazole (PRILOSEC) 40 MG capsule Take 40 mg by mouth every morning.      oxyCODONE-acetaminophen (PERCOCET) 5-325 mg per tablet Take 1 tablet by mouth every 4 (four) hours as needed.      potassium chloride (MICRO-K) 10 MEQ CpSR Take 10 mEq by mouth once.      potassium/magnesium (MAGNESIUM-POTASSIUM ORAL) Take 600 mg by mouth once daily. 600 mg/ 198      saw palmetto 500 MG capsule Take 450 mg by mouth once daily.      sertraline (ZOLOFT) 50 MG tablet Take 50 mg by mouth once daily.      solifenacin (VESICARE) 5 MG tablet Take 5 mg by mouth every morning.      sotaloL (BETAPACE) 120 MG Tab Take 60 mg by mouth 2 (two) times daily.      SYNTHROID 100 mcg tablet Take 100 mcg by mouth every morning.      tamsulosin (FLOMAX) 0.4 mg Cap Take 1 capsule by mouth every evening.      vit C/E/Zn/coppr/lutein/zeaxan (PRESERVISION AREDS-2 ORAL) Take by mouth once daily.      vitamin B12-folic acid 0.5-1 mg Tab       vitamin D 1000 units Tab Take 5,000 mg by mouth once daily.      vitamin E 1000 UNIT capsule Take 1,000 Units by mouth once daily.      VITAMIN K2 ORAL Take 100 mcg by mouth.      zinc gluconate 50 mg tablet Take 50 mg by mouth once daily.      guaiFENesin (MUCINEX) 1,200 mg Ta12 Take 1 tablet by mouth 2 (two) times daily as needed (chest congestion). 60 tablet 11    INFLIXIMAB (REMICADE IV) Inject into the vein.       triamcinolone acetonide 0.1% (KENALOG) 0.1 % cream Apply topically 2 (two) times daily. for 10 days 45 g 2    [DISCONTINUED] methylPREDNISolone (MEDROL DOSEPACK) 4 mg tablet use as directed 1 each 0     Facility-Administered Encounter Medications as of 10/4/2024   Medication Dose Route Frequency Provider Last Rate Last Admin    acetaminophen tablet 650 mg  650 mg Oral Once PRN Madhav  Farooq FRIEDMAN MD        albuterol inhaler 2 puff  2 puff Inhalation Q20 Min Farooq Narvaez MD        diphenhydrAMINE injection 25 mg  25 mg Intravenous Once PRFarooq Zimmer MD        EPINEPHrine (EPIPEN) 0.3 mg/0.3 mL pen injection 0.3 mg  0.3 mg Intramuscular PRN Farooq Corey MD        methylPREDNISolone sodium succinate injection 40 mg  40 mg Intravenous Once PRN Farooq Corey MD        ondansetron disintegrating tablet 4 mg  4 mg Oral Once PRN Farooq Corey MD        sodium chloride 0.9% 500 mL flush bag   Intravenous PRFarooq Zimmer MD        sodium chloride 0.9% flush 10 mL  10 mL Intravenous PRFarooq Zimmer MD         Plan:       Requested Prescriptions     Signed Prescriptions Disp Refills    guaiFENesin (MUCINEX) 1,200 mg Ta12 60 tablet 11     Sig: Take 1 tablet by mouth 2 (two) times daily as needed (chest congestion).     Problem List Items Addressed This Visit       Interstitial pulmonary disease, unspecified - Primary    Relevant Orders    Complete PFT with bronchodilator    CT Chest High Resolution Without Contrast            Follow up in about 6 months (around 4/4/2025) for CT - on return visit, PFT -return.    MEDICAL DECISION MAKING: Moderate to high complexity.  Overall, the multiple problems listed are of moderate to high severity that may impact quality of life and activities of daily living. Side effects of medications, treatment plan as well as options and alternatives reviewed and discussed with patient. There was counseling of patient concerning these issues.    Total time spent in counseling and coordination of care - 35  minutes of total time spent on the encounter, which includes face to face time and non-face to face time preparing to see the patient (eg, review of tests), Obtaining and/or reviewing separately obtained history, Documenting clinical information in the electronic or other health record, Independently interpreting results (not  separately reported) and communicating results to the patient/family/caregiver, or Care coordination (not separately reported).    Time was used in discussion of prognosis, risks, benefits of treatment, instructions and compliance with regimen . Discussion with other physicians and/or health care providers - home health or for use of durable medical equipment (oxygen, nebulizers, CPAP, BiPAP) occurred.

## 2024-10-11 ENCOUNTER — INFUSION (OUTPATIENT)
Dept: INFUSION THERAPY | Facility: HOSPITAL | Age: 79
End: 2024-10-11
Attending: PHYSICIAN ASSISTANT
Payer: MEDICARE

## 2024-10-11 VITALS
WEIGHT: 196.19 LBS | HEART RATE: 51 BPM | TEMPERATURE: 98 F | HEIGHT: 67 IN | BODY MASS INDEX: 30.79 KG/M2 | DIASTOLIC BLOOD PRESSURE: 56 MMHG | RESPIRATION RATE: 18 BRPM | SYSTOLIC BLOOD PRESSURE: 101 MMHG | OXYGEN SATURATION: 98 %

## 2024-10-11 DIAGNOSIS — M05.79 RHEUMATOID ARTHRITIS INVOLVING MULTIPLE SITES WITH POSITIVE RHEUMATOID FACTOR: Primary | ICD-10-CM

## 2024-10-11 PROCEDURE — 25000003 PHARM REV CODE 250: Performed by: INTERNAL MEDICINE

## 2024-10-11 PROCEDURE — 63600175 PHARM REV CODE 636 W HCPCS: Mod: JZ,JA,JG | Performed by: INTERNAL MEDICINE

## 2024-10-11 PROCEDURE — 96365 THER/PROPH/DIAG IV INF INIT: CPT

## 2024-10-11 RX ORDER — ACETAMINOPHEN 325 MG/1
650 TABLET ORAL
Status: DISCONTINUED | OUTPATIENT
Start: 2024-10-11 | End: 2024-10-11 | Stop reason: HOSPADM

## 2024-10-11 RX ORDER — HEPARIN 100 UNIT/ML
500 SYRINGE INTRAVENOUS
OUTPATIENT
Start: 2024-11-08

## 2024-10-11 RX ORDER — DIPHENHYDRAMINE HYDROCHLORIDE 50 MG/ML
25 INJECTION INTRAMUSCULAR; INTRAVENOUS
OUTPATIENT
Start: 2024-11-08

## 2024-10-11 RX ORDER — DIPHENHYDRAMINE HYDROCHLORIDE 50 MG/ML
50 INJECTION INTRAMUSCULAR; INTRAVENOUS ONCE AS NEEDED
OUTPATIENT
Start: 2024-11-08

## 2024-10-11 RX ORDER — METHYLPREDNISOLONE SOD SUCC 125 MG
100 VIAL (EA) INJECTION
OUTPATIENT
Start: 2024-11-08

## 2024-10-11 RX ORDER — SODIUM CHLORIDE 0.9 % (FLUSH) 0.9 %
10 SYRINGE (ML) INJECTION
OUTPATIENT
Start: 2024-11-08

## 2024-10-11 RX ORDER — DIPHENHYDRAMINE HCL 25 MG
25 CAPSULE ORAL
Start: 2024-11-08

## 2024-10-11 RX ORDER — ACETAMINOPHEN 325 MG/1
650 TABLET ORAL
OUTPATIENT
Start: 2024-11-08

## 2024-10-11 RX ORDER — EPINEPHRINE 0.3 MG/.3ML
0.3 INJECTION SUBCUTANEOUS ONCE AS NEEDED
OUTPATIENT
Start: 2024-11-08

## 2024-10-11 RX ORDER — ACETAMINOPHEN 325 MG/1
650 TABLET ORAL
Start: 2024-11-08

## 2024-10-11 RX ORDER — DIPHENHYDRAMINE HCL 25 MG
25 CAPSULE ORAL
Status: DISCONTINUED | OUTPATIENT
Start: 2024-10-11 | End: 2024-10-11 | Stop reason: HOSPADM

## 2024-10-11 RX ADMIN — SODIUM CHLORIDE: 9 INJECTION, SOLUTION INTRAVENOUS at 09:10

## 2024-10-11 RX ADMIN — SODIUM CHLORIDE 750 MG: 9 INJECTION, SOLUTION INTRAVENOUS at 09:10

## 2024-10-11 NOTE — PLAN OF CARE
Problem: Adult Inpatient Plan of Care  Goal: Plan of Care Review  Outcome: Progressing  Flowsheets (Taken 10/11/2024 1016)  Plan of Care Reviewed With: patient  Goal: Patient-Specific Goal (Individualized)  Outcome: Progressing  Flowsheets (Taken 10/11/2024 1026)  Individualized Care Needs: Reclined, pillow under arm with IV. Refused premeds. Note: don't keep arm straight out- got stiff after infusion caused some discomfort  Anxieties, Fears or Concerns: Patient states he feels ok but a little tired from working on building a shed  Patient/Family-Specific Goals (Include Timeframe): Patient tolerated infusion today.  Goal: Optimal Comfort and Wellbeing  Outcome: Progressing  Intervention: Monitor Pain and Promote Comfort  Flowsheets (Taken 10/11/2024 1026)  Pain Management Interventions:   quiet environment facilitated   pillow support provided  Intervention: Provide Person-Centered Care  Flowsheets (Taken 10/11/2024 1026)  Trust Relationship/Rapport:   care explained   reassurance provided   choices provided   thoughts/feelings acknowledged   emotional support provided   empathic listening provided   questions answered   questions encouraged     Problem: Fall Injury Risk  Goal: Absence of Fall and Fall-Related Injury  Outcome: Progressing  Intervention: Identify and Manage Contributors  Flowsheets (Taken 10/11/2024 1026)  Self-Care Promotion: BADL personal objects within reach  Medication Review/Management:   medications reviewed   infusion initiated  Intervention: Promote Injury-Free Environment  Flowsheets (Taken 10/11/2024 1026)  Safety Promotion/Fall Prevention:   in recliner, wheels locked   instructed to call staff for mobility   lighting adjusted   medications reviewed     Problem: Infection  Goal: Absence of Infection Signs and Symptoms  Outcome: Progressing  Intervention: Prevent or Manage Infection  Flowsheets (Taken 10/11/2024 1026)  Infection Management: aseptic technique maintained

## 2024-10-11 NOTE — DISCHARGE INSTRUCTIONS
.[facility]  [address] [address]  [address] [address]  [phone] phone     [phone] fax  Hours of Operation: Monday- Friday 8:00am- 5:00pm  After hours phone  [phone]  Hematology / Oncology Physicians on call    Dr. Jimmy Hinson           Nurse Practitioners:     Meenu Ye, HARLAN Doanhue, ANA Garcia, HARLAN Mendez, HARLAN Suarez, NP    Please don't hesitate to call if you have any concerns.      FALL PREVENTION   Falls often occur due to slipping, tripping or losing your balance. Here are ways to reduce your risk of falling again.   Was there anything that caused your fall that can be fixed, removed or replaced?   Make your home safe by keeping walkways clear of objects you may trip over.   Use non-slip pads under rugs.   Do not walk in poorly lit areas.   Do not stand on chairs or wobbly ladders.   Use caution when reaching overhead or looking upward. This position can cause a loss of balance.   Be sure your shoes fit properly, have non-slip bottoms and are in good condition.   Be cautious when going up and down stairs, curbs, and when walking on uneven sidewalks.   If your balance is poor, consider using a cane or walker.   If your fall was related to alcohol use, stop or limit alcohol intake.   If your fall was related to use of sleeping medicines, talk to your doctor about this. You may need to reduce your dosage at bedtime if you awaken during the night to go to the bathroom.   To reduce the need for nighttime bathroom trips:   Avoid drinking fluids for several hours before going to bed   Empty your bladder before going to bed   Men can keep a urinal at the bedside   © 8908-2466 [employer], [address] [place] [address], [place] [address]. All rights reserved. This information is not intended as a substitute for professional medical care. Always follow your healthcare  professional's instructions.    WAYS TO HELP PREVENT INFECTION        WASH YOUR HANDS OFTEN DURING THE DAY, ESPECIALLY BEFORE YOU EAT, AFTER USING THE BATHROOM, AND AFTER TOUCHING ANIMALS    STAY AWAY FROM PEOPLE WHO HAVE ILLNESSES YOU CAN CATCH; SUCH AS COLDS, FLU, CHICKEN POX    TRY TO AVOID CROWDS    STAY AWAY FROM CHILDREN WHO RECENTLY HAVE RECEIVED LIVE VIRUS VACCINES    MAINTAIN GOOD MOUTH CARE    DO NOT SQUEEZE OR SCRATCH PIMPLES    CLEAN CUTS & SCRAPES RIGHT AWAY AND DAILY UNTIL HEALED WITH WARM WATER, SOAP & AN ANTISEPTIC    AVOID CONTACT WITH LITTER BOXES, BIRD CAGES, & FISH TANKS    AVOID STANDING WATER, IE., BIRD BATHS, FLOWER POTS/VASES, OR HUMIDIFIERS    WEAR GLOVES WHEN GARDENING OR CLEANING UP AFTER OTHERS, ESPECIALLY BABIES & SMALL CHILDREN    DO NOT EAT RAW FISH, SEAFOOD, MEAT, OR EGGS

## 2024-10-22 ENCOUNTER — LAB VISIT (OUTPATIENT)
Dept: LAB | Facility: HOSPITAL | Age: 79
End: 2024-10-22
Attending: INTERNAL MEDICINE
Payer: MEDICARE

## 2024-10-22 DIAGNOSIS — D84.9 IMMUNOCOMPROMISED PATIENT: Chronic | ICD-10-CM

## 2024-10-22 DIAGNOSIS — Z51.81 MEDICATION MONITORING ENCOUNTER: ICD-10-CM

## 2024-10-22 LAB
ALBUMIN SERPL BCP-MCNC: 3.6 G/DL (ref 3.5–5.2)
ALP SERPL-CCNC: 91 U/L (ref 40–150)
ALT SERPL W/O P-5'-P-CCNC: 18 U/L (ref 10–44)
ANION GAP SERPL CALC-SCNC: 8 MMOL/L (ref 8–16)
AST SERPL-CCNC: 29 U/L (ref 10–40)
BASOPHILS # BLD AUTO: 0.04 K/UL (ref 0–0.2)
BASOPHILS NFR BLD: 0.7 % (ref 0–1.9)
BILIRUB SERPL-MCNC: 0.4 MG/DL (ref 0.1–1)
BUN SERPL-MCNC: 25 MG/DL (ref 8–23)
CALCIUM SERPL-MCNC: 9.6 MG/DL (ref 8.7–10.5)
CHLORIDE SERPL-SCNC: 102 MMOL/L (ref 95–110)
CO2 SERPL-SCNC: 29 MMOL/L (ref 23–29)
CREAT SERPL-MCNC: 1 MG/DL (ref 0.5–1.4)
DIFFERENTIAL METHOD BLD: ABNORMAL
EOSINOPHIL # BLD AUTO: 0.3 K/UL (ref 0–0.5)
EOSINOPHIL NFR BLD: 5.8 % (ref 0–8)
ERYTHROCYTE [DISTWIDTH] IN BLOOD BY AUTOMATED COUNT: 13.5 % (ref 11.5–14.5)
EST. GFR  (NO RACE VARIABLE): >60 ML/MIN/1.73 M^2
GLUCOSE SERPL-MCNC: 100 MG/DL (ref 70–110)
HCT VFR BLD AUTO: 34 % (ref 40–54)
HGB BLD-MCNC: 11.5 G/DL (ref 14–18)
IMM GRANULOCYTES # BLD AUTO: 0.02 K/UL (ref 0–0.04)
IMM GRANULOCYTES NFR BLD AUTO: 0.3 % (ref 0–0.5)
LYMPHOCYTES # BLD AUTO: 1.9 K/UL (ref 1–4.8)
LYMPHOCYTES NFR BLD: 33.1 % (ref 18–48)
MCH RBC QN AUTO: 34 PG (ref 27–31)
MCHC RBC AUTO-ENTMCNC: 33.8 G/DL (ref 32–36)
MCV RBC AUTO: 101 FL (ref 82–98)
MONOCYTES # BLD AUTO: 0.7 K/UL (ref 0.3–1)
MONOCYTES NFR BLD: 12.3 % (ref 4–15)
NEUTROPHILS # BLD AUTO: 2.8 K/UL (ref 1.8–7.7)
NEUTROPHILS NFR BLD: 47.8 % (ref 38–73)
NRBC BLD-RTO: 0 /100 WBC
PLATELET # BLD AUTO: 246 K/UL (ref 150–450)
PMV BLD AUTO: 8.2 FL (ref 9.2–12.9)
POTASSIUM SERPL-SCNC: 4.4 MMOL/L (ref 3.5–5.1)
PROT SERPL-MCNC: 7.8 G/DL (ref 6–8.4)
RBC # BLD AUTO: 3.38 M/UL (ref 4.6–6.2)
SODIUM SERPL-SCNC: 139 MMOL/L (ref 136–145)
WBC # BLD AUTO: 5.83 K/UL (ref 3.9–12.7)

## 2024-10-22 PROCEDURE — 85025 COMPLETE CBC W/AUTO DIFF WBC: CPT | Performed by: INTERNAL MEDICINE

## 2024-10-22 PROCEDURE — 80053 COMPREHEN METABOLIC PANEL: CPT | Performed by: INTERNAL MEDICINE

## 2024-10-22 PROCEDURE — 36415 COLL VENOUS BLD VENIPUNCTURE: CPT | Performed by: INTERNAL MEDICINE

## 2024-10-28 DIAGNOSIS — J20.9 BRONCHITIS WITH ASTHMA, SUBACUTE: ICD-10-CM

## 2024-10-28 DIAGNOSIS — J45.909 BRONCHITIS WITH ASTHMA, SUBACUTE: ICD-10-CM

## 2024-10-31 RX ORDER — DOXYCYCLINE 100 MG/1
100 CAPSULE ORAL EVERY 12 HOURS
Qty: 20 CAPSULE | Refills: 0 | Status: SHIPPED | OUTPATIENT
Start: 2024-10-31

## 2024-11-08 ENCOUNTER — INFUSION (OUTPATIENT)
Dept: INFUSION THERAPY | Facility: HOSPITAL | Age: 79
End: 2024-11-08
Attending: PHYSICIAN ASSISTANT
Payer: MEDICARE

## 2024-11-08 VITALS
BODY MASS INDEX: 31.44 KG/M2 | OXYGEN SATURATION: 96 % | WEIGHT: 200.31 LBS | TEMPERATURE: 99 F | SYSTOLIC BLOOD PRESSURE: 96 MMHG | DIASTOLIC BLOOD PRESSURE: 59 MMHG | RESPIRATION RATE: 16 BRPM | HEART RATE: 55 BPM | HEIGHT: 67 IN

## 2024-11-08 DIAGNOSIS — M05.79 RHEUMATOID ARTHRITIS INVOLVING MULTIPLE SITES WITH POSITIVE RHEUMATOID FACTOR: Primary | ICD-10-CM

## 2024-11-08 PROCEDURE — 96365 THER/PROPH/DIAG IV INF INIT: CPT

## 2024-11-08 PROCEDURE — 63600175 PHARM REV CODE 636 W HCPCS: Mod: JZ,JA,JG | Performed by: INTERNAL MEDICINE

## 2024-11-08 PROCEDURE — 25000003 PHARM REV CODE 250: Performed by: INTERNAL MEDICINE

## 2024-11-08 RX ORDER — METHYLPREDNISOLONE SOD SUCC 125 MG
100 VIAL (EA) INJECTION
OUTPATIENT
Start: 2024-12-06

## 2024-11-08 RX ORDER — DIPHENHYDRAMINE HYDROCHLORIDE 50 MG/ML
25 INJECTION INTRAMUSCULAR; INTRAVENOUS
OUTPATIENT
Start: 2024-12-06

## 2024-11-08 RX ORDER — DIPHENHYDRAMINE HYDROCHLORIDE 50 MG/ML
50 INJECTION INTRAMUSCULAR; INTRAVENOUS ONCE AS NEEDED
OUTPATIENT
Start: 2024-12-06

## 2024-11-08 RX ORDER — ACETAMINOPHEN 325 MG/1
650 TABLET ORAL
Start: 2024-12-06

## 2024-11-08 RX ORDER — SODIUM CHLORIDE 0.9 % (FLUSH) 0.9 %
10 SYRINGE (ML) INJECTION
OUTPATIENT
Start: 2024-12-06

## 2024-11-08 RX ORDER — ACETAMINOPHEN 325 MG/1
650 TABLET ORAL
OUTPATIENT
Start: 2024-12-06

## 2024-11-08 RX ORDER — HEPARIN 100 UNIT/ML
500 SYRINGE INTRAVENOUS
OUTPATIENT
Start: 2024-12-06

## 2024-11-08 RX ORDER — EPINEPHRINE 0.3 MG/.3ML
0.3 INJECTION SUBCUTANEOUS ONCE AS NEEDED
OUTPATIENT
Start: 2024-12-06

## 2024-11-08 RX ORDER — DIPHENHYDRAMINE HCL 25 MG
25 CAPSULE ORAL
Start: 2024-12-06

## 2024-11-08 RX ADMIN — SODIUM CHLORIDE 750 MG: 9 INJECTION, SOLUTION INTRAVENOUS at 09:11

## 2024-11-08 NOTE — PLAN OF CARE
Problem: Adult Inpatient Plan of Care  Goal: Plan of Care Review  Outcome: Progressing  Flowsheets (Taken 11/8/2024 1027)  Plan of Care Reviewed With: patient  Goal: Patient-Specific Goal (Individualized)  Outcome: Progressing  Flowsheets (Taken 11/8/2024 1027)  Individualized Care Needs: Reclined, pillow under right arm with IV. Refused premeds. Stated that he has not been taking them since the Remicaid.  Anxieties, Fears or Concerns: Denies  Patient/Family-Specific Goals (Include Timeframe): Will tolerate infusion without event.  Goal: Optimal Comfort and Wellbeing  Outcome: Progressing     Problem: Infection  Goal: Absence of Infection Signs and Symptoms  Outcome: Progressing  Intervention: Prevent or Manage Infection  Flowsheets (Taken 11/8/2024 1027)  Infection Management: aseptic technique maintained

## 2024-11-08 NOTE — DISCHARGE INSTRUCTIONS
.  [facility]  [address] [facility]  [address]  [phone] phone     [phone] fax  Hours of Operation: Monday- Friday 8:00am- 5:00pm  After hours phone  [phone]  Hematology / Oncology Physicians on call      VINNIE Santacruz Dr., NP Phaon Dunbar, NP Khelsea Conley, FNP    Please call with any concerns regarding your appointment today.      .WAYS TO HELP PREVENT INFECTION        WASH YOUR HANDS OFTEN DURING THE DAY, ESPECIALLY BEFORE YOU EAT, AFTER USING THE BATHROOM, AND AFTER TOUCHING ANIMALS    STAY AWAY FROM PEOPLE WHO HAVE ILLNESSES YOU CAN CATCH; SUCH AS COLDS, FLU, CHICKEN POX    TRY TO AVOID CROWDS    STAY AWAY FROM CHILDREN WHO RECENTLY HAVE RECEIVED LIVE VIRUS VACCINES    MAINTAIN GOOD MOUTH CARE    DO NOT SQUEEZE OR SCRATCH PIMPLES    CLEAN CUTS & SCRAPES RIGHT AWAY AND DAILY UNTIL HEALED WITH WARM WATER, SOAP & AN ANTISEPTIC    AVOID CONTACT WITH LITTER BOXES, BIRD CAGES, & FISH TANKS    AVOID STANDING WATER, IE., BIRD BATHS, FLOWER POTS/VASES, OR HUMIDIFIERS    WEAR GLOVES WHEN GARDENING OR CLEANING UP AFTER OTHERS, ESPECIALLY BABIES & SMALL CHILDREN    DO NOT EAT RAW FISH, SEAFOOD, MEAT, OR EGGS    .FALL PREVENTION   Falls often occur due to slipping, tripping or losing your balance. Here are ways to reduce your risk of falling again.   Was there anything that caused your fall that can be fixed, removed or replaced?   Make your home safe by keeping walkways clear of objects you may trip over.   Use non-slip pads under rugs.   Do not walk in poorly lit areas.   Do not stand on chairs or wobbly ladders.   Use caution when reaching overhead or looking upward. This position can cause a loss of balance.   Be sure your shoes fit properly, have non-slip bottoms and are in good condition.   Be cautious when going up and down stairs, curbs, and when walking on uneven sidewalks.   If your balance is  poor, consider using a cane or walker.   If your fall was related to alcohol use, stop or limit alcohol intake.   If your fall was related to use of sleeping medicines, talk to your doctor about this. You may need to reduce your dosage at bedtime if you awaken during the night to go to the bathroom.   To reduce the need for nighttime bathroom trips:   Avoid drinking fluids for several hours before going to bed   Empty your bladder before going to bed   Men can keep a urinal at the bedside   © 4734-3778 [facility], [address] [place] [address], [place] [address]. All rights reserved. This information is not intended as a substitute for professional medical care. Always follow your healthcare professional's instructions.

## 2024-11-08 NOTE — NURSING
Patient to unit today for Orencia. Refused premeds and states that he has not been taking them for a while, and that he mainly took them when he was on the Remicaid. Tolerated premeds well. Discharged without distress or complaints; ambulatory with cane.

## 2024-12-06 ENCOUNTER — INFUSION (OUTPATIENT)
Dept: INFUSION THERAPY | Facility: HOSPITAL | Age: 79
End: 2024-12-06
Attending: PHYSICIAN ASSISTANT
Payer: MEDICARE

## 2024-12-06 VITALS
WEIGHT: 205.38 LBS | BODY MASS INDEX: 32.16 KG/M2 | OXYGEN SATURATION: 96 % | HEART RATE: 60 BPM | RESPIRATION RATE: 16 BRPM | DIASTOLIC BLOOD PRESSURE: 60 MMHG | TEMPERATURE: 98 F | SYSTOLIC BLOOD PRESSURE: 111 MMHG

## 2024-12-06 DIAGNOSIS — M05.79 RHEUMATOID ARTHRITIS INVOLVING MULTIPLE SITES WITH POSITIVE RHEUMATOID FACTOR: Primary | ICD-10-CM

## 2024-12-06 PROCEDURE — 96365 THER/PROPH/DIAG IV INF INIT: CPT

## 2024-12-06 PROCEDURE — 25000003 PHARM REV CODE 250: Performed by: INTERNAL MEDICINE

## 2024-12-06 RX ORDER — DIPHENHYDRAMINE HYDROCHLORIDE 50 MG/ML
25 INJECTION INTRAMUSCULAR; INTRAVENOUS
OUTPATIENT
Start: 2025-01-03

## 2024-12-06 RX ORDER — METHYLPREDNISOLONE SOD SUCC 125 MG
100 VIAL (EA) INJECTION
OUTPATIENT
Start: 2025-01-03

## 2024-12-06 RX ORDER — SODIUM CHLORIDE 0.9 % (FLUSH) 0.9 %
10 SYRINGE (ML) INJECTION
OUTPATIENT
Start: 2025-01-03

## 2024-12-06 RX ORDER — DIPHENHYDRAMINE HCL 25 MG
25 CAPSULE ORAL
Start: 2025-01-03

## 2024-12-06 RX ORDER — ACETAMINOPHEN 325 MG/1
650 TABLET ORAL
OUTPATIENT
Start: 2025-01-03

## 2024-12-06 RX ORDER — DIPHENHYDRAMINE HYDROCHLORIDE 50 MG/ML
50 INJECTION INTRAMUSCULAR; INTRAVENOUS ONCE AS NEEDED
OUTPATIENT
Start: 2025-01-03

## 2024-12-06 RX ORDER — HEPARIN 100 UNIT/ML
500 SYRINGE INTRAVENOUS
OUTPATIENT
Start: 2025-01-03

## 2024-12-06 RX ORDER — EPINEPHRINE 0.3 MG/.3ML
0.3 INJECTION SUBCUTANEOUS ONCE AS NEEDED
OUTPATIENT
Start: 2025-01-03

## 2024-12-06 RX ORDER — ACETAMINOPHEN 325 MG/1
650 TABLET ORAL
Start: 2025-01-03

## 2024-12-06 RX ADMIN — SODIUM CHLORIDE 750 MG: 9 INJECTION, SOLUTION INTRAVENOUS at 09:12

## 2024-12-26 ENCOUNTER — TELEPHONE (OUTPATIENT)
Dept: RHEUMATOLOGY | Facility: CLINIC | Age: 79
End: 2024-12-26
Payer: MEDICARE

## 2024-12-26 NOTE — TELEPHONE ENCOUNTER
----- Message from Pharmacist Ese sent at 12/26/2024  1:20 PM CST -----  Contact: Krista/wife  Mary Nicole,     What symptoms is the patient experiencing? When did they start? Have they worsened since they started? Are they consistent or coming and going?     Orencia was last given 12/6 and he is due for a dose on 1/3. If the flare just started, it could be that he is due for the dose. If the flare started right after the dose, it could be a reaction to the med itself. Once I have those clinical questions answered above I can better advise the patient's wife. Thanks so much!     Ese Dudley, PharmD  Ambulatory Clinical Pharmacist- Rheumatology  ----- Message -----  From: Bonnie uDarte MA  Sent: 12/26/2024  12:03 PM CST  To: Ese Dudley, Faustino      ----- Message -----  From: Isela Leyva  Sent: 12/26/2024  12:00 PM CST  To: Rachid Israel Staff    Pt wife is calling in regards to the pt Flaring up from Orencia.please call back at .[phone]        Thanks  GLADYS

## 2024-12-26 NOTE — TELEPHONE ENCOUNTER
Hand pain.Started 2 days ago on the 24th  and lasted for a day. Equate arthritis pain meds.  Consistent pain. Ease up last night

## 2025-01-03 ENCOUNTER — INFUSION (OUTPATIENT)
Dept: INFUSION THERAPY | Facility: HOSPITAL | Age: 80
End: 2025-01-03
Attending: PHYSICIAN ASSISTANT
Payer: MEDICARE

## 2025-01-03 VITALS
HEIGHT: 67 IN | SYSTOLIC BLOOD PRESSURE: 102 MMHG | TEMPERATURE: 98 F | RESPIRATION RATE: 16 BRPM | DIASTOLIC BLOOD PRESSURE: 55 MMHG | BODY MASS INDEX: 32.67 KG/M2 | HEART RATE: 47 BPM | WEIGHT: 208.13 LBS | OXYGEN SATURATION: 97 %

## 2025-01-03 DIAGNOSIS — M05.79 RHEUMATOID ARTHRITIS INVOLVING MULTIPLE SITES WITH POSITIVE RHEUMATOID FACTOR: Primary | ICD-10-CM

## 2025-01-03 PROCEDURE — 96365 THER/PROPH/DIAG IV INF INIT: CPT

## 2025-01-03 PROCEDURE — 25000003 PHARM REV CODE 250: Performed by: INTERNAL MEDICINE

## 2025-01-03 RX ORDER — DIPHENHYDRAMINE HCL 25 MG
25 CAPSULE ORAL
Start: 2025-01-31

## 2025-01-03 RX ORDER — ACETAMINOPHEN 325 MG/1
650 TABLET ORAL
Status: DISCONTINUED | OUTPATIENT
Start: 2025-01-03 | End: 2025-01-03 | Stop reason: HOSPADM

## 2025-01-03 RX ORDER — EPINEPHRINE 0.3 MG/.3ML
0.3 INJECTION SUBCUTANEOUS ONCE AS NEEDED
OUTPATIENT
Start: 2025-01-31

## 2025-01-03 RX ORDER — HEPARIN 100 UNIT/ML
500 SYRINGE INTRAVENOUS
OUTPATIENT
Start: 2025-01-31

## 2025-01-03 RX ORDER — DIPHENHYDRAMINE HYDROCHLORIDE 50 MG/ML
25 INJECTION INTRAMUSCULAR; INTRAVENOUS
OUTPATIENT
Start: 2025-01-31

## 2025-01-03 RX ORDER — DIPHENHYDRAMINE HYDROCHLORIDE 50 MG/ML
50 INJECTION INTRAMUSCULAR; INTRAVENOUS ONCE AS NEEDED
OUTPATIENT
Start: 2025-01-31

## 2025-01-03 RX ORDER — ACETAMINOPHEN 325 MG/1
650 TABLET ORAL
Start: 2025-01-31

## 2025-01-03 RX ORDER — SODIUM CHLORIDE 0.9 % (FLUSH) 0.9 %
10 SYRINGE (ML) INJECTION
OUTPATIENT
Start: 2025-01-31

## 2025-01-03 RX ORDER — METHYLPREDNISOLONE SOD SUCC 125 MG
100 VIAL (EA) INJECTION
OUTPATIENT
Start: 2025-01-31

## 2025-01-03 RX ORDER — DIPHENHYDRAMINE HCL 25 MG
25 CAPSULE ORAL
Status: DISCONTINUED | OUTPATIENT
Start: 2025-01-03 | End: 2025-01-03 | Stop reason: HOSPADM

## 2025-01-03 RX ORDER — ACETAMINOPHEN 325 MG/1
650 TABLET ORAL
OUTPATIENT
Start: 2025-01-31

## 2025-01-03 RX ADMIN — SODIUM CHLORIDE 750 MG: 9 INJECTION, SOLUTION INTRAVENOUS at 09:01

## 2025-01-03 NOTE — PLAN OF CARE
Problem: Adult Inpatient Plan of Care  Goal: Plan of Care Review  Outcome: Progressing  Flowsheets (Taken 1/3/2025 0916)  Plan of Care Reviewed With: patient  Goal: Patient-Specific Goal (Individualized)  Outcome: Progressing  Flowsheets (Taken 1/3/2025 0916)  Individualized Care Needs: patient likes feet elevated, pillow under IV arm. Refused premeds.  Anxieties, Fears or Concerns: Patient reports feeling a little weak today and having pain to right wrist  Patient/Family-Specific Goals (Include Timeframe): patient tolerated infusion well with no adverse reactions.  Goal: Optimal Comfort and Wellbeing  Outcome: Progressing     Problem: Infection  Goal: Absence of Infection Signs and Symptoms  Outcome: Progressing

## 2025-01-03 NOTE — DISCHARGE INSTRUCTIONS
.[facility]  [address] [facility]  [address]  [phone] phone     [phone] fax  Hours of Operation: Monday- Friday 8:00am- 5:00pm  After hours phone  [phone]  Hematology / Oncology Physicians on call    Dr. Jimmy Whatley        Nurse Practitioners:    Halina Suarez, HARLAN Donahue, HARLAN Martinez, HARLAN Mendez, HARLAN Moreira, PA      Please don't hesitate to call if you have any concerns.       .WAYS TO HELP PREVENT INFECTION        WASH YOUR HANDS OFTEN DURING THE DAY, ESPECIALLY BEFORE YOU EAT, AFTER USING THE BATHROOM, AND AFTER TOUCHING ANIMALS    STAY AWAY FROM PEOPLE WHO HAVE ILLNESSES YOU CAN CATCH; SUCH AS COLDS, FLU, CHICKEN POX    TRY TO AVOID CROWDS    STAY AWAY FROM CHILDREN WHO RECENTLY HAVE RECEIVED LIVE VIRUS VACCINES    MAINTAIN GOOD MOUTH CARE    DO NOT SQUEEZE OR SCRATCH PIMPLES    CLEAN CUTS & SCRAPES RIGHT AWAY AND DAILY UNTIL HEALED WITH WARM WATER, SOAP & AN ANTISEPTIC    AVOID CONTACT WITH LITTER BOXES, BIRD CAGES, & FISH TANKS    AVOID STANDING WATER, IE., BIRD BATHS, FLOWER POTS/VASES, OR HUMIDIFIERS    WEAR GLOVES WHEN GARDENING OR CLEANING UP AFTER OTHERS, ESPECIALLY BABIES & SMALL CHILDREN    DO NOT EAT RAW FISH, SEAFOOD, MEAT, OR EGGS

## 2025-01-09 ENCOUNTER — LAB VISIT (OUTPATIENT)
Dept: LAB | Facility: HOSPITAL | Age: 80
End: 2025-01-09
Attending: INTERNAL MEDICINE
Payer: MEDICARE

## 2025-01-09 DIAGNOSIS — D84.9 IMMUNOCOMPROMISED PATIENT: Chronic | ICD-10-CM

## 2025-01-09 DIAGNOSIS — M05.79 RHEUMATOID ARTHRITIS INVOLVING MULTIPLE SITES WITH POSITIVE RHEUMATOID FACTOR: ICD-10-CM

## 2025-01-09 DIAGNOSIS — Z51.81 MEDICATION MONITORING ENCOUNTER: ICD-10-CM

## 2025-01-09 LAB
ALBUMIN SERPL BCP-MCNC: 3.4 G/DL (ref 3.5–5.2)
ALP SERPL-CCNC: 92 U/L (ref 40–150)
ALT SERPL W/O P-5'-P-CCNC: 13 U/L (ref 10–44)
ANION GAP SERPL CALC-SCNC: 13 MMOL/L (ref 8–16)
AST SERPL-CCNC: 25 U/L (ref 10–40)
BASOPHILS # BLD AUTO: 0.04 K/UL (ref 0–0.2)
BASOPHILS NFR BLD: 0.4 % (ref 0–1.9)
BILIRUB SERPL-MCNC: 0.4 MG/DL (ref 0.1–1)
BUN SERPL-MCNC: 17 MG/DL (ref 8–23)
CALCIUM SERPL-MCNC: 9.5 MG/DL (ref 8.7–10.5)
CHLORIDE SERPL-SCNC: 99 MMOL/L (ref 95–110)
CO2 SERPL-SCNC: 26 MMOL/L (ref 23–29)
CREAT SERPL-MCNC: 1 MG/DL (ref 0.5–1.4)
CRP SERPL-MCNC: 12.5 MG/L (ref 0–8.2)
DIFFERENTIAL METHOD BLD: ABNORMAL
EOSINOPHIL # BLD AUTO: 0.2 K/UL (ref 0–0.5)
EOSINOPHIL NFR BLD: 2.4 % (ref 0–8)
ERYTHROCYTE [DISTWIDTH] IN BLOOD BY AUTOMATED COUNT: 15.7 % (ref 11.5–14.5)
EST. GFR  (NO RACE VARIABLE): >60 ML/MIN/1.73 M^2
GLUCOSE SERPL-MCNC: 102 MG/DL (ref 70–110)
HCT VFR BLD AUTO: 39.1 % (ref 40–54)
HGB BLD-MCNC: 13.8 G/DL (ref 14–18)
IMM GRANULOCYTES # BLD AUTO: 0.05 K/UL (ref 0–0.04)
IMM GRANULOCYTES NFR BLD AUTO: 0.5 % (ref 0–0.5)
LYMPHOCYTES # BLD AUTO: 1.8 K/UL (ref 1–4.8)
LYMPHOCYTES NFR BLD: 19.9 % (ref 18–48)
MCH RBC QN AUTO: 35.8 PG (ref 27–31)
MCHC RBC AUTO-ENTMCNC: 35.3 G/DL (ref 32–36)
MCV RBC AUTO: 102 FL (ref 82–98)
MONOCYTES # BLD AUTO: 0.9 K/UL (ref 0.3–1)
MONOCYTES NFR BLD: 10.3 % (ref 4–15)
NEUTROPHILS # BLD AUTO: 6.1 K/UL (ref 1.8–7.7)
NEUTROPHILS NFR BLD: 66.5 % (ref 38–73)
NRBC BLD-RTO: 0 /100 WBC
PLATELET # BLD AUTO: 299 K/UL (ref 150–450)
PMV BLD AUTO: 8.6 FL (ref 9.2–12.9)
POTASSIUM SERPL-SCNC: 3.9 MMOL/L (ref 3.5–5.1)
PROT SERPL-MCNC: 8 G/DL (ref 6–8.4)
RBC # BLD AUTO: 3.85 M/UL (ref 4.6–6.2)
SODIUM SERPL-SCNC: 138 MMOL/L (ref 136–145)
WBC # BLD AUTO: 9.14 K/UL (ref 3.9–12.7)

## 2025-01-09 PROCEDURE — 80053 COMPREHEN METABOLIC PANEL: CPT | Performed by: INTERNAL MEDICINE

## 2025-01-09 PROCEDURE — 36415 COLL VENOUS BLD VENIPUNCTURE: CPT | Performed by: INTERNAL MEDICINE

## 2025-01-09 PROCEDURE — 86140 C-REACTIVE PROTEIN: CPT | Performed by: INTERNAL MEDICINE

## 2025-01-09 PROCEDURE — 85025 COMPLETE CBC W/AUTO DIFF WBC: CPT | Performed by: INTERNAL MEDICINE

## 2025-01-14 ENCOUNTER — OFFICE VISIT (OUTPATIENT)
Dept: PULMONOLOGY | Facility: CLINIC | Age: 80
End: 2025-01-14
Payer: MEDICARE

## 2025-01-14 ENCOUNTER — HOSPITAL ENCOUNTER (OUTPATIENT)
Dept: RADIOLOGY | Facility: HOSPITAL | Age: 80
Discharge: HOME | End: 2025-01-14
Payer: MEDICARE

## 2025-01-14 ENCOUNTER — TELEPHONE (OUTPATIENT)
Dept: PULMONOLOGY | Facility: CLINIC | Age: 80
End: 2025-01-14
Payer: MEDICARE

## 2025-01-14 VITALS
BODY MASS INDEX: 32.65 KG/M2 | OXYGEN SATURATION: 95 % | HEART RATE: 54 BPM | WEIGHT: 208 LBS | SYSTOLIC BLOOD PRESSURE: 112 MMHG | RESPIRATION RATE: 18 BRPM | DIASTOLIC BLOOD PRESSURE: 60 MMHG | HEIGHT: 67 IN

## 2025-01-14 DIAGNOSIS — R05.9 COUGH, UNSPECIFIED TYPE: Primary | ICD-10-CM

## 2025-01-14 DIAGNOSIS — R05.9 COUGH, UNSPECIFIED TYPE: ICD-10-CM

## 2025-01-14 PROCEDURE — 71046 X-RAY EXAM CHEST 2 VIEWS: CPT | Mod: TC

## 2025-01-14 PROCEDURE — 71046 X-RAY EXAM CHEST 2 VIEWS: CPT | Mod: 26,,, | Performed by: RADIOLOGY

## 2025-01-14 PROCEDURE — 99215 OFFICE O/P EST HI 40 MIN: CPT | Mod: PBBFAC,25

## 2025-01-14 PROCEDURE — 99999 PR PBB SHADOW E&M-EST. PATIENT-LVL V: CPT | Mod: PBBFAC,,,

## 2025-01-14 PROCEDURE — 99214 OFFICE O/P EST MOD 30 MIN: CPT | Mod: S$PBB,,,

## 2025-01-14 RX ORDER — TESTOSTERONE CYPIONATE 200 MG/ML
200 INJECTION, SOLUTION INTRAMUSCULAR
COMMUNITY
Start: 2025-01-04

## 2025-01-14 NOTE — TELEPHONE ENCOUNTER
----- Message from Bj sent at 1/14/2025  9:05 AM CST -----  Regarding: advice  Contact: SALVATORE MERIDA [[id number]]  Type: Needs Medical Advice  Who Called:  Krista, wife    Symptoms (please be specific):  Deep cough    How long has patient had these symptoms:  3 weeks    Pharmacy name and phone #:      [id number] - [place], [place] - [address] [facility]  [address] [address]  Phone: [phone] Fax: [phone]    [facility] Call Back Number: [phone]    Additional Information: Please call to advise.

## 2025-01-14 NOTE — PROGRESS NOTES
Subjective:      Patient ID: Jack Back is a 80 y.o. male.    Chief Complaint: Cough      01/14/2025  Jack Back 80 y.o.  New to me  Last seen by Dr. Dang on 10/4/2024  Patient presents with intermittent Cough  Onset approx 1 -2 month  Worse at night laying flat, relief with sitting upright (adjustable bed)  Albuterol inhaler with no change in symptom quality  Night time dry cough. Wheeze per wife in bed  Productive a.m. cough - clear easy to produce   No cough throughout day  SOB with increased activity, able to perform ADLs  Weigh gain of 5-6 lbs per patient and wife  Increased lower leg edema  Still taking daily Lasix   Saw cardiology 6 months ago  No hemoptysis, fever, chills or night sweats  No recent sick contact or recent URI  Finished ABX Doxy. No change  CXR reviewed with patient    Class II  NYHA Heart Failure Classification          Pertinent Work Up:    Pulmonary Interventions:  Smoking hx:  Environmental/Occupational hx:    Review of Systems   Constitutional:  Positive for weight gain. Negative for fever, chills, fatigue and night sweats.   HENT: Negative.  Negative for postnasal drip, rhinorrhea, sinus pressure, sore throat, voice change and congestion.    Respiratory:  Positive for cough, sputum production, wheezing, orthopnea, dyspnea on extertion and use of rescue inhaler. Negative for hemoptysis and chest tightness.    Cardiovascular:  Positive for leg swelling. Negative for chest pain and palpitations.   Musculoskeletal:  Positive for arthralgias.   Neurological:  Negative for dizziness, syncope, light-headedness and headaches.   Hematological:  Does not bruise/bleed easily and no excessive bruising.       Interval HPI History:      Patient Active Problem List   Diagnosis    Hypogonadism male    Rheumatoid arthritis involving multiple sites with positive rheumatoid factor    Infliximab (Remicade) long-term use    Immunocompromised patient    CAD (coronary artery disease)     Hypergammaglobulinemia    Loculated pleural effusion    Post-operative state- s/p CABG redo 2019    Hx of CABG    Persistent atrial fibrillation    Primary hypertension    Hyperlipidemia    Chronic combined systolic and diastolic heart failure    Interstitial pulmonary disease, unspecified    Cough      Past Medical History:   Diagnosis Date    Arthritis     Congestive heart failure 06/07/2019    Depression     Lung disease     Rheumatoid arthritis involving multiple sites with positive rheumatoid factor 12/1/2015    Rheumatoid arthritis(714.0)     Thyroid disease      Past Surgical History:   Procedure Laterality Date    CREATION OF AORTOCORONARY ARTERY BYPASS      HERNIA REPAIR      JOINT REPLACEMENT      bi lateral hips    WA CABG, ARTERY-VEIN, FOUR  05/21/2019    Coronary Artery Bypass, 4    SPINE SURGERY        Review of patient's allergies indicates:   Allergen Reactions    Arava [leflunomide] Diarrhea    Penicillins Other (See Comments) and Hives     unknown    Tetanus vaccines and toxoid Other (See Comments)     unknown  Fever      Tobacco Use: Medium Risk (1/16/2025)    Patient History     Smoking Tobacco Use: Former     Smokeless Tobacco Use: Never     Passive Exposure: Not on file      Current Outpatient Medications   Medication Sig    albuterol (PROVENTIL) 2.5 mg /3 mL (0.083 %) nebulizer solution Take 3 mLs (2.5 mg total) by nebulization every 4 to 6 hours as needed for Wheezing or Shortness of Breath.    albuterol (PROVENTIL/VENTOLIN HFA) 90 mcg/actuation inhaler Inhale 2 puffs into the lungs every 4 (four) hours as needed for Wheezing or Shortness of Breath.    ascorbic acid, vitamin C, (VITAMIN C) 100 MG tablet Take 100 mg by mouth once daily.    aspirin (ECOTRIN) 81 MG EC tablet Take 325 mg by mouth once daily.     cholecalciferol, vitamin D3, 10 mcg (400 unit) Cap     co-enzyme Q-10 30 mg capsule Take 30 mg by mouth once daily.    cyanocobalamin (VITAMIN B-12) 1000 MCG tablet Take 300 mcg by mouth  once daily.    doxycycline (MONODOX) 100 MG capsule TAKE 1 CAPSULE BY MOUTH EVERY 12 HOURS    ELIQUIS 5 mg Tab Take 5 mg by mouth 2 (two) times daily.    ezetimibe (ZETIA) 10 mg tablet Take 10 mg by mouth once daily.    furosemide (LASIX) 40 MG tablet     VANNESSA ORAL Take by mouth. 565 mg daily    INFLIXIMAB (REMICADE IV) Inject into the vein.     ipratropium (ATROVENT) 21 mcg (0.03 %) nasal spray 2 sprays by Each Nostril route 3 (three) times daily as needed for Rhinitis (take before eating).    iron,carb/vit C/vit B12/folic (IRON 100 PLUS ORAL) Take by mouth.    ketoconazole (NIZORAL) 2 % cream SMARTSIG:Sparingly Topical Twice Daily    losartan potassium (LOSARTAN ORAL) Take by mouth.    metoprolol succinate (TOPROL-XL) 25 MG 24 hr tablet Take 0.5 tablets (12.5 mg total) by mouth once daily.    milk thistle 175 mg tablet Take 500 mg by mouth once daily.    multivit with minerals/lutein (MULTIVITAMIN 50 PLUS ORAL)     NITROSTAT 0.4 mg SL tablet 0.4 mg.    omega 3-dha-epa-fish oil 500-100-1,000 mg Cap Take by mouth 2 (two) times daily.    omeprazole (PRILOSEC) 40 MG capsule Take 40 mg by mouth every morning.    oxyCODONE-acetaminophen (PERCOCET) 5-325 mg per tablet Take 1 tablet by mouth every 4 (four) hours as needed.    potassium chloride (MICRO-K) 10 MEQ CpSR Take 10 mEq by mouth once.    potassium/magnesium (MAGNESIUM-POTASSIUM ORAL) Take 600 mg by mouth once daily. 600 mg/ 198    saw palmetto 500 MG capsule Take 450 mg by mouth once daily.    sertraline (ZOLOFT) 50 MG tablet Take 50 mg by mouth once daily.    solifenacin (VESICARE) 5 MG tablet Take 5 mg by mouth every morning.    sotaloL (BETAPACE) 120 MG Tab Take 60 mg by mouth 2 (two) times daily.    SYNTHROID 100 mcg tablet Take 100 mcg by mouth every morning.    tamsulosin (FLOMAX) 0.4 mg Cap Take 1 capsule by mouth every evening.    testosterone cypionate (DEPOTESTOTERONE CYPIONATE) 200 mg/mL injection Inject 200 mg into the muscle every 14 (fourteen)  "days.    vit C/E/Zn/coppr/lutein/zeaxan (PRESERVISION AREDS-2 ORAL) Take by mouth once daily.    vitamin B12-folic acid 0.5-1 mg Tab     vitamin D 1000 units Tab Take 5,000 mg by mouth once daily.    vitamin E 1000 UNIT capsule Take 1,000 Units by mouth once daily.    VITAMIN K2 ORAL Take 100 mcg by mouth.    zinc gluconate 50 mg tablet Take 50 mg by mouth once daily.    predniSONE (DELTASONE) 5 MG tablet Take 1 tablet (5 mg total) by mouth 2 (two) times daily as needed (rheumatoid related pain).    triamcinolone acetonide 0.1% (KENALOG) 0.1 % cream Apply topically 2 (two) times daily. for 10 days   Last reviewed on 1/16/2025 10:18 AM by Addie Kapoor MA      Objective:     Vitals:    01/14/25 1519   BP: 112/60   Pulse: (!) 54   Resp: 18      Last 3 sets of Vitals        1/3/2025     8:56 AM 1/14/2025     3:19 PM 1/16/2025    10:12 AM   Vitals - 1 value per visit   SYSTOLIC 112 112 125   DIASTOLIC 62 60 70   Pulse 55 54 74   Temp 97.7 °F (36.5 °C)     Resp 18 18    SPO2 95 % 95 %    Weight (lb) 208.11 208 208.56   Weight (kg) 94.4 94.35 94.6   Height 5' 7" (1.702 m) 5' 7" (1.702 m) 5' 7" (1.702 m)   BMI (Calculated) 32.6 32.6 32.7   Pain Score Four Zero Six      Body mass index is 32.58 kg/m².   Wt Readings from Last 3 Encounters:   01/16/25 94.6 kg (208 lb 8.9 oz)   01/14/25 94.3 kg (208 lb 0.1 oz)   01/03/25 94.4 kg (208 lb 1.8 oz)        Physical Exam   Constitutional: He is oriented to person, place, and time. He appears well-developed and well-nourished. He is cooperative. He does not appear ill.   HENT:   Head: Normocephalic and atraumatic.   Right Ear: Hearing and external ear normal.   Left Ear: Hearing and external ear normal.   Nose: Nose normal. No mucosal edema, nasal deformity or congestion.   Mouth/Throat: Uvula is midline and oropharynx is clear and moist. Mucous membranes are moist. No gingival swelling or oral lesions. Normal dentition. No oropharyngeal exudate. Oropharynx is clear. Mallampati " "Score: III.   Neck: Trachea normal and phonation normal. No thyroid mass present.   Cardiovascular: Regular rhythm, S1 normal, S2 normal, normal heart sounds, intact distal pulses and normal pulses. Bradycardia present.   Pulmonary/Chest: Normal expansion, symmetric chest wall expansion and effort normal. There is normal air entry. No accessory muscle usage or stridor. No tachypnea. No respiratory distress. He has no decreased breath sounds. He has no wheezes. He has no rhonchi. He has rales in the right lower field and the left lower field. Negative for egophony.   Abdominal: Normal appearance.   Musculoskeletal:         General: Normal range of motion.      Cervical back: Full passive range of motion without pain, normal range of motion and neck supple.      Right lower le+ Pitting Edema present.      Left lower le+ Pitting Edema present.      Comments: Cane to ambulate   Lymphadenopathy: No supraclavicular adenopathy is present.     He has no cervical adenopathy.     He has no axillary adenopathy.        Right: No supraclavicular adenopathy present.        Left: No supraclavicular adenopathy present.   Neurological: He is alert and oriented to person, place, and time. He has normal motor skills and normal sensation. Coordination normal. Gait abnormal.   Skin: Skin is warm and dry. Capillary refill takes less than 2 seconds. No cyanosis. No pallor. Nails show no clubbing.   Psychiatric: He has a normal mood and affect. His speech is normal and behavior is normal. Mood, memory, affect, judgment and thought content normal.   Vitals reviewed.          2025    10:12 AM 2025     3:19 PM 1/3/2025     9:52 AM 1/3/2025     8:56 AM 2024     9:57 AM 2024     9:00 AM 2024    10:10 AM   Pulmonary Function Tests   SpO2  95 % 97 % 95 % 96 % 95 % 96 %   Height 5' 7" (1.702 m) 5' 7" (1.702 m)  5' 7" (1.702 m)      Weight 94.6 kg (208 lb 8.9 oz) 94.3 kg (208 lb 0.1 oz)  94.4 kg (208 lb 1.8 oz)  " 93.2 kg (205 lb 5.7 oz)    BMI (Calculated) 32.7 32.6  32.6  32.2        X-Ray Chest PA And Lateral  Narrative: EXAMINATION:  XR CHEST PA AND LATERAL    CLINICAL HISTORY:  SOB; Cough, unspecified    TECHNIQUE:  PA and lateral views of the chest were performed.    COMPARISON:  08/29/2024    FINDINGS:  The lungs are clear and free of infiltrate.  No pleural effusion or pneumothorax. The heart is not enlarged. There is evidence for prior median sternotomy.  A loop recorder is seen projecting over the left side of the chest.  There is some chronic scarring changes noted in the region of either costophrenic angle.  Impression: 1.  No acute cardiopulmonary process.    Electronically signed by: Montana Langley DO  Date:    01/14/2025  Time:    15:49       Personal Diagnostic Review       Assessment/Plan:     1. Cough, unspecified type  Assessment & Plan:  XR CHEST PA AND LATERAL   CLINICAL HISTORY:SOB; Cough, unspecified     TECHNIQUE:PA and lateral views of the chest were performed.     COMPARISON:  08/29/2024     FINDINGS:  The lungs are clear and free of infiltrate.  No pleural effusion or pneumothorax. The heart is not enlarged. There is evidence for prior median sternotomy.  A loop recorder is seen projecting over the left side of the chest.  There is some chronic scarring changes noted in the region of either costophrenic angle.     Impression:  1.  No acute cardiopulmonary process.  Electronically signed by:Montana Langley DO  Date:                                            01/14/2025  Time:                                           15:49             Outpatient Encounter Medications as of 1/14/2025   Medication Sig Dispense Refill    albuterol (PROVENTIL) 2.5 mg /3 mL (0.083 %) nebulizer solution Take 3 mLs (2.5 mg total) by nebulization every 4 to 6 hours as needed for Wheezing or Shortness of Breath. 360 mL 11    albuterol (PROVENTIL/VENTOLIN HFA) 90 mcg/actuation inhaler Inhale 2 puffs into the lungs every 4  (four) hours as needed for Wheezing or Shortness of Breath. 18 g 11    ascorbic acid, vitamin C, (VITAMIN C) 100 MG tablet Take 100 mg by mouth once daily.      aspirin (ECOTRIN) 81 MG EC tablet Take 325 mg by mouth once daily.       cholecalciferol, vitamin D3, 10 mcg (400 unit) Cap       co-enzyme Q-10 30 mg capsule Take 30 mg by mouth once daily.      cyanocobalamin (VITAMIN B-12) 1000 MCG tablet Take 300 mcg by mouth once daily.      doxycycline (MONODOX) 100 MG capsule TAKE 1 CAPSULE BY MOUTH EVERY 12 HOURS 20 capsule 0    ELIQUIS 5 mg Tab Take 5 mg by mouth 2 (two) times daily.      ezetimibe (ZETIA) 10 mg tablet Take 10 mg by mouth once daily.      furosemide (LASIX) 40 MG tablet   1    VANNESSA ORAL Take by mouth. 565 mg daily      INFLIXIMAB (REMICADE IV) Inject into the vein.       ipratropium (ATROVENT) 21 mcg (0.03 %) nasal spray 2 sprays by Each Nostril route 3 (three) times daily as needed for Rhinitis (take before eating). 30 mL 11    iron,carb/vit C/vit B12/folic (IRON 100 PLUS ORAL) Take by mouth.      ketoconazole (NIZORAL) 2 % cream SMARTSIG:Sparingly Topical Twice Daily      losartan potassium (LOSARTAN ORAL) Take by mouth.      metoprolol succinate (TOPROL-XL) 25 MG 24 hr tablet Take 0.5 tablets (12.5 mg total) by mouth once daily. 45 tablet 3    milk thistle 175 mg tablet Take 500 mg by mouth once daily.      multivit with minerals/lutein (MULTIVITAMIN 50 PLUS ORAL)       NITROSTAT 0.4 mg SL tablet 0.4 mg.      omega 3-dha-epa-fish oil 500-100-1,000 mg Cap Take by mouth 2 (two) times daily.      omeprazole (PRILOSEC) 40 MG capsule Take 40 mg by mouth every morning.      oxyCODONE-acetaminophen (PERCOCET) 5-325 mg per tablet Take 1 tablet by mouth every 4 (four) hours as needed.      potassium chloride (MICRO-K) 10 MEQ CpSR Take 10 mEq by mouth once.      potassium/magnesium (MAGNESIUM-POTASSIUM ORAL) Take 600 mg by mouth once daily. 600 mg/ 198      saw palmetto 500 MG capsule Take 450 mg by  mouth once daily.      sertraline (ZOLOFT) 50 MG tablet Take 50 mg by mouth once daily.      solifenacin (VESICARE) 5 MG tablet Take 5 mg by mouth every morning.      sotaloL (BETAPACE) 120 MG Tab Take 60 mg by mouth 2 (two) times daily.      SYNTHROID 100 mcg tablet Take 100 mcg by mouth every morning.      tamsulosin (FLOMAX) 0.4 mg Cap Take 1 capsule by mouth every evening.      testosterone cypionate (DEPOTESTOTERONE CYPIONATE) 200 mg/mL injection Inject 200 mg into the muscle every 14 (fourteen) days.      vit C/E/Zn/coppr/lutein/zeaxan (PRESERVISION AREDS-2 ORAL) Take by mouth once daily.      vitamin B12-folic acid 0.5-1 mg Tab       vitamin D 1000 units Tab Take 5,000 mg by mouth once daily.      vitamin E 1000 UNIT capsule Take 1,000 Units by mouth once daily.      VITAMIN K2 ORAL Take 100 mcg by mouth.      zinc gluconate 50 mg tablet Take 50 mg by mouth once daily.      [DISCONTINUED] folic acid (FOLVITE) 800 MCG Tab Take 1 tablet (800 mcg total) by mouth once daily. 90 tablet 1    [DISCONTINUED] methotrexate 2.5 MG Tab Take 4 tablets (10 mg total) by mouth every 7 days. 48 tablet 1    triamcinolone acetonide 0.1% (KENALOG) 0.1 % cream Apply topically 2 (two) times daily. for 10 days 45 g 2     Facility-Administered Encounter Medications as of 1/14/2025   Medication Dose Route Frequency Provider Last Rate Last Admin    acetaminophen tablet 650 mg  650 mg Oral Once PRN Farooq Corey MD        albuterol inhaler 2 puff  2 puff Inhalation Q20 Min PRN Farooq oCrey MD        diphenhydrAMINE injection 25 mg  25 mg Intravenous Once PRN Farooq Corey MD        EPINEPHrine (EPIPEN) 0.3 mg/0.3 mL pen injection 0.3 mg  0.3 mg Intramuscular PRN Farooq Corey MD        methylPREDNISolone sodium succinate injection 40 mg  40 mg Intravenous Once PRN Farooq Corey MD        ondansetron disintegrating tablet 4 mg  4 mg Oral Once PRN Farooq Corey MD        sodium chloride 0.9% 500 mL flush  bag   Intravenous PRN Farooq Corey MD        sodium chloride 0.9% flush 10 mL  10 mL Intravenous PRN Farooq Corey MD         No orders of the defined types were placed in this encounter.      Discussed diagnosis, its evaluation, treatment and usual course. All questions answered.     Patient verbalized understanding of plan and left in no acute distress.    Follow up in about 3 months (around 4/4/2025), or see cardiology, F/u with at appt Dr. Dang.    Note has been documented by NANCI Smith 2/3/2025   Thank you for allowing me to participate in the care of this patient,    NANCI Smith   Pulmonary Disease

## 2025-01-16 ENCOUNTER — TELEPHONE (OUTPATIENT)
Dept: INFUSION THERAPY | Facility: HOSPITAL | Age: 80
End: 2025-01-16
Payer: MEDICARE

## 2025-01-16 ENCOUNTER — TELEPHONE (OUTPATIENT)
Dept: RHEUMATOLOGY | Facility: CLINIC | Age: 80
End: 2025-01-16

## 2025-01-16 ENCOUNTER — OFFICE VISIT (OUTPATIENT)
Dept: RHEUMATOLOGY | Facility: CLINIC | Age: 80
End: 2025-01-16
Payer: MEDICARE

## 2025-01-16 VITALS
WEIGHT: 208.56 LBS | SYSTOLIC BLOOD PRESSURE: 125 MMHG | DIASTOLIC BLOOD PRESSURE: 70 MMHG | BODY MASS INDEX: 32.73 KG/M2 | HEART RATE: 74 BPM | HEIGHT: 67 IN

## 2025-01-16 DIAGNOSIS — Z79.631 LONG TERM METHOTREXATE USER: ICD-10-CM

## 2025-01-16 DIAGNOSIS — M05.79 RHEUMATOID ARTHRITIS INVOLVING MULTIPLE SITES WITH POSITIVE RHEUMATOID FACTOR: Primary | ICD-10-CM

## 2025-01-16 DIAGNOSIS — J84.10 PULMONARY FIBROSIS: ICD-10-CM

## 2025-01-16 DIAGNOSIS — Z71.89 COUNSELING ON HEALTH PROMOTION AND DISEASE PREVENTION: ICD-10-CM

## 2025-01-16 DIAGNOSIS — Z79.899 IMMUNOSUPPRESSION DUE TO DRUG THERAPY: ICD-10-CM

## 2025-01-16 DIAGNOSIS — I25.702 CORONARY ARTERY DISEASE INVOLVING CORONARY BYPASS GRAFT OF NATIVE HEART WITH REFRACTORY ANGINA PECTORIS: ICD-10-CM

## 2025-01-16 DIAGNOSIS — D84.821 IMMUNOSUPPRESSION DUE TO DRUG THERAPY: ICD-10-CM

## 2025-01-16 DIAGNOSIS — Z51.81 MEDICATION MONITORING ENCOUNTER: ICD-10-CM

## 2025-01-16 DIAGNOSIS — Z79.620 INFLIXIMAB (REMICADE) LONG-TERM USE: ICD-10-CM

## 2025-01-16 DIAGNOSIS — R73.03 PREDIABETES: ICD-10-CM

## 2025-01-16 DIAGNOSIS — M06.9 RHEUMATOID ARTHRITIS FLARE: ICD-10-CM

## 2025-01-16 PROCEDURE — 99999 PR PBB SHADOW E&M-EST. PATIENT-LVL V: CPT | Mod: PBBFAC,,, | Performed by: INTERNAL MEDICINE

## 2025-01-16 PROCEDURE — G2211 COMPLEX E/M VISIT ADD ON: HCPCS | Mod: S$PBB,,, | Performed by: INTERNAL MEDICINE

## 2025-01-16 PROCEDURE — 99215 OFFICE O/P EST HI 40 MIN: CPT | Mod: S$PBB,,, | Performed by: INTERNAL MEDICINE

## 2025-01-16 PROCEDURE — 99215 OFFICE O/P EST HI 40 MIN: CPT | Mod: PBBFAC | Performed by: INTERNAL MEDICINE

## 2025-01-16 RX ORDER — ACETAMINOPHEN 325 MG/1
650 TABLET ORAL
OUTPATIENT
Start: 2025-02-03

## 2025-01-16 RX ORDER — DIPHENHYDRAMINE HYDROCHLORIDE 50 MG/ML
50 INJECTION INTRAMUSCULAR; INTRAVENOUS ONCE AS NEEDED
OUTPATIENT
Start: 2025-02-03

## 2025-01-16 RX ORDER — IPRATROPIUM BROMIDE AND ALBUTEROL SULFATE 2.5; .5 MG/3ML; MG/3ML
3 SOLUTION RESPIRATORY (INHALATION)
OUTPATIENT
Start: 2025-02-03

## 2025-01-16 RX ORDER — SODIUM CHLORIDE 0.9 % (FLUSH) 0.9 %
10 SYRINGE (ML) INJECTION
OUTPATIENT
Start: 2025-02-03

## 2025-01-16 RX ORDER — CETIRIZINE HYDROCHLORIDE 10 MG/1
10 TABLET ORAL
OUTPATIENT
Start: 2025-02-03

## 2025-01-16 RX ORDER — METHYLPREDNISOLONE SOD SUCC 125 MG
40 VIAL (EA) INJECTION
OUTPATIENT
Start: 2025-02-03

## 2025-01-16 RX ORDER — HEPARIN 100 UNIT/ML
500 SYRINGE INTRAVENOUS
OUTPATIENT
Start: 2025-02-03

## 2025-01-16 RX ORDER — PREDNISONE 5 MG/1
5 TABLET ORAL 2 TIMES DAILY PRN
Qty: 60 TABLET | Refills: 1 | Status: SHIPPED | OUTPATIENT
Start: 2025-01-16 | End: 2025-01-17 | Stop reason: SDUPTHER

## 2025-01-16 RX ORDER — PREDNISONE 20 MG/1
20 TABLET ORAL DAILY
Qty: 14 TABLET | Refills: 0 | Status: SHIPPED | OUTPATIENT
Start: 2025-01-16 | End: 2025-01-17 | Stop reason: SDUPTHER

## 2025-01-16 RX ORDER — EPINEPHRINE 0.3 MG/.3ML
0.3 INJECTION SUBCUTANEOUS ONCE AS NEEDED
OUTPATIENT
Start: 2025-02-03

## 2025-01-16 NOTE — TELEPHONE ENCOUNTER
Clinical Pharmacy Progress Note: Medication Education     Patient was counseled by pharmacist on new medication infliximab (unbranded) and its indications, side effects, and reasons for taking this medication. Previously took Remicade ~ 20-25 years per patient.     - Educated patient on mechanism of action, frequency and route of administration, onset of action, and safety profile of infliximab. Discussion regarding Dr. Rashid' concerns for worsening lung disease if infliximab response subpar. Counseled patient re: TNFi labelled with warning for new or worsening heart failure. Patient voiced understanding.     - Encouraged pt to practice proper hand hygiene considering increased risk of infection with biologics. Pt to make us aware if he ever experiences s/sx of an infection including fever, chills, URI symptoms or UTI symptoms.     - Labs up to date: CBC/CMP ; TB; Hep B completed within the past 5 months.   - Plan for CBC/CMP prior to each treatment.     Patient advised re: discontinue methotrexate and folic acid in light of pulmonary fibrosis/ILD.     Patient expressed understanding and had no further questions.    Therapy plan entered for infliximab therapy.     Thank you for allowing us to participate in this patient's care.    Ese Dudley, PharmD  Ambulatory Clinical Pharmacist- Rheumatology

## 2025-01-16 NOTE — Clinical Note
No efficacy from Orencia, patient wishes to resume Remicade: suggest IFX at 10mg/kg q8w, few days after due for next orencia.

## 2025-01-16 NOTE — TELEPHONE ENCOUNTER
Orders noted to d/c orencia and start Infliximab. Discussed infliximab infusion protocol with pt. Scheduled with labs prior on 2/7/25 (5 weeks after last orencia infusion). Pending insurance approval.

## 2025-01-16 NOTE — PROGRESS NOTES
RHEUMATOLOGY OUTPATIENT CLINIC NOTE    1/16/2025    Attending Rheumatologist: Jhonatan Rashid  Primary Care Provider/Physician Requesting Consultation: Cipriano Chapman MD   Chief Complaint/Reason For Consultation:  Rheumatoid Arthritis and Interstitial Lung Disease      Subjective:     Jack Back is a 80 y.o. White male with SPRA, ILD    No efficacy from Orencia IV, presenting on IA active flare.  Chronic cough unchanged.  Reports chronic UGALDE stable.  Request to go back on IFX infusion.    Review of Systems   Constitutional:  Negative for fever.   Eyes:  Negative for photophobia and pain.   Respiratory:  Positive for cough and sputum production. Negative for hemoptysis and shortness of breath (UGALDE).    Cardiovascular:  Negative for chest pain.   Gastrointestinal:  Negative for blood in stool and melena.   Genitourinary:  Negative for dysuria, hematuria and urgency.   Musculoskeletal:  Positive for joint pain.   Skin:  Negative for rash.   Neurological:  Negative for tingling and focal weakness.       Chronic comorbid conditions affecting medical decision making today:  Past Medical History:   Diagnosis Date    Arthritis     Congestive heart failure 06/07/2019    Depression     Lung disease     Rheumatoid arthritis involving multiple sites with positive rheumatoid factor 12/1/2015    Rheumatoid arthritis(714.0)     Thyroid disease      Past Surgical History:   Procedure Laterality Date    CREATION OF AORTOCORONARY ARTERY BYPASS      HERNIA REPAIR      JOINT REPLACEMENT      bi lateral hips    IL CABG, ARTERY-VEIN, FOUR  05/21/2019    Coronary Artery Bypass, 4    SPINE SURGERY       Family History   Problem Relation Name Age of Onset    Diabetes Mellitus Mother      Heart disease Mother      Heart disease Father      Rheum arthritis Father      Diabetes Mellitus Brother      Heart disease Brother       Social History     Tobacco Use   Smoking Status Former    Types: Cigarettes   Smokeless Tobacco Never        Current Outpatient Medications:     albuterol (PROVENTIL) 2.5 mg /3 mL (0.083 %) nebulizer solution, Take 3 mLs (2.5 mg total) by nebulization every 4 to 6 hours as needed for Wheezing or Shortness of Breath., Disp: 360 mL, Rfl: 11    albuterol (PROVENTIL/VENTOLIN HFA) 90 mcg/actuation inhaler, Inhale 2 puffs into the lungs every 4 (four) hours as needed for Wheezing or Shortness of Breath., Disp: 18 g, Rfl: 11    ascorbic acid, vitamin C, (VITAMIN C) 100 MG tablet, Take 100 mg by mouth once daily., Disp: , Rfl:     aspirin (ECOTRIN) 81 MG EC tablet, Take 325 mg by mouth once daily. , Disp: , Rfl:     cholecalciferol, vitamin D3, 10 mcg (400 unit) Cap, , Disp: , Rfl:     co-enzyme Q-10 30 mg capsule, Take 30 mg by mouth once daily., Disp: , Rfl:     cyanocobalamin (VITAMIN B-12) 1000 MCG tablet, Take 300 mcg by mouth once daily., Disp: , Rfl:     doxycycline (MONODOX) 100 MG capsule, TAKE 1 CAPSULE BY MOUTH EVERY 12 HOURS, Disp: 20 capsule, Rfl: 0    ELIQUIS 5 mg Tab, Take 5 mg by mouth 2 (two) times daily., Disp: , Rfl:     ezetimibe (ZETIA) 10 mg tablet, Take 10 mg by mouth once daily., Disp: , Rfl:     folic acid (FOLVITE) 800 MCG Tab, Take 1 tablet (800 mcg total) by mouth once daily., Disp: 90 tablet, Rfl: 1    furosemide (LASIX) 40 MG tablet, , Disp: , Rfl: 1    VANNESSA ORAL, Take by mouth. 565 mg daily, Disp: , Rfl:     INFLIXIMAB (REMICADE IV), Inject into the vein. , Disp: , Rfl:     ipratropium (ATROVENT) 21 mcg (0.03 %) nasal spray, 2 sprays by Each Nostril route 3 (three) times daily as needed for Rhinitis (take before eating)., Disp: 30 mL, Rfl: 11    iron,carb/vit C/vit B12/folic (IRON 100 PLUS ORAL), Take by mouth., Disp: , Rfl:     ketoconazole (NIZORAL) 2 % cream, SMARTSIG:Sparingly Topical Twice Daily, Disp: , Rfl:     losartan potassium (LOSARTAN ORAL), Take by mouth., Disp: , Rfl:     methotrexate 2.5 MG Tab, Take 4 tablets (10 mg total) by mouth every 7 days., Disp: 48 tablet, Rfl: 1     metoprolol succinate (TOPROL-XL) 25 MG 24 hr tablet, Take 0.5 tablets (12.5 mg total) by mouth once daily., Disp: 45 tablet, Rfl: 3    milk thistle 175 mg tablet, Take 500 mg by mouth once daily., Disp: , Rfl:     multivit with minerals/lutein (MULTIVITAMIN 50 PLUS ORAL), , Disp: , Rfl:     NITROSTAT 0.4 mg SL tablet, 0.4 mg., Disp: , Rfl:     omega 3-dha-epa-fish oil 500-100-1,000 mg Cap, Take by mouth 2 (two) times daily., Disp: , Rfl:     omeprazole (PRILOSEC) 40 MG capsule, Take 40 mg by mouth every morning., Disp: , Rfl:     oxyCODONE-acetaminophen (PERCOCET) 5-325 mg per tablet, Take 1 tablet by mouth every 4 (four) hours as needed., Disp: , Rfl:     potassium chloride (MICRO-K) 10 MEQ CpSR, Take 10 mEq by mouth once., Disp: , Rfl:     potassium/magnesium (MAGNESIUM-POTASSIUM ORAL), Take 600 mg by mouth once daily. 600 mg/ 198, Disp: , Rfl:     saw palmetto 500 MG capsule, Take 450 mg by mouth once daily., Disp: , Rfl:     sertraline (ZOLOFT) 50 MG tablet, Take 50 mg by mouth once daily., Disp: , Rfl:     solifenacin (VESICARE) 5 MG tablet, Take 5 mg by mouth every morning., Disp: , Rfl:     sotaloL (BETAPACE) 120 MG Tab, Take 60 mg by mouth 2 (two) times daily., Disp: , Rfl:     SYNTHROID 100 mcg tablet, Take 100 mcg by mouth every morning., Disp: , Rfl:     tamsulosin (FLOMAX) 0.4 mg Cap, Take 1 capsule by mouth every evening., Disp: , Rfl:     testosterone cypionate (DEPOTESTOTERONE CYPIONATE) 200 mg/mL injection, Inject 200 mg into the muscle every 14 (fourteen) days., Disp: , Rfl:     vit C/E/Zn/coppr/lutein/zeaxan (PRESERVISION AREDS-2 ORAL), Take by mouth once daily., Disp: , Rfl:     vitamin B12-folic acid 0.5-1 mg Tab, , Disp: , Rfl:     vitamin D 1000 units Tab, Take 5,000 mg by mouth once daily., Disp: , Rfl:     vitamin E 1000 UNIT capsule, Take 1,000 Units by mouth once daily., Disp: , Rfl:     VITAMIN K2 ORAL, Take 100 mcg by mouth., Disp: , Rfl:     zinc gluconate 50 mg tablet, Take 50 mg by  mouth once daily., Disp: , Rfl:     predniSONE (DELTASONE) 20 MG tablet, Take 1 tablet (20 mg total) by mouth once daily. for 14 days, Disp: 14 tablet, Rfl: 0    predniSONE (DELTASONE) 5 MG tablet, Take 1 tablet (5 mg total) by mouth 2 (two) times daily as needed (rheumatoid related pain)., Disp: 60 tablet, Rfl: 1    triamcinolone acetonide 0.1% (KENALOG) 0.1 % cream, Apply topically 2 (two) times daily. for 10 days, Disp: 45 g, Rfl: 2    Current Facility-Administered Medications:     acetaminophen tablet 650 mg, 650 mg, Oral, Once PRN, Farooq Corey MD    albuterol inhaler 2 puff, 2 puff, Inhalation, Q20 Min PRN, Farooq Corey MD    diphenhydrAMINE injection 25 mg, 25 mg, Intravenous, Once PRN, Farooq Corey MD    EPINEPHrine (EPIPEN) 0.3 mg/0.3 mL pen injection 0.3 mg, 0.3 mg, Intramuscular, PRN, Farooq Corey MD    methylPREDNISolone sodium succinate injection 40 mg, 40 mg, Intravenous, Once PRN, Farooq Corey MD    ondansetron disintegrating tablet 4 mg, 4 mg, Oral, Once PRN, Farooq Corey MD    sodium chloride 0.9% 500 mL flush bag, , Intravenous, PRN, Farooq Corey MD    sodium chloride 0.9% flush 10 mL, 10 mL, Intravenous, PRN, Farooq Corey MD     Objective:     Vitals:    01/16/25 1012   BP: 125/70   Pulse: 74     Physical Exam   Eyes: Conjunctivae are normal.   Pulmonary/Chest: Effort normal. No respiratory distress.   Musculoskeletal:         General: Swelling, tenderness and deformity present.   Skin: No rash noted.       Reviewed available old and all outside pertinent medical records available.    All lab results personally reviewed and interpreted by me.       ASSESSMENT / PLAN     1. Rheumatoid arthritis involving multiple sites with positive rheumatoid factor  CDAI: high disease activity.  Fibrotic ILD, did not tolerate Orencia to replace IFX.  Patient not currently amenable for either Actemra or RTX as safer biologic therapy in context of fibrotic ILD.  May  resume IFX at 10mg/kg q8w per his request.  Suggest encounter w/ clinical pharmacist for pharmacotherapy counseling.  Plan for monotherapy w/ biologic, high risk of ILD worsening w/ MTX: DC.  C-Reactive Protein      2. Rheumatoid arthritis flare  predniSONE (DELTASONE) 20 MG tablet    predniSONE (DELTASONE) 5 MG tablet      3. Pulmonary fibrosis  Suggest CT chest repeat, 5m after resuming infliximab IV      4. Long term methotrexate user        5. Infliximab (Remicade) long-term use        6. 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 Counseling on health promotion and disease prevention        8. Immunosuppression due to drug therapy  Hold immunosuppression in event of active infections or need for surgery.  CBC Auto Differential      9. Medication monitoring encounter  Comprehensive Metabolic Panel      10. Prediabetes                Visit today included increased complexity associated with the care of the episodic problem Rheumatoid arthritis involving multiple sites with positive rheumatoid factor addressed and managing the longitudinal care of the patient due to the serious and/or complex managed problem(s) Pulmonary fibrosis, prediabetes, immunosuppression due to drug therapy.    Jhonatan Rashid M.D.

## 2025-01-16 NOTE — TELEPHONE ENCOUNTER
----- Message from Jhonatan Rashid MD sent at 1/16/2025 10:44 AM CST -----  No efficacy from Orencia, patient wishes to resume Remicade: suggest IFX at 10mg/kg q8w, few days after due for next orencia.

## 2025-01-17 ENCOUNTER — PATIENT MESSAGE (OUTPATIENT)
Dept: RHEUMATOLOGY | Facility: CLINIC | Age: 80
End: 2025-01-17
Payer: MEDICARE

## 2025-01-17 DIAGNOSIS — J45.909 BRONCHITIS WITH ASTHMA, SUBACUTE: ICD-10-CM

## 2025-01-17 DIAGNOSIS — J45.991 COUGH VARIANT ASTHMA: ICD-10-CM

## 2025-01-17 DIAGNOSIS — M06.9 RHEUMATOID ARTHRITIS FLARE: ICD-10-CM

## 2025-01-17 DIAGNOSIS — J20.9 BRONCHITIS WITH ASTHMA, SUBACUTE: ICD-10-CM

## 2025-01-17 PROBLEM — R05.9 COUGH: Status: ACTIVE | Noted: 2025-01-17

## 2025-01-17 RX ORDER — PREDNISONE 5 MG/1
5 TABLET ORAL 2 TIMES DAILY PRN
Qty: 60 TABLET | Refills: 1 | Status: SHIPPED | OUTPATIENT
Start: 2025-01-17

## 2025-01-17 RX ORDER — PREDNISONE 20 MG/1
20 TABLET ORAL DAILY
Qty: 14 TABLET | Refills: 0 | Status: SHIPPED | OUTPATIENT
Start: 2025-01-17 | End: 2025-01-31

## 2025-01-17 NOTE — ASSESSMENT & PLAN NOTE
XR CHEST PA AND LATERAL   CLINICAL HISTORY:SOB; Cough, unspecified     TECHNIQUE:PA and lateral views of the chest were performed.     COMPARISON:  08/29/2024     FINDINGS:  The lungs are clear and free of infiltrate.  No pleural effusion or pneumothorax. The heart is not enlarged. There is evidence for prior median sternotomy.  A loop recorder is seen projecting over the left side of the chest.  There is some chronic scarring changes noted in the region of either costophrenic angle.     Impression:  1.  No acute cardiopulmonary process.  Electronically signed by:Montana Langley DO  Date:                                            01/14/2025  Time:                                           15:49

## 2025-02-07 ENCOUNTER — LAB VISIT (OUTPATIENT)
Dept: LAB | Facility: HOSPITAL | Age: 80
End: 2025-02-07
Attending: INTERNAL MEDICINE
Payer: MEDICARE

## 2025-02-07 ENCOUNTER — INFUSION (OUTPATIENT)
Dept: INFUSION THERAPY | Facility: HOSPITAL | Age: 80
End: 2025-02-07
Attending: PHYSICIAN ASSISTANT
Payer: MEDICARE

## 2025-02-07 VITALS
OXYGEN SATURATION: 94 % | HEIGHT: 67 IN | BODY MASS INDEX: 32.32 KG/M2 | HEART RATE: 70 BPM | TEMPERATURE: 98 F | SYSTOLIC BLOOD PRESSURE: 114 MMHG | RESPIRATION RATE: 16 BRPM | WEIGHT: 205.94 LBS | DIASTOLIC BLOOD PRESSURE: 59 MMHG

## 2025-02-07 DIAGNOSIS — D84.9 IMMUNOCOMPROMISED PATIENT: Chronic | ICD-10-CM

## 2025-02-07 DIAGNOSIS — Z51.81 MEDICATION MONITORING ENCOUNTER: ICD-10-CM

## 2025-02-07 DIAGNOSIS — M05.79 RHEUMATOID ARTHRITIS INVOLVING MULTIPLE SITES WITH POSITIVE RHEUMATOID FACTOR: Primary | ICD-10-CM

## 2025-02-07 LAB
ALBUMIN SERPL BCP-MCNC: 3.2 G/DL (ref 3.5–5.2)
ALP SERPL-CCNC: 68 U/L (ref 40–150)
ALT SERPL W/O P-5'-P-CCNC: 17 U/L (ref 10–44)
ANION GAP SERPL CALC-SCNC: 9 MMOL/L (ref 8–16)
AST SERPL-CCNC: 25 U/L (ref 10–40)
BASOPHILS # BLD AUTO: 0.08 K/UL (ref 0–0.2)
BASOPHILS NFR BLD: 0.9 % (ref 0–1.9)
BILIRUB SERPL-MCNC: 0.5 MG/DL (ref 0.1–1)
BUN SERPL-MCNC: 16 MG/DL (ref 8–23)
CALCIUM SERPL-MCNC: 9.7 MG/DL (ref 8.7–10.5)
CHLORIDE SERPL-SCNC: 103 MMOL/L (ref 95–110)
CO2 SERPL-SCNC: 27 MMOL/L (ref 23–29)
CREAT SERPL-MCNC: 1.2 MG/DL (ref 0.5–1.4)
DIFFERENTIAL METHOD BLD: ABNORMAL
EOSINOPHIL # BLD AUTO: 0.4 K/UL (ref 0–0.5)
EOSINOPHIL NFR BLD: 4.3 % (ref 0–8)
ERYTHROCYTE [DISTWIDTH] IN BLOOD BY AUTOMATED COUNT: 16.5 % (ref 11.5–14.5)
EST. GFR  (NO RACE VARIABLE): >60 ML/MIN/1.73 M^2
GLUCOSE SERPL-MCNC: 111 MG/DL (ref 70–110)
HCT VFR BLD AUTO: 42.7 % (ref 40–54)
HGB BLD-MCNC: 14.4 G/DL (ref 14–18)
IMM GRANULOCYTES # BLD AUTO: 0.03 K/UL (ref 0–0.04)
IMM GRANULOCYTES NFR BLD AUTO: 0.3 % (ref 0–0.5)
LYMPHOCYTES # BLD AUTO: 2.9 K/UL (ref 1–4.8)
LYMPHOCYTES NFR BLD: 32.1 % (ref 18–48)
MCH RBC QN AUTO: 33.9 PG (ref 27–31)
MCHC RBC AUTO-ENTMCNC: 33.7 G/DL (ref 32–36)
MCV RBC AUTO: 101 FL (ref 82–98)
MONOCYTES # BLD AUTO: 0.6 K/UL (ref 0.3–1)
MONOCYTES NFR BLD: 6.9 % (ref 4–15)
NEUTROPHILS # BLD AUTO: 5 K/UL (ref 1.8–7.7)
NEUTROPHILS NFR BLD: 55.5 % (ref 38–73)
NRBC BLD-RTO: 0 /100 WBC
PLATELET # BLD AUTO: 271 K/UL (ref 150–450)
PMV BLD AUTO: 8.4 FL (ref 9.2–12.9)
POTASSIUM SERPL-SCNC: 3.9 MMOL/L (ref 3.5–5.1)
PROT SERPL-MCNC: 7.5 G/DL (ref 6–8.4)
RBC # BLD AUTO: 4.25 M/UL (ref 4.6–6.2)
SODIUM SERPL-SCNC: 139 MMOL/L (ref 136–145)
WBC # BLD AUTO: 8.99 K/UL (ref 3.9–12.7)

## 2025-02-07 PROCEDURE — 96413 CHEMO IV INFUSION 1 HR: CPT

## 2025-02-07 PROCEDURE — 63600175 PHARM REV CODE 636 W HCPCS: Mod: JZ,TB | Performed by: INTERNAL MEDICINE

## 2025-02-07 PROCEDURE — 85025 COMPLETE CBC W/AUTO DIFF WBC: CPT | Performed by: INTERNAL MEDICINE

## 2025-02-07 PROCEDURE — 36415 COLL VENOUS BLD VENIPUNCTURE: CPT | Performed by: INTERNAL MEDICINE

## 2025-02-07 PROCEDURE — 80053 COMPREHEN METABOLIC PANEL: CPT | Performed by: INTERNAL MEDICINE

## 2025-02-07 PROCEDURE — 96415 CHEMO IV INFUSION ADDL HR: CPT

## 2025-02-07 PROCEDURE — 25000003 PHARM REV CODE 250: Performed by: INTERNAL MEDICINE

## 2025-02-07 RX ORDER — EPINEPHRINE 0.3 MG/.3ML
0.3 INJECTION SUBCUTANEOUS ONCE AS NEEDED
Status: DISCONTINUED | OUTPATIENT
Start: 2025-02-07 | End: 2025-02-07 | Stop reason: HOSPADM

## 2025-02-07 RX ORDER — ACETAMINOPHEN 325 MG/1
650 TABLET ORAL
Status: CANCELLED | OUTPATIENT
Start: 2025-04-04

## 2025-02-07 RX ORDER — CETIRIZINE HYDROCHLORIDE 10 MG/1
10 TABLET ORAL
Status: CANCELLED | OUTPATIENT
Start: 2025-04-04

## 2025-02-07 RX ORDER — IPRATROPIUM BROMIDE AND ALBUTEROL SULFATE 2.5; .5 MG/3ML; MG/3ML
3 SOLUTION RESPIRATORY (INHALATION)
Status: CANCELLED | OUTPATIENT
Start: 2025-04-04

## 2025-02-07 RX ORDER — METHYLPREDNISOLONE SOD SUCC 125 MG
40 VIAL (EA) INJECTION
Status: DISCONTINUED | OUTPATIENT
Start: 2025-02-07 | End: 2025-02-07 | Stop reason: HOSPADM

## 2025-02-07 RX ORDER — ACETAMINOPHEN 325 MG/1
650 TABLET ORAL
Status: DISCONTINUED | OUTPATIENT
Start: 2025-02-07 | End: 2025-02-07 | Stop reason: HOSPADM

## 2025-02-07 RX ORDER — SODIUM CHLORIDE 0.9 % (FLUSH) 0.9 %
10 SYRINGE (ML) INJECTION
Status: CANCELLED | OUTPATIENT
Start: 2025-04-04

## 2025-02-07 RX ORDER — METHYLPREDNISOLONE SOD SUCC 125 MG
40 VIAL (EA) INJECTION
Status: CANCELLED | OUTPATIENT
Start: 2025-04-04

## 2025-02-07 RX ORDER — HEPARIN 100 UNIT/ML
500 SYRINGE INTRAVENOUS
Status: CANCELLED | OUTPATIENT
Start: 2025-04-04

## 2025-02-07 RX ORDER — DIPHENHYDRAMINE HYDROCHLORIDE 50 MG/ML
50 INJECTION INTRAMUSCULAR; INTRAVENOUS ONCE AS NEEDED
Status: CANCELLED | OUTPATIENT
Start: 2025-04-04

## 2025-02-07 RX ORDER — DIPHENHYDRAMINE HYDROCHLORIDE 50 MG/ML
50 INJECTION INTRAMUSCULAR; INTRAVENOUS ONCE AS NEEDED
Status: DISCONTINUED | OUTPATIENT
Start: 2025-02-07 | End: 2025-02-07 | Stop reason: HOSPADM

## 2025-02-07 RX ORDER — CETIRIZINE HYDROCHLORIDE 10 MG/1
10 TABLET ORAL
Status: DISCONTINUED | OUTPATIENT
Start: 2025-02-07 | End: 2025-02-07 | Stop reason: HOSPADM

## 2025-02-07 RX ORDER — EPINEPHRINE 0.3 MG/.3ML
0.3 INJECTION SUBCUTANEOUS ONCE AS NEEDED
Status: CANCELLED | OUTPATIENT
Start: 2025-04-04

## 2025-02-07 RX ADMIN — SODIUM CHLORIDE: 9 INJECTION, SOLUTION INTRAVENOUS at 09:02

## 2025-02-07 RX ADMIN — INFLIXIMAB 900 MG: 100 INJECTION, POWDER, LYOPHILIZED, FOR SOLUTION INTRAVENOUS at 10:02

## 2025-02-07 NOTE — PLAN OF CARE
Problem: Adult Inpatient Plan of Care  Goal: Plan of Care Review  Outcome: Progressing  Flowsheets (Taken 2/7/2025 1036)  Plan of Care Reviewed With: patient  Goal: Patient-Specific Goal (Individualized)  Outcome: Progressing  Flowsheets (Taken 2/7/2025 1036)  Individualized Care Needs: pillow, reclining position, refuses pre meds  Anxieties, Fears or Concerns: denies any complaints     Problem: Infection  Goal: Absence of Infection Signs and Symptoms  Outcome: Progressing  Intervention: Prevent or Manage Infection  Flowsheets (Taken 2/7/2025 1036)  Infection Management: aseptic technique maintained

## 2025-02-21 ENCOUNTER — INFUSION (OUTPATIENT)
Dept: INFUSION THERAPY | Facility: HOSPITAL | Age: 80
End: 2025-02-21
Attending: PHYSICIAN ASSISTANT
Payer: MEDICARE

## 2025-02-21 ENCOUNTER — LAB VISIT (OUTPATIENT)
Dept: LAB | Facility: HOSPITAL | Age: 80
End: 2025-02-21
Attending: INTERNAL MEDICINE
Payer: MEDICARE

## 2025-02-21 VITALS
HEIGHT: 67 IN | WEIGHT: 210.75 LBS | HEART RATE: 48 BPM | BODY MASS INDEX: 33.08 KG/M2 | DIASTOLIC BLOOD PRESSURE: 60 MMHG | TEMPERATURE: 98 F | SYSTOLIC BLOOD PRESSURE: 110 MMHG | OXYGEN SATURATION: 96 % | RESPIRATION RATE: 16 BRPM

## 2025-02-21 DIAGNOSIS — Z51.81 MEDICATION MONITORING ENCOUNTER: ICD-10-CM

## 2025-02-21 DIAGNOSIS — Z79.899 IMMUNOSUPPRESSION DUE TO DRUG THERAPY: ICD-10-CM

## 2025-02-21 DIAGNOSIS — M05.79 RHEUMATOID ARTHRITIS INVOLVING MULTIPLE SITES WITH POSITIVE RHEUMATOID FACTOR: Primary | ICD-10-CM

## 2025-02-21 DIAGNOSIS — D84.821 IMMUNOSUPPRESSION DUE TO DRUG THERAPY: ICD-10-CM

## 2025-02-21 DIAGNOSIS — M05.79 RHEUMATOID ARTHRITIS INVOLVING MULTIPLE SITES WITH POSITIVE RHEUMATOID FACTOR: ICD-10-CM

## 2025-02-21 LAB
ALBUMIN SERPL BCP-MCNC: 3.4 G/DL (ref 3.5–5.2)
ALP SERPL-CCNC: 74 U/L (ref 40–150)
ALT SERPL W/O P-5'-P-CCNC: 14 U/L (ref 10–44)
ANION GAP SERPL CALC-SCNC: 10 MMOL/L (ref 8–16)
AST SERPL-CCNC: 23 U/L (ref 10–40)
BASOPHILS # BLD AUTO: 0.06 K/UL (ref 0–0.2)
BASOPHILS NFR BLD: 0.8 % (ref 0–1.9)
BILIRUB SERPL-MCNC: 0.4 MG/DL (ref 0.1–1)
BUN SERPL-MCNC: 13 MG/DL (ref 8–23)
CALCIUM SERPL-MCNC: 9.4 MG/DL (ref 8.7–10.5)
CHLORIDE SERPL-SCNC: 101 MMOL/L (ref 95–110)
CO2 SERPL-SCNC: 27 MMOL/L (ref 23–29)
CREAT SERPL-MCNC: 1.1 MG/DL (ref 0.5–1.4)
CRP SERPL-MCNC: 6.6 MG/L (ref 0–8.2)
DIFFERENTIAL METHOD BLD: ABNORMAL
EOSINOPHIL # BLD AUTO: 0.2 K/UL (ref 0–0.5)
EOSINOPHIL NFR BLD: 3.2 % (ref 0–8)
ERYTHROCYTE [DISTWIDTH] IN BLOOD BY AUTOMATED COUNT: 16.1 % (ref 11.5–14.5)
EST. GFR  (NO RACE VARIABLE): >60 ML/MIN/1.73 M^2
GLUCOSE SERPL-MCNC: 118 MG/DL (ref 70–110)
HCT VFR BLD AUTO: 42.7 % (ref 40–54)
HGB BLD-MCNC: 14.5 G/DL (ref 14–18)
IMM GRANULOCYTES # BLD AUTO: 0.02 K/UL (ref 0–0.04)
IMM GRANULOCYTES NFR BLD AUTO: 0.3 % (ref 0–0.5)
LYMPHOCYTES # BLD AUTO: 1.8 K/UL (ref 1–4.8)
LYMPHOCYTES NFR BLD: 23.8 % (ref 18–48)
MCH RBC QN AUTO: 34.1 PG (ref 27–31)
MCHC RBC AUTO-ENTMCNC: 34 G/DL (ref 32–36)
MCV RBC AUTO: 101 FL (ref 82–98)
MONOCYTES # BLD AUTO: 0.7 K/UL (ref 0.3–1)
MONOCYTES NFR BLD: 9.3 % (ref 4–15)
NEUTROPHILS # BLD AUTO: 4.8 K/UL (ref 1.8–7.7)
NEUTROPHILS NFR BLD: 62.6 % (ref 38–73)
NRBC BLD-RTO: 0 /100 WBC
PLATELET # BLD AUTO: 274 K/UL (ref 150–450)
PMV BLD AUTO: 8.5 FL (ref 9.2–12.9)
POTASSIUM SERPL-SCNC: 3.9 MMOL/L (ref 3.5–5.1)
PROT SERPL-MCNC: 7.5 G/DL (ref 6–8.4)
RBC # BLD AUTO: 4.25 M/UL (ref 4.6–6.2)
SODIUM SERPL-SCNC: 138 MMOL/L (ref 136–145)
WBC # BLD AUTO: 7.6 K/UL (ref 3.9–12.7)

## 2025-02-21 PROCEDURE — 80053 COMPREHEN METABOLIC PANEL: CPT | Performed by: INTERNAL MEDICINE

## 2025-02-21 PROCEDURE — 36415 COLL VENOUS BLD VENIPUNCTURE: CPT | Performed by: INTERNAL MEDICINE

## 2025-02-21 PROCEDURE — 96413 CHEMO IV INFUSION 1 HR: CPT

## 2025-02-21 PROCEDURE — 86140 C-REACTIVE PROTEIN: CPT | Performed by: INTERNAL MEDICINE

## 2025-02-21 PROCEDURE — 85025 COMPLETE CBC W/AUTO DIFF WBC: CPT | Performed by: INTERNAL MEDICINE

## 2025-02-21 PROCEDURE — 63600175 PHARM REV CODE 636 W HCPCS: Mod: JZ,TB | Performed by: INTERNAL MEDICINE

## 2025-02-21 PROCEDURE — 25000003 PHARM REV CODE 250: Performed by: INTERNAL MEDICINE

## 2025-02-21 PROCEDURE — 96415 CHEMO IV INFUSION ADDL HR: CPT

## 2025-02-21 RX ORDER — METHYLPREDNISOLONE SOD SUCC 125 MG
40 VIAL (EA) INJECTION
OUTPATIENT
Start: 2025-04-04

## 2025-02-21 RX ORDER — ACETAMINOPHEN 325 MG/1
650 TABLET ORAL
OUTPATIENT
Start: 2025-04-04

## 2025-02-21 RX ORDER — DIPHENHYDRAMINE HYDROCHLORIDE 50 MG/ML
50 INJECTION INTRAMUSCULAR; INTRAVENOUS ONCE AS NEEDED
OUTPATIENT
Start: 2025-04-04

## 2025-02-21 RX ORDER — SODIUM CHLORIDE 0.9 % (FLUSH) 0.9 %
10 SYRINGE (ML) INJECTION
OUTPATIENT
Start: 2025-04-04

## 2025-02-21 RX ORDER — DIPHENHYDRAMINE HYDROCHLORIDE 50 MG/ML
50 INJECTION INTRAMUSCULAR; INTRAVENOUS ONCE AS NEEDED
Status: DISCONTINUED | OUTPATIENT
Start: 2025-02-21 | End: 2025-02-21 | Stop reason: HOSPADM

## 2025-02-21 RX ORDER — CETIRIZINE HYDROCHLORIDE 10 MG/1
10 TABLET ORAL
Status: DISCONTINUED | OUTPATIENT
Start: 2025-02-21 | End: 2025-02-21 | Stop reason: HOSPADM

## 2025-02-21 RX ORDER — ACETAMINOPHEN 325 MG/1
650 TABLET ORAL
Status: DISCONTINUED | OUTPATIENT
Start: 2025-02-21 | End: 2025-02-21 | Stop reason: HOSPADM

## 2025-02-21 RX ORDER — CETIRIZINE HYDROCHLORIDE 10 MG/1
10 TABLET ORAL
OUTPATIENT
Start: 2025-04-04

## 2025-02-21 RX ORDER — SODIUM CHLORIDE 0.9 % (FLUSH) 0.9 %
10 SYRINGE (ML) INJECTION
Status: DISCONTINUED | OUTPATIENT
Start: 2025-02-21 | End: 2025-02-21 | Stop reason: HOSPADM

## 2025-02-21 RX ORDER — EPINEPHRINE 0.3 MG/.3ML
0.3 INJECTION SUBCUTANEOUS ONCE AS NEEDED
Status: DISCONTINUED | OUTPATIENT
Start: 2025-02-21 | End: 2025-02-21 | Stop reason: HOSPADM

## 2025-02-21 RX ORDER — HEPARIN 100 UNIT/ML
500 SYRINGE INTRAVENOUS
OUTPATIENT
Start: 2025-04-04

## 2025-02-21 RX ORDER — METHYLPREDNISOLONE SOD SUCC 125 MG
40 VIAL (EA) INJECTION
Status: DISCONTINUED | OUTPATIENT
Start: 2025-02-21 | End: 2025-02-21 | Stop reason: HOSPADM

## 2025-02-21 RX ORDER — EPINEPHRINE 0.3 MG/.3ML
0.3 INJECTION SUBCUTANEOUS ONCE AS NEEDED
OUTPATIENT
Start: 2025-04-04

## 2025-02-21 RX ORDER — IPRATROPIUM BROMIDE AND ALBUTEROL SULFATE 2.5; .5 MG/3ML; MG/3ML
3 SOLUTION RESPIRATORY (INHALATION)
OUTPATIENT
Start: 2025-04-04

## 2025-02-21 RX ADMIN — INFLIXIMAB 900 MG: 100 INJECTION, POWDER, LYOPHILIZED, FOR SOLUTION INTRAVENOUS at 10:02

## 2025-02-21 NOTE — PLAN OF CARE
Problem: Adult Inpatient Plan of Care  Goal: Plan of Care Review  Outcome: Progressing  Flowsheets (Taken 2/21/2025 1021)  Plan of Care Reviewed With: patient  Goal: Patient-Specific Goal (Individualized)  Outcome: Progressing  Flowsheets (Taken 2/21/2025 1021)  Individualized Care Needs: pt likes feet up  Anxieties, Fears or Concerns: pt states he is always tired and weak, but no other complaints  Patient/Family-Specific Goals (Include Timeframe): pt to tolerate 2nd RA infusion well with no reactions     Problem: Infection  Goal: Absence of Infection Signs and Symptoms  Outcome: Progressing

## 2025-02-21 NOTE — DISCHARGE INSTRUCTIONS
[facility]  [address] [facility]  [address]  [phone] phone     [phone] fax  Hours of Operation: Monday- Friday 8:00am- 5:00pm  After hours phone  [phone]  Hematology / Oncology Physicians on call      VINNIE Santacruz Dr., NP Phaon Dunbar, NP Khelsea Conley, FNP    Please call with any concerns regarding your appointment today.

## 2025-03-21 ENCOUNTER — LAB VISIT (OUTPATIENT)
Dept: LAB | Facility: HOSPITAL | Age: 80
End: 2025-03-21
Attending: INTERNAL MEDICINE
Payer: MEDICARE

## 2025-03-21 ENCOUNTER — INFUSION (OUTPATIENT)
Dept: INFUSION THERAPY | Facility: HOSPITAL | Age: 80
End: 2025-03-21
Attending: PHYSICIAN ASSISTANT
Payer: MEDICARE

## 2025-03-21 VITALS
DIASTOLIC BLOOD PRESSURE: 65 MMHG | OXYGEN SATURATION: 94 % | TEMPERATURE: 98 F | WEIGHT: 209.44 LBS | HEART RATE: 51 BPM | RESPIRATION RATE: 18 BRPM | SYSTOLIC BLOOD PRESSURE: 138 MMHG | BODY MASS INDEX: 32.8 KG/M2

## 2025-03-21 DIAGNOSIS — Z79.899 IMMUNOSUPPRESSION DUE TO DRUG THERAPY: ICD-10-CM

## 2025-03-21 DIAGNOSIS — Z51.81 MEDICATION MONITORING ENCOUNTER: ICD-10-CM

## 2025-03-21 DIAGNOSIS — M05.79 RHEUMATOID ARTHRITIS INVOLVING MULTIPLE SITES WITH POSITIVE RHEUMATOID FACTOR: Primary | ICD-10-CM

## 2025-03-21 DIAGNOSIS — D84.821 IMMUNOSUPPRESSION DUE TO DRUG THERAPY: ICD-10-CM

## 2025-03-21 LAB
ALBUMIN SERPL BCP-MCNC: 3.5 G/DL (ref 3.5–5.2)
ALP SERPL-CCNC: 73 U/L (ref 40–150)
ALT SERPL W/O P-5'-P-CCNC: 11 U/L (ref 10–44)
ANION GAP SERPL CALC-SCNC: 8 MMOL/L (ref 8–16)
AST SERPL-CCNC: 20 U/L (ref 10–40)
BASOPHILS # BLD AUTO: 0.04 K/UL (ref 0–0.2)
BASOPHILS NFR BLD: 0.5 % (ref 0–1.9)
BILIRUB SERPL-MCNC: 0.5 MG/DL (ref 0.1–1)
BUN SERPL-MCNC: 16 MG/DL (ref 8–23)
CALCIUM SERPL-MCNC: 9.1 MG/DL (ref 8.7–10.5)
CHLORIDE SERPL-SCNC: 102 MMOL/L (ref 95–110)
CO2 SERPL-SCNC: 27 MMOL/L (ref 23–29)
CREAT SERPL-MCNC: 1 MG/DL (ref 0.5–1.4)
DIFFERENTIAL METHOD BLD: ABNORMAL
EOSINOPHIL # BLD AUTO: 0.2 K/UL (ref 0–0.5)
EOSINOPHIL NFR BLD: 3 % (ref 0–8)
ERYTHROCYTE [DISTWIDTH] IN BLOOD BY AUTOMATED COUNT: 15.3 % (ref 11.5–14.5)
EST. GFR  (NO RACE VARIABLE): >60 ML/MIN/1.73 M^2
GLUCOSE SERPL-MCNC: 100 MG/DL (ref 70–110)
HCT VFR BLD AUTO: 41 % (ref 40–54)
HGB BLD-MCNC: 14.1 G/DL (ref 14–18)
IMM GRANULOCYTES # BLD AUTO: 0.02 K/UL (ref 0–0.04)
IMM GRANULOCYTES NFR BLD AUTO: 0.3 % (ref 0–0.5)
LYMPHOCYTES # BLD AUTO: 1.7 K/UL (ref 1–4.8)
LYMPHOCYTES NFR BLD: 21.6 % (ref 18–48)
MCH RBC QN AUTO: 35.1 PG (ref 27–31)
MCHC RBC AUTO-ENTMCNC: 34.4 G/DL (ref 32–36)
MCV RBC AUTO: 102 FL (ref 82–98)
MONOCYTES # BLD AUTO: 0.8 K/UL (ref 0.3–1)
MONOCYTES NFR BLD: 10.4 % (ref 4–15)
NEUTROPHILS # BLD AUTO: 5.1 K/UL (ref 1.8–7.7)
NEUTROPHILS NFR BLD: 64.2 % (ref 38–73)
NRBC BLD-RTO: 0 /100 WBC
PLATELET # BLD AUTO: 273 K/UL (ref 150–450)
PMV BLD AUTO: 8.4 FL (ref 9.2–12.9)
POTASSIUM SERPL-SCNC: 4.1 MMOL/L (ref 3.5–5.1)
PROT SERPL-MCNC: 7.3 G/DL (ref 6–8.4)
RBC # BLD AUTO: 4.02 M/UL (ref 4.6–6.2)
SODIUM SERPL-SCNC: 137 MMOL/L (ref 136–145)
WBC # BLD AUTO: 8 K/UL (ref 3.9–12.7)

## 2025-03-21 PROCEDURE — 85025 COMPLETE CBC W/AUTO DIFF WBC: CPT | Performed by: INTERNAL MEDICINE

## 2025-03-21 PROCEDURE — 96413 CHEMO IV INFUSION 1 HR: CPT

## 2025-03-21 PROCEDURE — 36415 COLL VENOUS BLD VENIPUNCTURE: CPT | Performed by: INTERNAL MEDICINE

## 2025-03-21 PROCEDURE — 25000003 PHARM REV CODE 250: Performed by: INTERNAL MEDICINE

## 2025-03-21 PROCEDURE — 96415 CHEMO IV INFUSION ADDL HR: CPT

## 2025-03-21 PROCEDURE — 80053 COMPREHEN METABOLIC PANEL: CPT | Performed by: INTERNAL MEDICINE

## 2025-03-21 RX ORDER — SODIUM CHLORIDE 0.9 % (FLUSH) 0.9 %
10 SYRINGE (ML) INJECTION
Status: DISCONTINUED | OUTPATIENT
Start: 2025-03-21 | End: 2025-03-21 | Stop reason: HOSPADM

## 2025-03-21 RX ORDER — HEPARIN 100 UNIT/ML
500 SYRINGE INTRAVENOUS
OUTPATIENT
Start: 2025-04-04

## 2025-03-21 RX ORDER — IPRATROPIUM BROMIDE AND ALBUTEROL SULFATE 2.5; .5 MG/3ML; MG/3ML
3 SOLUTION RESPIRATORY (INHALATION)
OUTPATIENT
Start: 2025-04-04

## 2025-03-21 RX ORDER — ACETAMINOPHEN 325 MG/1
650 TABLET ORAL
OUTPATIENT
Start: 2025-04-04

## 2025-03-21 RX ORDER — SODIUM CHLORIDE 0.9 % (FLUSH) 0.9 %
10 SYRINGE (ML) INJECTION
OUTPATIENT
Start: 2025-04-04

## 2025-03-21 RX ORDER — DIPHENHYDRAMINE HYDROCHLORIDE 50 MG/ML
50 INJECTION, SOLUTION INTRAMUSCULAR; INTRAVENOUS ONCE AS NEEDED
OUTPATIENT
Start: 2025-04-04

## 2025-03-21 RX ORDER — METHYLPREDNISOLONE SOD SUCC 125 MG
40 VIAL (EA) INJECTION
OUTPATIENT
Start: 2025-04-04

## 2025-03-21 RX ORDER — EPINEPHRINE 0.3 MG/.3ML
0.3 INJECTION SUBCUTANEOUS ONCE AS NEEDED
OUTPATIENT
Start: 2025-04-04

## 2025-03-21 RX ORDER — CETIRIZINE HYDROCHLORIDE 10 MG/1
10 TABLET ORAL
OUTPATIENT
Start: 2025-04-04

## 2025-03-21 RX ADMIN — SODIUM CHLORIDE 900 MG: 9 INJECTION, SOLUTION INTRAVENOUS at 10:03

## 2025-03-27 ENCOUNTER — LAB VISIT (OUTPATIENT)
Dept: LAB | Facility: HOSPITAL | Age: 80
End: 2025-03-27
Attending: INTERNAL MEDICINE
Payer: MEDICARE

## 2025-03-27 DIAGNOSIS — D84.821 IMMUNOSUPPRESSION DUE TO DRUG THERAPY: ICD-10-CM

## 2025-03-27 DIAGNOSIS — M05.79 RHEUMATOID ARTHRITIS INVOLVING MULTIPLE SITES WITH POSITIVE RHEUMATOID FACTOR: ICD-10-CM

## 2025-03-27 DIAGNOSIS — Z79.899 IMMUNOSUPPRESSION DUE TO DRUG THERAPY: ICD-10-CM

## 2025-03-27 DIAGNOSIS — Z51.81 MEDICATION MONITORING ENCOUNTER: ICD-10-CM

## 2025-03-27 LAB
ABSOLUTE EOSINOPHIL (OHS): 0.24 K/UL
ABSOLUTE MONOCYTE (OHS): 0.9 K/UL (ref 0.3–1)
ABSOLUTE NEUTROPHIL COUNT (OHS): 6.23 K/UL (ref 1.8–7.7)
ALBUMIN SERPL BCP-MCNC: 3.4 G/DL (ref 3.5–5.2)
ALP SERPL-CCNC: 69 UNIT/L (ref 40–150)
ALT SERPL W/O P-5'-P-CCNC: 13 UNIT/L (ref 10–44)
ANION GAP (OHS): 9 MMOL/L (ref 8–16)
AST SERPL-CCNC: 26 UNIT/L (ref 11–45)
BASOPHILS # BLD AUTO: 0.03 K/UL
BASOPHILS NFR BLD AUTO: 0.3 %
BILIRUB SERPL-MCNC: 0.4 MG/DL (ref 0.1–1)
BUN SERPL-MCNC: 18 MG/DL (ref 8–23)
CALCIUM SERPL-MCNC: 9.3 MG/DL (ref 8.7–10.5)
CHLORIDE SERPL-SCNC: 102 MMOL/L (ref 95–110)
CO2 SERPL-SCNC: 26 MMOL/L (ref 23–29)
CREAT SERPL-MCNC: 1.1 MG/DL (ref 0.5–1.4)
CRP SERPL-MCNC: 11.2 MG/L
ERYTHROCYTE [DISTWIDTH] IN BLOOD BY AUTOMATED COUNT: 15.5 % (ref 11.5–14.5)
GFR SERPLBLD CREATININE-BSD FMLA CKD-EPI: >60 ML/MIN/1.73/M2
GLUCOSE SERPL-MCNC: 93 MG/DL (ref 70–110)
HCT VFR BLD AUTO: 42.2 % (ref 40–54)
HGB BLD-MCNC: 14.3 GM/DL (ref 14–18)
IMM GRANULOCYTES # BLD AUTO: 0.03 K/UL (ref 0–0.04)
IMM GRANULOCYTES NFR BLD AUTO: 0.3 % (ref 0–0.5)
LYMPHOCYTES # BLD AUTO: 1.9 K/UL (ref 1–4.8)
MCH RBC QN AUTO: 34.5 PG (ref 27–50)
MCHC RBC AUTO-ENTMCNC: 33.9 G/DL (ref 32–36)
MCV RBC AUTO: 102 FL (ref 82–98)
NUCLEATED RBC (/100WBC) (OHS): 0 /100 WBC
PLATELET # BLD AUTO: 296 K/UL (ref 150–450)
PMV BLD AUTO: 8.7 FL (ref 9.2–12.9)
POTASSIUM SERPL-SCNC: 4.2 MMOL/L (ref 3.5–5.1)
PROT SERPL-MCNC: 7.5 GM/DL (ref 6–8.4)
RBC # BLD AUTO: 4.15 M/UL (ref 4.6–6.2)
RELATIVE EOSINOPHIL (OHS): 2.6 %
RELATIVE LYMPHOCYTE (OHS): 20.4 % (ref 18–48)
RELATIVE MONOCYTE (OHS): 9.6 % (ref 4–15)
RELATIVE NEUTROPHIL (OHS): 66.8 % (ref 38–73)
SODIUM SERPL-SCNC: 137 MMOL/L (ref 136–145)
WBC # BLD AUTO: 9.33 K/UL (ref 3.9–12.7)

## 2025-03-27 PROCEDURE — 86140 C-REACTIVE PROTEIN: CPT

## 2025-03-27 PROCEDURE — 80053 COMPREHEN METABOLIC PANEL: CPT

## 2025-03-27 PROCEDURE — 85025 COMPLETE CBC W/AUTO DIFF WBC: CPT

## 2025-03-27 PROCEDURE — 36415 COLL VENOUS BLD VENIPUNCTURE: CPT

## 2025-04-04 ENCOUNTER — OFFICE VISIT (OUTPATIENT)
Dept: PULMONOLOGY | Facility: CLINIC | Age: 80
End: 2025-04-04
Attending: INTERNAL MEDICINE
Payer: MEDICARE

## 2025-04-04 ENCOUNTER — HOSPITAL ENCOUNTER (OUTPATIENT)
Dept: RADIOLOGY | Facility: HOSPITAL | Age: 80
Discharge: HOME | End: 2025-04-04
Attending: INTERNAL MEDICINE
Payer: MEDICARE

## 2025-04-04 VITALS
HEIGHT: 67 IN | OXYGEN SATURATION: 91 % | DIASTOLIC BLOOD PRESSURE: 72 MMHG | RESPIRATION RATE: 18 BRPM | HEART RATE: 68 BPM | WEIGHT: 205 LBS | SYSTOLIC BLOOD PRESSURE: 126 MMHG | BODY MASS INDEX: 32.18 KG/M2

## 2025-04-04 DIAGNOSIS — J84.9 INTERSTITIAL PULMONARY DISEASE, UNSPECIFIED: ICD-10-CM

## 2025-04-04 DIAGNOSIS — M05.79 RHEUMATOID ARTHRITIS INVOLVING MULTIPLE SITES WITH POSITIVE RHEUMATOID FACTOR: Chronic | ICD-10-CM

## 2025-04-04 DIAGNOSIS — J84.9 INTERSTITIAL PULMONARY DISEASE, UNSPECIFIED: Primary | ICD-10-CM

## 2025-04-04 DIAGNOSIS — D84.9 IMMUNOCOMPROMISED PATIENT: Chronic | ICD-10-CM

## 2025-04-04 DIAGNOSIS — Z79.620 INFLIXIMAB (REMICADE) LONG-TERM USE: ICD-10-CM

## 2025-04-04 LAB
BRPFT: ABNORMAL
DLCO SINGLE BREATH LLN: 15.56
DLCO SINGLE BREATH PRE REF: 78.2 %
DLCO SINGLE BREATH REF: 22.49
DLCOC SBVA LLN: 2.22
DLCOC SBVA REF: 3.45
DLCOC SINGLE BREATH LLN: 15.56
DLCOC SINGLE BREATH REF: 22.49
DLCOVA LLN: 2.22
DLCOVA PRE REF: 142.1 %
DLCOVA PRE: 4.9 ML/(MIN*MMHG*L) (ref 2.22–4.68)
DLCOVA REF: 3.45
ERV LLN: -16449.16
ERV PRE REF: 52.6 %
ERV REF: 0.84
FEF 25 75 CHG: 12.2 %
FEF 25 75 LLN: 0.81
FEF 25 75 POST REF: 16.5 %
FEF 25 75 PRE REF: 14.7 %
FEF 25 75 REF: 2.52
FET100 CHG: 22.2 %
FEV1 CHG: 9.4 %
FEV1 FVC CHG: -4.2 %
FEV1 FVC LLN: 60
FEV1 FVC POST REF: 71.3 %
FEV1 FVC PRE REF: 74.4 %
FEV1 FVC REF: 75
FEV1 LLN: 1.78
FEV1 POST REF: 49 %
FEV1 PRE REF: 44.8 %
FEV1 REF: 2.58
FRCPLETH LLN: 2.63
FRCPLETH PREREF: 85.5 %
FRCPLETH REF: 3.62
FVC CHG: 14.1 %
FVC LLN: 2.49
FVC POST REF: 68.1 %
FVC PRE REF: 59.7 %
FVC REF: 3.46
IVC PRE: 2.03 L (ref 2.49–4.45)
IVC SINGLE BREATH LLN: 2.49
IVC SINGLE BREATH PRE REF: 58.6 %
IVC SINGLE BREATH REF: 3.46
MVV LLN: 84
MVV PRE REF: 39.8 %
MVV REF: 99
PEF CHG: -0.1 %
PEF LLN: 4.32
PEF POST REF: 38.2 %
PEF PRE REF: 38.2 %
PEF REF: 6.45
POST FEF 25 75: 0.42 L/S (ref 0.81–4.23)
POST FET 100: 13.05 SEC
POST FEV1 FVC: 53.49 % (ref 59.99–88.67)
POST FEV1: 1.26 L (ref 1.78–3.32)
POST FVC: 2.36 L (ref 2.49–4.45)
POST PEF: 2.46 L/S (ref 4.32–8.57)
PRE DLCO: 17.59 ML/(MIN*MMHG) (ref 15.56–29.42)
PRE ERV: 0.44 L (ref -16449.16–16450.84)
PRE FEF 25 75: 0.37 L/S (ref 0.81–4.23)
PRE FET 100: 10.68 SEC
PRE FEV1 FVC: 55.82 % (ref 59.99–88.67)
PRE FEV1: 1.15 L (ref 1.78–3.32)
PRE FRC PL: 3.1 L (ref 2.63–4.61)
PRE FVC: 2.07 L (ref 2.49–4.45)
PRE MVV: 39.42 L/MIN (ref 84.28–114.02)
PRE PEF: 2.46 L/S (ref 4.32–8.57)
PRE RV: 2.65 L (ref 2.11–3.46)
PRE TLC: 4.72 L (ref 5.37–7.67)
RAW LLN: 3.06
RAW PRE REF: 264.7 %
RAW PRE: 8.1 CMH2O*S/L (ref 3.06–3.06)
RAW REF: 3.06
RV LLN: 2.11
RV PRE REF: 95.4 %
RV REF: 2.78
RVTLC LLN: 37
RVTLC PRE REF: 123.4 %
RVTLC PRE: 56.2 % (ref 36.57–54.53)
RVTLC REF: 46
TLC LLN: 5.37
TLC PRE REF: 72.5 %
TLC REF: 6.52
VA PRE: 3.59 L (ref 6.37–6.37)
VA SINGLE BREATH LLN: 6.37
VA SINGLE BREATH PRE REF: 56.3 %
VA SINGLE BREATH REF: 6.37
VC LLN: 2.49
VC PRE REF: 59.7 %
VC PRE: 2.07 L (ref 2.49–4.45)
VC REF: 3.46

## 2025-04-04 PROCEDURE — 71250 CT THORAX DX C-: CPT | Mod: 26,,, | Performed by: RADIOLOGY

## 2025-04-04 PROCEDURE — 71250 CT THORAX DX C-: CPT | Mod: TC

## 2025-04-04 PROCEDURE — 99215 OFFICE O/P EST HI 40 MIN: CPT | Mod: PBBFAC,25 | Performed by: INTERNAL MEDICINE

## 2025-04-04 PROCEDURE — 99999 PR PBB SHADOW E&M-EST. PATIENT-LVL V: CPT | Mod: PBBFAC,,, | Performed by: INTERNAL MEDICINE

## 2025-04-04 NOTE — PROGRESS NOTES
Subjective:     Patient ID: Jack Back is a 80 y.o. male.    Chief Complaint:  Coughing     HPI 80 y.o. history of rheumatoid intersitial fibrosis, Atrial Fibrillation, Coronary Artery Disease s/p SABG x 2  Voice has changed since using inhaler  Hoarseness and congestion  Concerned about recent intubation and breathing problems after. 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 Symptoms occur with one block walking. Symptoms began few years ago, gradually worsening since. Associated symptoms include  difficulty breathing, dyspnea on exertion, morning cough, shortness of breath, sputum production, and wheezing. He denies chest pain, located left chest. He does not have had recent travel. Weight has been stable. Symptoms are exacerbated by moderate activity. Symptoms are alleviated by nothing.   \  Has been on Remcade for years   Changed to a diffent medication   Now back on Remicade    Past Medical History:   Diagnosis Date    Arthritis     Congestive heart failure 06/07/2019    Depression     Lung disease     Rheumatoid arthritis involving multiple sites with positive rheumatoid factor 12/1/2015    Rheumatoid arthritis(714.0)     Thyroid disease      Past Surgical History:   Procedure Laterality Date    CREATION OF AORTOCORONARY ARTERY BYPASS      HERNIA REPAIR      JOINT REPLACEMENT      bi lateral hips    DE CABG, ARTERY-VEIN, FOUR  05/21/2019    Coronary Artery Bypass, 4    SPINE SURGERY       Review of patient's allergies indicates:   Allergen Reactions    Arava [leflunomide] Diarrhea    Penicillins Other (See Comments) and Hives     unknown    Tetanus vaccines and toxoid Other (See Comments)     unknown  Fever     Current Outpatient Medications on File Prior to Visit   Medication Sig Dispense Refill    albuterol (PROVENTIL)  2.5 mg /3 mL (0.083 %) nebulizer solution Take 3 mLs (2.5 mg total) by nebulization every 4 to 6 hours as needed for Wheezing or Shortness of Breath. 360 mL 11    albuterol (PROVENTIL/VENTOLIN HFA) 90 mcg/actuation inhaler Inhale 2 puffs into the lungs every 4 (four) hours as needed for Wheezing or Shortness of Breath. 18 g 11    ascorbic acid, vitamin C, (VITAMIN C) 100 MG tablet Take 100 mg by mouth once daily.      aspirin (ECOTRIN) 81 MG EC tablet Take 325 mg by mouth once daily.       cholecalciferol, vitamin D3, 10 mcg (400 unit) Cap       co-enzyme Q-10 30 mg capsule Take 30 mg by mouth once daily.      cyanocobalamin (VITAMIN B-12) 1000 MCG tablet Take 300 mcg by mouth once daily.      doxycycline (MONODOX) 100 MG capsule TAKE 1 CAPSULE BY MOUTH EVERY 12 HOURS 20 capsule 0    ELIQUIS 5 mg Tab Take 5 mg by mouth 2 (two) times daily.      ezetimibe (ZETIA) 10 mg tablet Take 10 mg by mouth once daily.      furosemide (LASIX) 40 MG tablet   1    VANNESSA ORAL Take by mouth. 565 mg daily      INFLIXIMAB (REMICADE IV) Inject into the vein.       ipratropium (ATROVENT) 21 mcg (0.03 %) nasal spray 2 sprays by Each Nostril route 3 (three) times daily as needed for Rhinitis (take before eating). 30 mL 11    iron,carb/vit C/vit B12/folic (IRON 100 PLUS ORAL) Take by mouth.      ketoconazole (NIZORAL) 2 % cream SMARTSIG:Sparingly Topical Twice Daily      losartan potassium (LOSARTAN ORAL) Take by mouth.      metoprolol succinate (TOPROL-XL) 25 MG 24 hr tablet Take 0.5 tablets (12.5 mg total) by mouth once daily. 45 tablet 3    milk thistle 175 mg tablet Take 500 mg by mouth once daily.      multivit with minerals/lutein (MULTIVITAMIN 50 PLUS ORAL)       NITROSTAT 0.4 mg SL tablet 0.4 mg.      omega 3-dha-epa-fish oil 500-100-1,000 mg Cap Take by mouth 2 (two) times daily.      omeprazole (PRILOSEC) 40 MG capsule Take 40 mg by mouth every morning.      oxyCODONE-acetaminophen (PERCOCET) 5-325 mg per tablet Take 1 tablet  by mouth every 4 (four) hours as needed.      potassium chloride (MICRO-K) 10 MEQ CpSR Take 10 mEq by mouth once.      potassium/magnesium (MAGNESIUM-POTASSIUM ORAL) Take 600 mg by mouth once daily. 600 mg/ 198      predniSONE (DELTASONE) 5 MG tablet Take 1 tablet (5 mg total) by mouth 2 (two) times daily as needed (rheumatoid related pain). 60 tablet 1    saw palmetto 500 MG capsule Take 450 mg by mouth once daily.      sertraline (ZOLOFT) 50 MG tablet Take 50 mg by mouth once daily.      solifenacin (VESICARE) 5 MG tablet Take 5 mg by mouth every morning.      sotaloL (BETAPACE) 120 MG Tab Take 60 mg by mouth 2 (two) times daily.      SYNTHROID 100 mcg tablet Take 100 mcg by mouth every morning.      tamsulosin (FLOMAX) 0.4 mg Cap Take 1 capsule by mouth every evening.      testosterone cypionate (DEPOTESTOTERONE CYPIONATE) 200 mg/mL injection Inject 200 mg into the muscle every 14 (fourteen) days.      vit C/E/Zn/coppr/lutein/zeaxan (PRESERVISION AREDS-2 ORAL) Take by mouth once daily.      vitamin B12-folic acid 0.5-1 mg Tab       vitamin D 1000 units Tab Take 5,000 mg by mouth once daily.      vitamin E 1000 UNIT capsule Take 1,000 Units by mouth once daily.      VITAMIN K2 ORAL Take 100 mcg by mouth.      zinc gluconate 50 mg tablet Take 50 mg by mouth once daily.      triamcinolone acetonide 0.1% (KENALOG) 0.1 % cream Apply topically 2 (two) times daily. for 10 days 45 g 2     Current Facility-Administered Medications on File Prior to Visit   Medication Dose Route Frequency Provider Last Rate Last Admin    acetaminophen tablet 650 mg  650 mg Oral Once PRN Farooq Corey MD        albuterol inhaler 2 puff  2 puff Inhalation Q20 Min PRN Farooq Corey MD        diphenhydrAMINE injection 25 mg  25 mg Intravenous Once PRN Farooq Corey MD        EPINEPHrine (EPIPEN) 0.3 mg/0.3 mL pen injection 0.3 mg  0.3 mg Intramuscular PRN Farooq Corey MD        methylPREDNISolone sodium succinate injection  40 mg  40 mg Intravenous Once PRN Farooq Corey MD        ondansetron disintegrating tablet 4 mg  4 mg Oral Once PRN Farooq Corey MD        sodium chloride 0.9% 500 mL flush bag   Intravenous PRN Farooq Corey MD        sodium chloride 0.9% flush 10 mL  10 mL Intravenous PRN Farooq Corey MD         Social History     Socioeconomic History    Marital status:    Tobacco Use    Smoking status: Former     Types: Cigarettes    Smokeless tobacco: Never   Substance and Sexual Activity    Alcohol use: No    Drug use: No     Social Drivers of Health     Financial Resource Strain: Low Risk  (4/22/2024)    Overall Financial Resource Strain (CARDIA)     Difficulty of Paying Living Expenses: Not hard at all   Food Insecurity: No Food Insecurity (4/22/2024)    Hunger Vital Sign     Worried About Running Out of Food in the Last Year: Never true     Ran Out of Food in the Last Year: Never true   Transportation Needs: No Transportation Needs (4/22/2024)    PRAPARE - Transportation     Lack of Transportation (Medical): No     Lack of Transportation (Non-Medical): No   Physical Activity: Inactive (4/22/2024)    Exercise Vital Sign     Days of Exercise per Week: 0 days     Minutes of Exercise per Session: 30 min   Stress: No Stress Concern Present (4/22/2024)    [employer] - Occupational Stress Questionnaire     Feeling of Stress : Not at all   Housing Stability: Unknown (4/22/2024)    Housing Stability Vital Sign     Unable to Pay for Housing in the Last Year: No     Family History   Problem Relation Name Age of Onset    Diabetes Mellitus Mother      Heart disease Mother      Heart disease Father      Rheum arthritis Father      Diabetes Mellitus Brother      Heart disease Brother         Review of Systems   Constitutional:  Positive for fatigue. Negative for fever.   HENT:  Positive for postnasal drip, rhinorrhea and congestion.    Eyes:  Negative for redness and itching.  "  Respiratory:  Positive for cough, sputum production, shortness of breath, dyspnea on extertion, use of rescue inhaler and Paroxysmal Nocturnal Dyspnea.    Cardiovascular:  Negative for chest pain, palpitations and leg swelling.   Genitourinary:  Negative for difficulty urinating and hematuria.   Endocrine:  Negative for cold intolerance and heat intolerance.    Skin:  Negative for rash.   Gastrointestinal:  Negative for nausea and abdominal pain.   Neurological:  Negative for dizziness, syncope, weakness and light-headedness.   Hematological:  Negative for adenopathy. Does not bruise/bleed easily.   Psychiatric/Behavioral:  Negative for sleep disturbance. The patient is not nervous/anxious.        Objective:      /72 (Patient Position: Sitting)   Pulse 68   Resp 18   Ht 5' 7" (1.702 m)   Wt 93 kg (205 lb)   SpO2 (!) 91%   BMI 32.11 kg/m²   Physical Exam  Vitals and nursing note reviewed.   Constitutional:       Appearance: He is well-developed. He is ill-appearing.   HENT:      Head: Normocephalic and atraumatic.      Comments: Change in voice  Phlegm in throat     Nose: Rhinorrhea present.      Mouth/Throat:      Pharynx: No oropharyngeal exudate.   Eyes:      Conjunctiva/sclera: Conjunctivae normal.      Pupils: Pupils are equal, round, and reactive to light.   Neck:      Thyroid: No thyromegaly.      Vascular: No JVD.      Trachea: No tracheal deviation.   Cardiovascular:      Rate and Rhythm: Normal rate and regular rhythm.      Heart sounds: Normal heart sounds. No murmur heard.  Pulmonary:      Effort: Pulmonary effort is normal. No respiratory distress.      Breath sounds: Examination of the right-lower field reveals rales. Examination of the left-lower field reveals rales. Decreased breath sounds, wheezing and rales present. No rhonchi.   Chest:      Chest wall: No tenderness.   Abdominal:      General: Bowel sounds are normal.      Palpations: Abdomen is soft.   Musculoskeletal:         " "General: No tenderness.      Cervical back: Neck supple.      Comments: Decreased range of motion of  multiple joints .     Lymphadenopathy:      Cervical: No cervical adenopathy.   Skin:     General: Skin is warm and dry.   Neurological:      Mental Status: He is alert and oriented to person, place, and time.       Personal Diagnostic Review  Chest x-ray: interstitial changes        4/4/2025     2:27 PM   Pulmonary Studies Review   SpO2 91 %   Height 5' 7" (1.702 m)   Weight 93 kg (205 lb)   BMI (Calculated) 32.1   Predicted Distance 244.78   Predicted Distance Meters (Calculated) 409.14 meters   EXAMINATION:  CT CHEST WITHOUT CONTRAST     CLINICAL HISTORY:  Interstitial pulmonary disease, unspecifiedInterstitial lung disease;ILD progression, concerning masses / LAD;     COMPARISON:  None available.     TECHNIQUE:  Axial CT images were obtained of the CHEST.  Iterative reconstruction technique was used. The CT exam was performed using one or more of the following dose reduction techniques- Automated exposure control, adjustment of the mA and/or kV according to patient size, and/or use of iterative reconstructed technique.     FINDINGS:  Lungs/pleura: Bibasilar bronchiectasis and fibrosis.  Fibrosis is in an apical to basal gradient.  There is honeycombing in the lung bilateral lung bases.  There is a pulmonary opacity with air bronchogram in the lateral right lower lobe that measures 4.6 by 1.4 cm (series 4, image 352)..  Pulmonary opacity in the right middle lobe measures 1.5 x 1.5 cm (series 2, image 79).  There is a focal opacity in the lingula that measures 1.1 x 0.5 cm (series 4, image 313).  Right basilar pleural thickening.  Calcified right-sided pleural plaques.     Coronary artery calcification: Present.     Aorta: Atherosclerosis. No Aneurysm.     Heart: Normal size. No pericardial effusion.  CABG changes.     Bones/joints: Median sternotomy wires.     Other: No mediastinal or axillary lymphadenopathy. No " acute finding in the visualized upper abdomen.     Impression:     1. Pulmonary fibrosis with honeycombing in the lung bases which can be seen with UIP.  2. Bilateral pulmonary nodular opacities, the largest in the right lung base measures up to 4.6 cm.  For multiple solid nodules with any 6 mm or greater, Fleischner Society guidelines recommend follow up with non-contrast chest CT at 3-6 months and 18-24 months after discovery.  Consider three-month follow-up.  3. Bibasilar bronchiectasis.  4. Calcified pleural plaques which can be seen with prior asbestos exposure.  5. Other findings as above.        Electronically signed by:Denys Stout  Date:                                            04/24/2024  Time:                                           12:04    CT Chest High Resolution Without Contrast  Narrative: EXAMINATION:  CT CHEST HIGH RESOLUTION WITHOUT CONTRAST    CLINICAL HISTORY:  Interstitial pulmonary disease, unspecified    TECHNIQUE:  Low dose axial high-resolution inspiration expiration and prone images, sagittal and coronal reformations were obtained from the thoracic inlet to the lung bases. Contrast was not administered.  All CT scans at this location are performed using dose modulation techniques as appropriate to a performed exam including the following: Automated exposure control; adjustment of the mA and/or kV  according to patient size.    COMPARISON:  10/04/2024    FINDINGS:  Base of Neck: No significant abnormality.    Thoracic soft tissues: Normal.    Aorta: Post CABG changes.    Heart: Normal size. No effusion. Severe aortic and coronary artery atherosclerotic calcification.    Pulmonary vasculature: Pulmonary arteries distribute normally.    Billie/Mediastinum: No pathologic carlos enlargement.    Esophagus: Normal.    Upper Abdomen: No abnormality of the partially imaged upper abdomen.    Airways: Mild-to-moderate cylindrical bronchiectasis.  Generalized airway wall thickening suggesting  "inflammation.    Lungs/Pleura: Mild peripheral upper lobe reticulation in ground-glass similar to prior exam.  Unchanged far basilar predominant advanced honeycombing.  Unchanged calcified and noncalcified pleural plaque at the posteromedial right lower lobe.    Bones: No acute fracture. No suspicious lytic or sclerotic lesions.  Impression: Stable UIP pattern interstitial lung disease compared to 10/04/2024.    Electronically signed by: Jerrell Franklin  Date:    04/04/2025  Time:    14:24      Office Spirometry Results:         4/4/2025     2:27 PM 3/21/2025    12:41 PM 3/21/2025    10:16 AM 2/21/2025    12:09 PM 2/21/2025     9:46 AM 2/7/2025    12:25 PM 2/7/2025    11:24 AM   Pulmonary Function Tests   SpO2 91 % 94 % 92 % 96 % 95 % 94 % 95 %   Height 5' 7" (1.702 m)    5' 7" (1.702 m)     Weight 93 kg (205 lb)  95 kg (209 lb 7 oz)  95.6 kg (210 lb 12.2 oz)     BMI (Calculated) 32.1  32.8  33           4/4/2025     2:27 PM   Pulmonary Studies Review   SpO2 91 %   Height 5' 7" (1.702 m)   Weight 93 kg (205 lb)   BMI (Calculated) 32.1   Predicted Distance 244.78   Predicted Distance Meters (Calculated) 409.14 meters           Recent Results (from the past 2 weeks)   CBC with Differential    Collection Time: 03/27/25 11:34 AM   Result Value Ref Range    WBC 9.33 3.90 - 12.70 K/uL    HGB 14.3 14.0 - 18.0 gm/dL    HCT 42.2 40.0 - 54.0 %    Platelet Count 296 150 - 450 K/uL       Assessment:            Interstitial pulmonary disease, unspecified  -     CT Chest Without Contrast; Future; Expected date: 04/04/2025  -     Complete PFT with bronchodilator; Future; Expected date: 04/04/2025    Rheumatoid arthritis involving multiple sites with positive rheumatoid factor    Infliximab (Remicade) long-term use    Immunocompromised patient              Outpatient Encounter Medications as of 4/4/2025   Medication Sig Dispense Refill    albuterol (PROVENTIL) 2.5 mg /3 mL (0.083 %) nebulizer solution Take 3 mLs (2.5 mg total) by " nebulization every 4 to 6 hours as needed for Wheezing or Shortness of Breath. 360 mL 11    albuterol (PROVENTIL/VENTOLIN HFA) 90 mcg/actuation inhaler Inhale 2 puffs into the lungs every 4 (four) hours as needed for Wheezing or Shortness of Breath. 18 g 11    ascorbic acid, vitamin C, (VITAMIN C) 100 MG tablet Take 100 mg by mouth once daily.      aspirin (ECOTRIN) 81 MG EC tablet Take 325 mg by mouth once daily.       cholecalciferol, vitamin D3, 10 mcg (400 unit) Cap       co-enzyme Q-10 30 mg capsule Take 30 mg by mouth once daily.      cyanocobalamin (VITAMIN B-12) 1000 MCG tablet Take 300 mcg by mouth once daily.      doxycycline (MONODOX) 100 MG capsule TAKE 1 CAPSULE BY MOUTH EVERY 12 HOURS 20 capsule 0    ELIQUIS 5 mg Tab Take 5 mg by mouth 2 (two) times daily.      ezetimibe (ZETIA) 10 mg tablet Take 10 mg by mouth once daily.      furosemide (LASIX) 40 MG tablet   1    VANNESSA ORAL Take by mouth. 565 mg daily      INFLIXIMAB (REMICADE IV) Inject into the vein.       ipratropium (ATROVENT) 21 mcg (0.03 %) nasal spray 2 sprays by Each Nostril route 3 (three) times daily as needed for Rhinitis (take before eating). 30 mL 11    iron,carb/vit C/vit B12/folic (IRON 100 PLUS ORAL) Take by mouth.      ketoconazole (NIZORAL) 2 % cream SMARTSIG:Sparingly Topical Twice Daily      losartan potassium (LOSARTAN ORAL) Take by mouth.      metoprolol succinate (TOPROL-XL) 25 MG 24 hr tablet Take 0.5 tablets (12.5 mg total) by mouth once daily. 45 tablet 3    milk thistle 175 mg tablet Take 500 mg by mouth once daily.      multivit with minerals/lutein (MULTIVITAMIN 50 PLUS ORAL)       NITROSTAT 0.4 mg SL tablet 0.4 mg.      omega 3-dha-epa-fish oil 500-100-1,000 mg Cap Take by mouth 2 (two) times daily.      omeprazole (PRILOSEC) 40 MG capsule Take 40 mg by mouth every morning.      oxyCODONE-acetaminophen (PERCOCET) 5-325 mg per tablet Take 1 tablet by mouth every 4 (four) hours as needed.      potassium chloride  (MICRO-K) 10 MEQ CpSR Take 10 mEq by mouth once.      potassium/magnesium (MAGNESIUM-POTASSIUM ORAL) Take 600 mg by mouth once daily. 600 mg/ 198      predniSONE (DELTASONE) 5 MG tablet Take 1 tablet (5 mg total) by mouth 2 (two) times daily as needed (rheumatoid related pain). 60 tablet 1    saw palmetto 500 MG capsule Take 450 mg by mouth once daily.      sertraline (ZOLOFT) 50 MG tablet Take 50 mg by mouth once daily.      solifenacin (VESICARE) 5 MG tablet Take 5 mg by mouth every morning.      sotaloL (BETAPACE) 120 MG Tab Take 60 mg by mouth 2 (two) times daily.      SYNTHROID 100 mcg tablet Take 100 mcg by mouth every morning.      tamsulosin (FLOMAX) 0.4 mg Cap Take 1 capsule by mouth every evening.      testosterone cypionate (DEPOTESTOTERONE CYPIONATE) 200 mg/mL injection Inject 200 mg into the muscle every 14 (fourteen) days.      vit C/E/Zn/coppr/lutein/zeaxan (PRESERVISION AREDS-2 ORAL) Take by mouth once daily.      vitamin B12-folic acid 0.5-1 mg Tab       vitamin D 1000 units Tab Take 5,000 mg by mouth once daily.      vitamin E 1000 UNIT capsule Take 1,000 Units by mouth once daily.      VITAMIN K2 ORAL Take 100 mcg by mouth.      zinc gluconate 50 mg tablet Take 50 mg by mouth once daily.      triamcinolone acetonide 0.1% (KENALOG) 0.1 % cream Apply topically 2 (two) times daily. for 10 days 45 g 2     Facility-Administered Encounter Medications as of 4/4/2025   Medication Dose Route Frequency Provider Last Rate Last Admin    acetaminophen tablet 650 mg  650 mg Oral Once PRN Farooq Corye MD        albuterol inhaler 2 puff  2 puff Inhalation Q20 Min PRN Farooq Corey MD        diphenhydrAMINE injection 25 mg  25 mg Intravenous Once PRN Farooq Corey MD        EPINEPHrine (EPIPEN) 0.3 mg/0.3 mL pen injection 0.3 mg  0.3 mg Intramuscular PRN Farooq Corey MD        methylPREDNISolone sodium succinate injection 40 mg  40 mg Intravenous Once PRN Farooq Corey MD         ondansetron disintegrating tablet 4 mg  4 mg Oral Once PRN Farooq Corey MD        sodium chloride 0.9% 500 mL flush bag   Intravenous PRN Farooq Corey MD        sodium chloride 0.9% flush 10 mL  10 mL Intravenous PRN Farooq Corey MD         Plan:       Requested Prescriptions      No prescriptions requested or ordered in this encounter     Problem List Items Addressed This Visit       Immunocompromised patient (Chronic)    Rheumatoid arthritis involving multiple sites with positive rheumatoid factor (Chronic)    Infliximab (Remicade) long-term use    Interstitial pulmonary disease, unspecified - Primary    Relevant Orders    CT Chest Without Contrast    Complete PFT with bronchodilator              Follow up in about 7 months (around 11/4/2025) for CT - on return visit, PFT -return.    MEDICAL DECISION MAKING: Moderate to high complexity.  Overall, the multiple problems listed are of moderate to high severity that may impact quality of life and activities of daily living. Side effects of medications, treatment plan as well as options and alternatives reviewed and discussed with patient. There was counseling of patient concerning these issues.    Total time spent in counseling and coordination of care - 35  minutes of total time spent on the encounter, which includes face to face time and non-face to face time preparing to see the patient (eg, review of tests), Obtaining and/or reviewing separately obtained history, Documenting clinical information in the electronic or other health record, Independently interpreting results (not separately reported) and communicating results to the patient/family/caregiver, or Care coordination (not separately reported).    Time was used in discussion of prognosis, risks, benefits of treatment, instructions and compliance with regimen . Discussion with other physicians and/or health care providers - home health or for use of durable medical equipment (oxygen,  nebulizers, CPAP, BiPAP) occurred.

## 2025-05-16 ENCOUNTER — INFUSION (OUTPATIENT)
Dept: INFUSION THERAPY | Facility: HOSPITAL | Age: 80
End: 2025-05-16
Attending: PHYSICIAN ASSISTANT
Payer: MEDICARE

## 2025-05-16 ENCOUNTER — LAB VISIT (OUTPATIENT)
Dept: LAB | Facility: HOSPITAL | Age: 80
End: 2025-05-16
Attending: INTERNAL MEDICINE
Payer: MEDICARE

## 2025-05-16 VITALS
HEART RATE: 53 BPM | RESPIRATION RATE: 16 BRPM | WEIGHT: 203.5 LBS | HEIGHT: 67 IN | TEMPERATURE: 98 F | BODY MASS INDEX: 31.94 KG/M2 | SYSTOLIC BLOOD PRESSURE: 106 MMHG | DIASTOLIC BLOOD PRESSURE: 59 MMHG | OXYGEN SATURATION: 95 %

## 2025-05-16 DIAGNOSIS — M05.79 RHEUMATOID ARTHRITIS INVOLVING MULTIPLE SITES WITH POSITIVE RHEUMATOID FACTOR: Primary | ICD-10-CM

## 2025-05-16 DIAGNOSIS — Z79.899 IMMUNOSUPPRESSION DUE TO DRUG THERAPY: ICD-10-CM

## 2025-05-16 DIAGNOSIS — D84.821 IMMUNOSUPPRESSION DUE TO DRUG THERAPY: ICD-10-CM

## 2025-05-16 DIAGNOSIS — Z51.81 MEDICATION MONITORING ENCOUNTER: ICD-10-CM

## 2025-05-16 LAB
ABSOLUTE EOSINOPHIL (OHS): 0.17 K/UL
ABSOLUTE MONOCYTE (OHS): 0.57 K/UL (ref 0.3–1)
ABSOLUTE NEUTROPHIL COUNT (OHS): 5.16 K/UL (ref 1.8–7.7)
ALBUMIN SERPL BCP-MCNC: 3.7 G/DL (ref 3.5–5.2)
ALP SERPL-CCNC: 71 UNIT/L (ref 40–150)
ALT SERPL W/O P-5'-P-CCNC: 20 UNIT/L (ref 10–44)
ANION GAP (OHS): 8 MMOL/L (ref 8–16)
AST SERPL-CCNC: 25 UNIT/L (ref 11–45)
BASOPHILS # BLD AUTO: 0.02 K/UL
BASOPHILS NFR BLD AUTO: 0.3 %
BILIRUB SERPL-MCNC: 0.6 MG/DL (ref 0.1–1)
BUN SERPL-MCNC: 17 MG/DL (ref 8–23)
CALCIUM SERPL-MCNC: 9.5 MG/DL (ref 8.7–10.5)
CHLORIDE SERPL-SCNC: 100 MMOL/L (ref 95–110)
CO2 SERPL-SCNC: 29 MMOL/L (ref 23–29)
CREAT SERPL-MCNC: 1.2 MG/DL (ref 0.5–1.4)
ERYTHROCYTE [DISTWIDTH] IN BLOOD BY AUTOMATED COUNT: 15.1 % (ref 11.5–14.5)
GFR SERPLBLD CREATININE-BSD FMLA CKD-EPI: >60 ML/MIN/1.73/M2
GLUCOSE SERPL-MCNC: 106 MG/DL (ref 70–110)
HCT VFR BLD AUTO: 47.5 % (ref 40–54)
HGB BLD-MCNC: 16.1 GM/DL (ref 14–18)
IMM GRANULOCYTES # BLD AUTO: 0.02 K/UL (ref 0–0.04)
IMM GRANULOCYTES NFR BLD AUTO: 0.3 % (ref 0–0.5)
LYMPHOCYTES # BLD AUTO: 1.59 K/UL (ref 1–4.8)
MCH RBC QN AUTO: 34.8 PG (ref 27–31)
MCHC RBC AUTO-ENTMCNC: 33.9 G/DL (ref 32–36)
MCV RBC AUTO: 103 FL (ref 82–98)
NUCLEATED RBC (/100WBC) (OHS): 0 /100 WBC
PLATELET # BLD AUTO: 249 K/UL (ref 150–450)
PMV BLD AUTO: 8.4 FL (ref 9.2–12.9)
POTASSIUM SERPL-SCNC: 4.3 MMOL/L (ref 3.5–5.1)
PROT SERPL-MCNC: 8.7 GM/DL (ref 6–8.4)
RBC # BLD AUTO: 4.62 M/UL (ref 4.6–6.2)
RELATIVE EOSINOPHIL (OHS): 2.3 %
RELATIVE LYMPHOCYTE (OHS): 21.1 % (ref 18–48)
RELATIVE MONOCYTE (OHS): 7.6 % (ref 4–15)
RELATIVE NEUTROPHIL (OHS): 68.4 % (ref 38–73)
SODIUM SERPL-SCNC: 137 MMOL/L (ref 136–145)
WBC # BLD AUTO: 7.53 K/UL (ref 3.9–12.7)

## 2025-05-16 PROCEDURE — 80053 COMPREHEN METABOLIC PANEL: CPT

## 2025-05-16 PROCEDURE — 96413 CHEMO IV INFUSION 1 HR: CPT

## 2025-05-16 PROCEDURE — 63600175 PHARM REV CODE 636 W HCPCS: Mod: JZ,TB | Performed by: INTERNAL MEDICINE

## 2025-05-16 PROCEDURE — 25000003 PHARM REV CODE 250: Performed by: INTERNAL MEDICINE

## 2025-05-16 PROCEDURE — 36415 COLL VENOUS BLD VENIPUNCTURE: CPT

## 2025-05-16 PROCEDURE — 85025 COMPLETE CBC W/AUTO DIFF WBC: CPT

## 2025-05-16 PROCEDURE — 96415 CHEMO IV INFUSION ADDL HR: CPT

## 2025-05-16 RX ORDER — HEPARIN 100 UNIT/ML
500 SYRINGE INTRAVENOUS
OUTPATIENT
Start: 2025-05-30

## 2025-05-16 RX ORDER — EPINEPHRINE 0.3 MG/.3ML
0.3 INJECTION SUBCUTANEOUS ONCE AS NEEDED
OUTPATIENT
Start: 2025-05-30

## 2025-05-16 RX ORDER — ACETAMINOPHEN 325 MG/1
650 TABLET ORAL
OUTPATIENT
Start: 2025-05-30

## 2025-05-16 RX ORDER — SODIUM CHLORIDE 0.9 % (FLUSH) 0.9 %
10 SYRINGE (ML) INJECTION
Status: DISCONTINUED | OUTPATIENT
Start: 2025-05-16 | End: 2025-05-16 | Stop reason: HOSPADM

## 2025-05-16 RX ORDER — DIPHENHYDRAMINE HYDROCHLORIDE 50 MG/ML
50 INJECTION, SOLUTION INTRAMUSCULAR; INTRAVENOUS ONCE AS NEEDED
OUTPATIENT
Start: 2025-05-30

## 2025-05-16 RX ORDER — METHYLPREDNISOLONE SOD SUCC 125 MG
40 VIAL (EA) INJECTION
OUTPATIENT
Start: 2025-05-30

## 2025-05-16 RX ORDER — SODIUM CHLORIDE 0.9 % (FLUSH) 0.9 %
10 SYRINGE (ML) INJECTION
OUTPATIENT
Start: 2025-05-30

## 2025-05-16 RX ORDER — IPRATROPIUM BROMIDE AND ALBUTEROL SULFATE 2.5; .5 MG/3ML; MG/3ML
3 SOLUTION RESPIRATORY (INHALATION)
OUTPATIENT
Start: 2025-05-30

## 2025-05-16 RX ORDER — CETIRIZINE HYDROCHLORIDE 10 MG/1
10 TABLET ORAL
OUTPATIENT
Start: 2025-05-30

## 2025-05-16 RX ADMIN — INFLIXIMAB 900 MG: 100 INJECTION, POWDER, LYOPHILIZED, FOR SOLUTION INTRAVENOUS at 11:05

## 2025-05-16 NOTE — PLAN OF CARE
Problem: Adult Inpatient Plan of Care  Goal: Plan of Care Review  Outcome: Progressing  Flowsheets (Taken 5/16/2025 1116)  Plan of Care Reviewed With: patient  Goal: Patient-Specific Goal (Individualized)  Outcome: Progressing  Flowsheets (Taken 5/16/2025 1116)  Individualized Care Needs: Reclined position IV to left hand  Anxieties, Fears or Concerns: Wife having him evaluated for early stages of dementia, recently started a new medication  Patient/Family-Specific Goals (Include Timeframe): Will tolerate infusion with no adverse reaction  Goal: Absence of Hospital-Acquired Illness or Injury  Outcome: Progressing  Intervention: Identify and Manage Fall Risk  Flowsheets (Taken 5/16/2025 1116)  Safety Promotion/Fall Prevention: in recliner, wheels locked  Intervention: Prevent Infection  Flowsheets (Taken 5/16/2025 1116)  Infection Prevention:   equipment surfaces disinfected   hand hygiene promoted   personal protective equipment utilized  Goal: Optimal Comfort and Wellbeing  Outcome: Progressing  Intervention: Provide Person-Centered Care  Flowsheets (Taken 5/16/2025 1116)  Trust Relationship/Rapport:   care explained   questions encouraged   choices provided   reassurance provided   emotional support provided   thoughts/feelings acknowledged   empathic listening provided   questions answered

## 2025-05-16 NOTE — DISCHARGE INSTRUCTIONS
[facility]  [address] [address] [place]  [address]  [phone] phone     [phone] fax  Hours of Operation: Monday- Friday 8:00am- 5:00pm  After hours phone  [phone]  Hematology / Oncology Physicians on call      VINNIE Santacruz Dr., NP Phaon Dunbar, NP Khelsea Conley, FNP    Please call with any concerns regarding your appointment today.

## 2025-06-04 ENCOUNTER — TELEPHONE (OUTPATIENT)
Dept: RHEUMATOLOGY | Facility: CLINIC | Age: 80
End: 2025-06-04
Payer: MEDICARE

## 2025-06-16 ENCOUNTER — TELEPHONE (OUTPATIENT)
Dept: RHEUMATOLOGY | Facility: CLINIC | Age: 80
End: 2025-06-16
Payer: MEDICARE

## 2025-06-16 NOTE — TELEPHONE ENCOUNTER
Contacted patient to let him know medication has be dc. Messaged clinical pharmacist who will reach out with more information

## 2025-06-16 NOTE — TELEPHONE ENCOUNTER
Plan for monotherapy w/ biologic, high risk of ILD worsening w/ MTX: DC.     Encounter opened to track return call to patient to educate re: risk > benefit of MTX continuation.

## 2025-06-18 NOTE — TELEPHONE ENCOUNTER
Incoming call from pt. Advised that w/ worsening ILD risk currently > benefit to continuing methotrexate at this time and that Dr. Rashid will revisit that after repeat CT in November. Educated on how MTX can worsen ILD. Pt voiced understanding and had no further questions at this time.

## 2025-07-02 ENCOUNTER — HOSPITAL ENCOUNTER (OUTPATIENT)
Dept: RADIOLOGY | Facility: HOSPITAL | Age: 80
Discharge: HOME | End: 2025-07-02
Attending: INTERNAL MEDICINE
Payer: MEDICARE

## 2025-07-02 ENCOUNTER — OFFICE VISIT (OUTPATIENT)
Dept: PULMONOLOGY | Facility: CLINIC | Age: 80
End: 2025-07-02
Payer: MEDICARE

## 2025-07-02 VITALS
BODY MASS INDEX: 31.4 KG/M2 | HEART RATE: 58 BPM | WEIGHT: 200.06 LBS | RESPIRATION RATE: 18 BRPM | OXYGEN SATURATION: 92 % | DIASTOLIC BLOOD PRESSURE: 68 MMHG | HEIGHT: 67 IN | SYSTOLIC BLOOD PRESSURE: 120 MMHG

## 2025-07-02 DIAGNOSIS — J40 BRONCHITIS: ICD-10-CM

## 2025-07-02 DIAGNOSIS — R05.9 COUGH, UNSPECIFIED TYPE: ICD-10-CM

## 2025-07-02 DIAGNOSIS — I50.42 CHRONIC COMBINED SYSTOLIC AND DIASTOLIC HEART FAILURE: Primary | ICD-10-CM

## 2025-07-02 DIAGNOSIS — R06.02 SOB (SHORTNESS OF BREATH): ICD-10-CM

## 2025-07-02 DIAGNOSIS — R09.02 EXERCISE HYPOXEMIA: ICD-10-CM

## 2025-07-02 DIAGNOSIS — R05.3 CHRONIC COUGHING: ICD-10-CM

## 2025-07-02 DIAGNOSIS — I50.42 CHRONIC COMBINED SYSTOLIC AND DIASTOLIC HEART FAILURE: ICD-10-CM

## 2025-07-02 PROCEDURE — 99999 PR PBB SHADOW E&M-EST. PATIENT-LVL III: CPT | Mod: PBBFAC,,, | Performed by: INTERNAL MEDICINE

## 2025-07-02 PROCEDURE — 71046 X-RAY EXAM CHEST 2 VIEWS: CPT | Mod: TC

## 2025-07-02 PROCEDURE — 71046 X-RAY EXAM CHEST 2 VIEWS: CPT | Mod: 26,,, | Performed by: RADIOLOGY

## 2025-07-02 PROCEDURE — 99213 OFFICE O/P EST LOW 20 MIN: CPT | Mod: PBBFAC,25 | Performed by: INTERNAL MEDICINE

## 2025-07-02 RX ORDER — DOXYCYCLINE 100 MG/1
100 CAPSULE ORAL EVERY 12 HOURS
Qty: 20 CAPSULE | Refills: 0 | Status: SHIPPED | OUTPATIENT
Start: 2025-07-02

## 2025-07-02 RX ORDER — BENZONATATE 200 MG/1
200 CAPSULE ORAL 3 TIMES DAILY PRN
Qty: 90 CAPSULE | Refills: 11 | Status: SHIPPED | OUTPATIENT
Start: 2025-07-02 | End: 2026-07-02

## 2025-07-02 RX ORDER — METHYLPREDNISOLONE 4 MG/1
TABLET ORAL
Qty: 1 EACH | Refills: 0 | Status: SHIPPED | OUTPATIENT
Start: 2025-07-02

## 2025-07-02 NOTE — PROGRESS NOTES
Subjective:     Patient ID: Jack Back is a 80 y.o. male.    Chief Complaint:  Coughing     HPI 80 y.o. with dry cough for 6 weeks. He has a history of rheumatoid intersitial fibrosis, Atrial Fibrillation, Coronary Artery Disease s/p SABG x 2  Voice has changed since using inhaler -Hoarseness is worse. C/o some  congestion - improved  Concerned about intubation and breathing problems after. 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 Symptoms occur with one block walking. Symptoms began few years ago, gradually worsening since. Associated symptoms include  difficulty breathing, dyspnea on exertion, morning cough, shortness of breath, sputum production, and wheezing. He denies chest pain, located left chest. He does not have had recent travel. Weight has been stable. Symptoms are exacerbated by moderate activity. Symptoms are alleviated by nothing.   \  Has been on Remcade for years   Changed to a diffent medication   Now back on Remicade    Past Medical History:   Diagnosis Date    Arthritis     Congestive heart failure 06/07/2019    Depression     Lung disease     Rheumatoid arthritis involving multiple sites with positive rheumatoid factor 12/1/2015    Rheumatoid arthritis(714.0)     Thyroid disease      Past Surgical History:   Procedure Laterality Date    CREATION OF AORTOCORONARY ARTERY BYPASS      HERNIA REPAIR      JOINT REPLACEMENT      bi lateral hips    CA CABG, ARTERY-VEIN, FOUR  05/21/2019    Coronary Artery Bypass, 4    SPINE SURGERY       Review of patient's allergies indicates:   Allergen Reactions    Arava [leflunomide] Diarrhea    Penicillins Other (See Comments) and Hives     unknown    Tetanus vaccines and toxoid Other (See Comments)     unknown  Fever     Current Outpatient Medications on File Prior to Visit    Medication Sig Dispense Refill    albuterol (PROVENTIL) 2.5 mg /3 mL (0.083 %) nebulizer solution Take 3 mLs (2.5 mg total) by nebulization every 4 to 6 hours as needed for Wheezing or Shortness of Breath. 360 mL 11    albuterol (PROVENTIL/VENTOLIN HFA) 90 mcg/actuation inhaler Inhale 2 puffs into the lungs every 4 (four) hours as needed for Wheezing or Shortness of Breath. 18 g 11    ascorbic acid, vitamin C, (VITAMIN C) 100 MG tablet Take 100 mg by mouth once daily.      aspirin (ECOTRIN) 81 MG EC tablet Take 325 mg by mouth once daily.       co-enzyme Q-10 30 mg capsule Take 30 mg by mouth once daily.      cyanocobalamin (VITAMIN B-12) 1000 MCG tablet Take 300 mcg by mouth once daily.      doxycycline (MONODOX) 100 MG capsule TAKE 1 CAPSULE BY MOUTH EVERY 12 HOURS 20 capsule 0    ELIQUIS 5 mg Tab Take 5 mg by mouth 2 (two) times daily.      ezetimibe (ZETIA) 10 mg tablet Take 10 mg by mouth once daily.      ferrous sulfate (FEOSOL) 325 mg (65 mg iron) Tab tablet Take 65 mg by mouth.      folic acid (FOLVITE) 800 MCG Tab Take 400 mcg by mouth.      furosemide (LASIX) 40 MG tablet   1    INFLIXIMAB (REMICADE IV) Inject into the vein.       ipratropium (ATROVENT) 21 mcg (0.03 %) nasal spray 2 sprays by Each Nostril route 3 (three) times daily as needed for Rhinitis (take before eating). 30 mL 11    ketoconazole (NIZORAL) 2 % cream SMARTSIG:Sparingly Topical Twice Daily      losartan potassium (LOSARTAN ORAL) Take by mouth.      memantine (NAMENDA) 10 MG Tab Take 10 mg by mouth 2 (two) times daily.      metoprolol tartrate (LOPRESSOR) 25 MG tablet TAKE 1/2 TABLET ORALLY TWICE A DAY      milk thistle 175 mg tablet Take 500 mg by mouth once daily.      milk thistle seed extract 175 mg Tab Take 500 mg by mouth.      multivit with minerals/lutein (MULTIVITAMIN 50 PLUS ORAL)       NITROSTAT 0.4 mg SL tablet 0.4 mg.      omega 3-dha-epa-fish oil 500-100-1,000 mg Cap Take by mouth 2 (two) times daily.      omeprazole  (PRILOSEC) 40 MG capsule Take 40 mg by mouth every morning.      oxyCODONE-acetaminophen (PERCOCET) 5-325 mg per tablet Take 1 tablet by mouth every 4 (four) hours as needed.      potassium chloride (MICRO-K) 10 MEQ CpSR Take 10 mEq by mouth once.      predniSONE (DELTASONE) 5 MG tablet Take 1 tablet (5 mg total) by mouth 2 (two) times daily as needed (rheumatoid related pain). 60 tablet 1    sertraline (ZOLOFT) 50 MG tablet Take 50 mg by mouth once daily.      solifenacin (VESICARE) 5 MG tablet Take 5 mg by mouth every morning.      sotaloL (BETAPACE) 120 MG Tab Take 60 mg by mouth 2 (two) times daily.      SYNTHROID 100 mcg tablet Take 100 mcg by mouth every morning.      tamsulosin (FLOMAX) 0.4 mg Cap Take 1 capsule by mouth every evening.      testosterone cypionate (DEPOTESTOTERONE CYPIONATE) 200 mg/mL injection Inject 200 mg into the muscle every 14 (fourteen) days.      vitamin B12-folic acid 0.5-1 mg Tab       vitamin D 1000 units Tab Take 5,000 mg by mouth once daily.      VITAMIN K2 ORAL Take 100 mcg by mouth.      cholecalciferol, vitamin D3, 10 mcg (400 unit) Cap       VANNESSA ORAL Take by mouth. 565 mg daily      iron,carb/vit C/vit B12/folic (IRON 100 PLUS ORAL) Take by mouth.      metoprolol succinate (TOPROL-XL) 25 MG 24 hr tablet Take 0.5 tablets (12.5 mg total) by mouth once daily. 45 tablet 3    potassium/magnesium (MAGNESIUM-POTASSIUM ORAL) Take 600 mg by mouth once daily. 600 mg/ 198      saw palmetto 500 MG capsule Take 450 mg by mouth once daily.      triamcinolone acetonide 0.1% (KENALOG) 0.1 % cream Apply topically 2 (two) times daily. for 10 days 45 g 2    vit C/E/Zn/coppr/lutein/zeaxan (PRESERVISION AREDS-2 ORAL) Take by mouth once daily.      vitamin E 1000 UNIT capsule Take 1,000 Units by mouth once daily.      zinc gluconate 50 mg tablet Take 50 mg by mouth once daily.       Current Facility-Administered Medications on File Prior to Visit   Medication Dose Route Frequency Provider  Last Rate Last Admin    acetaminophen tablet 650 mg  650 mg Oral Once PRFarooq Zimmer MD        albuterol inhaler 2 puff  2 puff Inhalation Q20 Min PRFarooq Zimmer MD        diphenhydrAMINE injection 25 mg  25 mg Intravenous Once PRN Farooq Corye MD        EPINEPHrine (EPIPEN) 0.3 mg/0.3 mL pen injection 0.3 mg  0.3 mg Intramuscular PRN Farooq Corey MD        methylPREDNISolone sodium succinate injection 40 mg  40 mg Intravenous Once PRFarooq Zimmer MD        ondansetron disintegrating tablet 4 mg  4 mg Oral Once PRN Farooq Corey MD        sodium chloride 0.9% 500 mL flush bag   Intravenous PRFarooq Zimmer MD        sodium chloride 0.9% flush 10 mL  10 mL Intravenous PRFarooq Zimmer MD         Social History     Socioeconomic History    Marital status:    Tobacco Use    Smoking status: Former     Types: Cigarettes    Smokeless tobacco: Never   Substance and Sexual Activity    Alcohol use: No    Drug use: No     Social Drivers of Health     Financial Resource Strain: Low Risk  (4/22/2024)    Overall Financial Resource Strain (CARDIA)     Difficulty of Paying Living Expenses: Not hard at all   Food Insecurity: No Food Insecurity (4/22/2024)    Hunger Vital Sign     Worried About Running Out of Food in the Last Year: Never true     Ran Out of Food in the Last Year: Never true   Transportation Needs: No Transportation Needs (4/22/2024)    PRAPARE - Transportation     Lack of Transportation (Medical): No     Lack of Transportation (Non-Medical): No   Physical Activity: Inactive (4/22/2024)    Exercise Vital Sign     Days of Exercise per Week: 0 days     Minutes of Exercise per Session: 30 min   Stress: No Stress Concern Present (4/22/2024)    [employer] - Occupational Stress Questionnaire     Feeling of Stress : Not at all   Housing Stability: Unknown (4/22/2024)    Housing Stability Vital Sign     Unable to Pay for Housing in the  "Last Year: No     Family History   Problem Relation Name Age of Onset    Diabetes Mellitus Mother      Heart disease Mother      Heart disease Father      Rheum arthritis Father      Diabetes Mellitus Brother      Heart disease Brother         Review of Systems   Constitutional:  Positive for fatigue. Negative for fever.   HENT:  Positive for postnasal drip, rhinorrhea and congestion.    Eyes:  Negative for redness and itching.   Respiratory:  Positive for cough, sputum production, shortness of breath, dyspnea on extertion, use of rescue inhaler and Paroxysmal Nocturnal Dyspnea.    Cardiovascular:  Negative for chest pain, palpitations and leg swelling.   Genitourinary:  Negative for difficulty urinating and hematuria.   Endocrine:  Negative for cold intolerance and heat intolerance.    Skin:  Negative for rash.   Gastrointestinal:  Negative for nausea and abdominal pain.   Neurological:  Negative for dizziness, syncope, weakness and light-headedness.   Hematological:  Negative for adenopathy. Does not bruise/bleed easily.   Psychiatric/Behavioral:  Negative for sleep disturbance. The patient is not nervous/anxious.        Objective:      /68 (BP Location: Right arm, Patient Position: Sitting)   Pulse (!) 58   Resp 18   Ht 5' 7" (1.702 m)   Wt 90.8 kg (200 lb 1.1 oz)   SpO2 (!) 92%   BMI 31.34 kg/m²   Physical Exam  Vitals and nursing note reviewed.   Constitutional:       Appearance: He is well-developed. He is ill-appearing.   HENT:      Head: Normocephalic and atraumatic.      Comments: Change in voice  Phlegm in throat     Nose: Rhinorrhea present.      Mouth/Throat:      Pharynx: No oropharyngeal exudate.   Eyes:      Conjunctiva/sclera: Conjunctivae normal.      Pupils: Pupils are equal, round, and reactive to light.   Neck:      Thyroid: No thyromegaly.      Vascular: No JVD.      Trachea: No tracheal deviation.   Cardiovascular:      Rate and Rhythm: Normal rate and regular rhythm.      Heart " "sounds: Normal heart sounds. No murmur heard.  Pulmonary:      Effort: Pulmonary effort is normal. No respiratory distress.      Breath sounds: Examination of the right-lower field reveals rales. Examination of the left-lower field reveals rales. Decreased breath sounds, wheezing and rales present. No rhonchi.   Chest:      Chest wall: No tenderness.   Abdominal:      General: Bowel sounds are normal.      Palpations: Abdomen is soft.   Musculoskeletal:         General: No tenderness.      Cervical back: Neck supple.      Comments: Decreased range of motion of  multiple joints .     Lymphadenopathy:      Cervical: No cervical adenopathy.   Skin:     General: Skin is warm and dry.   Neurological:      Mental Status: He is alert and oriented to person, place, and time.       Personal Diagnostic Review  Chest x-ray: interstitial changes        7/2/2025     2:35 PM   Pulmonary Studies Review   SpO2 92 %   Height 5' 7" (1.702 m)   Weight 90.8 kg (200 lb 1.1 oz)   BMI (Calculated) 31.3   Predicted Distance 249.27   Predicted Distance Meters (Calculated) 413.07 meters   EXAMINATION:  CT CHEST WITHOUT CONTRAST     CLINICAL HISTORY:  Interstitial pulmonary disease, unspecifiedInterstitial lung disease;ILD progression, concerning masses / LAD;     COMPARISON:  None available.     TECHNIQUE:  Axial CT images were obtained of the CHEST.  Iterative reconstruction technique was used. The CT exam was performed using one or more of the following dose reduction techniques- Automated exposure control, adjustment of the mA and/or kV according to patient size, and/or use of iterative reconstructed technique.     FINDINGS:  Lungs/pleura: Bibasilar bronchiectasis and fibrosis.  Fibrosis is in an apical to basal gradient.  There is honeycombing in the lung bilateral lung bases.  There is a pulmonary opacity with air bronchogram in the lateral right lower lobe that measures 4.6 by 1.4 cm (series 4, image 352)..  Pulmonary opacity in the " "right middle lobe measures 1.5 x 1.5 cm (series 2, image 79).  There is a focal opacity in the lingula that measures 1.1 x 0.5 cm (series 4, image 313).  Right basilar pleural thickening.  Calcified right-sided pleural plaques.     Coronary artery calcification: Present.     Aorta: Atherosclerosis. No Aneurysm.     Heart: Normal size. No pericardial effusion.  CABG changes.     Bones/joints: Median sternotomy wires.     Other: No mediastinal or axillary lymphadenopathy. No acute finding in the visualized upper abdomen.     Impression:     1. Pulmonary fibrosis with honeycombing in the lung bases which can be seen with UIP.  2. Bilateral pulmonary nodular opacities, the largest in the right lung base measures up to 4.6 cm.  For multiple solid nodules with any 6 mm or greater, Fleischner Society guidelines recommend follow up with non-contrast chest CT at 3-6 months and 18-24 months after discovery.  Consider three-month follow-up.  3. Bibasilar bronchiectasis.  4. Calcified pleural plaques which can be seen with prior asbestos exposure.  5. Other findings as above.        Electronically signed by:Denys Stout  Date:                                            04/24/2024  Time:                                           12:04    X-Ray Chest PA And Lateral  Narrative: EXAM: XR CHEST PA AND LATERAL    CLINICAL HISTORY:   Shortness of breath    PRIOR:   01/14/2025    FINDINGS:  No new pulmonary opacity pleural effusion or convincing pneumothorax.  Mediastinum stable.  Impression: No acute cardiopulmonary disease.    Finalized on: 7/2/2025 3:44 PM By:  Marina Hall MD  [facility]# [id number]      2025-07-02 15:46:25.348     [facility]      Office Spirometry Results:         7/2/2025     2:35 PM 5/16/2025     1:16 PM 5/16/2025    10:56 AM 4/4/2025     2:27 PM 3/21/2025    12:41 PM 3/21/2025    10:16 AM 2/21/2025    12:09 PM   Pulmonary Function Tests   SpO2 92 % 95 % 93 % 91 % 94 % 92 % 96 %   Height 5' 7" (1.702 m)  5' 7" (1.702 m) " "5' 7" (1.702 m)      Weight 90.8 kg (200 lb 1.1 oz)  92.3 kg (203 lb 7.8 oz) 93 kg (205 lb)  95 kg (209 lb 7 oz)    BMI (Calculated) 31.3  31.9 32.1  32.8          7/2/2025     2:35 PM   Pulmonary Studies Review   SpO2 92 %   Height 5' 7" (1.702 m)   Weight 90.8 kg (200 lb 1.1 oz)   BMI (Calculated) 31.3   Predicted Distance 249.27   Predicted Distance Meters (Calculated) 413.07 meters           No results found for this or any previous visit (from the past 2 weeks).      Assessment:            Chronic combined systolic and diastolic heart failure  -     X-Ray Chest PA And Lateral; Future; Expected date: 07/02/2025    Cough, unspecified type  -     X-Ray Chest PA And Lateral; Future; Expected date: 07/02/2025  -     doxycycline (MONODOX) 100 MG capsule; Take 1 capsule (100 mg total) by mouth every 12 (twelve) hours.  Dispense: 20 capsule; Refill: 0  -     methylPREDNISolone (MEDROL DOSEPACK) 4 mg tablet; use as directed  Dispense: 1 each; Refill: 0  -     CBC Auto Differential; Future; Expected date: 07/02/2025  -     Sedimentation rate; Future; Expected date: 07/02/2025  -     Comprehensive Metabolic Panel; Future; Expected date: 07/02/2025  -     benzonatate (TESSALON) 200 MG capsule; Take 1 capsule (200 mg total) by mouth 3 (three) times daily as needed for Cough.  Dispense: 90 capsule; Refill: 11    Exercise hypoxemia  -     Six Minute Walk Test to qualify for Home Oxygen; Future    SOB (shortness of breath)  -     X-Ray Chest PA And Lateral; Future; Expected date: 07/02/2025  -     CBC Auto Differential; Future; Expected date: 07/02/2025  -     Sedimentation rate; Future; Expected date: 07/02/2025  -     Six Minute Walk Test to qualify for Home Oxygen; Future    Bronchitis  -     X-Ray Chest PA And Lateral; Future; Expected date: 07/02/2025  -     doxycycline (MONODOX) 100 MG capsule; Take 1 capsule (100 mg total) by mouth every 12 (twelve) hours.  Dispense: 20 capsule; Refill: 0  -     methylPREDNISolone " (MEDROL DOSEPACK) 4 mg tablet; use as directed  Dispense: 1 each; Refill: 0    Chronic coughing  -     benzonatate (TESSALON) 200 MG capsule; Take 1 capsule (200 mg total) by mouth 3 (three) times daily as needed for Cough.  Dispense: 90 capsule; Refill: 11                Outpatient Encounter Medications as of 7/2/2025   Medication Sig Dispense Refill    albuterol (PROVENTIL) 2.5 mg /3 mL (0.083 %) nebulizer solution Take 3 mLs (2.5 mg total) by nebulization every 4 to 6 hours as needed for Wheezing or Shortness of Breath. 360 mL 11    albuterol (PROVENTIL/VENTOLIN HFA) 90 mcg/actuation inhaler Inhale 2 puffs into the lungs every 4 (four) hours as needed for Wheezing or Shortness of Breath. 18 g 11    ascorbic acid, vitamin C, (VITAMIN C) 100 MG tablet Take 100 mg by mouth once daily.      aspirin (ECOTRIN) 81 MG EC tablet Take 325 mg by mouth once daily.       co-enzyme Q-10 30 mg capsule Take 30 mg by mouth once daily.      cyanocobalamin (VITAMIN B-12) 1000 MCG tablet Take 300 mcg by mouth once daily.      doxycycline (MONODOX) 100 MG capsule TAKE 1 CAPSULE BY MOUTH EVERY 12 HOURS 20 capsule 0    ELIQUIS 5 mg Tab Take 5 mg by mouth 2 (two) times daily.      ezetimibe (ZETIA) 10 mg tablet Take 10 mg by mouth once daily.      ferrous sulfate (FEOSOL) 325 mg (65 mg iron) Tab tablet Take 65 mg by mouth.      folic acid (FOLVITE) 800 MCG Tab Take 400 mcg by mouth.      furosemide (LASIX) 40 MG tablet   1    INFLIXIMAB (REMICADE IV) Inject into the vein.       ipratropium (ATROVENT) 21 mcg (0.03 %) nasal spray 2 sprays by Each Nostril route 3 (three) times daily as needed for Rhinitis (take before eating). 30 mL 11    ketoconazole (NIZORAL) 2 % cream SMARTSIG:Sparingly Topical Twice Daily      losartan potassium (LOSARTAN ORAL) Take by mouth.      memantine (NAMENDA) 10 MG Tab Take 10 mg by mouth 2 (two) times daily.      metoprolol tartrate (LOPRESSOR) 25 MG tablet TAKE 1/2 TABLET ORALLY TWICE A DAY      milk thistle  175 mg tablet Take 500 mg by mouth once daily.      milk thistle seed extract 175 mg Tab Take 500 mg by mouth.      multivit with minerals/lutein (MULTIVITAMIN 50 PLUS ORAL)       NITROSTAT 0.4 mg SL tablet 0.4 mg.      omega 3-dha-epa-fish oil 500-100-1,000 mg Cap Take by mouth 2 (two) times daily.      omeprazole (PRILOSEC) 40 MG capsule Take 40 mg by mouth every morning.      oxyCODONE-acetaminophen (PERCOCET) 5-325 mg per tablet Take 1 tablet by mouth every 4 (four) hours as needed.      potassium chloride (MICRO-K) 10 MEQ CpSR Take 10 mEq by mouth once.      predniSONE (DELTASONE) 5 MG tablet Take 1 tablet (5 mg total) by mouth 2 (two) times daily as needed (rheumatoid related pain). 60 tablet 1    sertraline (ZOLOFT) 50 MG tablet Take 50 mg by mouth once daily.      solifenacin (VESICARE) 5 MG tablet Take 5 mg by mouth every morning.      sotaloL (BETAPACE) 120 MG Tab Take 60 mg by mouth 2 (two) times daily.      SYNTHROID 100 mcg tablet Take 100 mcg by mouth every morning.      tamsulosin (FLOMAX) 0.4 mg Cap Take 1 capsule by mouth every evening.      testosterone cypionate (DEPOTESTOTERONE CYPIONATE) 200 mg/mL injection Inject 200 mg into the muscle every 14 (fourteen) days.      vitamin B12-folic acid 0.5-1 mg Tab       vitamin D 1000 units Tab Take 5,000 mg by mouth once daily.      VITAMIN K2 ORAL Take 100 mcg by mouth.      benzonatate (TESSALON) 200 MG capsule Take 1 capsule (200 mg total) by mouth 3 (three) times daily as needed for Cough. 90 capsule 11    cholecalciferol, vitamin D3, 10 mcg (400 unit) Cap       doxycycline (MONODOX) 100 MG capsule Take 1 capsule (100 mg total) by mouth every 12 (twelve) hours. 20 capsule 0    VANNESSA ORAL Take by mouth. 565 mg daily      iron,carb/vit C/vit B12/folic (IRON 100 PLUS ORAL) Take by mouth.      methylPREDNISolone (MEDROL DOSEPACK) 4 mg tablet use as directed 1 each 0    metoprolol succinate (TOPROL-XL) 25 MG 24 hr tablet Take 0.5 tablets (12.5 mg total)  by mouth once daily. 45 tablet 3    potassium/magnesium (MAGNESIUM-POTASSIUM ORAL) Take 600 mg by mouth once daily. 600 mg/ 198      saw palmetto 500 MG capsule Take 450 mg by mouth once daily.      triamcinolone acetonide 0.1% (KENALOG) 0.1 % cream Apply topically 2 (two) times daily. for 10 days 45 g 2    vit C/E/Zn/coppr/lutein/zeaxan (PRESERVISION AREDS-2 ORAL) Take by mouth once daily.      vitamin E 1000 UNIT capsule Take 1,000 Units by mouth once daily.      zinc gluconate 50 mg tablet Take 50 mg by mouth once daily.       Facility-Administered Encounter Medications as of 7/2/2025   Medication Dose Route Frequency Provider Last Rate Last Admin    acetaminophen tablet 650 mg  650 mg Oral Once PRN Farooq Corey MD        albuterol inhaler 2 puff  2 puff Inhalation Q20 Min PRN Farooq Corey MD        diphenhydrAMINE injection 25 mg  25 mg Intravenous Once PRN Farooq Corey MD        EPINEPHrine (EPIPEN) 0.3 mg/0.3 mL pen injection 0.3 mg  0.3 mg Intramuscular PRN Farooq Corey MD        methylPREDNISolone sodium succinate injection 40 mg  40 mg Intravenous Once PRN Farooq Corey MD        ondansetron disintegrating tablet 4 mg  4 mg Oral Once PRN Farooq Corey MD        sodium chloride 0.9% 500 mL flush bag   Intravenous PRFarooq Zimmer MD        sodium chloride 0.9% flush 10 mL  10 mL Intravenous PRN Farooq Corey MD         Plan:       Requested Prescriptions     Signed Prescriptions Disp Refills    doxycycline (MONODOX) 100 MG capsule 20 capsule 0     Sig: Take 1 capsule (100 mg total) by mouth every 12 (twelve) hours.    methylPREDNISolone (MEDROL DOSEPACK) 4 mg tablet 1 each 0     Sig: use as directed    benzonatate (TESSALON) 200 MG capsule 90 capsule 11     Sig: Take 1 capsule (200 mg total) by mouth 3 (three) times daily as needed for Cough.     Problem List Items Addressed This Visit       Chronic combined systolic and diastolic heart failure - Primary     Relevant Orders    X-Ray Chest PA And Lateral (Completed)    Cough    Relevant Medications    doxycycline (MONODOX) 100 MG capsule    methylPREDNISolone (MEDROL DOSEPACK) 4 mg tablet    benzonatate (TESSALON) 200 MG capsule    Other Relevant Orders    X-Ray Chest PA And Lateral (Completed)    CBC Auto Differential    Sedimentation rate (Completed)    Comprehensive Metabolic Panel     Other Visit Diagnoses         Exercise hypoxemia        Relevant Orders    Six Minute Walk Test to qualify for Home Oxygen      SOB (shortness of breath)        Relevant Orders    X-Ray Chest PA And Lateral (Completed)    CBC Auto Differential    Sedimentation rate (Completed)    Six Minute Walk Test to qualify for Home Oxygen      Bronchitis        Relevant Medications    doxycycline (MONODOX) 100 MG capsule    methylPREDNISolone (MEDROL DOSEPACK) 4 mg tablet    Other Relevant Orders    X-Ray Chest PA And Lateral (Completed)      Chronic coughing        Relevant Medications    benzonatate (TESSALON) 200 MG capsule                     Follow up in about 3 months (around 10/2/2025) for Review CXR - perform today, 6 min walk - on return.    MEDICAL DECISION MAKING: Moderate to high complexity.  Overall, the multiple problems listed are of moderate to high severity that may impact quality of life and activities of daily living. Side effects of medications, treatment plan as well as options and alternatives reviewed and discussed with patient. There was counseling of patient concerning these issues.    Total time spent in counseling and coordination of care - 35  minutes of total time spent on the encounter, which includes face to face time and non-face to face time preparing to see the patient (eg, review of tests), Obtaining and/or reviewing separately obtained history, Documenting clinical information in the electronic or other health record, Independently interpreting results (not separately reported) and communicating results to the  patient/family/caregiver, or Care coordination (not separately reported).    Time was used in discussion of prognosis, risks, benefits of treatment, instructions and compliance with regimen . Discussion with other physicians and/or health care providers - home health or for use of durable medical equipment (oxygen, nebulizers, CPAP, BiPAP) occurred

## 2025-07-08 ENCOUNTER — TELEPHONE (OUTPATIENT)
Dept: RHEUMATOLOGY | Facility: CLINIC | Age: 80
End: 2025-07-08
Payer: MEDICARE

## 2025-07-08 NOTE — TELEPHONE ENCOUNTER
Copied from CRM #[id number]. Topic: Medications - Medication Status Check   >> Jun 4, 2025  9:49 AM Madalyn wrote:  ..Type:  Patient Requesting Call    Who Called: Krista  Does the patient know what this is regarding?: pt wife is calling to discuss denial of medication refill  Would the patient rather a call back or a response via [facility]?  call  Best Call Back Number: .[phone] (home)  Additional Information:    This was resolved on 6/18/25.   Please see telephone encounter.

## 2025-07-11 ENCOUNTER — INFUSION (OUTPATIENT)
Dept: INFUSION THERAPY | Facility: HOSPITAL | Age: 80
End: 2025-07-11
Attending: PHYSICIAN ASSISTANT
Payer: MEDICARE

## 2025-07-11 ENCOUNTER — LAB VISIT (OUTPATIENT)
Dept: LAB | Facility: HOSPITAL | Age: 80
End: 2025-07-11
Attending: INTERNAL MEDICINE
Payer: MEDICARE

## 2025-07-11 VITALS
WEIGHT: 199.63 LBS | HEART RATE: 54 BPM | RESPIRATION RATE: 18 BRPM | TEMPERATURE: 97 F | DIASTOLIC BLOOD PRESSURE: 67 MMHG | SYSTOLIC BLOOD PRESSURE: 137 MMHG | BODY MASS INDEX: 31.33 KG/M2 | OXYGEN SATURATION: 94 % | HEIGHT: 67 IN

## 2025-07-11 DIAGNOSIS — Z51.81 MEDICATION MONITORING ENCOUNTER: ICD-10-CM

## 2025-07-11 DIAGNOSIS — D84.9 IMMUNOCOMPROMISED PATIENT: ICD-10-CM

## 2025-07-11 DIAGNOSIS — M05.79 RHEUMATOID ARTHRITIS INVOLVING MULTIPLE SITES WITH POSITIVE RHEUMATOID FACTOR: Primary | ICD-10-CM

## 2025-07-11 LAB
ABSOLUTE EOSINOPHIL (OHS): 0.4 K/UL
ABSOLUTE MONOCYTE (OHS): 0.86 K/UL (ref 0.3–1)
ABSOLUTE NEUTROPHIL COUNT (OHS): 4 K/UL (ref 1.8–7.7)
ALBUMIN SERPL BCP-MCNC: 3.1 G/DL (ref 3.5–5.2)
ALP SERPL-CCNC: 63 UNIT/L (ref 40–150)
ALT SERPL W/O P-5'-P-CCNC: 26 UNIT/L (ref 10–44)
ANION GAP (OHS): 7 MMOL/L (ref 8–16)
AST SERPL-CCNC: 24 UNIT/L (ref 11–45)
BASOPHILS # BLD AUTO: 0.04 K/UL
BASOPHILS NFR BLD AUTO: 0.5 %
BILIRUB SERPL-MCNC: 0.8 MG/DL (ref 0.1–1)
BUN SERPL-MCNC: 19 MG/DL (ref 8–23)
CALCIUM SERPL-MCNC: 9.1 MG/DL (ref 8.7–10.5)
CHLORIDE SERPL-SCNC: 102 MMOL/L (ref 95–110)
CO2 SERPL-SCNC: 26 MMOL/L (ref 23–29)
CREAT SERPL-MCNC: 1 MG/DL (ref 0.5–1.4)
ERYTHROCYTE [DISTWIDTH] IN BLOOD BY AUTOMATED COUNT: 14.1 % (ref 11.5–14.5)
GFR SERPLBLD CREATININE-BSD FMLA CKD-EPI: >60 ML/MIN/1.73/M2
GLUCOSE SERPL-MCNC: 116 MG/DL (ref 70–110)
HCT VFR BLD AUTO: 47.4 % (ref 40–54)
HGB BLD-MCNC: 16 GM/DL (ref 14–18)
IMM GRANULOCYTES # BLD AUTO: 0.02 K/UL (ref 0–0.04)
IMM GRANULOCYTES NFR BLD AUTO: 0.3 % (ref 0–0.5)
LYMPHOCYTES # BLD AUTO: 2.49 K/UL (ref 1–4.8)
MCH RBC QN AUTO: 34.2 PG (ref 27–31)
MCHC RBC AUTO-ENTMCNC: 33.8 G/DL (ref 32–36)
MCV RBC AUTO: 101 FL (ref 82–98)
NUCLEATED RBC (/100WBC) (OHS): 0 /100 WBC
PLATELET # BLD AUTO: 240 K/UL (ref 150–450)
PMV BLD AUTO: 8.5 FL (ref 9.2–12.9)
POTASSIUM SERPL-SCNC: 4.2 MMOL/L (ref 3.5–5.1)
PROT SERPL-MCNC: 7.8 GM/DL (ref 6–8.4)
RBC # BLD AUTO: 4.68 M/UL (ref 4.6–6.2)
RELATIVE EOSINOPHIL (OHS): 5.1 %
RELATIVE LYMPHOCYTE (OHS): 31.9 % (ref 18–48)
RELATIVE MONOCYTE (OHS): 11 % (ref 4–15)
RELATIVE NEUTROPHIL (OHS): 51.2 % (ref 38–73)
SODIUM SERPL-SCNC: 135 MMOL/L (ref 136–145)
WBC # BLD AUTO: 7.81 K/UL (ref 3.9–12.7)

## 2025-07-11 PROCEDURE — 82565 ASSAY OF CREATININE: CPT

## 2025-07-11 PROCEDURE — 25000003 PHARM REV CODE 250: Performed by: INTERNAL MEDICINE

## 2025-07-11 PROCEDURE — 85025 COMPLETE CBC W/AUTO DIFF WBC: CPT

## 2025-07-11 PROCEDURE — 96413 CHEMO IV INFUSION 1 HR: CPT

## 2025-07-11 PROCEDURE — 36415 COLL VENOUS BLD VENIPUNCTURE: CPT

## 2025-07-11 PROCEDURE — 96415 CHEMO IV INFUSION ADDL HR: CPT

## 2025-07-11 RX ORDER — SODIUM CHLORIDE 0.9 % (FLUSH) 0.9 %
10 SYRINGE (ML) INJECTION
OUTPATIENT
Start: 2025-09-05

## 2025-07-11 RX ORDER — SODIUM CHLORIDE 0.9 % (FLUSH) 0.9 %
10 SYRINGE (ML) INJECTION
Status: DISCONTINUED | OUTPATIENT
Start: 2025-07-11 | End: 2025-07-11 | Stop reason: HOSPADM

## 2025-07-11 RX ORDER — ACETAMINOPHEN 325 MG/1
650 TABLET ORAL
OUTPATIENT
Start: 2025-09-05

## 2025-07-11 RX ORDER — METHYLPREDNISOLONE SOD SUCC 125 MG
40 VIAL (EA) INJECTION
Status: DISCONTINUED | OUTPATIENT
Start: 2025-07-11 | End: 2025-07-11 | Stop reason: HOSPADM

## 2025-07-11 RX ORDER — EPINEPHRINE 0.3 MG/.3ML
0.3 INJECTION SUBCUTANEOUS ONCE AS NEEDED
OUTPATIENT
Start: 2025-09-05

## 2025-07-11 RX ORDER — METHYLPREDNISOLONE SOD SUCC 125 MG
40 VIAL (EA) INJECTION
OUTPATIENT
Start: 2025-09-05

## 2025-07-11 RX ORDER — ACETAMINOPHEN 325 MG/1
650 TABLET ORAL
Status: DISCONTINUED | OUTPATIENT
Start: 2025-07-11 | End: 2025-07-11 | Stop reason: HOSPADM

## 2025-07-11 RX ORDER — CETIRIZINE HYDROCHLORIDE 10 MG/1
10 TABLET ORAL
Status: DISCONTINUED | OUTPATIENT
Start: 2025-07-11 | End: 2025-07-11 | Stop reason: HOSPADM

## 2025-07-11 RX ORDER — IPRATROPIUM BROMIDE AND ALBUTEROL SULFATE 2.5; .5 MG/3ML; MG/3ML
3 SOLUTION RESPIRATORY (INHALATION)
OUTPATIENT
Start: 2025-09-05

## 2025-07-11 RX ORDER — EPINEPHRINE 0.3 MG/.3ML
0.3 INJECTION SUBCUTANEOUS ONCE AS NEEDED
Status: DISCONTINUED | OUTPATIENT
Start: 2025-07-11 | End: 2025-07-11 | Stop reason: HOSPADM

## 2025-07-11 RX ORDER — CETIRIZINE HYDROCHLORIDE 10 MG/1
10 TABLET ORAL
OUTPATIENT
Start: 2025-09-05

## 2025-07-11 RX ORDER — HEPARIN 100 UNIT/ML
500 SYRINGE INTRAVENOUS
OUTPATIENT
Start: 2025-09-05

## 2025-07-11 RX ORDER — DIPHENHYDRAMINE HYDROCHLORIDE 50 MG/ML
50 INJECTION, SOLUTION INTRAMUSCULAR; INTRAVENOUS ONCE AS NEEDED
Status: DISCONTINUED | OUTPATIENT
Start: 2025-07-11 | End: 2025-07-11 | Stop reason: HOSPADM

## 2025-07-11 RX ORDER — DIPHENHYDRAMINE HYDROCHLORIDE 50 MG/ML
50 INJECTION, SOLUTION INTRAMUSCULAR; INTRAVENOUS ONCE AS NEEDED
OUTPATIENT
Start: 2025-09-05

## 2025-07-11 RX ADMIN — SODIUM CHLORIDE 900 MG: 9 INJECTION, SOLUTION INTRAVENOUS at 10:07

## 2025-07-11 NOTE — PLAN OF CARE
Problem: Adult Inpatient Plan of Care  Goal: Plan of Care Review  Outcome: Progressing  Flowsheets (Taken 7/11/2025 1055)  Plan of Care Reviewed With: patient  Goal: Patient-Specific Goal (Individualized)  Outcome: Progressing  Flowsheets (Taken 7/11/2025 1055)  Individualized Care Needs: pt likes feet up  Anxieties, Fears or Concerns: pt denies  Patient/Family-Specific Goals (Include Timeframe): pt to tolerate infusion without reaction     Problem: Infection  Goal: Absence of Infection Signs and Symptoms  Outcome: Progressing

## 2025-07-11 NOTE — DISCHARGE INSTRUCTIONS
[facility]  [address] [facility]  [address]  [phone] phone     [phone] fax  Hours of Operation: Monday- Friday 8:00am- 5:00pm  After hours phone  [phone]  Hematology / Oncology Physicians on call      VINNIE Santacruz Dr., NP Phaon Dunbar, NP Khelsea Conley, FNP    Please call with any concerns regarding your appointment today.

## 2025-08-27 ENCOUNTER — LAB VISIT (OUTPATIENT)
Dept: LAB | Facility: HOSPITAL | Age: 80
End: 2025-08-27
Payer: MEDICARE

## 2025-08-27 DIAGNOSIS — M05.79 RHEUMATOID ARTHRITIS INVOLVING MULTIPLE SITES WITH POSITIVE RHEUMATOID FACTOR: ICD-10-CM

## 2025-08-27 DIAGNOSIS — Z79.899 IMMUNOSUPPRESSION DUE TO DRUG THERAPY: ICD-10-CM

## 2025-08-27 DIAGNOSIS — D84.821 IMMUNOSUPPRESSION DUE TO DRUG THERAPY: ICD-10-CM

## 2025-08-27 DIAGNOSIS — Z51.81 MEDICATION MONITORING ENCOUNTER: ICD-10-CM

## 2025-08-27 LAB
ABSOLUTE EOSINOPHIL (OHS): 0.4 K/UL
ABSOLUTE MONOCYTE (OHS): 0.84 K/UL (ref 0.3–1)
ABSOLUTE NEUTROPHIL COUNT (OHS): 4.86 K/UL (ref 1.8–7.7)
ALBUMIN SERPL BCP-MCNC: 3.6 G/DL (ref 3.5–5.2)
ALP SERPL-CCNC: 66 UNIT/L (ref 40–150)
ALT SERPL W/O P-5'-P-CCNC: 18 UNIT/L (ref 10–44)
ANION GAP (OHS): 8 MMOL/L (ref 8–16)
AST SERPL-CCNC: 32 UNIT/L (ref 11–45)
BASOPHILS # BLD AUTO: 0.05 K/UL
BASOPHILS NFR BLD AUTO: 0.6 %
BILIRUB SERPL-MCNC: 0.6 MG/DL (ref 0.1–1)
BUN SERPL-MCNC: 18 MG/DL (ref 8–23)
CALCIUM SERPL-MCNC: 9.4 MG/DL (ref 8.7–10.5)
CHLORIDE SERPL-SCNC: 103 MMOL/L (ref 95–110)
CO2 SERPL-SCNC: 27 MMOL/L (ref 23–29)
CREAT SERPL-MCNC: 1 MG/DL (ref 0.5–1.4)
CRP SERPL-MCNC: 7.1 MG/L
ERYTHROCYTE [DISTWIDTH] IN BLOOD BY AUTOMATED COUNT: 13.5 % (ref 11.5–14.5)
GFR SERPLBLD CREATININE-BSD FMLA CKD-EPI: >60 ML/MIN/1.73/M2
GLUCOSE SERPL-MCNC: 116 MG/DL (ref 70–110)
HCT VFR BLD AUTO: 42.3 % (ref 40–54)
HGB BLD-MCNC: 12 GM/DL (ref 14–18)
IMM GRANULOCYTES # BLD AUTO: 0.01 K/UL (ref 0–0.04)
IMM GRANULOCYTES NFR BLD AUTO: 0.1 % (ref 0–0.5)
LYMPHOCYTES # BLD AUTO: 2.67 K/UL (ref 1–4.8)
MCH RBC QN AUTO: 36.4 PG (ref 27–31)
MCHC RBC AUTO-ENTMCNC: 35.3 G/DL (ref 32–36)
MCV RBC AUTO: 105 FL (ref 82–98)
NUCLEATED RBC (/100WBC) (OHS): 0 /100 WBC
PLATELET # BLD AUTO: 255 K/UL (ref 150–450)
PMV BLD AUTO: 8.5 FL (ref 9.2–12.9)
POTASSIUM SERPL-SCNC: 4.4 MMOL/L (ref 3.5–5.1)
PROT SERPL-MCNC: 7.9 GM/DL (ref 6–8.4)
RBC # BLD AUTO: 4.03 M/UL (ref 4.6–6.2)
RELATIVE EOSINOPHIL (OHS): 4.5 %
RELATIVE LYMPHOCYTE (OHS): 30.2 % (ref 18–48)
RELATIVE MONOCYTE (OHS): 9.5 % (ref 4–15)
RELATIVE NEUTROPHIL (OHS): 55.1 % (ref 38–73)
SODIUM SERPL-SCNC: 138 MMOL/L (ref 136–145)
WBC # BLD AUTO: 8.83 K/UL (ref 3.9–12.7)

## 2025-08-27 PROCEDURE — 80053 COMPREHEN METABOLIC PANEL: CPT

## 2025-08-27 PROCEDURE — 85025 COMPLETE CBC W/AUTO DIFF WBC: CPT

## 2025-08-27 PROCEDURE — 86140 C-REACTIVE PROTEIN: CPT

## 2025-08-27 PROCEDURE — 36415 COLL VENOUS BLD VENIPUNCTURE: CPT

## 2025-09-03 ENCOUNTER — OFFICE VISIT (OUTPATIENT)
Dept: RHEUMATOLOGY | Facility: CLINIC | Age: 80
End: 2025-09-03
Payer: MEDICARE

## 2025-09-03 VITALS
HEIGHT: 67 IN | SYSTOLIC BLOOD PRESSURE: 127 MMHG | BODY MASS INDEX: 31.97 KG/M2 | DIASTOLIC BLOOD PRESSURE: 70 MMHG | WEIGHT: 203.69 LBS | HEART RATE: 62 BPM

## 2025-09-03 DIAGNOSIS — R52 BREAKTHROUGH PAIN: ICD-10-CM

## 2025-09-03 DIAGNOSIS — M05.10 RHEUMATOID LUNG: ICD-10-CM

## 2025-09-03 DIAGNOSIS — M06.9 RHEUMATOID ARTHRITIS FLARE: ICD-10-CM

## 2025-09-03 DIAGNOSIS — I50.42 CHRONIC COMBINED SYSTOLIC AND DIASTOLIC HEART FAILURE: ICD-10-CM

## 2025-09-03 DIAGNOSIS — D84.821 IMMUNOSUPPRESSION DUE TO DRUG THERAPY: ICD-10-CM

## 2025-09-03 DIAGNOSIS — Z71.89 COUNSELING ON HEALTH PROMOTION AND DISEASE PREVENTION: ICD-10-CM

## 2025-09-03 DIAGNOSIS — Z51.81 MEDICATION MONITORING ENCOUNTER: ICD-10-CM

## 2025-09-03 DIAGNOSIS — Z79.899 IMMUNOSUPPRESSION DUE TO DRUG THERAPY: ICD-10-CM

## 2025-09-03 DIAGNOSIS — M05.79 RHEUMATOID ARTHRITIS INVOLVING MULTIPLE SITES WITH POSITIVE RHEUMATOID FACTOR: Primary | ICD-10-CM

## 2025-09-03 DIAGNOSIS — Z78.9 FAILURE OF OUTPATIENT TREATMENT: ICD-10-CM

## 2025-09-03 DIAGNOSIS — Z79.620 INFLIXIMAB (REMICADE) LONG-TERM USE: ICD-10-CM

## 2025-09-03 PROCEDURE — G2211 COMPLEX E/M VISIT ADD ON: HCPCS | Mod: ,,, | Performed by: INTERNAL MEDICINE

## 2025-09-03 PROCEDURE — 99999 PR PBB SHADOW E&M-EST. PATIENT-LVL IV: CPT | Mod: PBBFAC,,, | Performed by: INTERNAL MEDICINE

## 2025-09-03 PROCEDURE — 99214 OFFICE O/P EST MOD 30 MIN: CPT | Mod: PBBFAC | Performed by: INTERNAL MEDICINE

## 2025-09-03 PROCEDURE — 99215 OFFICE O/P EST HI 40 MIN: CPT | Mod: S$PBB,,, | Performed by: INTERNAL MEDICINE

## 2025-09-03 RX ORDER — PREDNISONE 5 MG/1
5 TABLET ORAL 2 TIMES DAILY PRN
Qty: 60 TABLET | Refills: 1 | Status: SHIPPED | OUTPATIENT
Start: 2025-09-03 | End: 2025-11-02

## 2025-09-05 ENCOUNTER — INFUSION (OUTPATIENT)
Dept: INFUSION THERAPY | Facility: HOSPITAL | Age: 80
End: 2025-09-05
Attending: PHYSICIAN ASSISTANT
Payer: MEDICARE

## 2025-09-05 VITALS
OXYGEN SATURATION: 95 % | SYSTOLIC BLOOD PRESSURE: 145 MMHG | DIASTOLIC BLOOD PRESSURE: 66 MMHG | WEIGHT: 201.94 LBS | TEMPERATURE: 98 F | HEART RATE: 47 BPM | RESPIRATION RATE: 18 BRPM | BODY MASS INDEX: 31.63 KG/M2

## 2025-09-05 DIAGNOSIS — M05.79 RHEUMATOID ARTHRITIS INVOLVING MULTIPLE SITES WITH POSITIVE RHEUMATOID FACTOR: Primary | ICD-10-CM

## 2025-09-05 PROCEDURE — 63600175 PHARM REV CODE 636 W HCPCS: Mod: JZ,TB | Performed by: INTERNAL MEDICINE

## 2025-09-05 PROCEDURE — 96415 CHEMO IV INFUSION ADDL HR: CPT

## 2025-09-05 PROCEDURE — 96413 CHEMO IV INFUSION 1 HR: CPT

## 2025-09-05 PROCEDURE — 25000003 PHARM REV CODE 250: Performed by: INTERNAL MEDICINE

## 2025-09-05 RX ORDER — EPINEPHRINE 0.3 MG/.3ML
0.3 INJECTION SUBCUTANEOUS ONCE AS NEEDED
OUTPATIENT
Start: 2025-09-05 | End: 2037-02-01

## 2025-09-05 RX ORDER — ACETAMINOPHEN 325 MG/1
650 TABLET ORAL
OUTPATIENT
Start: 2025-09-05 | End: 2025-09-05

## 2025-09-05 RX ORDER — HEPARIN 100 UNIT/ML
500 SYRINGE INTRAVENOUS
OUTPATIENT
Start: 2025-09-05

## 2025-09-05 RX ORDER — DIPHENHYDRAMINE HYDROCHLORIDE 50 MG/ML
50 INJECTION, SOLUTION INTRAMUSCULAR; INTRAVENOUS ONCE AS NEEDED
OUTPATIENT
Start: 2025-09-05 | End: 2037-02-01

## 2025-09-05 RX ORDER — IPRATROPIUM BROMIDE AND ALBUTEROL SULFATE 2.5; .5 MG/3ML; MG/3ML
3 SOLUTION RESPIRATORY (INHALATION)
OUTPATIENT
Start: 2025-09-05 | End: 2025-09-05

## 2025-09-05 RX ORDER — METHYLPREDNISOLONE SOD SUCC 125 MG
40 VIAL (EA) INJECTION
OUTPATIENT
Start: 2025-09-05 | End: 2025-09-05

## 2025-09-05 RX ORDER — CETIRIZINE HYDROCHLORIDE 10 MG/1
10 TABLET ORAL
OUTPATIENT
Start: 2025-09-05 | End: 2025-09-05

## 2025-09-05 RX ORDER — SODIUM CHLORIDE 0.9 % (FLUSH) 0.9 %
10 SYRINGE (ML) INJECTION
Status: DISCONTINUED | OUTPATIENT
Start: 2025-09-05 | End: 2025-09-05 | Stop reason: HOSPADM

## 2025-09-05 RX ORDER — SODIUM CHLORIDE 0.9 % (FLUSH) 0.9 %
10 SYRINGE (ML) INJECTION
OUTPATIENT
Start: 2025-09-05

## 2025-09-05 RX ADMIN — INFLIXIMAB 900 MG: 100 INJECTION, POWDER, LYOPHILIZED, FOR SOLUTION INTRAVENOUS at 10:09
